# Patient Record
Sex: MALE | Race: WHITE | NOT HISPANIC OR LATINO | Employment: PART TIME | URBAN - METROPOLITAN AREA
[De-identification: names, ages, dates, MRNs, and addresses within clinical notes are randomized per-mention and may not be internally consistent; named-entity substitution may affect disease eponyms.]

---

## 2017-01-04 ENCOUNTER — OFFICE VISIT (OUTPATIENT)
Dept: FAMILY MEDICINE CLINIC | Facility: CLINIC | Age: 72
End: 2017-01-04

## 2017-01-04 VITALS
HEIGHT: 70 IN | TEMPERATURE: 97.5 F | HEART RATE: 70 BPM | RESPIRATION RATE: 16 BRPM | DIASTOLIC BLOOD PRESSURE: 83 MMHG | BODY MASS INDEX: 26.2 KG/M2 | SYSTOLIC BLOOD PRESSURE: 147 MMHG | WEIGHT: 183 LBS

## 2017-01-04 DIAGNOSIS — E78.2 MIXED HYPERLIPIDEMIA: Primary | ICD-10-CM

## 2017-01-04 PROBLEM — E78.5 HYPERLIPIDEMIA: Status: ACTIVE | Noted: 2017-01-04

## 2017-01-04 PROCEDURE — 99213 OFFICE O/P EST LOW 20 MIN: CPT | Performed by: FAMILY MEDICINE

## 2017-01-04 RX ORDER — NIACIN 500 MG
500 TABLET ORAL NIGHTLY
COMMUNITY
End: 2018-01-11

## 2017-01-04 NOTE — MR AVS SNAPSHOT
Td Melendez Jr.   2017 3:00 PM   Office Visit    Provider:  Beau Lee MD   Department:  [facility]   Dept Phone:  [phone]                Your Full Care Plan              Today's Medication Changes          These changes are accurate as of: 17  3:26 PM.  If you have any questions, ask your nurse or doctor.               Stop taking medication(s)listed here:     cetirizine 10 MG tablet   Commonly known as:  zyrTEC   Stopped by:  Beau Lee MD           vitamin b complex capsule capsule   Stopped by:  Beau Lee MD           Vitamin D (Cholecalciferol) 1000 UNITS capsule   Stopped by:  Beau Lee MD                      Your Updated Medication List          This list is accurate as of: 17  3:26 PM.  Always use your most recent med list.                alosetron 0.5 MG tablet   Commonly known as:  LOTRONEX       ALPRAZolam 0.25 MG tablet   Commonly known as:  XANAX       aspirin 81 MG chewable tablet       ezetimibe 10 MG tablet   Commonly known as:  ZETIA       glucosamine-chondroitin 500-400 MG capsule capsule       LIVALO 4 MG tablet   Generic drug:  Pitavastatin Calcium       niacin 500 MG tablet               You Were Diagnosed With        Codes Comments    Mixed hyperlipidemia    -  Primary ICD-10-CM: E78.2  ICD-9-CM: 272.2 new issue for me to address      Instructions     None    Patient Instructions History      eXelate Signup     [facility] eXelate allows you to send messages to your doctor, view your test results, renew your prescriptions, schedule appointments, and more. To sign up, go to [employer] and click on the Sign Up Now link in the New User? box. Enter your e[employer] Activation Code exactly as it appears below along with the last four digits of your Social Security Number and your Date of Birth () to complete the sign-up process. If you do not sign up before the expiration date, you must  "request a new code.    [employer] Activation Code: [email or web address]  Expires: 1/18/2017  3:23 PM    If you have questions, you can email [email or web address] or call [phone] to talk to our [employer] staff. Remember, [employer] is NOT to be used for urgent needs. For medical emergencies, dial 911.               Other Info from Your Visit           Your Appointments     Jul 12, 2017  3:15 PM EDT   Office Visit with Beau Lee MD   [facility] (--)    [address] [place] [id number] [phone]           Arrive 15 minutes prior to appointment.              Allergies     Augmentin [Amoxicillin-pot Clavulanate] Allergy Hives, Itching      Reason for Visit     Hyperlipidemia           Vital Signs     Blood Pressure Pulse Temperature Respirations Height Weight    147/83 70 97.5 °F (36.4 °C) (Oral) 16 70\" (177.8 cm) 183 lb (83 kg)    Body Mass Index Smoking Status                26.26 kg/m2 Former Smoker          Problems and Diagnoses Noted     High cholesterol or triglycerides      "

## 2017-01-04 NOTE — PROGRESS NOTES
"Subjective   Td Melendez Jr. is a 71 y.o. male.     History of Present Illness     Chief Complaint:   Chief Complaint   Patient presents with   • Hyperlipidemia       Td Melendez Jr. 71 y.o. male who presents today to establish care for Medical Management of the below listed issues and medication refills.  he has a history of   Patient Active Problem List   Diagnosis   • CAD (coronary artery disease)   • Hyperlipidemia   .  Since the last visit, he has overall felt well.  he has been compliant with   Current Outpatient Prescriptions:   •  niacin 500 MG tablet, Take 500 mg by mouth Every Night., Disp: , Rfl:   •  alosetron (LOTRONEX) 0.5 MG tablet, Take 0.5 mg by mouth. q other day, Disp: , Rfl:   •  ALPRAZolam (XANAX) 0.25 MG tablet, Take 0.25 mg by mouth as needed for anxiety., Disp: , Rfl:   •  aspirin 81 MG chewable tablet, Chew 81 mg daily., Disp: , Rfl:   •  ezetimibe (ZETIA) 10 MG tablet, Take 10 mg by mouth daily., Disp: , Rfl:   •  glucosamine-chondroitin 500-400 MG capsule capsule, Take  by mouth daily., Disp: , Rfl:   •  Pitavastatin Calcium (LIVALO) 4 MG tablet, Take  by mouth every night., Disp: , Rfl: .  he denies medication side effects.    He is UTD with his cardiologist. Most recent LDL was 63 (November 2016) with a slightly low HDL.    All of the chronic condition(s) listed above are stable w/o issues.    Sees GI in [place] that gives him the Lotronex.    BPs at home average 120's systolics.    Had myalgia issues with prior statins, too.     Visit Vitals   • /83   • Pulse 70   • Temp 97.5 °F (36.4 °C) (Oral)   • Resp 16   • Ht 70\" (177.8 cm)   • Wt 183 lb (83 kg)   • BMI 26.26 kg/m2       Results for orders placed or performed during the hospital encounter of 04/28/16   POCT rapid strep A   Result Value Ref Range    Rapid Strep A Screen Negative Negative, VALID, INVALID         The following portions of the patient's history were reviewed and updated as appropriate: allergies, " current medications, past family history, past medical history, past social history, past surgical history and problem list.    Review of Systems   Constitutional: Negative for activity change, chills, fatigue and fever.   Respiratory: Negative for cough and chest tightness.    Cardiovascular: Negative for chest pain and palpitations.   Gastrointestinal: Negative for abdominal pain and nausea.   Endocrine: Negative for cold intolerance and polydipsia.   Psychiatric/Behavioral: Negative for behavioral problems and dysphoric mood.   All other systems reviewed and are negative.      Objective   Physical Exam   Constitutional: He appears well-developed and well-nourished.   Neck: Neck supple. No thyromegaly present.   Cardiovascular: Normal rate and regular rhythm.    No murmur heard.  Pulmonary/Chest: Effort normal and breath sounds normal.   Abdominal: Bowel sounds are normal.   Psychiatric: He has a normal mood and affect. His behavior is normal.   Nursing note and vitals reviewed.      Assessment/Plan   Td was seen today for hyperlipidemia.    Diagnoses and all orders for this visit:    Mixed hyperlipidemia  Comments:  new issue for me to address      Diet/exercise discussed.  Ancillary, pt given samples of Viagra 50mg to try due to some chronic ED issues.

## 2017-07-12 ENCOUNTER — OFFICE VISIT (OUTPATIENT)
Dept: FAMILY MEDICINE CLINIC | Facility: CLINIC | Age: 72
End: 2017-07-12

## 2017-07-12 VITALS
SYSTOLIC BLOOD PRESSURE: 141 MMHG | OXYGEN SATURATION: 98 % | TEMPERATURE: 99.1 F | WEIGHT: 191 LBS | HEART RATE: 66 BPM | HEIGHT: 70 IN | DIASTOLIC BLOOD PRESSURE: 67 MMHG | BODY MASS INDEX: 27.35 KG/M2 | RESPIRATION RATE: 16 BRPM

## 2017-07-12 DIAGNOSIS — E78.2 MIXED HYPERLIPIDEMIA: Primary | ICD-10-CM

## 2017-07-12 DIAGNOSIS — I25.10 CORONARY ARTERY DISEASE INVOLVING NATIVE CORONARY ARTERY OF NATIVE HEART WITHOUT ANGINA PECTORIS: ICD-10-CM

## 2017-07-12 PROCEDURE — 99213 OFFICE O/P EST LOW 20 MIN: CPT | Performed by: FAMILY MEDICINE

## 2017-07-12 NOTE — PROGRESS NOTES
"Subjective   Td Melendez Jr. is a 71 y.o. male.     History of Present Illness     Chief Complaint:   Chief Complaint   Patient presents with   • Hyperlipidemia   • Coronary Artery Disease       Td Melendez Jr. 71 y.o. male who presents today for Medical Management of the below listed issues and medication refills.  he has a history of   Patient Active Problem List   Diagnosis   • CAD (coronary artery disease)   • Hyperlipidemia   .  Since the last visit, he has overall felt well.  he has been compliant with   Current Outpatient Prescriptions:   •  alosetron (LOTRONEX) 0.5 MG tablet, Take 0.5 mg by mouth. q other day, Disp: , Rfl:   •  ALPRAZolam (XANAX) 0.25 MG tablet, Take 0.25 mg by mouth as needed for anxiety., Disp: , Rfl:   •  aspirin 81 MG chewable tablet, Chew 81 mg daily., Disp: , Rfl:   •  ezetimibe (ZETIA) 10 MG tablet, Take 10 mg by mouth daily., Disp: , Rfl:   •  glucosamine-chondroitin 500-400 MG capsule capsule, Take  by mouth daily., Disp: , Rfl:   •  niacin 500 MG tablet, Take 500 mg by mouth Every Night., Disp: , Rfl:   •  Pitavastatin Calcium (LIVALO) 4 MG tablet, Take  by mouth every night., Disp: , Rfl: .  he denies medication side effects.    Recent labs from June show TC= 127, LDL=65, HDL=40, TEl=665, glucose=86, PSA=4.2.  All of the chronic condition(s) listed above are stable w/o issues.    /67  Pulse 66  Temp 99.1 °F (37.3 °C) (Oral)   Resp 16  Ht 70\" (177.8 cm)  Wt 191 lb (86.6 kg)  SpO2 98%  BMI 27.41 kg/m2    Results for orders placed or performed during the hospital encounter of 04/28/16   POCT rapid strep A   Result Value Ref Range    Rapid Strep A Screen Negative Negative, VALID, INVALID         The following portions of the patient's history were reviewed and updated as appropriate: allergies, current medications, past family history, past medical history, past social history, past surgical history and problem list.    Review of Systems   Constitutional: Negative " for activity change, chills, fatigue and fever.   Respiratory: Negative for cough and shortness of breath.    Cardiovascular: Negative for chest pain and palpitations.   Gastrointestinal: Negative for abdominal pain.   Endocrine: Negative for cold intolerance.   Psychiatric/Behavioral: Negative for behavioral problems and dysphoric mood. The patient is not nervous/anxious.        Objective   Physical Exam   Constitutional: He appears well-developed and well-nourished.   Neck: Neck supple. No thyromegaly present.   Cardiovascular: Normal rate and regular rhythm.    No murmur heard.  Pulmonary/Chest: Effort normal and breath sounds normal.   Abdominal: Bowel sounds are normal.   Psychiatric: He has a normal mood and affect. His behavior is normal.   Nursing note and vitals reviewed.      Assessment/Plan   Td was seen today for hyperlipidemia and coronary artery disease.    Diagnoses and all orders for this visit:    Mixed hyperlipidemia    Coronary artery disease involving native coronary artery of native heart without angina pectoris    Pt has plenty of medications currently and will continue same.    Offered urology consult for the PSA yet pt declines as he reports this has been around that level for some time.

## 2017-07-13 ENCOUNTER — OFFICE VISIT (OUTPATIENT)
Dept: CARDIOLOGY | Facility: CLINIC | Age: 72
End: 2017-07-13

## 2017-07-13 VITALS
HEIGHT: 70 IN | DIASTOLIC BLOOD PRESSURE: 80 MMHG | HEART RATE: 70 BPM | BODY MASS INDEX: 27.06 KG/M2 | WEIGHT: 189 LBS | SYSTOLIC BLOOD PRESSURE: 112 MMHG

## 2017-07-13 DIAGNOSIS — I25.10 CORONARY ARTERY DISEASE INVOLVING NATIVE CORONARY ARTERY OF NATIVE HEART WITHOUT ANGINA PECTORIS: Primary | ICD-10-CM

## 2017-07-13 PROCEDURE — 99213 OFFICE O/P EST LOW 20 MIN: CPT | Performed by: INTERNAL MEDICINE

## 2017-07-14 PROCEDURE — 93000 ELECTROCARDIOGRAM COMPLETE: CPT | Performed by: INTERNAL MEDICINE

## 2017-07-14 NOTE — PROGRESS NOTES
Date of Office Visit: 2017  Encounter Provider: Obi Boucher MD  Place of Service: [facility] CARDIOLOGY  Patient Name: Td Melendez Jr.  :1945    Chief Complaint   Patient presents with   • Coronary Artery Disease     1 year   :     HPI: Td Melendez Jr. is a 71 y.o. male who presents today to followup. He has a history of unstable angina and underwent drug-eluting stent placement to the left anterior descending artery and the right coronary artery in 2015. He has a nonobstructive lesion in the circumflex. In 2015, he presented with chest discomfort and he was quite anxious as it was right after the passing of his mother. He had a Cardiolite stress test, which was unremarkable. He has had no chest pain since then and is doing really done well.     Past Medical History:   Diagnosis Date   • Anxiety    • CAD (coronary artery disease)     unstable angina, 2015: 20% LM, long 70% LAD with abnormal FFR (s/p 2.81m51lg Xience), 50% OM1, 99% prox RCA (s/p 3.5x18m Xience).  Normal Cardiolite 2015.    • Fibromyalgia    • H/O complete eye exam    • Hypercholesterolemia    • Hyperlipidemia    • IBS (irritable bowel syndrome)    • Knee pain        Past Surgical History:   Procedure Laterality Date   • CARDIAC CATHETERIZATION      [facility] 2015: cath with 20% LM long 70% LAD (with positive FFR), 50% OM1, 99% prox RCA, s/p 2.75x33 Xience Alpine to LAD and 3.5x18mm Xience Alpine to RCA. LVEF 64% Normal Cardiolite 2015   • CARDIAC SURGERY      cardiac stents, 2   • COLONOSCOPY  5 years       Social History     Social History   • Marital status:      Spouse name: N/A   • Number of children: N/A   • Years of education: N/A     Occupational History   • Not on file.     Social History Main Topics   • Smoking status: Former Smoker   • Smokeless tobacco: Not on file   • Alcohol use No      Comment: caffeine use   • Drug use: No   • Sexual activity: Not on  "file     Other Topics Concern   • Not on file     Social History Narrative       Family History   Problem Relation Age of Onset   • Coronary artery disease Other    • Cervical cancer Mother    • Cancer Mother    • Depression Mother    • Heart disease Mother    • Liver disease Mother    • Alcohol abuse Mother    • Thyroid cancer Father    • Cancer Father    • Depression Father    • Diabetes Father    • Thyroid disease Father        Review of Systems   All other systems reviewed and are negative.      Allergies   Allergen Reactions   • Augmentin [Amoxicillin-Pot Clavulanate] Hives and Itching         Current Outpatient Prescriptions:   •  alosetron (LOTRONEX) 0.5 MG tablet, Take 0.5 mg by mouth. q other day, Disp: , Rfl:   •  ALPRAZolam (XANAX) 0.25 MG tablet, Take 0.25 mg by mouth as needed for anxiety., Disp: , Rfl:   •  aspirin 81 MG chewable tablet, Chew 81 mg daily., Disp: , Rfl:   •  ezetimibe (ZETIA) 10 MG tablet, Take 10 mg by mouth daily., Disp: , Rfl:   •  glucosamine-chondroitin 500-400 MG capsule capsule, Take  by mouth daily., Disp: , Rfl:   •  niacin 500 MG tablet, Take 500 mg by mouth Every Night., Disp: , Rfl:   •  Pitavastatin Calcium (LIVALO) 4 MG tablet, Take  by mouth every night., Disp: , Rfl:      Objective:     Vitals:    07/13/17 1352   BP: 112/80   BP Location: Left arm   Pulse: 70   Weight: 189 lb (85.7 kg)   Height: 70\" (177.8 cm)     Body mass index is 27.12 kg/(m^2).    Physical Exam   Constitutional: He is oriented to person, place, and time. He appears well-developed and well-nourished.   HENT:   Head: Normocephalic.   Nose: Nose normal.   Mouth/Throat: Oropharynx is clear and moist.   Eyes: Conjunctivae and EOM are normal. Pupils are equal, round, and reactive to light.   Neck: Normal range of motion. No JVD present.   Cardiovascular: Normal rate, regular rhythm, normal heart sounds and intact distal pulses.    No murmur heard.  Pulmonary/Chest: Effort normal and breath sounds normal. "   Abdominal: Soft. He exhibits no mass. There is no tenderness.   Musculoskeletal: Normal range of motion. He exhibits no edema.   Lymphadenopathy:     He has no cervical adenopathy.   Neurological: He is alert and oriented to person, place, and time. No cranial nerve deficit.   Skin: Skin is warm and dry. No rash noted.   Psychiatric: He has a normal mood and affect. His behavior is normal. Judgment and thought content normal.   Vitals reviewed.        ECG 12 Lead  Date/Time: 7/14/2017 4:03 PM  Performed by: OBI BOUCHER  Authorized by: OBI BOUCHER   Comparison: compared with previous ECG   Similar to previous ECG  Rhythm: sinus rhythm  Conduction: conduction normal  ST Segments: ST segments normal  T Waves: T waves normal  QRS axis: normal  Other: no other findings  Clinical impression: normal ECG              Assessment:       Diagnosis Plan   1. Coronary artery disease involving native coronary artery of native heart without angina pectoris            Plan:       1.  Coronary Artery Disease  Assessment  • The patient has no angina  • He's doing well.  He's on ezetimibe and pitavastatin.    Subjective - Objective  • There has been a previous stent procedure using MADELIN 7/2015  • Current antiplatelet therapy includes aspirin 81 mg          Sincerely,       Obi Boucher MD

## 2017-07-19 ENCOUNTER — TELEPHONE (OUTPATIENT)
Dept: CARDIOLOGY | Facility: CLINIC | Age: 72
End: 2017-07-19

## 2017-07-19 NOTE — TELEPHONE ENCOUNTER
----- Message from Lakshmi Padgett MA sent at 7/13/2017  2:48 PM EDT -----  Call Dr. Castillo Mancia's office for lab work .

## 2017-09-05 RX ORDER — LIDOCAINE 50 MG/G
1 PATCH TOPICAL EVERY 24 HOURS
Qty: 30 PATCH | Refills: 5 | Status: SHIPPED | OUTPATIENT
Start: 2017-09-05 | End: 2018-01-11

## 2017-09-05 RX ORDER — METHYLPREDNISOLONE 4 MG/1
TABLET ORAL
Qty: 21 TABLET | Refills: 5 | Status: SHIPPED | OUTPATIENT
Start: 2017-09-05 | End: 2017-10-05

## 2017-09-05 RX ORDER — CYCLOBENZAPRINE HCL 10 MG
10 TABLET ORAL 2 TIMES DAILY PRN
Qty: 60 TABLET | Refills: 1 | Status: SHIPPED | OUTPATIENT
Start: 2017-09-05 | End: 2017-10-05

## 2017-09-28 ENCOUNTER — TELEPHONE (OUTPATIENT)
Dept: FAMILY MEDICINE CLINIC | Facility: CLINIC | Age: 72
End: 2017-09-28

## 2017-09-28 DIAGNOSIS — I25.10 CORONARY ARTERY DISEASE INVOLVING NATIVE CORONARY ARTERY OF NATIVE HEART WITHOUT ANGINA PECTORIS: Primary | ICD-10-CM

## 2017-09-28 DIAGNOSIS — E78.2 MIXED HYPERLIPIDEMIA: ICD-10-CM

## 2017-09-28 RX ORDER — EZETIMIBE 10 MG/1
10 TABLET ORAL DAILY
Qty: 90 TABLET | Refills: 2 | Status: SHIPPED | OUTPATIENT
Start: 2017-09-28 | End: 2018-06-28 | Stop reason: SDUPTHER

## 2017-09-28 NOTE — TELEPHONE ENCOUNTER
Pt need refill. Also asked pt to see Dr. Lee regarding something for anxiety, because we do not do long term scheduled medications

## 2017-10-05 ENCOUNTER — OFFICE VISIT (OUTPATIENT)
Dept: FAMILY MEDICINE CLINIC | Facility: CLINIC | Age: 72
End: 2017-10-05

## 2017-10-05 VITALS
HEIGHT: 70 IN | BODY MASS INDEX: 27.06 KG/M2 | RESPIRATION RATE: 16 BRPM | DIASTOLIC BLOOD PRESSURE: 72 MMHG | HEART RATE: 70 BPM | TEMPERATURE: 98.1 F | WEIGHT: 189 LBS | SYSTOLIC BLOOD PRESSURE: 131 MMHG

## 2017-10-05 DIAGNOSIS — R97.20 ELEVATED PSA: ICD-10-CM

## 2017-10-05 DIAGNOSIS — E78.2 MIXED HYPERLIPIDEMIA: ICD-10-CM

## 2017-10-05 DIAGNOSIS — Z00.00 MEDICARE ANNUAL WELLNESS VISIT, SUBSEQUENT: Primary | ICD-10-CM

## 2017-10-05 DIAGNOSIS — M54.50 CHRONIC RIGHT-SIDED LOW BACK PAIN WITHOUT SCIATICA: ICD-10-CM

## 2017-10-05 DIAGNOSIS — G89.29 CHRONIC RIGHT-SIDED LOW BACK PAIN WITHOUT SCIATICA: ICD-10-CM

## 2017-10-05 PROCEDURE — 99214 OFFICE O/P EST MOD 30 MIN: CPT | Performed by: FAMILY MEDICINE

## 2017-10-05 PROCEDURE — G0439 PPPS, SUBSEQ VISIT: HCPCS | Performed by: FAMILY MEDICINE

## 2017-10-05 NOTE — PROGRESS NOTES
QUICK REFERENCE INFORMATION:  The ABCs of the Annual Wellness Visit    Subsequent Medicare Wellness Visit    HEALTH RISK ASSESSMENT    1945    Recent Hospitalizations:  No hospitalization(s) within the last year..        Current Medical Providers:  Patient Care Team:  Beau Lee MD as PCP - General (Family Medicine)  Obi Boucher MD as Consulting Physician (Cardiology)        Smoking Status:  History   Smoking Status   • Former Smoker   Smokeless Tobacco   • Never Used       Alcohol Consumption:  History   Alcohol Use No     Comment: caffeine use       Depression Screen:   PHQ-2/PHQ-9 Depression Screening 10/5/2017   Little interest or pleasure in doing things 2   Feeling down, depressed, or hopeless 0   Trouble falling or staying asleep, or sleeping too much 0   Feeling tired or having little energy 0   Poor appetite or overeating 1   Feeling bad about yourself - or that you are a failure or have let yourself or your family down 0   Trouble concentrating on things, such as reading the newspaper or watching television 0   Moving or speaking so slowly that other people could have noticed. Or the opposite - being so fidgety or restless that you have been moving around a lot more than usual 0   Thoughts that you would be better off dead, or of hurting yourself in some way 0   Total Score 3   If you checked off any problems, how difficult have these problems made it for you to do your work, take care of things at home, or get along with other people? Somewhat difficult       Health Habits and Functional and Cognitive Screening:  Functional & Cognitive Status 10/5/2017   Do you have difficulty preparing food and eating? No   Do you have difficulty bathing yourself? No   Do you have difficulty getting dressed? No   Do you have difficulty using the toilet? No   Do you have difficulty moving around from place to place? No   In the past year have you fallen or experienced a near fall? No   Do you need help using  the phone?  No   Are you deaf or do you have serious difficulty hearing?  No   Do you need help with transportation? No   Do you need help shopping? No   Do you need help preparing meals?  No   Do you need help with housework?  No   Do you need help with laundry? No   Do you need help taking your medications? No   Do you need help managing money? No   Do you have difficulty concentrating, remembering or making decisions? No       Health Habits  Current Diet: Limited Junk Food  Dental Exam: Up to date  Eye Exam: Not up to date  Exercise (times per week): 7 times per week  Current Exercise Activities Include: Yoga      Does the patient have evidence of cognitive impairment? No    Aspirin use counseling: Taking ASA appropriately as indicated      Recent Lab Results:  CMP:  Lab Results   Component Value Date    BUN 20 11/22/2015    CREATININE 0.89 11/22/2015     11/22/2015    K 3.9 11/22/2015    CO2 26 11/22/2015    CALCIUM 9.2 11/22/2015    ALBUMIN 4.3 11/22/2015    BILITOT 0.8 11/22/2015    ALKPHOS 68 11/22/2015    AST 25 11/22/2015    ALT 30 11/22/2015     Lipid Panel:     HbA1c:       Visual Acuity:  No exam data present    Age-appropriate Screening Schedule:  Refer to the list below for future screening recommendations based on patient's age, sex and/or medical conditions. Orders for these recommended tests are listed in the plan section. The patient has been provided with a written plan.    Health Maintenance   Topic Date Due   • LIPID PANEL  01/04/2017   • INFLUENZA VACCINE  08/01/2017   • PNEUMOCOCCAL VACCINES (65+ LOW/MEDIUM RISK) (2 of 2 - PPSV23) 07/04/2018 (Originally 1/1/2017)   • TDAP/TD VACCINES (2 - Td) 01/01/2018   • COLONOSCOPY  06/01/2021   • ZOSTER VACCINE  Completed        Subjective   History of Present Illness    Td Melendez Jr. is a 71 y.o. male who presents for an Subsequent Wellness Visit.    The following portions of the patient's history were reviewed and updated as appropriate:  "allergies, current medications, past family history, past medical history, past social history, past surgical history and problem list.    Outpatient Medications Prior to Visit   Medication Sig Dispense Refill   • alosetron (LOTRONEX) 0.5 MG tablet Take 0.5 mg by mouth. q other day     • ALPRAZolam (XANAX) 0.25 MG tablet Take 0.25 mg by mouth as needed for anxiety.     • aspirin 81 MG chewable tablet Chew 81 mg daily.     • ezetimibe (ZETIA) 10 MG tablet Take 1 tablet by mouth Daily. 90 tablet 2   • glucosamine-chondroitin 500-400 MG capsule capsule Take  by mouth daily.     • lidocaine (LIDODERM) 5 % Place 1 patch on the skin Daily. Remove & Discard patch within 12 hours or as directed by MD 30 patch 5   • niacin 500 MG tablet Take 500 mg by mouth Every Night.     • Pitavastatin Calcium (LIVALO) 4 MG tablet Take 1 tablet by mouth Every Night. 90 tablet 2   • cyclobenzaprine (FLEXERIL) 10 MG tablet Take 1 tablet by mouth 2 (Two) Times a Day As Needed for Muscle Spasms. 60 tablet 1   • MethylPREDNISolone (MEDROL, BUCKY,) 4 MG tablet Take as directed on package instructions. 21 tablet 5     No facility-administered medications prior to visit.        Patient Active Problem List   Diagnosis   • CAD (coronary artery disease)   • Hyperlipidemia       Advance Care Planning:  has an advance directive - a copy HAS NOT been provided    Identification of Risk Factors:  Risk factors include: cardiovascular risk.    Review of Systems    Compared to one year ago, the patient feels his physical health is the same.  Compared to one year ago, the patient feels his mental health is the same.    Objective     Physical Exam    Vitals:    10/05/17 0921   BP: 131/72   Pulse: 70   Resp: 16   Temp: 98.1 °F (36.7 °C)   TempSrc: Oral   Weight: 189 lb (85.7 kg)   Height: 70\" (177.8 cm)   PainSc: 4  Comment: low back pain       Body mass index is 27.12 kg/(m^2).  Discussed the patient's BMI with him. The BMI is in the acceptable " range.    Assessment/Plan   Patient Self-Management and Personalized Health Advice  The patient has been provided with information about: diet, exercise and weight management and preventive services including:   · Exercise counseling provided, Fall Risk assessment done, Nutrition counseling provided.    Visit Diagnoses:    ICD-10-CM ICD-9-CM   1. Medicare annual wellness visit, subsequent Z00.00 V70.0   2. Chronic right-sided low back pain without sciatica M54.5 724.2    G89.29 338.29   3. Mixed hyperlipidemia E78.2 272.2   4. Elevated PSA R97.20 790.93       Orders Placed This Encounter   Procedures   • Lipid Panel     Standing Status:   Future   • Comprehensive Metabolic Panel     Standing Status:   Future   • PSA     Standing Status:   Future       Outpatient Encounter Prescriptions as of 10/5/2017   Medication Sig Dispense Refill   • alosetron (LOTRONEX) 0.5 MG tablet Take 0.5 mg by mouth. q other day     • ALPRAZolam (XANAX) 0.25 MG tablet Take 0.25 mg by mouth as needed for anxiety.     • aspirin 81 MG chewable tablet Chew 81 mg daily.     • diclofenac (VOLTAREN) 50 MG EC tablet Take 1 tablet by mouth 2 (Two) Times a Day. 60 tablet 5   • ezetimibe (ZETIA) 10 MG tablet Take 1 tablet by mouth Daily. 90 tablet 2   • glucosamine-chondroitin 500-400 MG capsule capsule Take  by mouth daily.     • lidocaine (LIDODERM) 5 % Place 1 patch on the skin Daily. Remove & Discard patch within 12 hours or as directed by MD 30 patch 5   • niacin 500 MG tablet Take 500 mg by mouth Every Night.     • Pitavastatin Calcium (LIVALO) 4 MG tablet Take 1 tablet by mouth Every Night. 90 tablet 2   • [DISCONTINUED] cyclobenzaprine (FLEXERIL) 10 MG tablet Take 1 tablet by mouth 2 (Two) Times a Day As Needed for Muscle Spasms. 60 tablet 1   • [DISCONTINUED] MethylPREDNISolone (MEDROL, BUCKY,) 4 MG tablet Take as directed on package instructions. 21 tablet 5     No facility-administered encounter medications on file as of 10/5/2017.         Reviewed use of high risk medication in the elderly: not applicable  Reviewed for potential of harmful drug interactions in the elderly: not applicable    Follow Up:  No Follow-up on file.     An After Visit Summary and PPPS with all of these plans were given to the patient.

## 2017-10-05 NOTE — PROGRESS NOTES
"Subjective   Td Melendez Jr. is a 71 y.o. male.     CC: Back Pain    History of Present Illness     Pt comes in today c/o LBP since stepping off a curb recently.  This happened Labor Day weekend and, the day after the curb issue, he walked a whole lot the next day, further aggravating it. Has had back trouble going back to the early 2000's. Had MRI then. Hurt more with standing and sitting. Is some better since it happened. Pain is right-sided and does not radiate. Denies spasms currently. \"Achy\". Used Prednisone early in September. Using ASA QD. Doing some home PT he's learned prior.       The following portions of the patient's history were reviewed and updated as appropriate: allergies, current medications, past family history, past medical history, past social history, past surgical history and problem list.    Review of Systems   Constitutional: Negative for activity change, chills, fatigue and fever.   Respiratory: Negative for cough and shortness of breath.    Cardiovascular: Negative for chest pain and palpitations.   Gastrointestinal: Negative for abdominal pain.   Endocrine: Negative for cold intolerance.   Musculoskeletal: Positive for back pain.   Neurological: Negative for weakness and numbness.   Psychiatric/Behavioral: Negative for behavioral problems and dysphoric mood. The patient is not nervous/anxious.      /72  Pulse 70  Temp 98.1 °F (36.7 °C) (Oral)   Resp 16  Ht 70\" (177.8 cm)  Wt 189 lb (85.7 kg)  BMI 27.12 kg/m2    Objective   Physical Exam   Constitutional: He appears well-developed and well-nourished.   Neck: Neck supple. No thyromegaly present.   Cardiovascular: Normal rate and regular rhythm.    No murmur heard.  Pulmonary/Chest: Effort normal and breath sounds normal.   Abdominal: Bowel sounds are normal.   Musculoskeletal: He exhibits tenderness (mildly tender right facet region, lower lumbar).   Negative SLR   Psychiatric: He has a normal mood and affect. His behavior " is normal.   Nursing note and vitals reviewed.  Discussed labs his prior Md completed in June esp.regarding the mildly elevated PSA. He wishes to monitor with labs at this time vs seeing urology.    Assessment/Plan   Td was seen today for medicare wellness exam and low back pain.    Diagnoses and all orders for this visit:    Medicare annual wellness visit, subsequent    Chronic right-sided low back pain without sciatica  -     diclofenac (VOLTAREN) 50 MG EC tablet; Take 1 tablet by mouth 2 (Two) Times a Day.    Mixed hyperlipidemia  -     Lipid Panel; Future  -     Comprehensive Metabolic Panel; Future    Elevated PSA  -     PSA; Future

## 2017-10-05 NOTE — PATIENT INSTRUCTIONS
Medicare Wellness  Personal Prevention Plan of Service     Date of Office Visit:  10/05/2017  Encounter Provider:  Beau Lee MD  Place of Service:  [facility]  Patient Name: Td Melendez Jr.  :  1945    As part of the Medicare Wellness portion of your visit today, we are providing you with this personalized preventive plan of services (PPPS). This plan is based upon recommendations of the [place] Preventive Services Task Force (USPSTF) and the Advisory Committee on Immunization Practices (ACIP).    This lists the preventive care services that should be considered, and provides dates of when you are due. Items listed as completed are up-to-date and do not require any further intervention.    Health Maintenance   Topic Date Due   • LIPID PANEL  2017   • INFLUENZA VACCINE  2017   • PNEUMOCOCCAL VACCINES (65+ LOW/MEDIUM RISK) (2 of 2 - PPSV23) 2018 (Originally 2017)   • TDAP/TD VACCINES (2 - Td) 2018   • MEDICARE ANNUAL WELLNESS  10/05/2018   • COLONOSCOPY  2021   • ZOSTER VACCINE  Completed   • HEPATITIS C SCREENING  Excluded   • AAA SCREEN (ONE-TIME)  Excluded       Orders Placed This Encounter   Procedures   • Lipid Panel     Standing Status:   Future   • Comprehensive Metabolic Panel     Standing Status:   Future   • PSA     Standing Status:   Future       No Follow-up on file.

## 2017-11-09 ENCOUNTER — TELEPHONE (OUTPATIENT)
Dept: FAMILY MEDICINE CLINIC | Facility: CLINIC | Age: 72
End: 2017-11-09

## 2017-11-09 DIAGNOSIS — N52.9 ERECTILE DYSFUNCTION, UNSPECIFIED ERECTILE DYSFUNCTION TYPE: Primary | ICD-10-CM

## 2017-11-09 RX ORDER — SILDENAFIL 50 MG/1
TABLET, FILM COATED ORAL
Qty: 20 TABLET | Refills: 6 | Status: SHIPPED | OUTPATIENT
Start: 2017-11-09 | End: 2017-11-10

## 2017-11-09 NOTE — TELEPHONE ENCOUNTER
----- Message from Td Melendez Jr. sent at 11/8/2017  3:09 PM EST -----  Regarding: RE: Prescription Question  Contact: [phone]  The [facility] on [address] in [place] ([phone]),  Sildenafil 20 mg, 20 tabs    Td Mendoza  ----- Message -----  From: Beau Lee MD  Sent: 11/8/2017 12:28 PM EST  To: Td Melendez Jr.  Subject: RE: Prescription Question    Insurance simply will not cover these except for very rare circumstances. I would be happy to send some in. Where would you like me to send them?          ----- Message -----     From: Td Melendez Jr.     Sent: 11/7/2017  3:04 PM EST       To: Beau Lee MD  Subject: Prescription Question    We had discussed possible prescription for Sildenafil in the past. A generic is available locally at 20mg dose. To get insurance coverage requires a coverage review. Is this an option? Full cost isn't prohibitive either. I was thinking 20 tablets.    Td Melendez

## 2017-11-10 RX ORDER — SILDENAFIL CITRATE 20 MG/1
TABLET ORAL
Qty: 30 TABLET | Refills: 11 | Status: SHIPPED | OUTPATIENT
Start: 2017-11-10 | End: 2019-10-04 | Stop reason: SDUPTHER

## 2017-11-16 ENCOUNTER — OFFICE VISIT (OUTPATIENT)
Dept: FAMILY MEDICINE CLINIC | Facility: CLINIC | Age: 72
End: 2017-11-16

## 2017-11-16 VITALS
BODY MASS INDEX: 27.2 KG/M2 | HEART RATE: 68 BPM | SYSTOLIC BLOOD PRESSURE: 135 MMHG | DIASTOLIC BLOOD PRESSURE: 78 MMHG | WEIGHT: 190 LBS | TEMPERATURE: 98.2 F | RESPIRATION RATE: 16 BRPM | HEIGHT: 70 IN

## 2017-11-16 DIAGNOSIS — M54.2 CERVICALGIA: Primary | ICD-10-CM

## 2017-11-16 PROCEDURE — 99214 OFFICE O/P EST MOD 30 MIN: CPT | Performed by: FAMILY MEDICINE

## 2017-11-16 PROCEDURE — 72050 X-RAY EXAM NECK SPINE 4/5VWS: CPT | Performed by: FAMILY MEDICINE
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
## 2017-12-05 ENCOUNTER — RESULTS ENCOUNTER (OUTPATIENT)
Dept: FAMILY MEDICINE CLINIC | Facility: CLINIC | Age: 72
End: 2017-12-05

## 2017-12-05 DIAGNOSIS — E78.2 MIXED HYPERLIPIDEMIA: ICD-10-CM

## 2017-12-05 DIAGNOSIS — R97.20 ELEVATED PSA: ICD-10-CM

## 2017-12-07 ENCOUNTER — TREATMENT (OUTPATIENT)
Dept: PHYSICAL THERAPY | Facility: CLINIC | Age: 72
End: 2017-12-07

## 2017-12-07 DIAGNOSIS — M54.2 PAIN, NECK: Primary | ICD-10-CM

## 2017-12-07 PROCEDURE — 97161 PT EVAL LOW COMPLEX 20 MIN: CPT | Performed by: PHYSICAL THERAPIST

## 2017-12-07 PROCEDURE — 97112 NEUROMUSCULAR REEDUCATION: CPT | Performed by: PHYSICAL THERAPIST

## 2017-12-07 PROCEDURE — 97110 THERAPEUTIC EXERCISES: CPT | Performed by: PHYSICAL THERAPIST
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
## 2017-12-07 NOTE — PATIENT INSTRUCTIONS
Sleep position and pillow height - Relax the Back recommendation  Pathology and involved anatomy  HEP performance  Please view My [facility] Td Melendez Jr. for a complete list of HEP instructions.  Handout given for ulnar nerve glide

## 2017-12-10 ENCOUNTER — APPOINTMENT (OUTPATIENT)
Dept: GENERAL RADIOLOGY | Facility: HOSPITAL | Age: 72
End: 2017-12-10

## 2017-12-10 ENCOUNTER — HOSPITAL ENCOUNTER (EMERGENCY)
Facility: HOSPITAL | Age: 72
Discharge: HOME | End: 2017-12-10
Attending: EMERGENCY MEDICINE | Admitting: EMERGENCY MEDICINE

## 2017-12-10 VITALS
HEIGHT: 70 IN | WEIGHT: 187 LBS | TEMPERATURE: 97.8 F | BODY MASS INDEX: 26.77 KG/M2 | DIASTOLIC BLOOD PRESSURE: 74 MMHG | RESPIRATION RATE: 16 BRPM | OXYGEN SATURATION: 93 % | SYSTOLIC BLOOD PRESSURE: 138 MMHG | HEART RATE: 63 BPM

## 2017-12-10 DIAGNOSIS — R07.9 CHEST PAIN, UNSPECIFIED TYPE: Primary | ICD-10-CM

## 2017-12-10 LAB
ALBUMIN SERPL-MCNC: 4.1 G/DL (ref 3.5–5.2)
ALBUMIN/GLOB SERPL: 1.5 G/DL
ALP SERPL-CCNC: 82 U/L (ref 39–117)
ALT SERPL W P-5'-P-CCNC: 29 U/L (ref 1–41)
ANION GAP SERPL CALCULATED.3IONS-SCNC: 11.4 MMOL/L
AST SERPL-CCNC: 20 U/L (ref 1–40)
BASOPHILS # BLD AUTO: 0.01 10*3/MM3 (ref 0–0.2)
BASOPHILS NFR BLD AUTO: 0.2 % (ref 0–1.5)
BILIRUB SERPL-MCNC: 0.7 MG/DL (ref 0.1–1.2)
BUN BLD-MCNC: 20 MG/DL (ref 8–23)
BUN/CREAT SERPL: 21.5 (ref 7–25)
CALCIUM SPEC-SCNC: 9.1 MG/DL (ref 8.6–10.5)
CHLORIDE SERPL-SCNC: 105 MMOL/L (ref 98–107)
CO2 SERPL-SCNC: 25.6 MMOL/L (ref 22–29)
CREAT BLD-MCNC: 0.93 MG/DL (ref 0.76–1.27)
D DIMER PPP FEU-MCNC: <0.27 MCGFEU/ML (ref 0–0.49)
DEPRECATED RDW RBC AUTO: 42.5 FL (ref 37–54)
EOSINOPHIL # BLD AUTO: 0.08 10*3/MM3 (ref 0–0.7)
EOSINOPHIL NFR BLD AUTO: 1.3 % (ref 0.3–6.2)
ERYTHROCYTE [DISTWIDTH] IN BLOOD BY AUTOMATED COUNT: 12.8 % (ref 11.5–14.5)
GFR SERPL CREATININE-BSD FRML MDRD: 80 ML/MIN/1.73
GLOBULIN UR ELPH-MCNC: 2.8 GM/DL
GLUCOSE BLD-MCNC: 104 MG/DL (ref 65–99)
HCT VFR BLD AUTO: 47.8 % (ref 40.4–52.2)
HGB BLD-MCNC: 16.6 G/DL (ref 13.7–17.6)
HOLD SPECIMEN: NORMAL
IMM GRANULOCYTES # BLD: 0 10*3/MM3 (ref 0–0.03)
IMM GRANULOCYTES NFR BLD: 0 % (ref 0–0.5)
LYMPHOCYTES # BLD AUTO: 1.65 10*3/MM3 (ref 0.9–4.8)
LYMPHOCYTES NFR BLD AUTO: 27.6 % (ref 19.6–45.3)
MCH RBC QN AUTO: 31.9 PG (ref 27–32.7)
MCHC RBC AUTO-ENTMCNC: 34.7 G/DL (ref 32.6–36.4)
MCV RBC AUTO: 91.7 FL (ref 79.8–96.2)
MONOCYTES # BLD AUTO: 0.47 10*3/MM3 (ref 0.2–1.2)
MONOCYTES NFR BLD AUTO: 7.9 % (ref 5–12)
NEUTROPHILS # BLD AUTO: 3.77 10*3/MM3 (ref 1.9–8.1)
NEUTROPHILS NFR BLD AUTO: 63 % (ref 42.7–76)
PLATELET # BLD AUTO: 166 10*3/MM3 (ref 140–500)
PMV BLD AUTO: 10.1 FL (ref 6–12)
POTASSIUM BLD-SCNC: 4.4 MMOL/L (ref 3.5–5.2)
PROT SERPL-MCNC: 6.9 G/DL (ref 6–8.5)
RBC # BLD AUTO: 5.21 10*6/MM3 (ref 4.6–6)
SODIUM BLD-SCNC: 142 MMOL/L (ref 136–145)
TROPONIN T SERPL-MCNC: <0.01 NG/ML (ref 0–0.03)
TROPONIN T SERPL-MCNC: <0.01 NG/ML (ref 0–0.03)
WBC NRBC COR # BLD: 5.98 10*3/MM3 (ref 4.5–10.7)
WHOLE BLOOD HOLD SPECIMEN: NORMAL

## 2017-12-10 PROCEDURE — 96375 TX/PRO/DX INJ NEW DRUG ADDON: CPT

## 2017-12-10 PROCEDURE — 85025 COMPLETE CBC W/AUTO DIFF WBC: CPT | Performed by: EMERGENCY MEDICINE

## 2017-12-10 PROCEDURE — 96374 THER/PROPH/DIAG INJ IV PUSH: CPT

## 2017-12-10 PROCEDURE — 25010000002 ONDANSETRON PER 1 MG: Performed by: EMERGENCY MEDICINE

## 2017-12-10 PROCEDURE — 85379 FIBRIN DEGRADATION QUANT: CPT | Performed by: EMERGENCY MEDICINE

## 2017-12-10 PROCEDURE — 99285 EMERGENCY DEPT VISIT HI MDM: CPT

## 2017-12-10 PROCEDURE — 36415 COLL VENOUS BLD VENIPUNCTURE: CPT

## 2017-12-10 PROCEDURE — 93010 ELECTROCARDIOGRAM REPORT: CPT | Performed by: INTERNAL MEDICINE

## 2017-12-10 PROCEDURE — 71020 HC CHEST PA AND LATERAL: CPT

## 2017-12-10 PROCEDURE — 80053 COMPREHEN METABOLIC PANEL: CPT | Performed by: EMERGENCY MEDICINE

## 2017-12-10 PROCEDURE — 93005 ELECTROCARDIOGRAM TRACING: CPT

## 2017-12-10 PROCEDURE — 84484 ASSAY OF TROPONIN QUANT: CPT | Performed by: EMERGENCY MEDICINE

## 2017-12-10 RX ORDER — FAMOTIDINE 10 MG/ML
20 INJECTION, SOLUTION INTRAVENOUS ONCE
Status: COMPLETED | OUTPATIENT
Start: 2017-12-10 | End: 2017-12-10

## 2017-12-10 RX ORDER — ONDANSETRON 2 MG/ML
4 INJECTION INTRAMUSCULAR; INTRAVENOUS ONCE
Status: COMPLETED | OUTPATIENT
Start: 2017-12-10 | End: 2017-12-10

## 2017-12-10 RX ORDER — ALUMINA, MAGNESIA, AND SIMETHICONE 2400; 2400; 240 MG/30ML; MG/30ML; MG/30ML
15 SUSPENSION ORAL ONCE
Status: COMPLETED | OUTPATIENT
Start: 2017-12-10 | End: 2017-12-10

## 2017-12-10 RX ORDER — SODIUM CHLORIDE 0.9 % (FLUSH) 0.9 %
10 SYRINGE (ML) INJECTION AS NEEDED
Status: DISCONTINUED | OUTPATIENT
Start: 2017-12-10 | End: 2017-12-10 | Stop reason: HOSPADM

## 2017-12-10 RX ORDER — UBIDECARENONE 100 MG
100 CAPSULE ORAL DAILY
COMMUNITY

## 2017-12-10 RX ORDER — MAGNESIUM HYDROXIDE/ALUMINUM HYDROXICE/SIMETHICONE 120; 1200; 1200 MG/30ML; MG/30ML; MG/30ML
30 SUSPENSION ORAL ONCE
Status: DISCONTINUED | OUTPATIENT
Start: 2017-12-10 | End: 2017-12-10

## 2017-12-10 RX ORDER — ASPIRIN 81 MG/1
324 TABLET, CHEWABLE ORAL ONCE
Status: COMPLETED | OUTPATIENT
Start: 2017-12-10 | End: 2017-12-10

## 2017-12-10 RX ADMIN — FAMOTIDINE 20 MG: 10 INJECTION, SOLUTION INTRAVENOUS at 09:33

## 2017-12-10 RX ADMIN — ASPIRIN 324 MG: 81 TABLET, CHEWABLE ORAL at 09:35

## 2017-12-10 RX ADMIN — ALUMINUM HYDROXIDE, MAGNESIUM HYDROXIDE, AND DIMETHICONE 15 ML: 400; 400; 40 SUSPENSION ORAL at 09:35

## 2017-12-10 RX ADMIN — ONDANSETRON 4 MG: 2 INJECTION INTRAMUSCULAR; INTRAVENOUS at 09:31

## 2017-12-10 NOTE — ED TRIAGE NOTES
"Pt to ER this AM due to \"chest pressure\" since 1030 last night.  Took a sildenafil with no change.  "

## 2017-12-10 NOTE — ED PROVIDER NOTES
" EMERGENCY DEPARTMENT ENCOUNTER    CHIEF COMPLAINT  Chief Complaint: Chest discomfort  History given by: Pt with spouse in the room  History limited by: Nothing  Room Number: 08/08  PMD: Beau Lee MD      HPI:  Pt is a 72 y.o. male who presents complaining of moderate chest discomfort at 2230 last night. Pt describes he discomfort as \"indigestion\" with a \"burning feeling all over.\" He states those sensations have been constant since 2230 last night when he was at rest. Pt also c/o nausea, but denies fever, chills, abdominal pain, vomiting, exertional CP, motor/sensory changes, recent illnesses. Pt reports his sx remind him of niacin, but reports he has not recently taken any niacin, but does report taking sildenafil 20 mg for erectile dysfunction last night 2 hrs PT symptoms. He reports his last cardiac evaluation was in 12/2015 with Dr. Boucher, and has a hx of 2 cardiac stents. He denies a hx of thoracic or abdominal AA.     Duration:  Since 2230 last night  Onset: gradual  Timing: constant  Location: generalized, but focalized in his chest  Radiation: none  Quality: \"burning all over\"  Intensity/Severity: moderate  Progression: unchanged  Associated Symptoms: abd pain  Aggravating Factors: none  Alleviating Factors: none  Previous Episodes: Pt reports a hx of cardiac stent placement in 12/2015  Treatment before arrival: Pt reports taking sildenafil last night    PAST MEDICAL HISTORY  Active Ambulatory Problems     Diagnosis Date Noted   • CAD (coronary artery disease)    • Hyperlipidemia 01/04/2017     Resolved Ambulatory Problems     Diagnosis Date Noted   • No Resolved Ambulatory Problems     Past Medical History:   Diagnosis Date   • Anxiety    • CAD (coronary artery disease)    • Fibromyalgia    • H/O complete eye exam 2014   • Hypercholesterolemia    • Hyperlipidemia    • IBS (irritable bowel syndrome)    • Knee pain        PAST SURGICAL HISTORY  Past Surgical History:   Procedure Laterality Date   • " "CARDIAC CATHETERIZATION      [place] 07/2015: cath with 20% LM long 70% LAD (with positive FFR), 50% OM1, 99% prox RCA, s/p 2.75x33 Xience Alpine to LAD and 3.5x18mm Xience Alpine to RCA. LVEF 64% Normal Cardiolite 11/2015   • CARDIAC SURGERY      cardiac stents, 2   • COLONOSCOPY  5 years       FAMILY HISTORY  Family History   Problem Relation Age of Onset   • Coronary artery disease Other    • Cervical cancer Mother    • Cancer Mother    • Depression Mother    • Heart disease Mother    • Liver disease Mother    • Alcohol abuse Mother    • Thyroid cancer Father    • Cancer Father    • Depression Father    • Diabetes Father    • Thyroid disease Father        SOCIAL HISTORY  Social History     Social History   • Marital status:      Spouse name: N/A   • Number of children: N/A   • Years of education: N/A     Occupational History   • Not on file.     Social History Main Topics   • Smoking status: Former Smoker   • Smokeless tobacco: Never Used   • Alcohol use No      Comment: caffeine use   • Drug use: No   • Sexual activity: Not on file     Other Topics Concern   • Not on file     Social History Narrative       ALLERGIES  Augmentin [amoxicillin-pot clavulanate]    REVIEW OF SYSTEMS  Review of Systems   Constitutional: Negative.  Negative for chills and fever.   HENT: Negative.  Negative for sore throat.    Eyes: Negative.    Respiratory: Negative.  Negative for cough.    Cardiovascular: Positive for chest pain (burning \"discomfort\").   Gastrointestinal: Positive for nausea.   Genitourinary: Negative.  Negative for dysuria.   Musculoskeletal: Negative.  Negative for back pain.   Skin: Negative.  Negative for rash.   Neurological: Negative.  Negative for headaches.       PHYSICAL EXAM  ED Triage Vitals   Temp Heart Rate Resp BP SpO2   12/10/17 0804 12/10/17 0804 12/10/17 0804 12/10/17 0837 12/10/17 0804   97.2 °F (36.2 °C) 64 18 168/84 98 %      Temp src Heart Rate Source Patient Position BP Location FiO2 (%)   -- " 12/10/17 0842 12/10/17 0837 12/10/17 0837 --    Monitor Sitting Right arm        Physical Exam   Constitutional: He is oriented to person, place, and time and well-developed, well-nourished, and in no distress. No distress.   HENT:   Head: Normocephalic and atraumatic.   Eyes: EOM are normal. Pupils are equal, round, and reactive to light.   Neck: Normal range of motion. Neck supple.   Cardiovascular: Normal rate, regular rhythm, normal heart sounds and intact distal pulses.    No murmur heard.  Pulmonary/Chest: Effort normal and breath sounds normal. No respiratory distress.   Abdominal: Soft. There is no tenderness. There is no rebound and no guarding.   Musculoskeletal: Normal range of motion. He exhibits no edema.   Neurological: He is alert and oriented to person, place, and time. He has normal sensation and normal strength.   Skin: Skin is warm and dry.   Psychiatric: Mood and affect normal. His mood appears anxious.   Nursing note and vitals reviewed.      LAB RESULTS  Lab Results (last 24 hours)     Procedure Component Value Units Date/Time    CBC & Differential [[id number]] Collected:  12/10/17 0841    Specimen:  Blood Updated:  12/10/17 0855    Narrative:       The following orders were created for panel order CBC & Differential.  Procedure                               Abnormality         Status                     ---------                               -----------         ------                     CBC Auto Differential[[id number]]        Normal              Final result                 Please view results for these tests on the individual orders.    Comprehensive Metabolic Panel [[id number]]  (Abnormal) Collected:  12/10/17 0841    Specimen:  Blood Updated:  12/10/17 0930     Glucose 104 (H) mg/dL      BUN 20 mg/dL      Creatinine 0.93 mg/dL      Sodium 142 mmol/L      Potassium 4.4 mmol/L      Chloride 105 mmol/L      CO2 25.6 mmol/L      Calcium 9.1 mg/dL      Total Protein 6.9 g/dL      Albumin 4.10  g/dL      ALT (SGPT) 29 U/L      AST (SGOT) 20 U/L      Alkaline Phosphatase 82 U/L      Total Bilirubin 0.7 mg/dL      eGFR Non African Amer 80 mL/min/1.73      Globulin 2.8 gm/dL      A/G Ratio 1.5 g/dL      BUN/Creatinine Ratio 21.5     Anion Gap 11.4 mmol/L     Troponin [[id number]]  (Normal) Collected:  12/10/17 0841    Specimen:  Blood Updated:  12/10/17 0930     Troponin T <0.010 ng/mL     Narrative:       Troponin T Reference Ranges:  Less than 0.03 ng/mL:    Negative for AMI  0.03 to 0.09 ng/mL:      Indeterminant for AMI  Greater than 0.09 ng/mL: Positive for AMI    CBC Auto Differential [[id number]]  (Normal) Collected:  12/10/17 0841    Specimen:  Blood Updated:  12/10/17 0855     WBC 5.98 10*3/mm3      RBC 5.21 10*6/mm3      Hemoglobin 16.6 g/dL      Hematocrit 47.8 %      MCV 91.7 fL      MCH 31.9 pg      MCHC 34.7 g/dL      RDW 12.8 %      RDW-SD 42.5 fl      MPV 10.1 fL      Platelets 166 10*3/mm3      Neutrophil % 63.0 %      Lymphocyte % 27.6 %      Monocyte % 7.9 %      Eosinophil % 1.3 %      Basophil % 0.2 %      Immature Grans % 0.0 %      Neutrophils, Absolute 3.77 10*3/mm3      Lymphocytes, Absolute 1.65 10*3/mm3      Monocytes, Absolute 0.47 10*3/mm3      Eosinophils, Absolute 0.08 10*3/mm3      Basophils, Absolute 0.01 10*3/mm3      Immature Grans, Absolute 0.00 10*3/mm3     D-dimer, Quantitative [[id number]]  (Normal) Collected:  12/10/17 0841    Specimen:  Blood Updated:  12/10/17 0951     D-Dimer, Quantitative <0.27 MCGFEU/mL     Narrative:       The Stago D-Dimer test used in conjunction with a clinical pretest probability (PTP) assessment model, has been approved by the FDA to rule out the presence of venous thromboembolism (VTE) in outpatients suspected of deep venous thrombosis (DVT) or pulmonary embolism (PE).     Troponin [[id number]]  (Normal) Collected:  12/10/17 1121    Specimen:  Blood Updated:  12/10/17 1151     Troponin T <0.010 ng/mL     Narrative:       Troponin T Reference  Ranges:  Less than 0.03 ng/mL:    Negative for AMI  0.03 to 0.09 ng/mL:      Indeterminant for AMI  Greater than 0.09 ng/mL: Positive for AMI          I ordered the above labs and reviewed the results    RADIOLOGY  XR Chest 2 View   Final Result   No focal pulmonary consolidation. COPD change. Follow-up as   clinical indications persist.       This report was finalized on 12/10/2017 10:00 AM by Dr. Matt Pelaez MD.               I ordered the above noted radiological studies. Interpreted by radiologist. Reviewed by me in PACS.       PROCEDURES  Procedures    EKG           EKG time: 0809  Rhythm/Rate: nsr, 84  P waves and WI: normal  QRS, axis: narrow complex, normal axis   ST and T waves: nonspecific ST and T wave changes, normal QT    Interpreted Contemporaneously by me, independently viewed  unchanged compared to prior 11/2015      PROGRESS AND CONSULTS  ED Course   Comment By Time   12:27 PM  I have discussed at length with the patient and spouse the results of the tests.  I have a low index of suspicion that anything is serious occurring.  The symptoms are atypical for coronary artery disease, pulmonary embolism, or thoracic aortic dissection.  The patient understands the limitations of testing and understands that we are unable to rule out a disease process 100%.  The patient feels comfortable and agrees in being discharged home.  The patient will will return if symptoms worsen, develop shortness of breath, weakness in extremities, or any concerns.  They will also follow up with cardiology as provided. I completed 2 sets of troponin and patient refused admission for observation if he felt uncomfortable discharging home. He has remained pain free in ED after Treatment with Gi meds. Currently pain free. Sha Ruiz MD 12/10 [id number] - I offered the pt pain medication, but he declined at this time.     0910 - ASA, zofran and pepcid ordered. D-dimer ordered.    0933 - maalox max ordered.     1031 -  Consult placed with [facility].     1103 - Consulted with Dr. Flores (cardiology) who reports that the pt is suitable for discharge and recommends having the pt call tomorrow for follow up.     1105 - Troponin ordered for further evaluation.     1110 - Rechecked pt. Pt states he feels better after GI-cocktail. Informed pt of his lab work and imaging studies including negative CXR, d-dimer and troponin. Informed him that based on his results, I do not believe his sx are due to a cardiac etiology. D/w pt the plans to perform a second troponin, and if the second troponin is negative then discharging him home with a follow up with cardiology. I offered the pt admission for observation, but he declined and states he does want to go home. Pt understands and agrees with plan. All questions answered.     MEDICAL DECISION MAKING  Results were reviewed/discussed with the patient and they were also made aware of online access. Pt also made aware that some labs, such as cultures, will not be resulted during ER visit and follow up with PMD is necessary.     MDM  Number of Diagnoses or Management Options     Amount and/or Complexity of Data Reviewed  Clinical lab tests: reviewed and ordered (D-dimer - negative  First troponin - negative  Second troponin - negative)  Tests in the radiology section of CPT®: ordered and reviewed (CXR - negative acute)  Tests in the medicine section of CPT®: ordered and reviewed (See note)  Decide to obtain previous medical records or to obtain history from someone other than the patient: yes  Review and summarize past medical records: yes (In 7/2017, the pt saw Dr. Boucher, and has a known hx of coronary artery disease. HPI from 7/13/17:    HPI: Td Melendez  is a 71 y.o. male who presents today to followup. He has a history of unstable angina and underwent drug-eluting stent placement to the left anterior descending artery and the right coronary artery in July 2015. He has a nonobstructive lesion in the  circumflex. In November 2015, he presented with chest discomfort and he was quite anxious as it was right after the passing of his mother. He had a Cardiolite stress test, which was unremarkable. He has had no chest pain since then and is doing really done well. )  Discuss the patient with other providers: yes (Dr. Flores ([facility] [place]))           DIAGNOSIS  Final diagnoses:   Chest pain, unspecified type       DISPOSITION  DISCHARGE    Patient discharged in stable condition.    Reviewed implications of results, diagnosis, meds, responsibility to follow up, warning signs and symptoms of possible worsening, potential complications and reasons to return to ER.    Patient/Family voiced understanding of above instructions.    Discussed plan for discharge, as there is no emergent indication for admission.  Pt/family is agreeable and understands need for follow up and repeat testing.  Pt is aware that discharge does not mean that nothing is wrong but it indicates no emergency is present that requires admission and they must continue care with follow-up as given below or physician of their choice.     FOLLOW-UP  Obi Boucher MD  [address] [address]  [phone]      Call in am for follow up in next couple of days. retyurn if any chest pain, shortness of breath, fever, any concerns         Medication List      Stop          aspirin 81 MG chewable tablet       lidocaine 5 %   Commonly known as:  LIDODERM       niacin 500 MG tablet               Latest Documented Vital Signs:  As of 12:26 PM  BP- 138/76 HR- 61 Temp- 97.2 °F (36.2 °C) O2 sat- 93%    --  Documentation assistance provided by festus De La Vega for Dr. Ruiz.  Information recorded by the scribe was done at my direction and has been verified and validated by me.          Eduardo De La Vega  12/10/17 0117       Sha Ruiz MD  12/10/17 9244

## 2017-12-10 NOTE — ED NOTES
"Pt c/o right sided chest pressure/burning since 2230 last night that radiates to the right side of back. Pt states that it is in a \"C\" shape. Pt states he took sildenafil around 2030 last night but is unsure if it is related to the symptoms he is having.     Shoshana Beck RN  12/10/17 0849    "

## 2017-12-12 ENCOUNTER — TELEPHONE (OUTPATIENT)
Dept: SOCIAL WORK | Facility: HOSPITAL | Age: 72
End: 2017-12-12

## 2017-12-12 ENCOUNTER — TREATMENT (OUTPATIENT)
Dept: PHYSICAL THERAPY | Facility: CLINIC | Age: 72
End: 2017-12-12

## 2017-12-12 PROCEDURE — 97112 NEUROMUSCULAR REEDUCATION: CPT | Performed by: PHYSICAL THERAPIST

## 2017-12-12 PROCEDURE — 97110 THERAPEUTIC EXERCISES: CPT | Performed by: PHYSICAL THERAPIST

## 2017-12-12 NOTE — TELEPHONE ENCOUNTER
ED f/u phone call: states that he feels better and has a call in to cardiologist re f/u care. No questions/concerns

## 2017-12-12 NOTE — PROGRESS NOTES
Physical Therapy Daily Progress Note    Time In 1030  Time Out 1111    Td Melendez reports: neck not feeling too bad today.  Pt did go to [facility] and got some good information.  Pt reports no problem with HEP.  Pt has decreased degree of decline for inversion table and increased time.  Pt reports ache in thoracic spine after carrying casket at  and lying on slant board.  Pt reports leaning forward while carrying casket.  Pt reports improved symptoms after using slant board.    Subjective     Objective       General Comments     Spine Comments  Thoracic  T2-12 hypomobile; painful T6-10     See Exercise, Manual, and Modality Logs for complete treatment.     Assessment & Plan     Assessment  Assessment details: Initiated thoracic mobility and upper quarter strengthening today to improve pain-free mobility and function.  Progress cervical control/stabilization activities with upper extremity resisted strengthening.  Pt required mild verbal and tactile cues to avoid compensation of scalenes and sternocleidomastoid during cervical retraction training.  Pt demonstrated difficulty with cervical retraction today and required moderate verbal, tactile, and visual cues for improved performance to limit cervical extension and compensation of global cervical muscles.      Progress strengthening /stabilization /functional activity           Manual Therapy:    8     mins  94760;  Therapeutic Exercise:    12     mins  07535;     Neuromuscular Dinorah:    10    mins  04790;    Therapeutic Activity:     -     mins  43191;     Gait Training:      -     mins  15927;     Ultrasound:     -     mins  42254;    Electrical Stimulation:    -     mins  13496 ( );  Dry Needling     -     mins self-pay    Timed Treatment:   30   mins - direct  Total Treatment:     41   mins    Mariel Urban, PT, DPT  Physical Therapist

## 2017-12-15 ENCOUNTER — TREATMENT (OUTPATIENT)
Dept: PHYSICAL THERAPY | Facility: CLINIC | Age: 72
End: 2017-12-15

## 2017-12-15 DIAGNOSIS — M54.2 PAIN, NECK: Primary | ICD-10-CM

## 2017-12-15 PROCEDURE — 97110 THERAPEUTIC EXERCISES: CPT | Performed by: PHYSICAL THERAPIST

## 2017-12-15 PROCEDURE — G0283 ELEC STIM OTHER THAN WOUND: HCPCS | Performed by: PHYSICAL THERAPIST

## 2017-12-15 PROCEDURE — 97140 MANUAL THERAPY 1/> REGIONS: CPT | Performed by: PHYSICAL THERAPIST

## 2017-12-15 NOTE — PROGRESS NOTES
Physical Therapy Daily Progress Note    Time In 1031  Time Out 1140    Td Melendez reports: feels about the same and neck is still sore.    Subjective     Objective       Tenderness   Cervical Spine   Tenderness in the spinous process and left transverse process.     Additional Tenderness Details  Spinous processes C6 and C7 TTP     See Exercise, Manual, and Modality Logs for complete treatment.       Assessment & Plan     Assessment  Assessment details: Initiated additional cervical and scapulothoracic strengthening today with mild difficulty and without increased pain.  Pt reported TTP at lower cervical spinous processes and left cervical transversus processes.  Pt education on purpose of treatment and function of upper quarter.  Pt educated on appropriate response to treatment.       Progress per Plan of Care           Manual Therapy:    10     mins  74478;  Therapeutic Exercise:    20     mins  68609;     Neuromuscular Dinorah:    -    mins  56293;    Therapeutic Activity:     -     mins  72085;     Gait Training:      -     mins  38620;     Ultrasound:     -     mins  43822;    Electrical Stimulation:    15     mins  73901 ( );  Dry Needling     -     mins self-pay    Timed Treatment:   30   mins direct  Total Treatment:     69   mins    Mariel Urban, PT, DPT  Physical Therapist

## 2017-12-15 NOTE — PATIENT INSTRUCTIONS
See assessment from today's treatment note  Benefits of dry needling, purpose and plan of care as well as cost  Appropriate response to treatment  HEP performance  Please view My [facility] Td Melendez Jr. for a complete list of HEP instructions.

## 2017-12-19 ENCOUNTER — TREATMENT (OUTPATIENT)
Dept: PHYSICAL THERAPY | Facility: CLINIC | Age: 72
End: 2017-12-19

## 2017-12-19 DIAGNOSIS — M54.2 PAIN, NECK: Primary | ICD-10-CM

## 2017-12-19 PROCEDURE — G0283 ELEC STIM OTHER THAN WOUND: HCPCS | Performed by: PHYSICAL THERAPIST

## 2017-12-19 PROCEDURE — 97140 MANUAL THERAPY 1/> REGIONS: CPT | Performed by: PHYSICAL THERAPIST

## 2017-12-19 PROCEDURE — 97110 THERAPEUTIC EXERCISES: CPT | Performed by: PHYSICAL THERAPIST

## 2017-12-19 NOTE — PROGRESS NOTES
"Physical Therapy Daily Progress Note    Time In 0902  Time Out 0954    Td Melendez reports: doing pretty well pre-treatment and not too much neck pain.    Subjective     Objective   See Exercise, Manual, and Modality Logs for complete treatment.       Assessment & Plan     Assessment  Assessment details: Continued manual therapy today due to favorable response last treatment.  Progressed local cervical therapeutic exercises and scapular strengthening today th mild difficulty and without increased pain.  Pt reported mild left neck \"pull\" during manual therapy.      Progress per Plan of Care           Manual Therapy:    10     mins  49924;  Therapeutic Exercise:    13     mins  73288;     Neuromuscular Dinorah:    -    mins  42294;    Therapeutic Activity:     -     mins  88680;     Gait Training:      -     mins  27460;     Ultrasound:     -     mins  27030;    Electrical Stimulation:    15     mins  64636 ( );  Dry Needling     -     mins self-pay    Timed Treatment:   23   mins direct  Total Treatment:     52   mins    Mariel Urban, PT, DPT  Physical Therapist    "

## 2017-12-29 ENCOUNTER — TREATMENT (OUTPATIENT)
Dept: PHYSICAL THERAPY | Facility: CLINIC | Age: 72
End: 2017-12-29

## 2017-12-29 DIAGNOSIS — M54.2 PAIN, NECK: Primary | ICD-10-CM

## 2017-12-29 PROCEDURE — 97110 THERAPEUTIC EXERCISES: CPT | Performed by: PHYSICAL THERAPIST

## 2017-12-29 PROCEDURE — 97140 MANUAL THERAPY 1/> REGIONS: CPT | Performed by: PHYSICAL THERAPIST

## 2017-12-29 NOTE — PROGRESS NOTES
Physical Therapy Daily Progress Note    Time In 1332  Time Out 1422    Td Melendez reports: improvement in neck symptoms, especially at night.  Pt reports discomfort is still there but noticeably better.    Subjective     Objective       Active Range of Motion   Cervical/Thoracic Spine   Cervical    Flexion: 74 degrees   Extension: 57 degrees   Left lateral flexion: 48 degrees   Right lateral flexion: 43 degrees      See Exercise, Manual, and Modality Logs for complete treatment.       Assessment & Plan     Assessment  Assessment details: Pt required mild verbal cues to avoid cervical extension during supine chin tucks.  Pt demonstrates improved cervical AROM and has met goals related to flexion and right lateral flexion.  Pt demonstrates good progress to remaining goals and will continue to improve with skilled PT and regular HEP performance.     Note: there may be 2 columns in modalities flow sheet, but heat and electrical stimulation not performed on today's date of service 12/29/17        Progress per Plan of Care and Progress strengthening /stabilization /functional activity           Manual Therapy:    10     mins  33693;  Therapeutic Exercise:    20     mins  67606;     Neuromuscular Dinorah:    -    mins  43680;    Therapeutic Activity:     -     mins  69279;     Gait Training:      -     mins  41576;     Ultrasound:     -     mins  93548;    Electrical Stimulation:    -     mins  22672 ( );  Dry Needling     -     mins self-pay    Timed Treatment:   30   mins - direct  Total Treatment:     50   mins    Mariel Urban, PT, DPT  Physical Therapist

## 2018-01-03 ENCOUNTER — OFFICE VISIT (OUTPATIENT)
Dept: FAMILY MEDICINE CLINIC | Facility: CLINIC | Age: 73
End: 2018-01-03

## 2018-01-03 ENCOUNTER — TREATMENT (OUTPATIENT)
Dept: PHYSICAL THERAPY | Facility: CLINIC | Age: 73
End: 2018-01-03

## 2018-01-03 VITALS
WEIGHT: 191 LBS | HEART RATE: 70 BPM | RESPIRATION RATE: 18 BRPM | DIASTOLIC BLOOD PRESSURE: 65 MMHG | TEMPERATURE: 98.5 F | SYSTOLIC BLOOD PRESSURE: 128 MMHG | OXYGEN SATURATION: 97 % | BODY MASS INDEX: 27.35 KG/M2 | HEIGHT: 70 IN

## 2018-01-03 DIAGNOSIS — M54.2 PAIN, NECK: Primary | ICD-10-CM

## 2018-01-03 DIAGNOSIS — F41.9 ANXIETY: Primary | ICD-10-CM

## 2018-01-03 PROCEDURE — 97110 THERAPEUTIC EXERCISES: CPT | Performed by: PHYSICAL THERAPIST

## 2018-01-03 PROCEDURE — 97112 NEUROMUSCULAR REEDUCATION: CPT | Performed by: PHYSICAL THERAPIST

## 2018-01-03 PROCEDURE — 99213 OFFICE O/P EST LOW 20 MIN: CPT | Performed by: NURSE PRACTITIONER

## 2018-01-03 NOTE — PROGRESS NOTES
"Physical Therapy Daily Progress Note    Time In 1030  Time Out 1120    Td Melendez reports: doing \"pretty fair\" pre-treatment.     Subjective     Objective   See Exercise, Manual, and Modality Logs for complete treatment.       Assessment & Plan     Assessment  Assessment details: Progressed scapular stabilization exercises today in prone.  Pt required moderate verbal and tactile cues to avoid upper cervical extension during supine and seated chin tuck.  Pt tolerated treatment well without pain.  Modalities deferred due to no significant reported symptom change after last treatment without modalities and to progress pt to more active strategies.         Progress strengthening /stabilization /functional activity   Re-assessment next visit           Manual Therapy:    10     mins  02298;  Therapeutic Exercise:    10     mins  24377;     Neuromuscular Dinorah:    10   mins  37767;    Therapeutic Activity:     -     mins  86812;     Gait Training:      -     mins  39142;     Ultrasound:     -     mins  23730;    Electrical Stimulation:    -     mins  73647 ( );  Dry Needling     -     mins self-pay    Timed Treatment:   30   mins - direct  Total Treatment:     50   mins    Mariel Urban, PT, DPT  Physical Therapist    "

## 2018-01-03 NOTE — PATIENT INSTRUCTIONS
Purpose of treatment  HEP performance  Please view My [facility] Td Melendez Jr. for a complete list of HEP instructions.

## 2018-01-03 NOTE — PROGRESS NOTES
Subjective   Td Melendez Jr. is a 72 y.o. male.     History of Present Illness   Td Melendez Jr. male 72 y.o. who presents today for new evaluation and treatment of Depression and Anxiety.  He reports depressed mood, anxiety and sleep disturbance. Onset of symptoms was approximately a few weeks ago.  He denies current suicidal and homicidal ideation. Risk factors are prior diagnosis of anxiety and prior diagnosis of depression.  Previous treatment includes zoloft and xanax prn.  He complains of the following medication side effects: none.      The following portions of the patient's history were reviewed and updated as appropriate: allergies, current medications, past family history, past medical history, past social history, past surgical history and problem list.    Review of Systems   Constitutional: Negative for fatigue.   Respiratory: Negative for cough and shortness of breath.    Cardiovascular: Negative for chest pain and palpitations.   Skin: Negative for rash.   Psychiatric/Behavioral: Negative for dysphoric mood, self-injury, sleep disturbance and suicidal ideas. The patient is not nervous/anxious.        Objective   Physical Exam   Constitutional: He is oriented to person, place, and time. He appears well-developed and well-nourished.   Pulmonary/Chest: Effort normal.   Neurological: He is alert and oriented to person, place, and time.   Skin: Skin is warm and dry.   Psychiatric: He has a normal mood and affect. His behavior is normal. Judgment and thought content normal.   Nursing note and vitals reviewed.      Assessment/Plan   Td was seen today for anxiety.    Diagnoses and all orders for this visit:    Anxiety  -     sertraline (ZOLOFT) 50 MG tablet; Take 1 tablet by mouth Daily. Start with 1/2 tab PO daily for 4 days then one po daily for stress

## 2018-01-05 ENCOUNTER — TREATMENT (OUTPATIENT)
Dept: PHYSICAL THERAPY | Facility: CLINIC | Age: 73
End: 2018-01-05

## 2018-01-05 DIAGNOSIS — M54.2 PAIN, NECK: Primary | ICD-10-CM

## 2018-01-05 PROCEDURE — 97110 THERAPEUTIC EXERCISES: CPT | Performed by: PHYSICAL THERAPIST

## 2018-01-05 PROCEDURE — G0283 ELEC STIM OTHER THAN WOUND: HCPCS | Performed by: PHYSICAL THERAPIST

## 2018-01-05 PROCEDURE — 97140 MANUAL THERAPY 1/> REGIONS: CPT | Performed by: PHYSICAL THERAPIST

## 2018-01-05 NOTE — PROGRESS NOTES
Re-Assessment / Re-Certification    Time In 1405     Time Out 1308    Patient: Td Melendez Jr.   : 1945  Diagnosis/ICD-10 Code:  Pain, neck [M54.2]  Referring practitioner: Beau Lee MD  Date of Initial Visit: 2018  Today's Date: 2018  Patient seen for 6 sessions      Subjective:   Td Melendez reports: overall improvement and more stable.  Pt reports still need for improvement and got spasm in left side of neck when getting out of car.  Pt reports plan to discuss neck symptoms with MD next week.  Pt reports improvement in sleep, but continued occasional disruptions in sleep due to neck pain.   Subjective Questionnaire: NDI 12%  Clinical Progress: improved  Home Program Compliance: Yes  Treatment has included: therapeutic exercise, neuromuscular re-education, manual therapy, electrical stimulation and moist heat    Subjective   Objective       Palpation   Left   Hypertonic in the upper trapezius.   Tenderness of the upper trapezius.   Trigger point to upper trapezius.     Right   Hypertonic in the upper trapezius. Tenderness of the upper trapezius.   Trigger point to upper trapezius.     Active Range of Motion   Cervical/Thoracic Spine   Cervical    Left rotation: 60 degrees   Right rotation: 55 degrees with pain    Strength/Myotome Testing   Cervical Spine   Neck extension: 5  Neck flexion: 4+ (increased posterior neck discomfort)    Left   Neck lateral flexion (C3): 4+    Right   Neck lateral flexion (C3): 4+    Additional Strength Details  Right rotation resisted test 4+/5 with some posterior neck discomfort  Left rotation resisted test 4+/5 without pain       Assessment & Plan     Assessment  Impairments: abnormal or restricted ROM, impaired physical strength and pain with function  Assessment details: Pt demonstrates improved cervical ROM and has met part of ROM goals.  Pt continues to demonstrate limitations and discomfort with right rotation and lateral flexion.  Initiated manual  therapy and HEP SNAGs today to improve right cervical rotation.  Pt demonstrates progress towards goals but continued limitations requiring continued skilled physical therapy in order to sit, get into and out of car and sleep without neck pain.  Prognosis: good  Functional Limitations: carrying objects, lifting, sleeping and uncomfortable because of pain  Plan  Therapy options: will be seen for skilled physical therapy services  Planned modality interventions: thermotherapy (hydrocollator packs), electrical stimulation/Russian stimulation and cryotherapy  Planned therapy interventions: abdominal trunk stabilization, body mechanics training, flexibility, functional ROM exercises, home exercise program, joint mobilization, manual therapy, neuromuscular re-education, soft tissue mobilization, strengthening, stretching and therapeutic activities  Frequency: 2x week  Duration in weeks: 2  Treatment plan discussed with: patient      Progress toward previous goals: Partially Met     New Goals  Short-term goals (STG): In 2 weeks:  1. Right cervical rotation AROM improve to 60 degrees without pain.  2. Pt will maintain cervical and scapular retraction independently for 5 minutes during therapeutic exercises for improved posture and upper quarter function with less pain.   3. Pt will report no upper extremity neural symptoms during daily activities.  4. Pt will sleep through the night without disruptions due to neck pain.  5. NDI score improve to 0% to demonstrate functional improvement.         Recommendations: Continue as planned  Timeframe: 2 weeks  Prognosis to achieve goals: good    PT Signature: Mariel Urban, PT, DPT      Based upon review of the patient's progress and continued therapy plan, it is my medical opinion that Td Melendez should [facility]  [address] [address]  [address]  [phone].    Signature: __________________________________  Beau Lee MD    Manual Therapy:    12     mins  [id number];  Therapeutic Exercise:    23     mins  12197;     Neuromuscular Dinorah:    8    mins  78675;    Therapeutic Activity:     -     mins  47509;     Gait Training:      -     mins  03882;     Ultrasound:     -     mins  34824;    Electrical Stimulation:    15     mins  43912 ( );  Dry Needling     -     mins self-pay    Timed Treatment:   43   mins   Total Treatment:     63   mins    Please sign and return via fax to [phone]. Thank you, [facility].

## 2018-01-09 LAB
ALBUMIN SERPL-MCNC: 4.4 G/DL (ref 3.5–4.8)
ALBUMIN/GLOB SERPL: 1.8 {RATIO} (ref 1.2–2.2)
ALP SERPL-CCNC: 81 IU/L (ref 39–117)
ALT SERPL-CCNC: 30 IU/L (ref 0–44)
AST SERPL-CCNC: 26 IU/L (ref 0–40)
BILIRUB SERPL-MCNC: 0.7 MG/DL (ref 0–1.2)
BUN SERPL-MCNC: 20 MG/DL (ref 8–27)
BUN/CREAT SERPL: 19 (ref 10–24)
CALCIUM SERPL-MCNC: 9.5 MG/DL (ref 8.6–10.2)
CHLORIDE SERPL-SCNC: 104 MMOL/L (ref 96–106)
CHOLEST SERPL-MCNC: 149 MG/DL (ref 100–199)
CO2 SERPL-SCNC: 25 MMOL/L (ref 18–29)
CREAT SERPL-MCNC: 1.06 MG/DL (ref 0.76–1.27)
GLOBULIN SER CALC-MCNC: 2.4 G/DL (ref 1.5–4.5)
GLUCOSE SERPL-MCNC: 97 MG/DL (ref 65–99)
HDLC SERPL-MCNC: 34 MG/DL
LDLC SERPL CALC-MCNC: 81 MG/DL (ref 0–99)
POTASSIUM SERPL-SCNC: 4.8 MMOL/L (ref 3.5–5.2)
PROT SERPL-MCNC: 6.8 G/DL (ref 6–8.5)
PSA SERPL-MCNC: 2.8 NG/ML (ref 0–4)
SODIUM SERPL-SCNC: 145 MMOL/L (ref 134–144)
TRIGL SERPL-MCNC: 169 MG/DL (ref 0–149)
VLDLC SERPL CALC-MCNC: 34 MG/DL (ref 5–40)

## 2018-01-11 ENCOUNTER — OFFICE VISIT (OUTPATIENT)
Dept: FAMILY MEDICINE CLINIC | Facility: CLINIC | Age: 73
End: 2018-01-11

## 2018-01-11 VITALS
HEIGHT: 70 IN | SYSTOLIC BLOOD PRESSURE: 122 MMHG | WEIGHT: 189 LBS | HEART RATE: 77 BPM | OXYGEN SATURATION: 98 % | DIASTOLIC BLOOD PRESSURE: 68 MMHG | TEMPERATURE: 98.8 F | BODY MASS INDEX: 27.06 KG/M2 | RESPIRATION RATE: 16 BRPM

## 2018-01-11 DIAGNOSIS — F41.9 ANXIETY: ICD-10-CM

## 2018-01-11 DIAGNOSIS — I25.10 CORONARY ARTERY DISEASE INVOLVING NATIVE CORONARY ARTERY OF NATIVE HEART WITHOUT ANGINA PECTORIS: Primary | ICD-10-CM

## 2018-01-11 DIAGNOSIS — M54.2 CERVICALGIA: ICD-10-CM

## 2018-01-11 DIAGNOSIS — E78.2 MIXED HYPERLIPIDEMIA: ICD-10-CM

## 2018-01-11 PROCEDURE — 99214 OFFICE O/P EST MOD 30 MIN: CPT | Performed by: FAMILY MEDICINE

## 2018-01-11 RX ORDER — ALPRAZOLAM 0.25 MG/1
0.25 TABLET ORAL DAILY PRN
Qty: 30 TABLET | Refills: 0 | Status: SHIPPED | OUTPATIENT
Start: 2018-01-11 | End: 2019-01-09 | Stop reason: SDUPTHER

## 2018-01-11 NOTE — PROGRESS NOTES
"Subjective   dT Melendez Jr. is a 72 y.o. male.     History of Present Illness     Chief Complaint:   Chief Complaint   Patient presents with   • Hyperlipidemia     Lab results   • Coronary Artery Disease   • Chronic Neck Pain   • Anxiety       Td Melendez Jr. 72 y.o. male who presents today for Medical Management of the below listed issues and medication refills.  he has a problem list of   Patient Active Problem List   Diagnosis   • CAD (coronary artery disease)   • Hyperlipidemia   • Anxiety   .  Since the last visit, he has been building up on his Zoloft and slowly better. Gets one Xanax script/year and needs a refill today.  he has been compliant with   Current Outpatient Prescriptions:   •  diclofenac (VOLTAREN) 50 MG EC tablet, Take 1 tablet by mouth 2 (Two) Times a Day., Disp: 60 tablet, Rfl: 5  •  ezetimibe (ZETIA) 10 MG tablet, Take 1 tablet by mouth Daily., Disp: 90 tablet, Rfl: 2  •  Pitavastatin Calcium (LIVALO) 4 MG tablet, Take 1 tablet by mouth Every Night., Disp: 90 tablet, Rfl: 2  •  sertraline (ZOLOFT) 50 MG tablet, Take 1 tablet by mouth Daily. Start with 1/2 tab PO daily for 4 days then one po daily for stress, Disp: 30 tablet, Rfl: 2  •  sildenafil (REVATIO) 20 MG tablet, Take 2-3 tabs QD prn, Disp: 30 tablet, Rfl: 11  •  alosetron (LOTRONEX) 0.5 MG tablet, Take 0.5 mg by mouth. q other day, Disp: , Rfl:   •  ALPRAZolam (XANAX) 0.25 MG tablet, Take 1 tablet by mouth Daily As Needed for Anxiety., Disp: 30 tablet, Rfl: 0  •  coenzyme Q10 100 MG capsule, Take 200 mg by mouth 2 (Two) Times a Day., Disp: , Rfl:   •  glucosamine-chondroitin 500-400 MG capsule capsule, Take 2 capsules by mouth Daily., Disp: , Rfl: .  he denies medication side effects.    All of the chronic condition(s) listed above are stable w/o issues.    /68  Pulse 77  Temp 98.8 °F (37.1 °C) (Oral)   Resp 16  Ht 177.8 cm (70\")  Wt 85.7 kg (189 lb)  SpO2 98%  BMI 27.12 kg/m2    Results for orders placed or " performed during the hospital encounter of 12/10/17   Comprehensive Metabolic Panel   Result Value Ref Range    Glucose 104 (H) 65 - 99 mg/dL    BUN 20 8 - 23 mg/dL    Creatinine 0.93 0.76 - 1.27 mg/dL    Sodium 142 136 - 145 mmol/L    Potassium 4.4 3.5 - 5.2 mmol/L    Chloride 105 98 - 107 mmol/L    CO2 25.6 22.0 - 29.0 mmol/L    Calcium 9.1 8.6 - 10.5 mg/dL    Total Protein 6.9 6.0 - 8.5 g/dL    Albumin 4.10 3.50 - 5.20 g/dL    ALT (SGPT) 29 1 - 41 U/L    AST (SGOT) 20 1 - 40 U/L    Alkaline Phosphatase 82 39 - 117 U/L    Total Bilirubin 0.7 0.1 - 1.2 mg/dL    eGFR Non African Amer 80 >60 mL/min/1.73    Globulin 2.8 gm/dL    A/G Ratio 1.5 g/dL    BUN/Creatinine Ratio 21.5 7.0 - 25.0    Anion Gap 11.4 mmol/L   Troponin   Result Value Ref Range    Troponin T <0.010 0.000 - 0.030 ng/mL   CBC Auto Differential   Result Value Ref Range    WBC 5.98 4.50 - 10.70 10*3/mm3    RBC 5.21 4.60 - 6.00 10*6/mm3    Hemoglobin 16.6 13.7 - 17.6 g/dL    Hematocrit 47.8 40.4 - 52.2 %    MCV 91.7 79.8 - 96.2 fL    MCH 31.9 27.0 - 32.7 pg    MCHC 34.7 32.6 - 36.4 g/dL    RDW 12.8 11.5 - 14.5 %    RDW-SD 42.5 37.0 - 54.0 fl    MPV 10.1 6.0 - 12.0 fL    Platelets 166 140 - 500 10*3/mm3    Neutrophil % 63.0 42.7 - 76.0 %    Lymphocyte % 27.6 19.6 - 45.3 %    Monocyte % 7.9 5.0 - 12.0 %    Eosinophil % 1.3 0.3 - 6.2 %    Basophil % 0.2 0.0 - 1.5 %    Immature Grans % 0.0 0.0 - 0.5 %    Neutrophils, Absolute 3.77 1.90 - 8.10 10*3/mm3    Lymphocytes, Absolute 1.65 0.90 - 4.80 10*3/mm3    Monocytes, Absolute 0.47 0.20 - 1.20 10*3/mm3    Eosinophils, Absolute 0.08 0.00 - 0.70 10*3/mm3    Basophils, Absolute 0.01 0.00 - 0.20 10*3/mm3    Immature Grans, Absolute 0.00 0.00 - 0.03 10*3/mm3   D-dimer, Quantitative   Result Value Ref Range    D-Dimer, Quantitative <0.27 0.00 - 0.49 MCGFEU/mL   Troponin   Result Value Ref Range    Troponin T <0.010 0.000 - 0.030 ng/mL   Light Blue Top   Result Value Ref Range    Extra Tube hold for add-on    Gold Top  - SST   Result Value Ref Range    Extra Tube Hold for add-ons.            The following portions of the patient's history were reviewed and updated as appropriate: allergies, current medications, past family history, past medical history, past social history, past surgical history and problem list.    Review of Systems   Constitutional: Negative for activity change, chills, fatigue and fever.   Respiratory: Negative for cough and shortness of breath.    Cardiovascular: Negative for chest pain and palpitations.   Gastrointestinal: Negative for abdominal pain.   Endocrine: Negative for cold intolerance.   Musculoskeletal: Positive for neck pain (chronic).   Psychiatric/Behavioral: Negative for behavioral problems and dysphoric mood. The patient is nervous/anxious.        Objective   Physical Exam   Constitutional: He appears well-developed and well-nourished.   Neck: Neck supple. No thyromegaly present.   Cardiovascular: Normal rate and regular rhythm.    No murmur heard.  Pulmonary/Chest: Effort normal and breath sounds normal.   Abdominal: Bowel sounds are normal.   Psychiatric: He has a normal mood and affect. His behavior is normal.   Nursing note and vitals reviewed.  Labs reviewed with pt today during visit. All questions answered.      Assessment/Plan   Td was seen today for hyperlipidemia, coronary artery disease, chronic neck pain and anxiety.    Diagnoses and all orders for this visit:    Coronary artery disease involving native coronary artery of native heart without angina pectoris    Mixed hyperlipidemia    Cervicalgia  Comments:  unresponsive to Rx  Orders:  -     MRI cervical spine wo contrast; Future    Anxiety  -     ALPRAZolam (XANAX) 0.25 MG tablet; Take 1 tablet by mouth Daily As Needed for Anxiety.

## 2018-01-24 ENCOUNTER — HOSPITAL ENCOUNTER (OUTPATIENT)
Dept: MRI IMAGING | Facility: HOSPITAL | Age: 73
Discharge: HOME | End: 2018-01-24
Admitting: FAMILY MEDICINE

## 2018-01-24 DIAGNOSIS — M54.2 CERVICALGIA: ICD-10-CM

## 2018-01-24 PROCEDURE — 72141 MRI NECK SPINE W/O DYE: CPT

## 2018-01-30 ENCOUNTER — OFFICE VISIT (OUTPATIENT)
Dept: FAMILY MEDICINE CLINIC | Facility: CLINIC | Age: 73
End: 2018-01-30

## 2018-01-30 VITALS
HEART RATE: 64 BPM | DIASTOLIC BLOOD PRESSURE: 65 MMHG | TEMPERATURE: 97.3 F | HEIGHT: 70 IN | BODY MASS INDEX: 27.35 KG/M2 | RESPIRATION RATE: 16 BRPM | SYSTOLIC BLOOD PRESSURE: 139 MMHG | WEIGHT: 191 LBS

## 2018-01-30 DIAGNOSIS — M50.30 DEGENERATIVE CERVICAL DISC: Primary | ICD-10-CM

## 2018-01-30 PROCEDURE — 99213 OFFICE O/P EST LOW 20 MIN: CPT | Performed by: FAMILY MEDICINE

## 2018-01-30 NOTE — PROGRESS NOTES
"Subjective   Td Melendez Jr. is a 72 y.o. male.     CC: Management of Cervical Pain    History of Present Illness     Pt comes I today to discuss recent MRI due to increasing cervical pain.      The following portions of the patient's history were reviewed and updated as appropriate: allergies, current medications, past family history, past medical history, past social history, past surgical history and problem list.    Review of Systems   Constitutional: Negative for activity change, chills, fatigue and fever.   Respiratory: Negative for cough and shortness of breath.    Cardiovascular: Negative for chest pain and palpitations.   Gastrointestinal: Negative for abdominal pain.   Endocrine: Negative for cold intolerance.   Musculoskeletal: Positive for neck pain.   Psychiatric/Behavioral: Negative for behavioral problems and dysphoric mood. The patient is not nervous/anxious.      /65  Pulse 64  Temp 97.3 °F (36.3 °C) (Oral)   Resp 16  Ht 177.8 cm (70\")  Wt 86.6 kg (191 lb)  BMI 27.41 kg/m2    Objective   Physical Exam   Constitutional: He appears well-developed and well-nourished.   Neck: Neck supple. No thyromegaly present.   Cardiovascular: Normal rate and regular rhythm.    No murmur heard.  Pulmonary/Chest: Effort normal and breath sounds normal.   Abdominal: Bowel sounds are normal.   Psychiatric: He has a normal mood and affect. His behavior is normal.   Nursing note and vitals reviewed.  MRI results reviewed with pt.    Assessment/Plan   Td was seen today for results.    Diagnoses and all orders for this visit:    Degenerative cervical disc  -     Ambulatory Referral to Pain Management               "

## 2018-04-18 ENCOUNTER — OFFICE VISIT (OUTPATIENT)
Dept: FAMILY MEDICINE CLINIC | Facility: CLINIC | Age: 73
End: 2018-04-18

## 2018-04-18 VITALS
DIASTOLIC BLOOD PRESSURE: 88 MMHG | RESPIRATION RATE: 16 BRPM | WEIGHT: 188 LBS | HEIGHT: 70 IN | SYSTOLIC BLOOD PRESSURE: 140 MMHG | TEMPERATURE: 97.8 F | HEART RATE: 88 BPM | BODY MASS INDEX: 26.92 KG/M2

## 2018-04-18 DIAGNOSIS — E78.2 MIXED HYPERLIPIDEMIA: ICD-10-CM

## 2018-04-18 DIAGNOSIS — F41.9 ANXIETY: Primary | ICD-10-CM

## 2018-04-18 PROCEDURE — 99213 OFFICE O/P EST LOW 20 MIN: CPT | Performed by: FAMILY MEDICINE

## 2018-04-18 RX ORDER — GABAPENTIN 300 MG/1
CAPSULE ORAL
Status: ON HOLD | COMMUNITY
Start: 2018-02-10 | End: 2018-06-24

## 2018-04-18 NOTE — PROGRESS NOTES
"Subjective   Td Melendez Jr. is a 72 y.o. male.     History of Present Illness     Chief Complaint:   Chief Complaint   Patient presents with   • Anxiety     FOLLOW UP MEDICATION ZOLOFT 50mgs QDAY        Td Melendez Jr. 72 y.o. male who presents today for Medical Management of the below listed issues and medication refills.  he has a problem list of   Patient Active Problem List   Diagnosis   • CAD (coronary artery disease)   • Hyperlipidemia   • Anxiety   .  Since the last visit, he has overall felt well. He stopped the Zoloft due to some continued GI upset. Instead, he ordered some Lithium, 5mg BID (from mail pharmacy) and reports is actually doing quite well.  he has been compliant with   Current Outpatient Prescriptions:   •  alosetron (LOTRONEX) 0.5 MG tablet, Take 0.5 mg by mouth. q other day, Disp: , Rfl:   •  ALPRAZolam (XANAX) 0.25 MG tablet, Take 1 tablet by mouth Daily As Needed for Anxiety., Disp: 30 tablet, Rfl: 0  •  coenzyme Q10 100 MG capsule, Take 200 mg by mouth 2 (Two) Times a Day., Disp: , Rfl:   •  diclofenac (VOLTAREN) 50 MG EC tablet, Take 1 tablet by mouth 2 (Two) Times a Day., Disp: 60 tablet, Rfl: 5  •  ezetimibe (ZETIA) 10 MG tablet, Take 1 tablet by mouth Daily., Disp: 90 tablet, Rfl: 2  •  gabapentin (NEURONTIN) 300 MG capsule, , Disp: , Rfl:   •  glucosamine-chondroitin 500-400 MG capsule capsule, Take 2 capsules by mouth Daily., Disp: , Rfl:   •  Pitavastatin Calcium (LIVALO) 4 MG tablet, Take 1 tablet by mouth Every Night., Disp: 90 tablet, Rfl: 2  •  sildenafil (REVATIO) 20 MG tablet, Take 2-3 tabs QD prn, Disp: 30 tablet, Rfl: 11.  he denies current medication side effects.    All of the chronic condition(s) listed above are stable w/o issues.    /88   Pulse 88   Temp 97.8 °F (36.6 °C) (Oral)   Resp 16   Ht 177.8 cm (70\")   Wt 85.3 kg (188 lb)   BMI 26.98 kg/m²     Results for orders placed or performed during the hospital encounter of 12/10/17   Comprehensive " Metabolic Panel   Result Value Ref Range    Glucose 104 (H) 65 - 99 mg/dL    BUN 20 8 - 23 mg/dL    Creatinine 0.93 0.76 - 1.27 mg/dL    Sodium 142 136 - 145 mmol/L    Potassium 4.4 3.5 - 5.2 mmol/L    Chloride 105 98 - 107 mmol/L    CO2 25.6 22.0 - 29.0 mmol/L    Calcium 9.1 8.6 - 10.5 mg/dL    Total Protein 6.9 6.0 - 8.5 g/dL    Albumin 4.10 3.50 - 5.20 g/dL    ALT (SGPT) 29 1 - 41 U/L    AST (SGOT) 20 1 - 40 U/L    Alkaline Phosphatase 82 39 - 117 U/L    Total Bilirubin 0.7 0.1 - 1.2 mg/dL    eGFR Non African Amer 80 >60 mL/min/1.73    Globulin 2.8 gm/dL    A/G Ratio 1.5 g/dL    BUN/Creatinine Ratio 21.5 7.0 - 25.0    Anion Gap 11.4 mmol/L   Troponin   Result Value Ref Range    Troponin T <0.010 0.000 - 0.030 ng/mL   CBC Auto Differential   Result Value Ref Range    WBC 5.98 4.50 - 10.70 10*3/mm3    RBC 5.21 4.60 - 6.00 10*6/mm3    Hemoglobin 16.6 13.7 - 17.6 g/dL    Hematocrit 47.8 40.4 - 52.2 %    MCV 91.7 79.8 - 96.2 fL    MCH 31.9 27.0 - 32.7 pg    MCHC 34.7 32.6 - 36.4 g/dL    RDW 12.8 11.5 - 14.5 %    RDW-SD 42.5 37.0 - 54.0 fl    MPV 10.1 6.0 - 12.0 fL    Platelets 166 140 - 500 10*3/mm3    Neutrophil % 63.0 42.7 - 76.0 %    Lymphocyte % 27.6 19.6 - 45.3 %    Monocyte % 7.9 5.0 - 12.0 %    Eosinophil % 1.3 0.3 - 6.2 %    Basophil % 0.2 0.0 - 1.5 %    Immature Grans % 0.0 0.0 - 0.5 %    Neutrophils, Absolute 3.77 1.90 - 8.10 10*3/mm3    Lymphocytes, Absolute 1.65 0.90 - 4.80 10*3/mm3    Monocytes, Absolute 0.47 0.20 - 1.20 10*3/mm3    Eosinophils, Absolute 0.08 0.00 - 0.70 10*3/mm3    Basophils, Absolute 0.01 0.00 - 0.20 10*3/mm3    Immature Grans, Absolute 0.00 0.00 - 0.03 10*3/mm3   D-dimer, Quantitative   Result Value Ref Range    D-Dimer, Quantitative <0.27 0.00 - 0.49 MCGFEU/mL   Troponin   Result Value Ref Range    Troponin T <0.010 0.000 - 0.030 ng/mL   Light Blue Top   Result Value Ref Range    Extra Tube hold for add-on    Gold Top - SST   Result Value Ref Range    Extra Tube Hold for add-ons.             The following portions of the patient's history were reviewed and updated as appropriate: allergies, current medications, past family history, past medical history, past social history, past surgical history and problem list.    Review of Systems   Constitutional: Negative for activity change, chills, fatigue and fever.   Respiratory: Negative for cough and shortness of breath.    Cardiovascular: Negative for chest pain and palpitations.   Gastrointestinal: Negative for abdominal pain.   Endocrine: Negative for cold intolerance.   Psychiatric/Behavioral: Negative for behavioral problems and dysphoric mood. The patient is not nervous/anxious.        Objective   Physical Exam   Constitutional: He appears well-developed and well-nourished.   Neck: Neck supple. No thyromegaly present.   Cardiovascular: Normal rate and regular rhythm.    No murmur heard.  Pulmonary/Chest: Effort normal and breath sounds normal.   Abdominal: Bowel sounds are normal.   Psychiatric: He has a normal mood and affect. His behavior is normal.   Nursing note and vitals reviewed.      Assessment/Plan   Td was seen today for anxiety.    Diagnoses and all orders for this visit:    Anxiety    Mixed hyperlipidemia  -     Basic Metabolic Panel; Future  -     Lipid Panel; Future    PT seems to be doing well with his current medications and will continue same.

## 2018-05-18 ENCOUNTER — RESULTS ENCOUNTER (OUTPATIENT)
Dept: FAMILY MEDICINE CLINIC | Facility: CLINIC | Age: 73
End: 2018-05-18

## 2018-05-18 DIAGNOSIS — E78.2 MIXED HYPERLIPIDEMIA: ICD-10-CM

## 2018-06-06 ENCOUNTER — TELEPHONE (OUTPATIENT)
Dept: FAMILY MEDICINE CLINIC | Facility: CLINIC | Age: 73
End: 2018-06-06

## 2018-06-06 DIAGNOSIS — G89.29 CHRONIC RIGHT-SIDED LOW BACK PAIN WITHOUT SCIATICA: ICD-10-CM

## 2018-06-06 DIAGNOSIS — M54.50 CHRONIC RIGHT-SIDED LOW BACK PAIN WITHOUT SCIATICA: ICD-10-CM

## 2018-06-06 NOTE — TELEPHONE ENCOUNTER
----- Message from Td Melendez Jr. sent at 6/6/2018 11:45 AM EDT -----  Regarding: Prescription Question  Contact: [phone]  Can I get a refill on Diclofenac?

## 2018-06-24 ENCOUNTER — HOSPITAL ENCOUNTER (OUTPATIENT)
Facility: HOSPITAL | Age: 73
Setting detail: OBSERVATION
Discharge: HOME | End: 2018-06-25
Attending: EMERGENCY MEDICINE | Admitting: INTERNAL MEDICINE

## 2018-06-24 ENCOUNTER — APPOINTMENT (OUTPATIENT)
Dept: GENERAL RADIOLOGY | Facility: HOSPITAL | Age: 73
End: 2018-06-24

## 2018-06-24 DIAGNOSIS — R07.9 CHEST PAIN, UNSPECIFIED TYPE: Primary | ICD-10-CM

## 2018-06-24 LAB
ALBUMIN SERPL-MCNC: 4.1 G/DL (ref 3.5–5.2)
ALBUMIN/GLOB SERPL: 1.4 G/DL
ALP SERPL-CCNC: 74 U/L (ref 39–117)
ALT SERPL W P-5'-P-CCNC: 28 U/L (ref 1–41)
ANION GAP SERPL CALCULATED.3IONS-SCNC: 15.1 MMOL/L
AST SERPL-CCNC: 20 U/L (ref 1–40)
BASOPHILS # BLD AUTO: 0.02 10*3/MM3 (ref 0–0.2)
BASOPHILS NFR BLD AUTO: 0.2 % (ref 0–1.5)
BILIRUB SERPL-MCNC: 0.8 MG/DL (ref 0.1–1.2)
BUN BLD-MCNC: 21 MG/DL (ref 8–23)
BUN/CREAT SERPL: 18.1 (ref 7–25)
CALCIUM SPEC-SCNC: 9.9 MG/DL (ref 8.6–10.5)
CHLORIDE SERPL-SCNC: 102 MMOL/L (ref 98–107)
CO2 SERPL-SCNC: 25.9 MMOL/L (ref 22–29)
CREAT BLD-MCNC: 1.16 MG/DL (ref 0.76–1.27)
DEPRECATED RDW RBC AUTO: 43.5 FL (ref 37–54)
EOSINOPHIL # BLD AUTO: 0.06 10*3/MM3 (ref 0–0.7)
EOSINOPHIL NFR BLD AUTO: 0.7 % (ref 0.3–6.2)
ERYTHROCYTE [DISTWIDTH] IN BLOOD BY AUTOMATED COUNT: 12.8 % (ref 11.5–14.5)
GFR SERPL CREATININE-BSD FRML MDRD: 62 ML/MIN/1.73
GLOBULIN UR ELPH-MCNC: 2.9 GM/DL
GLUCOSE BLD-MCNC: 98 MG/DL (ref 65–99)
HCT VFR BLD AUTO: 48.7 % (ref 40.4–52.2)
HGB BLD-MCNC: 17.5 G/DL (ref 13.7–17.6)
IMM GRANULOCYTES # BLD: 0.02 10*3/MM3 (ref 0–0.03)
IMM GRANULOCYTES NFR BLD: 0.2 % (ref 0–0.5)
INR PPP: 1.08 (ref 0.9–1.1)
LIPASE SERPL-CCNC: 29 U/L (ref 13–60)
LYMPHOCYTES # BLD AUTO: 1.34 10*3/MM3 (ref 0.9–4.8)
LYMPHOCYTES NFR BLD AUTO: 15.8 % (ref 19.6–45.3)
MCH RBC QN AUTO: 33.4 PG (ref 27–32.7)
MCHC RBC AUTO-ENTMCNC: 35.9 G/DL (ref 32.6–36.4)
MCV RBC AUTO: 92.9 FL (ref 79.8–96.2)
MONOCYTES # BLD AUTO: 0.68 10*3/MM3 (ref 0.2–1.2)
MONOCYTES NFR BLD AUTO: 8 % (ref 5–12)
NEUTROPHILS # BLD AUTO: 6.39 10*3/MM3 (ref 1.9–8.1)
NEUTROPHILS NFR BLD AUTO: 75.3 % (ref 42.7–76)
PLATELET # BLD AUTO: 175 10*3/MM3 (ref 140–500)
PMV BLD AUTO: 9.9 FL (ref 6–12)
POTASSIUM BLD-SCNC: 4.2 MMOL/L (ref 3.5–5.2)
PROT SERPL-MCNC: 7 G/DL (ref 6–8.5)
PROTHROMBIN TIME: 13.8 SECONDS (ref 11.7–14.2)
RBC # BLD AUTO: 5.24 10*6/MM3 (ref 4.6–6)
SODIUM BLD-SCNC: 143 MMOL/L (ref 136–145)
TROPONIN T SERPL-MCNC: <0.01 NG/ML (ref 0–0.03)
TROPONIN T SERPL-MCNC: <0.01 NG/ML (ref 0–0.03)
WBC NRBC COR # BLD: 8.49 10*3/MM3 (ref 4.5–10.7)

## 2018-06-24 PROCEDURE — 85610 PROTHROMBIN TIME: CPT | Performed by: EMERGENCY MEDICINE

## 2018-06-24 PROCEDURE — 93010 ELECTROCARDIOGRAM REPORT: CPT | Performed by: INTERNAL MEDICINE

## 2018-06-24 PROCEDURE — 84484 ASSAY OF TROPONIN QUANT: CPT | Performed by: EMERGENCY MEDICINE

## 2018-06-24 PROCEDURE — 71046 X-RAY EXAM CHEST 2 VIEWS: CPT

## 2018-06-24 PROCEDURE — G0378 HOSPITAL OBSERVATION PER HR: HCPCS

## 2018-06-24 PROCEDURE — 99220 PR INITIAL OBSERVATION CARE/DAY 70 MINUTES: CPT | Performed by: INTERNAL MEDICINE

## 2018-06-24 PROCEDURE — 84484 ASSAY OF TROPONIN QUANT: CPT | Performed by: INTERNAL MEDICINE

## 2018-06-24 PROCEDURE — 99284 EMERGENCY DEPT VISIT MOD MDM: CPT

## 2018-06-24 PROCEDURE — 85025 COMPLETE CBC W/AUTO DIFF WBC: CPT | Performed by: EMERGENCY MEDICINE

## 2018-06-24 PROCEDURE — 80053 COMPREHEN METABOLIC PANEL: CPT | Performed by: EMERGENCY MEDICINE

## 2018-06-24 PROCEDURE — 83690 ASSAY OF LIPASE: CPT | Performed by: EMERGENCY MEDICINE

## 2018-06-24 PROCEDURE — 94799 UNLISTED PULMONARY SVC/PX: CPT

## 2018-06-24 PROCEDURE — 93005 ELECTROCARDIOGRAM TRACING: CPT | Performed by: EMERGENCY MEDICINE

## 2018-06-24 RX ORDER — NITROGLYCERIN 0.4 MG/1
0.4 TABLET SUBLINGUAL
Status: DISCONTINUED | OUTPATIENT
Start: 2018-06-24 | End: 2018-06-25 | Stop reason: HOSPADM

## 2018-06-24 RX ORDER — ALPRAZOLAM 0.25 MG/1
0.25 TABLET ORAL DAILY PRN
Status: DISCONTINUED | OUTPATIENT
Start: 2018-06-24 | End: 2018-06-25 | Stop reason: HOSPADM

## 2018-06-24 RX ORDER — SODIUM CHLORIDE 0.9 % (FLUSH) 0.9 %
1-10 SYRINGE (ML) INJECTION AS NEEDED
Status: DISCONTINUED | OUTPATIENT
Start: 2018-06-24 | End: 2018-06-25 | Stop reason: HOSPADM

## 2018-06-24 RX ORDER — ASPIRIN 325 MG
325 TABLET ORAL ONCE
Status: COMPLETED | OUTPATIENT
Start: 2018-06-24 | End: 2018-06-24

## 2018-06-24 RX ORDER — ACETAMINOPHEN 325 MG/1
650 TABLET ORAL EVERY 4 HOURS PRN
Status: DISCONTINUED | OUTPATIENT
Start: 2018-06-24 | End: 2018-06-25 | Stop reason: HOSPADM

## 2018-06-24 RX ORDER — CALCIUM CARBONATE 200(500)MG
2 TABLET,CHEWABLE ORAL 2 TIMES DAILY PRN
Status: DISCONTINUED | OUTPATIENT
Start: 2018-06-24 | End: 2018-06-25 | Stop reason: HOSPADM

## 2018-06-24 RX ORDER — NITROGLYCERIN 0.4 MG/1
0.4 TABLET SUBLINGUAL
Status: DISCONTINUED | OUTPATIENT
Start: 2018-06-24 | End: 2018-06-24

## 2018-06-24 RX ORDER — ASPIRIN 325 MG
325 TABLET ORAL DAILY
Status: DISCONTINUED | OUTPATIENT
Start: 2018-06-24 | End: 2018-06-25 | Stop reason: HOSPADM

## 2018-06-24 RX ADMIN — ASPIRIN 325 MG: 325 TABLET ORAL at 17:34

## 2018-06-24 RX ADMIN — NITROGLYCERIN 1 INCH: 20 OINTMENT TOPICAL at 23:18

## 2018-06-24 RX ADMIN — NITROGLYCERIN 1 INCH: 20 OINTMENT TOPICAL at 17:35

## 2018-06-24 RX ADMIN — NITROGLYCERIN 1 INCH: 20 OINTMENT TOPICAL at 11:52

## 2018-06-24 RX ADMIN — NITROGLYCERIN 0.4 MG: 0.4 TABLET SUBLINGUAL at 11:42

## 2018-06-24 RX ADMIN — ASPIRIN 325 MG: 325 TABLET ORAL at 11:41

## 2018-06-25 ENCOUNTER — APPOINTMENT (OUTPATIENT)
Dept: NUCLEAR MEDICINE | Facility: HOSPITAL | Age: 73
End: 2018-06-25

## 2018-06-25 VITALS
HEART RATE: 67 BPM | BODY MASS INDEX: 26.2 KG/M2 | DIASTOLIC BLOOD PRESSURE: 77 MMHG | HEIGHT: 70 IN | OXYGEN SATURATION: 94 % | TEMPERATURE: 97.7 F | RESPIRATION RATE: 18 BRPM | WEIGHT: 183 LBS | SYSTOLIC BLOOD PRESSURE: 114 MMHG

## 2018-06-25 LAB
ANION GAP SERPL CALCULATED.3IONS-SCNC: 12.3 MMOL/L
BH CV STRESS BP STAGE 1: NORMAL
BH CV STRESS BP STAGE 2: NORMAL
BH CV STRESS DURATION MIN STAGE 1: 3
BH CV STRESS DURATION MIN STAGE 2: 3
BH CV STRESS DURATION SEC STAGE 1: 0
BH CV STRESS DURATION SEC STAGE 2: 0
BH CV STRESS GRADE STAGE 1: 10
BH CV STRESS GRADE STAGE 2: 12
BH CV STRESS HR STAGE 1: 113
BH CV STRESS HR STAGE 2: 145
BH CV STRESS METS STAGE 1: 5
BH CV STRESS METS STAGE 2: 7.5
BH CV STRESS PROTOCOL 1: NORMAL
BH CV STRESS RECOVERY BP: NORMAL MMHG
BH CV STRESS RECOVERY HR: 97 BPM
BH CV STRESS SPEED STAGE 1: 1.7
BH CV STRESS SPEED STAGE 2: 2.5
BH CV STRESS STAGE 1: 1
BH CV STRESS STAGE 2: 2
BUN BLD-MCNC: 23 MG/DL (ref 8–23)
BUN/CREAT SERPL: 21.5 (ref 7–25)
CALCIUM SPEC-SCNC: 8.8 MG/DL (ref 8.6–10.5)
CHLORIDE SERPL-SCNC: 101 MMOL/L (ref 98–107)
CO2 SERPL-SCNC: 26.7 MMOL/L (ref 22–29)
CREAT BLD-MCNC: 1.07 MG/DL (ref 0.76–1.27)
DEPRECATED RDW RBC AUTO: 44.6 FL (ref 37–54)
ERYTHROCYTE [DISTWIDTH] IN BLOOD BY AUTOMATED COUNT: 13 % (ref 11.5–14.5)
GFR SERPL CREATININE-BSD FRML MDRD: 68 ML/MIN/1.73
GLUCOSE BLD-MCNC: 97 MG/DL (ref 65–99)
HCT VFR BLD AUTO: 44.9 % (ref 40.4–52.2)
HGB BLD-MCNC: 15.2 G/DL (ref 13.7–17.6)
LV EF NUC BP: 65 %
LYMPHOCYTES # BLD MANUAL: 1.49 10*3/MM3 (ref 0.9–4.8)
LYMPHOCYTES NFR BLD MANUAL: 10 % (ref 5–12)
LYMPHOCYTES NFR BLD MANUAL: 15 % (ref 19.6–45.3)
MAXIMAL PREDICTED HEART RATE: 150 BPM
MCH RBC QN AUTO: 32 PG (ref 27–32.7)
MCHC RBC AUTO-ENTMCNC: 33.9 G/DL (ref 32.6–36.4)
MCV RBC AUTO: 94.5 FL (ref 79.8–96.2)
MONOCYTES # BLD AUTO: 1 10*3/MM3 (ref 0.2–1.2)
NEUTROPHILS # BLD AUTO: 7.46 10*3/MM3 (ref 1.9–8.1)
NEUTROPHILS NFR BLD MANUAL: 75 % (ref 42.7–76)
PERCENT MAX PREDICTED HR: 96 %
PLAT MORPH BLD: NORMAL
PLATELET # BLD AUTO: 162 10*3/MM3 (ref 140–500)
PMV BLD AUTO: 10.6 FL (ref 6–12)
POTASSIUM BLD-SCNC: 4.1 MMOL/L (ref 3.5–5.2)
RBC # BLD AUTO: 4.75 10*6/MM3 (ref 4.6–6)
RBC MORPH BLD: NORMAL
SODIUM BLD-SCNC: 140 MMOL/L (ref 136–145)
STRESS BASELINE BP: NORMAL MMHG
STRESS BASELINE HR: 72 BPM
STRESS PERCENT HR: 113 %
STRESS POST ESTIMATED WORKLOAD: 7.2 METS
STRESS POST EXERCISE DUR MIN: 6 MIN
STRESS POST EXERCISE DUR SEC: 0 SEC
STRESS POST PEAK BP: NORMAL MMHG
STRESS POST PEAK HR: 144 BPM
STRESS TARGET HR: 128 BPM
TROPONIN T SERPL-MCNC: <0.01 NG/ML (ref 0–0.03)
WBC MORPH BLD: NORMAL
WBC NRBC COR # BLD: 9.95 10*3/MM3 (ref 4.5–10.7)

## 2018-06-25 PROCEDURE — 80048 BASIC METABOLIC PNL TOTAL CA: CPT | Performed by: INTERNAL MEDICINE

## 2018-06-25 PROCEDURE — 85027 COMPLETE CBC AUTOMATED: CPT | Performed by: INTERNAL MEDICINE

## 2018-06-25 PROCEDURE — 99217 PR OBSERVATION CARE DISCHARGE MANAGEMENT: CPT | Performed by: NURSE PRACTITIONER

## 2018-06-25 PROCEDURE — 93005 ELECTROCARDIOGRAM TRACING: CPT | Performed by: INTERNAL MEDICINE

## 2018-06-25 PROCEDURE — G0378 HOSPITAL OBSERVATION PER HR: HCPCS

## 2018-06-25 PROCEDURE — 93017 CV STRESS TEST TRACING ONLY: CPT

## 2018-06-25 PROCEDURE — 84484 ASSAY OF TROPONIN QUANT: CPT | Performed by: INTERNAL MEDICINE

## 2018-06-25 PROCEDURE — 85007 BL SMEAR W/DIFF WBC COUNT: CPT | Performed by: INTERNAL MEDICINE

## 2018-06-25 PROCEDURE — 0 TECHNETIUM SESTAMIBI: Performed by: INTERNAL MEDICINE

## 2018-06-25 PROCEDURE — 93018 CV STRESS TEST I&R ONLY: CPT | Performed by: INTERNAL MEDICINE

## 2018-06-25 PROCEDURE — 93010 ELECTROCARDIOGRAM REPORT: CPT | Performed by: INTERNAL MEDICINE

## 2018-06-25 PROCEDURE — A9500 TC99M SESTAMIBI: HCPCS | Performed by: INTERNAL MEDICINE

## 2018-06-25 PROCEDURE — 93016 CV STRESS TEST SUPVJ ONLY: CPT | Performed by: INTERNAL MEDICINE

## 2018-06-25 PROCEDURE — 78452 HT MUSCLE IMAGE SPECT MULT: CPT

## 2018-06-25 PROCEDURE — 78452 HT MUSCLE IMAGE SPECT MULT: CPT | Performed by: INTERNAL MEDICINE

## 2018-06-25 RX ORDER — OMEPRAZOLE 20 MG/1
20 TABLET, DELAYED RELEASE ORAL DAILY
Qty: 30 TABLET | Refills: 3 | Status: SHIPPED | OUTPATIENT
Start: 2018-06-25 | End: 2018-12-17 | Stop reason: SDUPTHER

## 2018-06-25 RX ADMIN — NITROGLYCERIN 1 INCH: 20 OINTMENT TOPICAL at 05:00

## 2018-06-25 RX ADMIN — ASPIRIN 325 MG: 325 TABLET ORAL at 14:58

## 2018-06-25 RX ADMIN — TECHNETIUM TC 99M SESTAMIBI 1 DOSE: 1 INJECTION INTRAVENOUS at 13:15

## 2018-06-25 RX ADMIN — TECHNETIUM TC 99M SESTAMIBI 1 DOSE: 1 INJECTION INTRAVENOUS at 11:30

## 2018-06-25 RX ADMIN — ALPRAZOLAM 0.25 MG: 0.25 TABLET ORAL at 05:04

## 2018-06-25 RX ADMIN — ACETAMINOPHEN 650 MG: 325 TABLET, FILM COATED ORAL at 05:34

## 2018-06-25 RX ADMIN — NITROGLYCERIN 0.4 MG: 0.4 TABLET SUBLINGUAL at 04:58

## 2018-06-26 LAB
BUN SERPL-MCNC: 18 MG/DL (ref 8–23)
BUN/CREAT SERPL: 17.1 (ref 7–25)
CALCIUM SERPL-MCNC: 9.4 MG/DL (ref 8.6–10.5)
CHLORIDE SERPL-SCNC: 102 MMOL/L (ref 98–107)
CHOLEST SERPL-MCNC: 161 MG/DL (ref 0–200)
CO2 SERPL-SCNC: 25.7 MMOL/L (ref 22–29)
CREAT SERPL-MCNC: 1.05 MG/DL (ref 0.76–1.27)
GLUCOSE SERPL-MCNC: 96 MG/DL (ref 65–99)
HDLC SERPL-MCNC: 38 MG/DL (ref 40–60)
LDLC SERPL CALC-MCNC: 92 MG/DL (ref 0–100)
POTASSIUM SERPL-SCNC: 4.2 MMOL/L (ref 3.5–5.2)
SODIUM SERPL-SCNC: 141 MMOL/L (ref 136–145)
TRIGL SERPL-MCNC: 157 MG/DL (ref 0–150)
VLDLC SERPL CALC-MCNC: 31.4 MG/DL (ref 5–40)

## 2018-06-28 ENCOUNTER — OFFICE VISIT (OUTPATIENT)
Dept: FAMILY MEDICINE CLINIC | Facility: CLINIC | Age: 73
End: 2018-06-28

## 2018-06-28 VITALS
DIASTOLIC BLOOD PRESSURE: 72 MMHG | TEMPERATURE: 98 F | RESPIRATION RATE: 16 BRPM | WEIGHT: 187 LBS | HEIGHT: 70 IN | SYSTOLIC BLOOD PRESSURE: 128 MMHG | BODY MASS INDEX: 26.77 KG/M2 | HEART RATE: 72 BPM

## 2018-06-28 DIAGNOSIS — E78.2 MIXED HYPERLIPIDEMIA: ICD-10-CM

## 2018-06-28 DIAGNOSIS — I25.10 CORONARY ARTERY DISEASE INVOLVING NATIVE CORONARY ARTERY OF NATIVE HEART WITHOUT ANGINA PECTORIS: Primary | ICD-10-CM

## 2018-06-28 DIAGNOSIS — K21.9 GASTROESOPHAGEAL REFLUX DISEASE WITHOUT ESOPHAGITIS: ICD-10-CM

## 2018-06-28 PROCEDURE — 99214 OFFICE O/P EST MOD 30 MIN: CPT | Performed by: FAMILY MEDICINE

## 2018-06-28 RX ORDER — EZETIMIBE 10 MG/1
10 TABLET ORAL DAILY
Qty: 90 TABLET | Refills: 2 | Status: SHIPPED | OUTPATIENT
Start: 2018-06-28 | End: 2019-01-09 | Stop reason: SDUPTHER

## 2018-06-28 NOTE — PROGRESS NOTES
Subjective   Td Melendez Jr. is a 72 y.o. male.     History of Present Illness     Chief Complaint:   Chief Complaint   Patient presents with   • Hyperlipidemia     med refill  - lab results   • chest pain     hospital follow up - dr obi calzada       Td Melendez Jr. 72 y.o. male who presents today for Medical Management of the below listed issues and medication refills.  he has a problem list of   Patient Active Problem List   Diagnosis   • CAD (coronary artery disease)   • Hyperlipidemia   • Anxiety   • Chest pain   • Gastroesophageal reflux disease without esophagitis   .  Since the last visit, he has overall felt well until a hospitalization last week. That visit was as follows:  Hospital Course:   Td Melendez Jr. is a 72 y.o. male who normally follows with Dr. Obi Calzada for a history of coronary artery disease s/p stenting to LAD and RCA in 2015. He had a normal stress test that year and has done well since then. He presented to [facility] yesterday with complaints of epigastric chest discomfort with shortness of breath and diaphoresis that started as he was doing some stretching exercises. He was admitted for observation and ruled out for myocardial infarction. Overnight, he had another episode of chest burning that woke him from sleep. It was relieved with nitro. This morning, he underwent exercise nuclear perfusion testing which showed  GI artifact but no evidence of ischemia. He is stable for discharge. I have recommended over the counter prilosec and to follow up with PCP. He will keep his appointment with Dr. Calzada in July and call our office with any additional problems.     Current outpatient and discharge medications have been reconciled for the patient.  Reviewed by: Beau Lee MD          he has been compliant with   Current Outpatient Prescriptions:   •  alosetron (LOTRONEX) 0.5 MG tablet, Take 0.5 mg by mouth Daily. q other day , Disp: , Rfl:   •  ALPRAZolam (XANAX)  "0.25 MG tablet, Take 1 tablet by mouth Daily As Needed for Anxiety., Disp: 30 tablet, Rfl: 0  •  aspirin 81 MG tablet, Take 1 tablet by mouth Daily., Disp: 30 tablet, Rfl: 11  •  coenzyme Q10 100 MG capsule, Take 200 mg by mouth 2 (Two) Times a Day., Disp: , Rfl:   •  diclofenac (VOLTAREN) 50 MG EC tablet, Take 1 tablet by mouth 2 (Two) Times a Day., Disp: 60 tablet, Rfl: 5  •  ezetimibe (ZETIA) 10 MG tablet, Take 1 tablet by mouth Daily., Disp: 90 tablet, Rfl: 2  •  glucosamine-chondroitin 500-400 MG capsule capsule, Take 2 capsules by mouth Daily., Disp: , Rfl:   •  omeprazole OTC (PRILOSEC OTC) 20 MG EC tablet, Take 1 tablet by mouth Daily., Disp: 30 tablet, Rfl: 3  •  Pitavastatin Calcium (LIVALO) 4 MG tablet, Take 1 tablet by mouth Every Night., Disp: 90 tablet, Rfl: 2  •  sildenafil (REVATIO) 20 MG tablet, Take 2-3 tabs QD prn, Disp: 30 tablet, Rfl: 11.  he denies medication side effects.    All of the chronic condition(s) listed above are stable w/o issues.    /72   Pulse 72   Temp 98 °F (36.7 °C) (Oral)   Resp 16   Ht 177.8 cm (70\")   Wt 84.8 kg (187 lb)   BMI 26.83 kg/m²     Results for orders placed or performed during the hospital encounter of 06/24/18   Comprehensive Metabolic Panel   Result Value Ref Range    Glucose 98 65 - 99 mg/dL    BUN 21 8 - 23 mg/dL    Creatinine 1.16 0.76 - 1.27 mg/dL    Sodium 143 136 - 145 mmol/L    Potassium 4.2 3.5 - 5.2 mmol/L    Chloride 102 98 - 107 mmol/L    CO2 25.9 22.0 - 29.0 mmol/L    Calcium 9.9 8.6 - 10.5 mg/dL    Total Protein 7.0 6.0 - 8.5 g/dL    Albumin 4.10 3.50 - 5.20 g/dL    ALT (SGPT) 28 1 - 41 U/L    AST (SGOT) 20 1 - 40 U/L    Alkaline Phosphatase 74 39 - 117 U/L    Total Bilirubin 0.8 0.1 - 1.2 mg/dL    eGFR Non African Amer 62 >60 mL/min/1.73    Globulin 2.9 gm/dL    A/G Ratio 1.4 g/dL    BUN/Creatinine Ratio 18.1 7.0 - 25.0    Anion Gap 15.1 mmol/L   Protime-INR   Result Value Ref Range    Protime 13.8 11.7 - 14.2 Seconds    INR 1.08 0.90 - " 1.10   Troponin   Result Value Ref Range    Troponin T <0.010 0.000 - 0.030 ng/mL   Lipase   Result Value Ref Range    Lipase 29 13 - 60 U/L   CBC Auto Differential   Result Value Ref Range    WBC 8.49 4.50 - 10.70 10*3/mm3    RBC 5.24 4.60 - 6.00 10*6/mm3    Hemoglobin 17.5 13.7 - 17.6 g/dL    Hematocrit 48.7 40.4 - 52.2 %    MCV 92.9 79.8 - 96.2 fL    MCH 33.4 (H) 27.0 - 32.7 pg    MCHC 35.9 32.6 - 36.4 g/dL    RDW 12.8 11.5 - 14.5 %    RDW-SD 43.5 37.0 - 54.0 fl    MPV 9.9 6.0 - 12.0 fL    Platelets 175 140 - 500 10*3/mm3    Neutrophil % 75.3 42.7 - 76.0 %    Lymphocyte % 15.8 (L) 19.6 - 45.3 %    Monocyte % 8.0 5.0 - 12.0 %    Eosinophil % 0.7 0.3 - 6.2 %    Basophil % 0.2 0.0 - 1.5 %    Immature Grans % 0.2 0.0 - 0.5 %    Neutrophils, Absolute 6.39 1.90 - 8.10 10*3/mm3    Lymphocytes, Absolute 1.34 0.90 - 4.80 10*3/mm3    Monocytes, Absolute 0.68 0.20 - 1.20 10*3/mm3    Eosinophils, Absolute 0.06 0.00 - 0.70 10*3/mm3    Basophils, Absolute 0.02 0.00 - 0.20 10*3/mm3    Immature Grans, Absolute 0.02 0.00 - 0.03 10*3/mm3   Troponin   Result Value Ref Range    Troponin T <0.010 0.000 - 0.030 ng/mL   Basic Metabolic Panel   Result Value Ref Range    Glucose 97 65 - 99 mg/dL    BUN 23 8 - 23 mg/dL    Creatinine 1.07 0.76 - 1.27 mg/dL    Sodium 140 136 - 145 mmol/L    Potassium 4.1 3.5 - 5.2 mmol/L    Chloride 101 98 - 107 mmol/L    CO2 26.7 22.0 - 29.0 mmol/L    Calcium 8.8 8.6 - 10.5 mg/dL    eGFR Non African Amer 68 >60 mL/min/1.73    BUN/Creatinine Ratio 21.5 7.0 - 25.0    Anion Gap 12.3 mmol/L   Troponin   Result Value Ref Range    Troponin T <0.010 0.000 - 0.030 ng/mL   CBC Auto Differential   Result Value Ref Range    WBC 9.95 4.50 - 10.70 10*3/mm3    RBC 4.75 4.60 - 6.00 10*6/mm3    Hemoglobin 15.2 13.7 - 17.6 g/dL    Hematocrit 44.9 40.4 - 52.2 %    MCV 94.5 79.8 - 96.2 fL    MCH 32.0 27.0 - 32.7 pg    MCHC 33.9 32.6 - 36.4 g/dL    RDW 13.0 11.5 - 14.5 %    RDW-SD 44.6 37.0 - 54.0 fl    MPV 10.6 6.0 - 12.0 fL     Platelets 162 140 - 500 10*3/mm3   Stress Test With Myocardial Perfusion One Day   Result Value Ref Range    BH CV STRESS PROTOCOL 1 Johny     Stage 1 1     HR Stage 1 113     BP Stage 1 134/54     Duration Min Stage 1 3     Duration Sec Stage 1 0     Grade Stage 1 10     Speed Stage 1 1.7     BH CV STRESS METS STAGE 1 5     Stage 2 2     HR Stage 2 145     BP Stage 2 178/59     Duration Min Stage 2 3     Duration Sec Stage 2 0     Grade Stage 2 12     Speed Stage 2 2.5     BH CV STRESS METS STAGE 2 7.5     Baseline HR 72 bpm    Baseline /67 mmHg    Peak  bpm    Percent Max Pred HR 96.00 %    Percent Target  %    Peak /67 mmHg    Recovery HR 97 bpm    Recovery /66 mmHg    Target HR (85%) 128 bpm    Max. Pred. HR (100%) 150 bpm    Exercise duration (min) 6 min    Exercise duration (sec) 0 sec    Estimated workload 7.2 METS    Nuc Stress EF 65 %   Manual Differential   Result Value Ref Range    Neutrophil % 75.0 42.7 - 76.0 %    Lymphocyte % 15.0 (L) 19.6 - 45.3 %    Monocyte % 10.0 5.0 - 12.0 %    Neutrophils Absolute 7.46 1.90 - 8.10 10*3/mm3    Lymphocytes Absolute 1.49 0.90 - 4.80 10*3/mm3    Monocytes Absolute 1.00 0.20 - 1.20 10*3/mm3    RBC Morphology Normal Normal    WBC Morphology Normal Normal    Platelet Morphology Normal Normal           The following portions of the patient's history were reviewed and updated as appropriate: allergies, current medications, past family history, past medical history, past social history, past surgical history and problem list.    Review of Systems   Constitutional: Negative for activity change, chills, fatigue and fever.   Respiratory: Negative for cough and shortness of breath.    Cardiovascular: Negative for chest pain and palpitations.   Gastrointestinal: Negative for abdominal pain.   Endocrine: Negative for cold intolerance.   Psychiatric/Behavioral: Negative for behavioral problems and dysphoric mood. The patient is not  nervous/anxious.        Objective   Physical Exam   Constitutional: He appears well-developed and well-nourished.   Neck: Neck supple. No thyromegaly present.   Cardiovascular: Normal rate and regular rhythm.    No murmur heard.  Pulmonary/Chest: Effort normal and breath sounds normal.   Abdominal: Bowel sounds are normal. There is no tenderness.   Psychiatric: He has a normal mood and affect. His behavior is normal.   Nursing note and vitals reviewed.  Labs reviewed with pt today during visit. All questions answered.  Hospital records reviewed with pt confirming HPI.      Assessment/Plan   Td was seen today for hyperlipidemia and chest pain.    Diagnoses and all orders for this visit:    Coronary artery disease involving native coronary artery of native heart without angina pectoris  -     Pitavastatin Calcium (LIVALO) 4 MG tablet; Take 1 tablet by mouth Every Night.  -     ezetimibe (ZETIA) 10 MG tablet; Take 1 tablet by mouth Daily.    Mixed hyperlipidemia  -     Pitavastatin Calcium (LIVALO) 4 MG tablet; Take 1 tablet by mouth Every Night.  -     ezetimibe (ZETIA) 10 MG tablet; Take 1 tablet by mouth Daily.    Gastroesophageal reflux disease without esophagitis  -     Ambulatory Referral to Gastroenterology

## 2018-07-13 ENCOUNTER — OFFICE VISIT (OUTPATIENT)
Dept: GASTROENTEROLOGY | Facility: CLINIC | Age: 73
End: 2018-07-13

## 2018-07-13 VITALS
BODY MASS INDEX: 27.14 KG/M2 | TEMPERATURE: 98.2 F | SYSTOLIC BLOOD PRESSURE: 130 MMHG | WEIGHT: 189.6 LBS | HEIGHT: 70 IN | DIASTOLIC BLOOD PRESSURE: 74 MMHG

## 2018-07-13 DIAGNOSIS — K21.9 GASTROESOPHAGEAL REFLUX DISEASE, ESOPHAGITIS PRESENCE NOT SPECIFIED: Primary | ICD-10-CM

## 2018-07-13 DIAGNOSIS — K63.5 POLYP OF COLON, UNSPECIFIED PART OF COLON, UNSPECIFIED TYPE: ICD-10-CM

## 2018-07-13 PROCEDURE — 99204 OFFICE O/P NEW MOD 45 MIN: CPT | Performed by: INTERNAL MEDICINE

## 2018-07-13 RX ORDER — SODIUM CHLORIDE, SODIUM LACTATE, POTASSIUM CHLORIDE, CALCIUM CHLORIDE 600; 310; 30; 20 MG/100ML; MG/100ML; MG/100ML; MG/100ML
30 INJECTION, SOLUTION INTRAVENOUS CONTINUOUS
Status: CANCELLED | OUTPATIENT
Start: 2018-07-25

## 2018-07-13 RX ORDER — LITHIUM 8 MEQ/5ML
5 SOLUTION ORAL 2 TIMES DAILY
COMMUNITY
End: 2021-01-07 | Stop reason: HOSPADM

## 2018-07-13 NOTE — PROGRESS NOTES
Chief Complaint   Patient presents with   • Heartburn   • Diarrhea     Subjective   HPI  Td Melendez Jr. is a 72 y.o. male who presents for evaluation of heartburn and chest discomfort.      Long standing hx of IBS-D, on lotrenex prescribed by Gi in [place].  Has been on this for 12 years.  Takes 0.5mg every other day.    Recently started having heartburn.  Had recent hospitalization for chest pain found to be non-cardiac.  Some mild intermittent dysphagia to breads.  Improvement with omeprazole.  Notes he has been taking diclofenac for last few months for OA.   No prior EGD.  Last colonoscopy in 2011 normal.  Had polyp in 2006.      Past Medical History:   Diagnosis Date   • Anxiety    • CAD (coronary artery disease)     unstable angina, 7/2015: 20% LM, long 70% LAD with abnormal FFR (s/p 2.54y72cp Xience), 50% OM1, 99% prox RCA (s/p 3.5x18m Xience).  Normal Cardiolite 11/2015.    • Fibromyalgia    • H/O complete eye exam 04/2018   • Hypercholesterolemia    • Hyperlipidemia    • IBS (irritable bowel syndrome)    • Knee pain        Current Outpatient Prescriptions:   •  alosetron (LOTRONEX) 0.5 MG tablet, Take 0.5 mg by mouth Daily. q other day , Disp: , Rfl:   •  ALPRAZolam (XANAX) 0.25 MG tablet, Take 1 tablet by mouth Daily As Needed for Anxiety., Disp: 30 tablet, Rfl: 0  •  aspirin 81 MG tablet, Take 1 tablet by mouth Daily., Disp: 30 tablet, Rfl: 11  •  coenzyme Q10 100 MG capsule, Take 200 mg by mouth 2 (Two) Times a Day., Disp: , Rfl:   •  ezetimibe (ZETIA) 10 MG tablet, Take 1 tablet by mouth Daily., Disp: 90 tablet, Rfl: 2  •  glucosamine-chondroitin 500-400 MG capsule capsule, Take 2 capsules by mouth Daily., Disp: , Rfl:   •  omeprazole OTC (PRILOSEC OTC) 20 MG EC tablet, Take 1 tablet by mouth Daily., Disp: 30 tablet, Rfl: 3  •  Pitavastatin Calcium (LIVALO) 4 MG tablet, Take 1 tablet by mouth Every Night., Disp: 90 tablet, Rfl: 2  •  sildenafil (REVATIO) 20 MG tablet, Take 2-3 tabs QD prn, Disp:  30 tablet, Rfl: 11  •  Unable to find, 1 each 1 (One) Time. Lithium orotate-OTC, Disp: , Rfl:   •  diclofenac (VOLTAREN) 50 MG EC tablet, Take 1 tablet by mouth 2 (Two) Times a Day., Disp: 60 tablet, Rfl: 5  •  lithium 8 MEQ/5ML solution oral solution, Take  by mouth 3 (Three) Times a Day., Disp: , Rfl:   Allergies   Allergen Reactions   • Augmentin [Amoxicillin-Pot Clavulanate] Hives and Itching     Social History     Social History   • Marital status:      Spouse name: N/A   • Number of children: N/A   • Years of education: N/A     Occupational History   • Not on file.     Social History Main Topics   • Smoking status: Former Smoker     Packs/day: 0.50     Types: Cigarettes     Quit date: 1970   • Smokeless tobacco: Never Used   • Alcohol use No      Comment: caffeine use   • Drug use: No   • Sexual activity: Not on file     Other Topics Concern   • Not on file     Social History Narrative   • No narrative on file     Family History   Problem Relation Age of Onset   • Coronary artery disease Other    • Cervical cancer Mother    • Cancer Mother    • Depression Mother    • Heart disease Mother    • Liver disease Mother    • Alcohol abuse Mother    • Thyroid cancer Father    • Cancer Father    • Depression Father    • Diabetes Father    • Thyroid disease Father      Review of Systems   HENT: Positive for trouble swallowing.    Cardiovascular: Positive for chest pain.   Gastrointestinal: Positive for diarrhea.        GERD     All other systems reviewed and are negative.       Objective   Vitals:    07/13/18 1043   BP: 130/74   Temp: 98.2 °F (36.8 °C)     Physical Exam   Constitutional: He is oriented to person, place, and time. He appears well-developed and well-nourished.   HENT:   Head: Normocephalic and atraumatic.   Mouth/Throat: Oropharynx is clear and moist.   Eyes: Conjunctivae are normal. No scleral icterus.   Neck: Normal range of motion. Neck supple. No thyromegaly present.   Cardiovascular: Normal  rate and regular rhythm.  Exam reveals no friction rub.    No murmur heard.  Pulmonary/Chest: Effort normal and breath sounds normal. No respiratory distress. He has no wheezes. He has no rales. He exhibits no tenderness.   Abdominal: Soft. Bowel sounds are normal. He exhibits no distension and no mass. There is no tenderness. There is no rebound and no guarding. No hernia.   Musculoskeletal: He exhibits no edema.   Lymphadenopathy:     He has no cervical adenopathy.     He has no axillary adenopathy.   Neurological: He is alert and oriented to person, place, and time.   Skin: Skin is warm and dry. No rash noted.   Psychiatric: He has a normal mood and affect. His behavior is normal. Judgment and thought content normal.   Vitals reviewed.       Assessment/Plan   Assessment:     1. Gastroesophageal reflux disease, esophagitis presence not specified    2. Polyp of colon, unspecified part of colon, unspecified type    3.      IBS-D    Plan:   EGD will be scheduled for evaluation of pts new onset GERD  Continue omeprazole for now  Colonoscopy will be scheduled for surveillance due to hx of colon polyps  IBS-D well controlled on Lotrenex.  Pt will obtain refill from his GI in [place].          Catarino Luna M.D.  [facility]  [address] [place] [address]  Office: [phone]

## 2018-07-13 NOTE — PROGRESS NOTES
Date of Office Visit: 2018  Encounter Provider: Obi Boucher MD  Place of Service: [facility]  Patient Name: Td Melendez Jr.  :1945    Chief Complaint   Patient presents with   • Coronary artery disease involving native coronary artery of      Yearly   :     HPI: Td Melendez Jr. is a 72 y.o. male who presents today to followup. He has a history of unstable angina and underwent drug-eluting stent placement to the left anterior descending artery and the right coronary artery in 2015. He was noted to have a nonobstructive lesion in the circumflex. In 2015, he presented with chest discomfort and he was quite anxious as it was right after the passing of his mother. He had a Cardiolite stress test, which was unremarkable. In 2018, he was admitted with chest pain that sounded GI in etiology; a Cardiolite stress test was normal. He has an upcoming EGD/colonoscopy scheduled.    Past Medical History:   Diagnosis Date   • Anxiety    • CAD (coronary artery disease)     unstable angina, 2015: 20% LM, long 70% LAD with abnormal FFR (s/p 2.11a69xa Xience), 50% OM1, 99% prox RCA (s/p 3.5x18m Xience).  Normal Cardiolite 2015 and 2018   • Fibromyalgia    • H/O complete eye exam 2018   • Hypercholesterolemia    • Hyperlipidemia    • IBS (irritable bowel syndrome)    • Knee pain        Past Surgical History:   Procedure Laterality Date   • CARDIAC CATHETERIZATION      [facility] 2015: cath with 20% LM long 70% LAD (with positive FFR), 50% OM1, 99% prox RCA, s/p 2.75x33 Xience Alpine to LAD and 3.5x18mm Xience Alpine to RCA. LVEF 64% Normal Cardiolite 2015   • CARDIAC SURGERY      cardiac stents, 2   • COLONOSCOPY  approx 2011    normal per patient  [facility]   • WISDOM TOOTH EXTRACTION         Social History     Social History   • Marital status:      Spouse name: N/A   • Number of children: N/A   • Years of education: N/A      Occupational History   • Not on file.     Social History Main Topics   • Smoking status: Former Smoker     Packs/day: 0.50     Types: Cigarettes     Quit date: 1970   • Smokeless tobacco: Never Used   • Alcohol use No      Comment: caffeine use   • Drug use: No   • Sexual activity: Not on file     Other Topics Concern   • Not on file     Social History Narrative   • No narrative on file       Family History   Problem Relation Age of Onset   • Coronary artery disease Other    • Cervical cancer Mother    • Cancer Mother    • Depression Mother    • Heart disease Mother    • Liver disease Mother    • Alcohol abuse Mother    • Thyroid cancer Father    • Cancer Father    • Depression Father    • Diabetes Father    • Thyroid disease Father        Review of Systems   All other systems reviewed and are negative.      Allergies   Allergen Reactions   • Augmentin [Amoxicillin-Pot Clavulanate] Hives and Itching         Current Outpatient Prescriptions:   •  alosetron (LOTRONEX) 0.5 MG tablet, Take 0.5 mg by mouth Daily. q other day , Disp: , Rfl:   •  ALPRAZolam (XANAX) 0.25 MG tablet, Take 1 tablet by mouth Daily As Needed for Anxiety., Disp: 30 tablet, Rfl: 0  •  aspirin 81 MG tablet, Take 1 tablet by mouth Daily., Disp: 30 tablet, Rfl: 11  •  coenzyme Q10 100 MG capsule, Take 200 mg by mouth 2 (Two) Times a Day., Disp: , Rfl:   •  ezetimibe (ZETIA) 10 MG tablet, Take 1 tablet by mouth Daily., Disp: 90 tablet, Rfl: 2  •  glucosamine-chondroitin 500-400 MG capsule capsule, Take 2 capsules by mouth Daily., Disp: , Rfl:   •  lithium 8 MEQ/5ML solution oral solution, Take  by mouth 3 (Three) Times a Day., Disp: , Rfl:   •  omeprazole OTC (PRILOSEC OTC) 20 MG EC tablet, Take 1 tablet by mouth Daily., Disp: 30 tablet, Rfl: 3  •  Pitavastatin Calcium (LIVALO) 4 MG tablet, Take 1 tablet by mouth Every Night., Disp: 90 tablet, Rfl: 2  •  sildenafil (REVATIO) 20 MG tablet, Take 2-3 tabs QD prn, Disp: 30 tablet, Rfl: 11  •  Unable  "to find, 1 each 1 (One) Time. Lithium orotate-OTC, Disp: , Rfl:   •  diclofenac (VOLTAREN) 50 MG EC tablet, Take 1 tablet by mouth 2 (Two) Times a Day., Disp: 60 tablet, Rfl: 5     Objective:     Vitals:    07/16/18 1336   BP: 144/60   BP Location: Right arm   Pulse: 72   Weight: 86.8 kg (191 lb 6.4 oz)   Height: 177.8 cm (70\")     Body mass index is 27.46 kg/m².    Physical Exam   Constitutional: He is oriented to person, place, and time. He appears well-developed and well-nourished.   HENT:   Head: Normocephalic.   Nose: Nose normal.   Mouth/Throat: Oropharynx is clear and moist.   Eyes: Conjunctivae and EOM are normal. Pupils are equal, round, and reactive to light.   Neck: Normal range of motion. No JVD present.   Cardiovascular: Normal rate, regular rhythm, normal heart sounds and intact distal pulses.    No murmur heard.  Pulmonary/Chest: Effort normal and breath sounds normal.   Abdominal: Soft. He exhibits no mass. There is no tenderness.   Musculoskeletal: Normal range of motion. He exhibits no edema.   Lymphadenopathy:     He has no cervical adenopathy.   Neurological: He is alert and oriented to person, place, and time. No cranial nerve deficit.   Skin: Skin is warm and dry. No rash noted.   Psychiatric: He has a normal mood and affect. His behavior is normal. Judgment and thought content normal.   Vitals reviewed.        ECG 12 Lead  Date/Time: 7/16/2018 1:44 PM  Performed by: SUDHAKAR MCKEON  Authorized by: SUDHAKAR MCKEON   Comparison: compared with previous ECG   Similar to previous ECG  Rhythm: sinus rhythm  Conduction: conduction normal  ST Segments: ST segments normal  T Waves: T waves normal  QRS axis: normal  Other: no other findings  Clinical impression: normal ECG              Assessment:       Diagnosis Plan   1. Coronary artery disease involving native coronary artery of native heart without angina pectoris            Plan:       1.  Coronary Artery Disease  Assessment  • The patient has no " angina  • He's doing well.  He's on ezetimibe and pitavastatin.    Subjective - Objective  • There has been a previous stent procedure using MADELIN 7/2015  • Current antiplatelet therapy includes aspirin 81 mg      2.  I rechecked it and got 140/65.  He checks at home and it's always 120s/70.    Sincerely,       Obi Boucher MD

## 2018-07-16 ENCOUNTER — OFFICE VISIT (OUTPATIENT)
Dept: CARDIOLOGY | Facility: CLINIC | Age: 73
End: 2018-07-16

## 2018-07-16 VITALS
BODY MASS INDEX: 27.4 KG/M2 | DIASTOLIC BLOOD PRESSURE: 60 MMHG | SYSTOLIC BLOOD PRESSURE: 144 MMHG | WEIGHT: 191.4 LBS | HEART RATE: 72 BPM | HEIGHT: 70 IN

## 2018-07-16 DIAGNOSIS — I25.10 CORONARY ARTERY DISEASE INVOLVING NATIVE CORONARY ARTERY OF NATIVE HEART WITHOUT ANGINA PECTORIS: Primary | ICD-10-CM

## 2018-07-16 DIAGNOSIS — R03.0 ELEVATED BLOOD PRESSURE READING IN OFFICE WITHOUT DIAGNOSIS OF HYPERTENSION: ICD-10-CM

## 2018-07-16 PROBLEM — R07.9 CHEST PAIN: Status: RESOLVED | Noted: 2018-06-24 | Resolved: 2018-07-16

## 2018-07-16 PROCEDURE — 99213 OFFICE O/P EST LOW 20 MIN: CPT | Performed by: INTERNAL MEDICINE

## 2018-07-16 PROCEDURE — 93000 ELECTROCARDIOGRAM COMPLETE: CPT | Performed by: INTERNAL MEDICINE

## 2018-07-25 ENCOUNTER — ANESTHESIA EVENT (OUTPATIENT)
Dept: GASTROENTEROLOGY | Facility: HOSPITAL | Age: 73
End: 2018-07-25

## 2018-07-25 ENCOUNTER — HOSPITAL ENCOUNTER (OUTPATIENT)
Facility: HOSPITAL | Age: 73
Setting detail: HOSPITAL OUTPATIENT SURGERY
Discharge: HOME | End: 2018-07-25
Attending: INTERNAL MEDICINE | Admitting: INTERNAL MEDICINE

## 2018-07-25 ENCOUNTER — ANESTHESIA (OUTPATIENT)
Dept: GASTROENTEROLOGY | Facility: HOSPITAL | Age: 73
End: 2018-07-25

## 2018-07-25 VITALS
SYSTOLIC BLOOD PRESSURE: 112 MMHG | TEMPERATURE: 97.8 F | RESPIRATION RATE: 16 BRPM | WEIGHT: 184 LBS | HEART RATE: 69 BPM | DIASTOLIC BLOOD PRESSURE: 70 MMHG | OXYGEN SATURATION: 96 % | BODY MASS INDEX: 26.4 KG/M2

## 2018-07-25 DIAGNOSIS — K63.5 POLYP OF COLON, UNSPECIFIED PART OF COLON, UNSPECIFIED TYPE: ICD-10-CM

## 2018-07-25 DIAGNOSIS — K21.9 GASTROESOPHAGEAL REFLUX DISEASE, ESOPHAGITIS PRESENCE NOT SPECIFIED: ICD-10-CM

## 2018-07-25 PROCEDURE — 43239 EGD BIOPSY SINGLE/MULTIPLE: CPT | Performed by: INTERNAL MEDICINE

## 2018-07-25 PROCEDURE — 45380 COLONOSCOPY AND BIOPSY: CPT | Performed by: INTERNAL MEDICINE

## 2018-07-25 PROCEDURE — 88312 SPECIAL STAINS GROUP 1: CPT | Performed by: INTERNAL MEDICINE

## 2018-07-25 PROCEDURE — 45385 COLONOSCOPY W/LESION REMOVAL: CPT | Performed by: INTERNAL MEDICINE

## 2018-07-25 PROCEDURE — 25010000002 PROPOFOL 10 MG/ML EMULSION: Performed by: ANESTHESIOLOGY

## 2018-07-25 PROCEDURE — 88305 TISSUE EXAM BY PATHOLOGIST: CPT | Performed by: INTERNAL MEDICINE

## 2018-07-25 RX ORDER — LIDOCAINE HYDROCHLORIDE 20 MG/ML
INJECTION, SOLUTION INFILTRATION; PERINEURAL AS NEEDED
Status: DISCONTINUED | OUTPATIENT
Start: 2018-07-25 | End: 2018-07-25 | Stop reason: SURG

## 2018-07-25 RX ORDER — SODIUM CHLORIDE, SODIUM LACTATE, POTASSIUM CHLORIDE, CALCIUM CHLORIDE 600; 310; 30; 20 MG/100ML; MG/100ML; MG/100ML; MG/100ML
1000 INJECTION, SOLUTION INTRAVENOUS CONTINUOUS
Status: DISCONTINUED | OUTPATIENT
Start: 2018-07-25 | End: 2018-07-25 | Stop reason: HOSPADM

## 2018-07-25 RX ORDER — PROPOFOL 10 MG/ML
VIAL (ML) INTRAVENOUS AS NEEDED
Status: DISCONTINUED | OUTPATIENT
Start: 2018-07-25 | End: 2018-07-25 | Stop reason: SURG

## 2018-07-25 RX ORDER — PROPOFOL 10 MG/ML
VIAL (ML) INTRAVENOUS CONTINUOUS PRN
Status: DISCONTINUED | OUTPATIENT
Start: 2018-07-25 | End: 2018-07-25 | Stop reason: SURG

## 2018-07-25 RX ADMIN — PROPOFOL 140 MCG/KG/MIN: 10 INJECTION, EMULSION INTRAVENOUS at 14:44

## 2018-07-25 RX ADMIN — LIDOCAINE HYDROCHLORIDE 50 MG: 20 INJECTION, SOLUTION INFILTRATION; PERINEURAL at 14:44

## 2018-07-25 RX ADMIN — PROPOFOL 150 MG: 10 INJECTION, EMULSION INTRAVENOUS at 14:44

## 2018-07-25 RX ADMIN — SODIUM CHLORIDE, POTASSIUM CHLORIDE, SODIUM LACTATE AND CALCIUM CHLORIDE 1000 ML: 600; 310; 30; 20 INJECTION, SOLUTION INTRAVENOUS at 13:56

## 2018-07-25 NOTE — ANESTHESIA POSTPROCEDURE EVALUATION
Patient: Td Melendez Jr.    Procedure Summary     Date:  07/25/18 Room / Location:  [facility] ENDOSCOPY 9 / [facility] ENDOSCOPY    Anesthesia Start:  1439 Anesthesia Stop:  1519    Procedures:       ESOPHAGOGASTRODUODENOSCOPY WITH COLD BIOPSIES (N/A Esophagus)      COLONOSCOPY TO CECUM WITH COLD SNAREPOLYPECTOMIES, HOT SNARE POLYPECTOMIES (N/A ) Diagnosis:       Gastroesophageal reflux disease, esophagitis presence not specified      Polyp of colon, unspecified part of colon, unspecified type      (Gastroesophageal reflux disease, esophagitis presence not specified [K21.9])      (Polyp of colon, unspecified part of colon, unspecified type [K63.5])    Surgeon:  Catarino Luna MD Provider:  Td Fernandes MD    Anesthesia Type:  MAC ASA Status:  2          Anesthesia Type: MAC  Last vitals  BP   99/63 (07/25/18 1526)   Temp   36.6 °C (97.8 °F) (07/25/18 1345)   Pulse   78 (07/25/18 1526)   Resp   16 (07/25/18 1526)     SpO2   96 % (07/25/18 1526)     Post Anesthesia Care and Evaluation    Patient location during evaluation: bedside  Patient participation: complete - patient participated  Level of consciousness: awake and alert  Pain score: 0  Pain management: adequate  Airway patency: patent  Anesthetic complications: No anesthetic complications  PONV Status: none  Cardiovascular status: acceptable  Respiratory status: acceptable  Hydration status: acceptable  Post Neuraxial Block status: Motor and sensory function returned to baseline

## 2018-07-25 NOTE — ANESTHESIA PREPROCEDURE EVALUATION
Anesthesia Evaluation     Patient summary reviewed                Airway   Mallampati: II  TM distance: >3 FB  Neck ROM: full  No difficulty expected  Dental - normal exam     Pulmonary     breath sounds clear to auscultation  Cardiovascular   Exercise tolerance: good (4-7 METS)    Rhythm: regular  Rate: normal        Neuro/Psych  GI/Hepatic/Renal/Endo      Musculoskeletal     Abdominal    Substance History      OB/GYN          Other                        Anesthesia Plan    ASA 2     MAC     intravenous induction

## 2018-07-27 LAB
CYTO UR: NORMAL
LAB AP CASE REPORT: NORMAL
PATH REPORT.FINAL DX SPEC: NORMAL
PATH REPORT.GROSS SPEC: NORMAL

## 2018-08-01 ENCOUNTER — TELEPHONE (OUTPATIENT)
Dept: GASTROENTEROLOGY | Facility: CLINIC | Age: 73
End: 2018-08-01

## 2018-08-01 NOTE — TELEPHONE ENCOUNTER
Results sent to pt via a message on [facility].      Health Maintenance updated to reflect C/S due 7/2021.

## 2018-08-01 NOTE — TELEPHONE ENCOUNTER
----- Message from Catarino Luna MD sent at 8/1/2018 12:10 PM EDT -----  EGD bx benign  Colon polyp mix of benign and pre-cancerous polyps.  No cancer  Recall 3 years

## 2018-12-10 ENCOUNTER — TELEPHONE (OUTPATIENT)
Dept: FAMILY MEDICINE CLINIC | Facility: CLINIC | Age: 73
End: 2018-12-10

## 2018-12-10 DIAGNOSIS — E78.2 MIXED HYPERLIPIDEMIA: Primary | ICD-10-CM

## 2018-12-10 NOTE — TELEPHONE ENCOUNTER
Regarding: Non-Urgent Medical Question  Contact: [phone]  ----- Message from [employer], [employer] sent at 12/8/2018  7:01 PM EST -----    Coming for in for blood samples on 12/17, for my visit to see you on 12/19. Just confirming request put in for lab work.  Td

## 2018-12-11 RX ORDER — OMEPRAZOLE 20 MG/1
CAPSULE, DELAYED RELEASE ORAL
Qty: 30 CAPSULE | Refills: 3 | Status: CANCELLED | OUTPATIENT
Start: 2018-12-11

## 2018-12-16 ENCOUNTER — PATIENT MESSAGE (OUTPATIENT)
Dept: FAMILY MEDICINE CLINIC | Facility: CLINIC | Age: 73
End: 2018-12-16

## 2018-12-17 RX ORDER — OMEPRAZOLE 20 MG/1
20 TABLET, DELAYED RELEASE ORAL DAILY
Qty: 90 TABLET | Refills: 0 | Status: SHIPPED | OUTPATIENT
Start: 2018-12-17 | End: 2019-01-09

## 2018-12-17 NOTE — TELEPHONE ENCOUNTER
From: Td Melendez Jr.  To: Beau Lee MD  Sent: 12/16/2018 8:43 PM EST  Subject: Prescription Question    Can a prescription for Omeprazole 20mg be refilled? If so, can I do it?  Td Melendez

## 2019-01-07 LAB
ALBUMIN SERPL-MCNC: 4 G/DL (ref 3.5–5.2)
ALBUMIN/GLOB SERPL: 1.7 G/DL
ALP SERPL-CCNC: 70 U/L (ref 39–117)
ALT SERPL-CCNC: 25 U/L (ref 1–41)
AST SERPL-CCNC: 22 U/L (ref 1–40)
BASOPHILS # BLD AUTO: 0.03 10*3/MM3 (ref 0–0.2)
BASOPHILS NFR BLD AUTO: 0.5 % (ref 0–1.5)
BILIRUB SERPL-MCNC: 0.8 MG/DL (ref 0.1–1.2)
BUN SERPL-MCNC: 21 MG/DL (ref 8–23)
BUN/CREAT SERPL: 18.8 (ref 7–25)
CALCIUM SERPL-MCNC: 9.3 MG/DL (ref 8.6–10.5)
CHLORIDE SERPL-SCNC: 107 MMOL/L (ref 98–107)
CHOLEST SERPL-MCNC: 140 MG/DL (ref 0–200)
CO2 SERPL-SCNC: 27.4 MMOL/L (ref 22–29)
CREAT SERPL-MCNC: 1.12 MG/DL (ref 0.76–1.27)
EOSINOPHIL # BLD AUTO: 0.31 10*3/MM3 (ref 0–0.7)
EOSINOPHIL NFR BLD AUTO: 5 % (ref 0.3–6.2)
ERYTHROCYTE [DISTWIDTH] IN BLOOD BY AUTOMATED COUNT: 13.5 % (ref 11.5–14.5)
GLOBULIN SER CALC-MCNC: 2.4 GM/DL
GLUCOSE SERPL-MCNC: 97 MG/DL (ref 65–99)
HCT VFR BLD AUTO: 46.8 % (ref 40.4–52.2)
HDLC SERPL-MCNC: 31 MG/DL (ref 40–60)
HGB BLD-MCNC: 16.1 G/DL (ref 13.7–17.6)
IMM GRANULOCYTES # BLD AUTO: 0 10*3/MM3 (ref 0–0.03)
IMM GRANULOCYTES NFR BLD AUTO: 0 % (ref 0–0.5)
LDLC SERPL CALC-MCNC: 83 MG/DL (ref 0–100)
LDLC/HDLC SERPL: 2.69 {RATIO}
LYMPHOCYTES # BLD AUTO: 1.87 10*3/MM3 (ref 0.9–4.8)
LYMPHOCYTES NFR BLD AUTO: 30.5 % (ref 19.6–45.3)
MCH RBC QN AUTO: 31.5 PG (ref 27–32.7)
MCHC RBC AUTO-ENTMCNC: 34.4 G/DL (ref 32.6–36.4)
MCV RBC AUTO: 91.6 FL (ref 79.8–96.2)
MONOCYTES # BLD AUTO: 0.43 10*3/MM3 (ref 0.2–1.2)
MONOCYTES NFR BLD AUTO: 7 % (ref 5–12)
NEUTROPHILS # BLD AUTO: 3.5 10*3/MM3 (ref 1.9–8.1)
NEUTROPHILS NFR BLD AUTO: 57 % (ref 42.7–76)
PLATELET # BLD AUTO: 182 10*3/MM3 (ref 140–500)
POTASSIUM SERPL-SCNC: 4.8 MMOL/L (ref 3.5–5.2)
PROT SERPL-MCNC: 6.4 G/DL (ref 6–8.5)
RBC # BLD AUTO: 5.11 10*6/MM3 (ref 4.6–6)
SODIUM SERPL-SCNC: 145 MMOL/L (ref 136–145)
TRIGL SERPL-MCNC: 128 MG/DL (ref 0–150)
TSH SERPL DL<=0.005 MIU/L-ACNC: 2.19 MIU/ML (ref 0.27–4.2)
VLDLC SERPL CALC-MCNC: 25.6 MG/DL (ref 5–40)
WBC # BLD AUTO: 6.14 10*3/MM3 (ref 4.5–10.7)

## 2019-01-09 ENCOUNTER — OFFICE VISIT (OUTPATIENT)
Dept: FAMILY MEDICINE CLINIC | Facility: CLINIC | Age: 74
End: 2019-01-09

## 2019-01-09 VITALS
SYSTOLIC BLOOD PRESSURE: 134 MMHG | BODY MASS INDEX: 27.2 KG/M2 | TEMPERATURE: 98 F | HEART RATE: 61 BPM | RESPIRATION RATE: 14 BRPM | WEIGHT: 190 LBS | DIASTOLIC BLOOD PRESSURE: 69 MMHG | HEIGHT: 70 IN

## 2019-01-09 DIAGNOSIS — M54.50 CHRONIC RIGHT-SIDED LOW BACK PAIN WITHOUT SCIATICA: ICD-10-CM

## 2019-01-09 DIAGNOSIS — Z23 IMMUNIZATION DUE: ICD-10-CM

## 2019-01-09 DIAGNOSIS — F41.9 ANXIETY: ICD-10-CM

## 2019-01-09 DIAGNOSIS — I25.10 CORONARY ARTERY DISEASE INVOLVING NATIVE CORONARY ARTERY OF NATIVE HEART WITHOUT ANGINA PECTORIS: ICD-10-CM

## 2019-01-09 DIAGNOSIS — K21.9 GASTROESOPHAGEAL REFLUX DISEASE WITHOUT ESOPHAGITIS: ICD-10-CM

## 2019-01-09 DIAGNOSIS — G89.29 CHRONIC RIGHT-SIDED LOW BACK PAIN WITHOUT SCIATICA: ICD-10-CM

## 2019-01-09 DIAGNOSIS — Z00.00 MEDICARE ANNUAL WELLNESS VISIT, SUBSEQUENT: Primary | ICD-10-CM

## 2019-01-09 DIAGNOSIS — E78.2 MIXED HYPERLIPIDEMIA: ICD-10-CM

## 2019-01-09 PROCEDURE — 99214 OFFICE O/P EST MOD 30 MIN: CPT | Performed by: FAMILY MEDICINE

## 2019-01-09 PROCEDURE — 90732 PPSV23 VACC 2 YRS+ SUBQ/IM: CPT | Performed by: FAMILY MEDICINE

## 2019-01-09 PROCEDURE — G0439 PPPS, SUBSEQ VISIT: HCPCS | Performed by: FAMILY MEDICINE

## 2019-01-09 PROCEDURE — 96160 PT-FOCUSED HLTH RISK ASSMT: CPT | Performed by: FAMILY MEDICINE

## 2019-01-09 PROCEDURE — G0009 ADMIN PNEUMOCOCCAL VACCINE: HCPCS | Performed by: FAMILY MEDICINE

## 2019-01-09 RX ORDER — OMEPRAZOLE 20 MG/1
20 CAPSULE, DELAYED RELEASE ORAL DAILY
Qty: 90 CAPSULE | Refills: 3 | Status: SHIPPED | OUTPATIENT
Start: 2019-01-09 | End: 2019-07-25

## 2019-01-09 RX ORDER — ALPRAZOLAM 0.25 MG/1
0.25 TABLET ORAL DAILY PRN
Qty: 30 TABLET | Refills: 2 | Status: SHIPPED | OUTPATIENT
Start: 2019-01-09 | End: 2020-01-30 | Stop reason: SDUPTHER

## 2019-01-09 RX ORDER — EZETIMIBE 10 MG/1
10 TABLET ORAL DAILY
Qty: 90 TABLET | Refills: 3 | Status: SHIPPED | OUTPATIENT
Start: 2019-01-09 | End: 2019-01-14 | Stop reason: SDUPTHER

## 2019-01-09 NOTE — PATIENT INSTRUCTIONS
Medicare Wellness  Personal Prevention Plan of Service     Date of Office Visit:  2019  Encounter Provider:  Beau Lee MD  Place of Service:  [facility]  Patient Name: Td Melendez Jr.  :  1945    As part of the Medicare Wellness portion of your visit today, we are providing you with this personalized preventive plan of services (PPPS). This plan is based upon recommendations of the [place] Preventive Services Task Force (USPSTF) and the Advisory Committee on Immunization Practices (ACIP).    This lists the preventive care services that should be considered, and provides dates of when you are due. Items listed as completed are up-to-date and do not require any further intervention.    Health Maintenance   Topic Date Due   • PNEUMOCOCCAL VACCINES (65+ LOW/MEDIUM RISK) (2 of 2 - PPSV23) 2017   • ZOSTER VACCINE (2 of 2) 2019 (Originally 2012)   • TDAP/TD VACCINES (2 - Td) 2019 (Originally 2018)   • HEPATITIS A VACCINE ADULT (2 of 2) 2019 (Originally 10/24/2018)   • LIPID PANEL  2020   • MEDICARE ANNUAL WELLNESS  2020   • COLONOSCOPY  2021   • INFLUENZA VACCINE  Completed   • HEPATITIS C SCREENING  Discontinued   • AAA SCREEN (ONE-TIME)  Discontinued       No orders of the defined types were placed in this encounter.      Return in about 1 year (around 2020) for Recheck.

## 2019-01-09 NOTE — PROGRESS NOTES
QUICK REFERENCE INFORMATION:  The ABCs of the Annual Wellness Visit    Subsequent Medicare Wellness Visit    HEALTH RISK ASSESSMENT    1945    Recent Hospitalizations:  No hospitalization(s) within the last year..        Current Medical Providers:  Patient Care Team:  Beau Lee MD as PCP - General (Family Medicine)  Obi Boucher MD as Consulting Physician (Cardiology)  Venkat Salas MD (Pain Medicine)  Jonatan Loomis MD as Consulting Physician (Endocrinology)        Smoking Status:  Social History     Tobacco Use   Smoking Status Former Smoker   • Packs/day: 0.50   • Types: Cigarettes   • Last attempt to quit: 1970   • Years since quittin.0   Smokeless Tobacco Never Used       Alcohol Consumption:  Social History     Substance and Sexual Activity   Alcohol Use No    Comment: caffeine use       Depression Screen:   PHQ-2/PHQ-9 Depression Screening 2019   Little interest or pleasure in doing things 0   Feeling down, depressed, or hopeless 0   Trouble falling or staying asleep, or sleeping too much -   Feeling tired or having little energy -   Poor appetite or overeating -   Feeling bad about yourself - or that you are a failure or have let yourself or your family down -   Trouble concentrating on things, such as reading the newspaper or watching television -   Moving or speaking so slowly that other people could have noticed. Or the opposite - being so fidgety or restless that you have been moving around a lot more than usual -   Thoughts that you would be better off dead, or of hurting yourself in some way -   Total Score 0   If you checked off any problems, how difficult have these problems made it for you to do your work, take care of things at home, or get along with other people? -       Health Habits and Functional and Cognitive Screening:  Functional & Cognitive Status 2019   Do you have difficulty preparing food and eating? No   Do you have difficulty bathing yourself, getting  dressed or grooming yourself? No   Do you have difficulty using the toilet? No   Do you have difficulty moving around from place to place? No   Do you have trouble with steps or getting out of a bed or a chair? No   In the past year have you fallen or experienced a near fall? Yes   Current Diet Well Balanced Diet   Dental Exam Up to date   Eye Exam Up to date   Exercise (times per week) 2 times per week   Current Exercise Activities Include Walking   Do you need help using the phone?  No   Are you deaf or do you have serious difficulty hearing?  No   Do you need help with transportation? No   Do you need help shopping? No   Do you need help preparing meals?  No   Do you need help with housework?  No   Do you need help with laundry? No   Do you need help taking your medications? No   Do you need help managing money? No   Do you ever drive or ride in a car without wearing a seat belt? No   Have you felt unusual stress, anger or loneliness in the last month? No   Who do you live with? Spouse   If you need help, do you have trouble finding someone available to you? Yes   Have you been bothered in the last four weeks by sexual problems? No   Do you have difficulty concentrating, remembering or making decisions? No           Does the patient have evidence of cognitive impairment? No    Aspirin use counseling: Does not need ASA (and currently is not on it)      Recent Lab Results:  CMP:  Lab Results   Component Value Date    GLU 97 01/07/2019    BUN 21 01/07/2019    CREATININE 1.12 01/07/2019    EGFRIFNONA 65 01/07/2019    EGFRIFAFRI 78 01/07/2019    BCR 18.8 01/07/2019     01/07/2019    K 4.8 01/07/2019    CO2 27.4 01/07/2019    CALCIUM 9.3 01/07/2019    PROTENTOTREF 6.4 01/07/2019    ALBUMIN 4.00 01/07/2019    LABGLOBREF 2.4 01/07/2019    LABIL2 1.7 01/07/2019    BILITOT 0.8 01/07/2019    ALKPHOS 70 01/07/2019    AST 22 01/07/2019    ALT 25 01/07/2019     Lipid Panel:  Lab Results   Component Value Date    TRIG 128  01/07/2019    HDL 31 (L) 01/07/2019    VLDL 25.6 01/07/2019    LDLHDL 2.69 01/07/2019     HbA1c:       Visual Acuity:  No exam data present    Age-appropriate Screening Schedule:  Refer to the list below for future screening recommendations based on patient's age, sex and/or medical conditions. Orders for these recommended tests are listed in the plan section. The patient has been provided with a written plan.    Health Maintenance   Topic Date Due   • PNEUMOCOCCAL VACCINES (65+ LOW/MEDIUM RISK) (2 of 2 - PPSV23) 01/01/2017   • ZOSTER VACCINE (2 of 2) 01/24/2019 (Originally 2/26/2012)   • TDAP/TD VACCINES (2 - Td) 01/30/2019 (Originally 1/1/2018)   • LIPID PANEL  01/07/2020   • COLONOSCOPY  07/25/2021   • INFLUENZA VACCINE  Completed        Subjective   History of Present Illness    Td Melendez Jr. is a 73 y.o. male who presents for an Subsequent Wellness Visit.    The following portions of the patient's history were reviewed and updated as appropriate: allergies, current medications, past family history, past medical history, past social history, past surgical history and problem list.    Outpatient Medications Prior to Visit   Medication Sig Dispense Refill   • ALPRAZolam (XANAX) 0.25 MG tablet Take 1 tablet by mouth Daily As Needed for Anxiety. 30 tablet 0   • ezetimibe (ZETIA) 10 MG tablet Take 1 tablet by mouth Daily. 90 tablet 2   • Pitavastatin Calcium (LIVALO) 4 MG tablet Take 1 tablet by mouth Every Night. 90 tablet 2   • alosetron (LOTRONEX) 0.5 MG tablet Take 0.5 mg by mouth Daily. q other day      • aspirin 81 MG tablet Take 1 tablet by mouth Daily. 30 tablet 11   • coenzyme Q10 100 MG capsule Take 200 mg by mouth 2 (Two) Times a Day.     • glucosamine-chondroitin 500-400 MG capsule capsule Take 2 capsules by mouth Daily.     • lithium 8 MEQ/5ML solution oral solution Take  by mouth 3 (Three) Times a Day.     • sildenafil (REVATIO) 20 MG tablet Take 2-3 tabs QD prn 30 tablet 11   • Unable to find 1  "each 1 (One) Time. Lithium orotate-OTC     • diclofenac (VOLTAREN) 50 MG EC tablet Take 1 tablet by mouth 2 (Two) Times a Day. 60 tablet 5   • omeprazole OTC (PRILOSEC OTC) 20 MG EC tablet Take 1 tablet by mouth Daily. 90 tablet 0     No facility-administered medications prior to visit.        Patient Active Problem List   Diagnosis   • CAD (coronary artery disease)   • Hyperlipidemia   • Anxiety   • Gastroesophageal reflux disease without esophagitis   • Gastroesophageal reflux disease   • Polyp of colon       Advance Care Planning:  has NO advance directive - information provided to the patient today    Identification of Risk Factors:  Risk factors include: cardiovascular risk.    Review of Systems    Compared to one year ago, the patient feels his physical health is the same.  Compared to one year ago, the patient feels his mental health is the same.    Objective     Physical Exam    Vitals:    01/09/19 1503   BP: 134/69   Pulse: 61   Resp: 14   Temp: 98 °F (36.7 °C)   TempSrc: Oral   Weight: 86.2 kg (190 lb)   Height: 177.8 cm (70\")       Patient's Body mass index is 27.26 kg/m². BMI is within normal parameters. No follow-up required.      Assessment/Plan   Patient Self-Management and Personalized Health Advice  The patient has been provided with information about: diet, exercise and weight management and preventive services including:   · Exercise counseling provided, Fall Risk assessment done, Nutrition counseling provided.    Visit Diagnoses:    ICD-10-CM ICD-9-CM   1. Medicare annual wellness visit, subsequent Z00.00 V70.0   2. Gastroesophageal reflux disease without esophagitis K21.9 530.81   3. Mixed hyperlipidemia E78.2 272.2   4. Coronary artery disease involving native coronary artery of native heart without angina pectoris I25.10 414.01   5. Anxiety F41.9 300.00   6. Chronic right-sided low back pain without sciatica M54.5 724.2    G89.29 338.29   7. Immunization due Z23 V05.9       Orders Placed This " Encounter   Procedures   • Pneumococcal Polysaccharide Vaccine 23-Valent Greater Than or Equal To 3yo Subcutaneous / IM       Outpatient Encounter Medications as of 1/9/2019   Medication Sig Dispense Refill   • ALPRAZolam (XANAX) 0.25 MG tablet Take 1 tablet by mouth Daily As Needed for Anxiety. 30 tablet 2   • [DISCONTINUED] ALPRAZolam (XANAX) 0.25 MG tablet Take 1 tablet by mouth Daily As Needed for Anxiety. 30 tablet 0   • [DISCONTINUED] ezetimibe (ZETIA) 10 MG tablet Take 1 tablet by mouth Daily. 90 tablet 2   • [DISCONTINUED] Pitavastatin Calcium (LIVALO) 4 MG tablet Take 1 tablet by mouth Every Night. 90 tablet 2   • alosetron (LOTRONEX) 0.5 MG tablet Take 0.5 mg by mouth Daily. q other day      • aspirin 81 MG tablet Take 1 tablet by mouth Daily. 30 tablet 11   • coenzyme Q10 100 MG capsule Take 200 mg by mouth 2 (Two) Times a Day.     • diclofenac (VOLTAREN) 50 MG EC tablet Take 1 tablet by mouth 2 (Two) Times a Day. 180 tablet 3   • ezetimibe (ZETIA) 10 MG tablet Take 1 tablet by mouth Daily. 90 tablet 3   • glucosamine-chondroitin 500-400 MG capsule capsule Take 2 capsules by mouth Daily.     • lithium 8 MEQ/5ML solution oral solution Take  by mouth 3 (Three) Times a Day.     • omeprazole (priLOSEC) 20 MG capsule Take 1 capsule by mouth Daily. 90 capsule 3   • Pitavastatin Calcium (LIVALO) 4 MG tablet Take 1 tablet by mouth Every Night. 90 tablet 3   • sildenafil (REVATIO) 20 MG tablet Take 2-3 tabs QD prn 30 tablet 11   • Unable to find 1 each 1 (One) Time. Lithium orotate-OTC     • [DISCONTINUED] diclofenac (VOLTAREN) 50 MG EC tablet Take 1 tablet by mouth 2 (Two) Times a Day. 60 tablet 5   • [DISCONTINUED] omeprazole OTC (PRILOSEC OTC) 20 MG EC tablet Take 1 tablet by mouth Daily. 90 tablet 0     No facility-administered encounter medications on file as of 1/9/2019.        Reviewed use of high risk medication in the elderly: not applicable  Reviewed for potential of harmful drug interactions in the  elderly: not applicable    Follow Up:  Return in about 1 year (around 1/9/2020) for Recheck.     An After Visit Summary and PPPS with all of these plans were given to the patient.

## 2019-01-09 NOTE — PROGRESS NOTES
Subjective   Td Melendez Jr. is a 73 y.o. male.     History of Present Illness     Chief Complaint:   Chief Complaint   Patient presents with   • Coronary Artery Disease     med refill - konstantin - lab results. NEW PHARM    • Anxiety   • Hyperlipidemia   • MEDICARE WELLNESS EXAM       Td Melendez Jr. 73 y.o. male who presents today for Medical Management of the below listed issues and medication refills.  he has a problem list of   Patient Active Problem List   Diagnosis   • CAD (coronary artery disease)   • Hyperlipidemia   • Anxiety   • Gastroesophageal reflux disease without esophagitis   • Gastroesophageal reflux disease   • Polyp of colon   .  Since the last visit, he has overall felt well.  he has been compliant with   Current Outpatient Medications:   •  ALPRAZolam (XANAX) 0.25 MG tablet, Take 1 tablet by mouth Daily As Needed for Anxiety., Disp: 30 tablet, Rfl: 2  •  alosetron (LOTRONEX) 0.5 MG tablet, Take 0.5 mg by mouth Daily. q other day , Disp: , Rfl:   •  aspirin 81 MG tablet, Take 1 tablet by mouth Daily., Disp: 30 tablet, Rfl: 11  •  coenzyme Q10 100 MG capsule, Take 200 mg by mouth 2 (Two) Times a Day., Disp: , Rfl:   •  diclofenac (VOLTAREN) 50 MG EC tablet, Take 1 tablet by mouth 2 (Two) Times a Day., Disp: 180 tablet, Rfl: 3  •  ezetimibe (ZETIA) 10 MG tablet, Take 1 tablet by mouth Daily., Disp: 90 tablet, Rfl: 3  •  glucosamine-chondroitin 500-400 MG capsule capsule, Take 2 capsules by mouth Daily., Disp: , Rfl:   •  lithium 8 MEQ/5ML solution oral solution, Take  by mouth 3 (Three) Times a Day., Disp: , Rfl:   •  omeprazole (priLOSEC) 20 MG capsule, Take 1 capsule by mouth Daily., Disp: 90 capsule, Rfl: 3  •  Pitavastatin Calcium (LIVALO) 4 MG tablet, Take 1 tablet by mouth Every Night., Disp: 90 tablet, Rfl: 3  •  sildenafil (REVATIO) 20 MG tablet, Take 2-3 tabs QD prn, Disp: 30 tablet, Rfl: 11  •  Unable to find, 1 each 1 (One) Time. Lithium orotate-OTC, Disp: , Rfl: .  he denies  "medication side effects.    All of the chronic condition(s) listed above are stable w/o issues.    /69   Pulse 61   Temp 98 °F (36.7 °C) (Oral)   Resp 14   Ht 177.8 cm (70\")   Wt 86.2 kg (190 lb)   BMI 27.26 kg/m²     Results for orders placed or performed in visit on 12/10/18   Comprehensive metabolic panel   Result Value Ref Range    Glucose 97 65 - 99 mg/dL    BUN 21 8 - 23 mg/dL    Creatinine 1.12 0.76 - 1.27 mg/dL    eGFR Non African Am 65 >60 mL/min/1.73    eGFR African Am 78 >60 mL/min/1.73    BUN/Creatinine Ratio 18.8 7.0 - 25.0    Sodium 145 136 - 145 mmol/L    Potassium 4.8 3.5 - 5.2 mmol/L    Chloride 107 98 - 107 mmol/L    Total CO2 27.4 22.0 - 29.0 mmol/L    Calcium 9.3 8.6 - 10.5 mg/dL    Total Protein 6.4 6.0 - 8.5 g/dL    Albumin 4.00 3.50 - 5.20 g/dL    Globulin 2.4 gm/dL    A/G Ratio 1.7 g/dL    Total Bilirubin 0.8 0.1 - 1.2 mg/dL    Alkaline Phosphatase 70 39 - 117 U/L    AST (SGOT) 22 1 - 40 U/L    ALT (SGPT) 25 1 - 41 U/L   Lipid Panel With LDL/HDL Ratio   Result Value Ref Range    Total Cholesterol 140 0 - 200 mg/dL    Triglycerides 128 0 - 150 mg/dL    HDL Cholesterol 31 (L) 40 - 60 mg/dL    VLDL Cholesterol 25.6 5 - 40 mg/dL    LDL Cholesterol  83 0 - 100 mg/dL    LDL/HDL Ratio 2.69    TSH   Result Value Ref Range    TSH 2.190 0.270 - 4.200 mIU/mL   CBC and Differential   Result Value Ref Range    WBC 6.14 4.50 - 10.70 10*3/mm3    RBC 5.11 4.60 - 6.00 10*6/mm3    Hemoglobin 16.1 13.7 - 17.6 g/dL    Hematocrit 46.8 40.4 - 52.2 %    MCV 91.6 79.8 - 96.2 fL    MCH 31.5 27.0 - 32.7 pg    MCHC 34.4 32.6 - 36.4 g/dL    RDW 13.5 11.5 - 14.5 %    Platelets 182 140 - 500 10*3/mm3    Neutrophil Rel % 57.0 42.7 - 76.0 %    Lymphocyte Rel % 30.5 19.6 - 45.3 %    Monocyte Rel % 7.0 5.0 - 12.0 %    Eosinophil Rel % 5.0 0.3 - 6.2 %    Basophil Rel % 0.5 0.0 - 1.5 %    Neutrophils Absolute 3.50 1.90 - 8.10 10*3/mm3    Lymphocytes Absolute 1.87 0.90 - 4.80 10*3/mm3    Monocytes Absolute 0.43 0.20 " - 1.20 10*3/mm3    Eosinophils Absolute 0.31 0.00 - 0.70 10*3/mm3    Basophils Absolute 0.03 0.00 - 0.20 10*3/mm3    Immature Granulocyte Rel % 0.0 0.0 - 0.5 %    Immature Grans Absolute 0.00 0.00 - 0.03 10*3/mm3           The following portions of the patient's history were reviewed and updated as appropriate: allergies, current medications, past family history, past medical history, past social history, past surgical history and problem list.    Review of Systems   Constitutional: Negative for activity change, chills, fatigue and fever.   Respiratory: Negative for cough and shortness of breath.    Cardiovascular: Negative for chest pain and palpitations.   Gastrointestinal: Negative for abdominal pain.   Endocrine: Negative for cold intolerance.   Psychiatric/Behavioral: Negative for behavioral problems and dysphoric mood. The patient is not nervous/anxious.        Objective   Physical Exam   Constitutional: He appears well-developed and well-nourished.   Neck: Neck supple. No thyromegaly present.   Cardiovascular: Normal rate and regular rhythm.   No murmur heard.  Pulmonary/Chest: Effort normal and breath sounds normal.   Abdominal: Bowel sounds are normal. There is no tenderness.   Psychiatric: He has a normal mood and affect. His behavior is normal.   Nursing note and vitals reviewed.  Labs reviewed with pt today during visit. All questions answered.      Assessment/Plan   Td was seen today for coronary artery disease, anxiety, hyperlipidemia and medicare wellness exam.    Diagnoses and all orders for this visit:    Medicare annual wellness visit, subsequent    Gastroesophageal reflux disease without esophagitis  -     omeprazole (priLOSEC) 20 MG capsule; Take 1 capsule by mouth Daily.    Mixed hyperlipidemia  -     ezetimibe (ZETIA) 10 MG tablet; Take 1 tablet by mouth Daily.  -     Pitavastatin Calcium (LIVALO) 4 MG tablet; Take 1 tablet by mouth Every Night.    Coronary artery disease involving native  coronary artery of native heart without angina pectoris  -     ezetimibe (ZETIA) 10 MG tablet; Take 1 tablet by mouth Daily.  -     Pitavastatin Calcium (LIVALO) 4 MG tablet; Take 1 tablet by mouth Every Night.    Anxiety  -     ALPRAZolam (XANAX) 0.25 MG tablet; Take 1 tablet by mouth Daily As Needed for Anxiety.    Chronic right-sided low back pain without sciatica  -     diclofenac (VOLTAREN) 50 MG EC tablet; Take 1 tablet by mouth 2 (Two) Times a Day.    Immunization due  -     Pneumococcal Polysaccharide Vaccine 23-Valent Greater Than or Equal To 1yo Subcutaneous / IM

## 2019-01-13 ENCOUNTER — PATIENT MESSAGE (OUTPATIENT)
Dept: FAMILY MEDICINE CLINIC | Facility: CLINIC | Age: 74
End: 2019-01-13

## 2019-01-13 DIAGNOSIS — E78.2 MIXED HYPERLIPIDEMIA: ICD-10-CM

## 2019-01-13 DIAGNOSIS — G89.29 CHRONIC RIGHT-SIDED LOW BACK PAIN WITHOUT SCIATICA: ICD-10-CM

## 2019-01-13 DIAGNOSIS — I25.10 CORONARY ARTERY DISEASE INVOLVING NATIVE CORONARY ARTERY OF NATIVE HEART WITHOUT ANGINA PECTORIS: ICD-10-CM

## 2019-01-13 DIAGNOSIS — M54.50 CHRONIC RIGHT-SIDED LOW BACK PAIN WITHOUT SCIATICA: ICD-10-CM

## 2019-01-14 RX ORDER — EZETIMIBE 10 MG/1
10 TABLET ORAL DAILY
Qty: 90 TABLET | Refills: 3 | Status: SHIPPED | OUTPATIENT
Start: 2019-01-14 | End: 2020-01-30 | Stop reason: SDUPTHER

## 2019-01-14 NOTE — TELEPHONE ENCOUNTER
From: Td Melendez Jr.  To: Beau Lee MD  Sent: 1/13/2019 7:42 PM EST  Subject: Prescription Question    Can we send prescriptions for diclofenac, livalo and zetia to [facility] instead of [facility]?    Td Melendez

## 2019-02-21 RX ORDER — LEVOFLOXACIN 750 MG/1
750 TABLET ORAL DAILY
Qty: 10 TABLET | Refills: 0 | Status: SHIPPED | OUTPATIENT
Start: 2019-02-21 | End: 2019-03-03

## 2019-03-25 ENCOUNTER — OFFICE VISIT (OUTPATIENT)
Dept: FAMILY MEDICINE CLINIC | Facility: CLINIC | Age: 74
End: 2019-03-25

## 2019-03-25 VITALS
DIASTOLIC BLOOD PRESSURE: 69 MMHG | TEMPERATURE: 98.2 F | RESPIRATION RATE: 16 BRPM | HEART RATE: 88 BPM | BODY MASS INDEX: 26.63 KG/M2 | HEIGHT: 70 IN | OXYGEN SATURATION: 97 % | SYSTOLIC BLOOD PRESSURE: 128 MMHG | WEIGHT: 186 LBS

## 2019-03-25 DIAGNOSIS — R42 VERTIGO: Primary | ICD-10-CM

## 2019-03-25 PROCEDURE — 99213 OFFICE O/P EST LOW 20 MIN: CPT | Performed by: FAMILY MEDICINE

## 2019-03-25 RX ORDER — MECLIZINE HYDROCHLORIDE 25 MG/1
25 TABLET ORAL 2 TIMES DAILY PRN
Qty: 30 TABLET | Refills: 2 | Status: SHIPPED | OUTPATIENT
Start: 2019-03-25 | End: 2019-07-25

## 2019-03-25 NOTE — PROGRESS NOTES
"Subjective   Td Melendez Jr. is a 73 y.o. male.     CC: Dizziness    History of Present Illness     Pt comes in today with recurring dizziness with movement. Was seen twice in the [facility] this month. The second visit was as follows:    73-year-old male presents with a 2-week history of dizziness.  He was here several weeks ago and was diagnosed with otitis media with dizziness and treated with antibiotic.  He said he felt better until 2 days ago when the symptoms came back.   Had EKG (normal) and they told him they would recommend an MRI to look into this further. Pt reports his sx resolve when supine and come back with movement, along with some nausea with movement. Denies hearing changes or new tinnitus.  Took Augmentin for the initial Otitis Media and pt reports the vertigo sx started with the ear infection.    The following portions of the patient's history were reviewed and updated as appropriate: allergies, current medications, past family history, past medical history, past social history, past surgical history and problem list.    Review of Systems   Constitutional: Negative for activity change, chills, fatigue and fever.   Eyes: Negative for visual disturbance.   Respiratory: Negative for cough and shortness of breath.    Cardiovascular: Negative for chest pain and palpitations.   Gastrointestinal: Negative for abdominal pain.   Endocrine: Negative for cold intolerance.   Neurological: Positive for dizziness. Negative for headaches.   Psychiatric/Behavioral: Negative for behavioral problems and dysphoric mood. The patient is not nervous/anxious.        /69   Pulse 88   Temp 98.2 °F (36.8 °C) (Oral)   Resp 16   Ht 177.8 cm (70\")   Wt 84.4 kg (186 lb)   SpO2 97%   BMI 26.69 kg/m²     Objective   Physical Exam   Constitutional: He appears well-developed and well-nourished.   Neck: Neck supple. No thyromegaly present.   Cardiovascular: Normal rate and regular rhythm.   No murmur " heard.  Pulmonary/Chest: Effort normal and breath sounds normal.   Abdominal: Bowel sounds are normal. There is no tenderness.   Neurological:   Mildly positive Nu-Hallpike noted   Psychiatric: He has a normal mood and affect. His behavior is normal.   Nursing note and vitals reviewed.  [facility] note reviewed with pt confirming HPI.    Assessment/Plan   Td was seen today for dizziness.    Diagnoses and all orders for this visit:    Vertigo  -     meclizine (ANTIVERT) 25 MG tablet; Take 1 tablet by mouth 2 (Two) Times a Day As Needed for dizziness.    Other orders  -     Cancel: Ambulatory Referral to Physical Therapy Evaluate and treat    Offered PT but pt elects to look up Epley's on YouTube and do.

## 2019-07-25 ENCOUNTER — OFFICE VISIT (OUTPATIENT)
Dept: CARDIOLOGY | Facility: CLINIC | Age: 74
End: 2019-07-25

## 2019-07-25 VITALS
SYSTOLIC BLOOD PRESSURE: 132 MMHG | WEIGHT: 187 LBS | HEART RATE: 68 BPM | DIASTOLIC BLOOD PRESSURE: 62 MMHG | OXYGEN SATURATION: 98 % | BODY MASS INDEX: 26.77 KG/M2 | HEIGHT: 70 IN

## 2019-07-25 DIAGNOSIS — I25.10 CORONARY ARTERY DISEASE INVOLVING NATIVE CORONARY ARTERY OF NATIVE HEART WITHOUT ANGINA PECTORIS: Primary | ICD-10-CM

## 2019-07-25 DIAGNOSIS — E78.2 MIXED HYPERLIPIDEMIA: ICD-10-CM

## 2019-07-25 PROBLEM — K21.9 GASTROESOPHAGEAL REFLUX DISEASE: Status: RESOLVED | Noted: 2018-07-13 | Resolved: 2019-07-25

## 2019-07-25 PROCEDURE — 99214 OFFICE O/P EST MOD 30 MIN: CPT | Performed by: NURSE PRACTITIONER

## 2019-07-25 PROCEDURE — 93000 ELECTROCARDIOGRAM COMPLETE: CPT | Performed by: NURSE PRACTITIONER

## 2019-07-25 NOTE — PROGRESS NOTES
"  Date of Office Visit: 2019  Encounter Provider: RUSS Crook  Place of Service: [facility] CARDIOLOGY  Patient Name: Td Melendez Jr.  :1945      Chief Complaint   Patient presents with   • Coronary Artery Disease     Follow Up    :     Dear      HPI: Td Melendez Jr. is a pleasant 73 y.o. male who presents today for cardiac follow up.  He is a new patient to me and his previous records have been reviewed.  He has a history of coronary artery disease and hyperlipidemia.    In 2015, he presented with unstable angina and underwent cardiac catheterization which revealed coronary artery disease.  He had a drug-eluting stent placement to the LAD and the RCA.  In 2015 in 2018 he developed chest pain and had a stress test completed on both occasions which were normal.  He is established patient of Obi Boucher and was last seen in our office in 2018.    He presents today for follow-up and states \"I have had a good year\".  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  He had a bout of dizziness in which he thought he had vertigo and never ended up taking the meclizine as prescribed.  His symptoms eventually resolved.  He denies chest pain, shortness of air, PND, orthopnea, cough, edema, palpitations, or syncope.  His blood pressure today is 130/62 which he states is high for him as he typically runs less than 120/60 in the morning.  He is exercising and working part-time.  Upon review of his medications today, he is no longer taking aspirin 81 mg daily.  He says he was told to stop it because he is taking diclofenac.    I reviewed his blood work from 2019 which included a CBC which was normal, CMP which was normal, TSH normal, and total cholesterol 140, triglycerides 128, HDL 31, and LDL 83.    Past Medical History:   Diagnosis Date   • Anxiety    • CAD " (coronary artery disease)     unstable angina, 2015: 20% LM, long 70% LAD with abnormal FFR (s/p 2.13m22eg Xience), 50% OM1, 99% prox RCA (s/p 3.5x18m Xience).  Normal Cardiolite 2015 and 2018   • Fibromyalgia    • H/O complete eye exam 2018   • Hypercholesterolemia    • Hyperlipidemia    • IBS (irritable bowel syndrome)    • Knee pain        Past Surgical History:   Procedure Laterality Date   • CARDIAC CATHETERIZATION      [facility] 2015: cath with 20% LM long 70% LAD (with positive FFR), 50% OM1, 99% prox RCA, s/p 2.75x33 Xience [place] to LAD and 3.5x18mm Xience Alpine to RCA. LVEF 64% Normal Cardiolite 2015   • CARDIAC SURGERY      cardiac stents, 2   • COLONOSCOPY  approx 2011    normal per patient  [facility]   • COLONOSCOPY W/ POLYPECTOMY N/A 2018    Procedure: COLONOSCOPY TO CECUM WITH COLD SNAREPOLYPECTOMIES, HOT SNARE POLYPECTOMIES;  Surgeon: Catarino Luna MD;  Location: [facility] ENDOSCOPY;  Service: Gastroenterology   • CORONARY ANGIOPLASTY WITH STENT PLACEMENT     • ENDOSCOPY N/A 2018    Procedure: ESOPHAGOGASTRODUODENOSCOPY WITH COLD BIOPSIES;  Surgeon: Catarino Luna MD;  Location: [facility] ENDOSCOPY;  Service: Gastroenterology   • WISDOM TOOTH EXTRACTION         Social History     Socioeconomic History   • Marital status:      Spouse name: Not on file   • Number of children: Not on file   • Years of education: Not on file   • Highest education level: Not on file   Tobacco Use   • Smoking status: Former Smoker     Packs/day: 0.50     Types: Cigarettes     Last attempt to quit: 1970     Years since quittin.5   • Smokeless tobacco: Never Used   Substance and Sexual Activity   • Alcohol use: No     Comment: caffeine use   • Drug use: No   • Sexual activity: Defer       Family History   Problem Relation Age of Onset   • Coronary artery disease Other    • Cervical cancer Mother    • Cancer Mother    • Depression Mother    • Heart disease Mother    • Liver  disease Mother    • Alcohol abuse Mother    • Thyroid cancer Father    • Cancer Father    • Depression Father    • Diabetes Father    • Thyroid disease Father        The following portion of the patient's history were reviewed and updated as appropriate: past medical history, past surgical history, past social history, past family history, allergies, current medications, and problem list.    Review of Systems   Constitution: Negative for chills, diaphoresis, fever, malaise/fatigue, night sweats, weight gain and weight loss.   HENT: Negative for hearing loss, nosebleeds, sore throat and tinnitus.    Eyes: Positive for pain. Negative for blurred vision, double vision and visual disturbance.   Cardiovascular: Negative for chest pain, claudication, cyanosis, dyspnea on exertion, irregular heartbeat, leg swelling, near-syncope, orthopnea, palpitations, paroxysmal nocturnal dyspnea and syncope.   Respiratory: Negative for cough, hemoptysis, shortness of breath, snoring and wheezing.    Endocrine: Negative for cold intolerance, heat intolerance and polyuria.   Hematologic/Lymphatic: Negative for bleeding problem. Does not bruise/bleed easily.   Skin: Negative for color change, dry skin, flushing and itching.   Musculoskeletal: Negative for falls, joint pain, joint swelling, muscle cramps, muscle weakness and myalgias.   Gastrointestinal: Negative for abdominal pain, constipation, heartburn, melena, nausea and vomiting.   Genitourinary: Positive for frequency. Negative for dysuria and hematuria.        Erectile dysfunction   Neurological: Positive for dizziness. Negative for excessive daytime sleepiness, light-headedness, loss of balance, numbness, paresthesias, seizures and vertigo.   Psychiatric/Behavioral: Negative for altered mental status, depression, memory loss and substance abuse. The patient is nervous/anxious. The patient does not have insomnia.    Allergic/Immunologic: Negative for environmental allergies.  "      Allergies   Allergen Reactions   • Augmentin [Amoxicillin-Pot Clavulanate] Hives and Itching         Current Outpatient Medications:   •  alosetron (LOTRONEX) 0.5 MG tablet, Take 0.5 mg by mouth Daily. q other day , Disp: , Rfl:   •  ALPRAZolam (XANAX) 0.25 MG tablet, Take 1 tablet by mouth Daily As Needed for Anxiety., Disp: 30 tablet, Rfl: 2  •  CBD oil (cannabidiol) capsule, Take  by mouth 2 (Two) Times a Day., Disp: , Rfl:   •  coenzyme Q10 100 MG capsule, Take 200 mg by mouth 2 (Two) Times a Day., Disp: , Rfl:   •  diclofenac (VOLTAREN) 50 MG EC tablet, Take 1 tablet by mouth 2 (Two) Times a Day., Disp: 180 tablet, Rfl: 3  •  ezetimibe (ZETIA) 10 MG tablet, Take 1 tablet by mouth Daily., Disp: 90 tablet, Rfl: 3  •  glucosamine-chondroitin 500-400 MG capsule capsule, Take 2 capsules by mouth Daily., Disp: , Rfl:   •  lithium 8 MEQ/5ML solution oral solution, Take 5 mg by mouth 3 (Three) Times a Day., Disp: , Rfl:   •  Pitavastatin Calcium (LIVALO) 4 MG tablet, Take 1 tablet by mouth Every Night., Disp: 90 tablet, Rfl: 3  •  sildenafil (REVATIO) 20 MG tablet, Take 2-3 tabs QD prn, Disp: 30 tablet, Rfl: 11  •  Unable to find, 1 each 1 (One) Time. Lithium orotate-OTC, Disp: , Rfl:   •  aspirin 81 MG tablet, Take 1 tablet by mouth Daily., Disp: 30 tablet, Rfl: 11        Objective:     Vitals:    07/25/19 1341   BP: 132/62   Pulse: 68   SpO2: 98%   Weight: 84.8 kg (187 lb)   Height: 177.8 cm (70\")     Body mass index is 26.83 kg/m².    PHYSICAL EXAM:    Vitals Reviewed.   General Appearance: No acute distress, well developed and well nourished.   Eyes: Conjunctiva and lids: No erythema, swelling, or discharge. Sclera non-icteric.   HENT: Atraumatic, normocephalic. External eyes, ears, and nose normal. No hearing loss noted. Mucous membranes normal. Lips not cyanotic. Neck supple with no tenderness.  Respiratory: No signs of respiratory distress. Respiration rhythm and depth normal.   Clear to auscultation. No " rales, crackles, rhonchi, or wheezing auscultated.   Cardiovascular:  Jugular Venous Pressure: Normal  Heart Rate and Rhythm: Normal, Heart Sounds: Normal S1 and S2. No S3 or S4 noted.  Murmurs: No murmurs noted. No rubs, thrills, or gallops.   Arterial Pulses: Carotid pulses normal. No carotid bruit noted. Lower Extremities: No edema noted.  Gastrointestinal:  Abdomen soft, non-distended, non-tender. Normal bowel sounds. No hepatomegaly.   Musculoskeletal: Normal movement of extremities  Skin and Nails: General appearance normal. No pallor, cyanosis, diaphoresis. Skin temperature normal. No clubbing of fingernails.   Psychiatric: Patient alert and oriented to person, place, and time. Speech and behavior appropriate. Normal mood and affect.       ECG 12 Lead  Date/Time: 7/25/2019 1:51 PM  Performed by: Padmini Ballard APRN  Authorized by: Padmini Ballard APRN   Comparison: compared with previous ECG from 7/16/2018  Rhythm: sinus rhythm  Rate: normal  BPM: 67  Conduction: conduction normal  ST Segments: ST segments normal  T Waves: T waves normal  QRS axis: normal  Other: no other findings    Clinical impression: normal ECG              Assessment:       Diagnosis Plan   1. Coronary artery disease involving native coronary artery of native heart without angina pectoris     2. Mixed hyperlipidemia            Plan:       1.  Coronary Artery Disease: He denies anginal symptoms.  Upon review of his meds he stopped taking his aspirin because he is taking diclofenac.  I recommended that he restart his aspirin 81 mg daily as he has a history of drug-eluting stent placement.  If there is a concern of bleeding with him being on the diclofenac, I have asked him to follow-up with his PCP.    Coronary Artery Disease  Assessment  • The patient has no angina  • Restart aspirin 81 mg daily    Plan  • Lifestyle modifications discussed include adhering to a heart healthy diet, maintenance of a healthy weight, medication  compliance, regular exercise and regular monitoring of cholesterol and blood pressure    Subjective - Objective  • There has been a previous stent procedure using MADELIN        2.  Hyperlipidemia: Remains on pitavastatin and goal LDL is less than 70.    3.  I recommended follow-up with Obi Boucher in 1 year, unless otherwise needed sooner.      As always, it has been a pleasure to participate in your patient's care. Thank you.       Sincerely,         RUSS Greco        **Dragon Disclaimer:**  Much of this encounter note is an electronic transcription/translation of spoken language to printed text. The electronic translation of spoken language may permit erroneous, or at times, nonsensical words or phrases to be inadvertently transcribed. Although I have reviewed the note for such errors, some may still exist.

## 2019-09-30 RX ORDER — SILDENAFIL CITRATE 20 MG/1
TABLET ORAL
Qty: 30 TABLET | Refills: 11 | OUTPATIENT
Start: 2019-09-30

## 2019-10-07 ENCOUNTER — PATIENT MESSAGE (OUTPATIENT)
Dept: FAMILY MEDICINE CLINIC | Facility: CLINIC | Age: 74
End: 2019-10-07

## 2019-10-07 DIAGNOSIS — E78.2 MIXED HYPERLIPIDEMIA: Primary | ICD-10-CM

## 2019-10-09 LAB
ALBUMIN SERPL-MCNC: 4.4 G/DL (ref 3.5–5.2)
ALBUMIN/GLOB SERPL: 1.8 G/DL
ALP SERPL-CCNC: 90 U/L (ref 39–117)
ALT SERPL-CCNC: 17 U/L (ref 1–41)
AST SERPL-CCNC: 17 U/L (ref 1–40)
BILIRUB SERPL-MCNC: 0.8 MG/DL (ref 0.2–1.2)
BUN SERPL-MCNC: 17 MG/DL (ref 8–23)
BUN/CREAT SERPL: 16.2 (ref 7–25)
CALCIUM SERPL-MCNC: 9.4 MG/DL (ref 8.6–10.5)
CHLORIDE SERPL-SCNC: 102 MMOL/L (ref 98–107)
CHOLEST SERPL-MCNC: 176 MG/DL (ref 0–200)
CO2 SERPL-SCNC: 27.2 MMOL/L (ref 22–29)
CREAT SERPL-MCNC: 1.05 MG/DL (ref 0.76–1.27)
GLOBULIN SER CALC-MCNC: 2.5 GM/DL
GLUCOSE SERPL-MCNC: 103 MG/DL (ref 65–99)
HDLC SERPL-MCNC: 42 MG/DL (ref 40–60)
LDLC SERPL CALC-MCNC: 108 MG/DL (ref 0–100)
POTASSIUM SERPL-SCNC: 4.6 MMOL/L (ref 3.5–5.2)
PROT SERPL-MCNC: 6.9 G/DL (ref 6–8.5)
SODIUM SERPL-SCNC: 142 MMOL/L (ref 136–145)
TRIGL SERPL-MCNC: 129 MG/DL (ref 0–150)
VLDLC SERPL CALC-MCNC: 25.8 MG/DL

## 2019-10-14 ENCOUNTER — OFFICE VISIT (OUTPATIENT)
Dept: FAMILY MEDICINE CLINIC | Facility: CLINIC | Age: 74
End: 2019-10-14

## 2019-10-14 VITALS
TEMPERATURE: 98.6 F | DIASTOLIC BLOOD PRESSURE: 64 MMHG | BODY MASS INDEX: 26.77 KG/M2 | SYSTOLIC BLOOD PRESSURE: 108 MMHG | HEART RATE: 64 BPM | WEIGHT: 187 LBS | HEIGHT: 70 IN | RESPIRATION RATE: 16 BRPM

## 2019-10-14 DIAGNOSIS — J31.2 CHRONIC SORE THROAT: Primary | ICD-10-CM

## 2019-10-14 PROCEDURE — 99213 OFFICE O/P EST LOW 20 MIN: CPT | Performed by: FAMILY MEDICINE

## 2019-10-14 RX ORDER — DOXYCYCLINE HYCLATE 100 MG/1
CAPSULE ORAL
Refills: 0 | COMMUNITY
Start: 2019-10-10 | End: 2019-11-07

## 2019-10-14 RX ORDER — IBUPROFEN 400 MG/1
400 TABLET ORAL AS NEEDED
COMMUNITY
End: 2020-12-11

## 2019-10-14 RX ORDER — OMEPRAZOLE 20 MG/1
TABLET, DELAYED RELEASE ORAL
COMMUNITY
Start: 2018-06-25 | End: 2020-12-11

## 2019-10-14 RX ORDER — ACETAMINOPHEN AND CODEINE PHOSPHATE 300; 30 MG/1; MG/1
TABLET ORAL SEE ADMIN INSTRUCTIONS
Refills: 0 | COMMUNITY
Start: 2019-10-11 | End: 2020-12-11

## 2019-10-14 NOTE — PROGRESS NOTES
"Subjective   Td Melendez Jr. is a 73 y.o. male.     CC: Sore Throat    History of Present Illness     Pt comes in today c/o a 6-7 week h/o ST. Reports this can awaken him in the night. Described as a \"tickle\". Recently his dentist gave him some abx and the throat is better in the past several days. Using some cough drops. [place] [facility] [place] and w/u reported as normal. Does have some AR issues, too. FH: brother being treated for throat cancer. Uses an occasional Prilosec yet reports not much of an issue with GERD currently. The ST is mainly on the right side.      The following portions of the patient's history were reviewed and updated as appropriate: allergies, current medications, past family history, past medical history, past social history, past surgical history and problem list.    Review of Systems   Constitutional: Negative for activity change, chills, fatigue and fever.   HENT: Positive for sore throat.    Respiratory: Negative for cough and shortness of breath.    Cardiovascular: Negative for chest pain and palpitations.   Gastrointestinal: Negative for abdominal pain.   Endocrine: Negative for cold intolerance.   Psychiatric/Behavioral: Negative for behavioral problems and dysphoric mood. The patient is not nervous/anxious.        /64   Pulse 64   Temp 98.6 °F (37 °C) (Oral)   Resp 16   Ht 177.8 cm (70\")   Wt 84.8 kg (187 lb)   BMI 26.83 kg/m²     Objective   Physical Exam   Constitutional: He appears well-developed and well-nourished.   HENT:   Mouth/Throat: Uvula is midline, oropharynx is clear and moist and mucous membranes are normal. No tonsillar exudate.   Neck: Neck supple. No thyromegaly present.   Cardiovascular: Normal rate and regular rhythm.   No murmur heard.  Pulmonary/Chest: Effort normal and breath sounds normal.   Abdominal: Bowel sounds are normal. There is no tenderness.   Psychiatric: He has a normal mood and affect. His behavior is normal. "   Nursing note and vitals reviewed.  Labs reviewed with pt today during visit. All questions answered.      Assessment/Plan   Td was seen today for sore throat and hyperlipidemia.    Diagnoses and all orders for this visit:    Chronic sore throat  -     Ambulatory Referral to ENT (Otolaryngology)

## 2020-01-30 ENCOUNTER — OFFICE VISIT (OUTPATIENT)
Dept: FAMILY MEDICINE CLINIC | Facility: CLINIC | Age: 75
End: 2020-01-30

## 2020-01-30 VITALS
SYSTOLIC BLOOD PRESSURE: 135 MMHG | OXYGEN SATURATION: 98 % | TEMPERATURE: 97.9 F | HEIGHT: 70 IN | WEIGHT: 186 LBS | RESPIRATION RATE: 16 BRPM | DIASTOLIC BLOOD PRESSURE: 76 MMHG | BODY MASS INDEX: 26.63 KG/M2 | HEART RATE: 68 BPM

## 2020-01-30 DIAGNOSIS — M54.50 CHRONIC RIGHT-SIDED LOW BACK PAIN WITHOUT SCIATICA: ICD-10-CM

## 2020-01-30 DIAGNOSIS — I25.10 CORONARY ARTERY DISEASE INVOLVING NATIVE CORONARY ARTERY OF NATIVE HEART WITHOUT ANGINA PECTORIS: ICD-10-CM

## 2020-01-30 DIAGNOSIS — Z12.5 SCREENING FOR PROSTATE CANCER: ICD-10-CM

## 2020-01-30 DIAGNOSIS — E78.2 MIXED HYPERLIPIDEMIA: ICD-10-CM

## 2020-01-30 DIAGNOSIS — G89.29 CHRONIC RIGHT-SIDED LOW BACK PAIN WITHOUT SCIATICA: ICD-10-CM

## 2020-01-30 DIAGNOSIS — Z00.00 MEDICARE ANNUAL WELLNESS VISIT, SUBSEQUENT: Primary | ICD-10-CM

## 2020-01-30 DIAGNOSIS — F41.9 ANXIETY: ICD-10-CM

## 2020-01-30 PROCEDURE — G0439 PPPS, SUBSEQ VISIT: HCPCS | Performed by: FAMILY MEDICINE

## 2020-01-30 PROCEDURE — 99214 OFFICE O/P EST MOD 30 MIN: CPT | Performed by: FAMILY MEDICINE

## 2020-01-30 RX ORDER — EZETIMIBE 10 MG/1
10 TABLET ORAL DAILY
Qty: 90 TABLET | Refills: 3 | Status: SHIPPED | OUTPATIENT
Start: 2020-01-30 | End: 2021-02-11 | Stop reason: SDUPTHER

## 2020-01-30 RX ORDER — ALPRAZOLAM 0.25 MG/1
0.25 TABLET ORAL DAILY PRN
Qty: 30 TABLET | Refills: 2 | Status: SHIPPED | OUTPATIENT
Start: 2020-01-30 | End: 2021-02-11 | Stop reason: SDUPTHER

## 2020-01-30 NOTE — PROGRESS NOTES
The ABCs of the Annual Wellness Visit  Subsequent Medicare Wellness Visit    Chief Complaint   Patient presents with   • medcare wellness     last AMW done  2019 per Kendra ARIZMENDI   • Coronary Artery Disease   • Hypertension   • Anxiety       Subjective   History of Present Illness:  Td Melendez Jr. is a 74 y.o. male who presents for a Subsequent Medicare Wellness Visit.    HEALTH RISK ASSESSMENT    Recent Hospitalizations:  No hospitalization(s) within the last year.    Current Medical Providers:  Patient Care Team:  Beau Lee MD as PCP - General (Family Medicine)  Obi Boucher MD as Consulting Physician (Cardiology)  Venkat Salas MD (Pain Medicine)  Jonatan Loomis MD as Consulting Physician (Endocrinology)    Smoking Status:  Social History     Tobacco Use   Smoking Status Former Smoker   • Packs/day: 0.50   • Types: Cigarettes   • Last attempt to quit: 1970   • Years since quittin.1   Smokeless Tobacco Never Used       Alcohol Consumption:  Social History     Substance and Sexual Activity   Alcohol Use No    Comment: caffeine use       Depression Screen:   PHQ-2/PHQ-9 Depression Screening 2020   Little interest or pleasure in doing things 0   Feeling down, depressed, or hopeless 0   Trouble falling or staying asleep, or sleeping too much -   Feeling tired or having little energy -   Poor appetite or overeating -   Feeling bad about yourself - or that you are a failure or have let yourself or your family down -   Trouble concentrating on things, such as reading the newspaper or watching television -   Moving or speaking so slowly that other people could have noticed. Or the opposite - being so fidgety or restless that you have been moving around a lot more than usual -   Thoughts that you would be better off dead, or of hurting yourself in some way -   Total Score 0   If you checked off any problems, how difficult have these problems made it for you to do your work, take care of things at  home, or get along with other people? -       Fall Risk Screen:  MARILU Fall Risk Assessment was completed, and patient is at LOW risk for falls.Assessment completed on:1/30/2020    Health Habits and Functional and Cognitive Screening:  Functional & Cognitive Status 1/30/2020   Do you have difficulty preparing food and eating? No   Do you have difficulty bathing yourself, getting dressed or grooming yourself? No   Do you have difficulty using the toilet? No   Do you have difficulty moving around from place to place? No   Do you have trouble with steps or getting out of a bed or a chair? No   Current Diet Well Balanced Diet   Dental Exam Up to date   Eye Exam Up to date   Exercise (times per week) 0 times per week   Current Exercise Activities Include None   Do you need help using the phone?  No   Are you deaf or do you have serious difficulty hearing?  No   Do you need help with transportation? No   Do you need help shopping? No   Do you need help preparing meals?  No   Do you need help with housework?  No   Do you need help with laundry? No   Do you need help taking your medications? No   Do you need help managing money? No   Do you ever drive or ride in a car without wearing a seat belt? No   Have you felt unusual stress, anger or loneliness in the last month? No   Who do you live with? Spouse   If you need help, do you have trouble finding someone available to you? Yes   Have you been bothered in the last four weeks by sexual problems? No   Do you have difficulty concentrating, remembering or making decisions? No         Does the patient have evidence of cognitive impairment? No    Asprin use counseling:Taking ASA appropriately as indicated    Age-appropriate Screening Schedule:  Refer to the list below for future screening recommendations based on patient's age, sex and/or medical conditions. Orders for these recommended tests are listed in the plan section. The patient has been provided with a written  plan.    Health Maintenance   Topic Date Due   • TDAP/TD VACCINES (2 - Td) 01/01/2018   • INFLUENZA VACCINE  08/01/2019   • ZOSTER VACCINE (2 of 2) 04/02/2020 (Originally 2/26/2012)   • LIPID PANEL  10/08/2020   • COLONOSCOPY  07/25/2021          The following portions of the patient's history were reviewed and updated as appropriate: allergies, current medications, past family history, past medical history, past social history, past surgical history and problem list.    Outpatient Medications Prior to Visit   Medication Sig Dispense Refill   • ALPRAZolam (XANAX) 0.25 MG tablet Take 1 tablet by mouth Daily As Needed for Anxiety. 30 tablet 2   • diclofenac (VOLTAREN) 50 MG EC tablet Take 1 tablet by mouth 2 (Two) Times a Day. 180 tablet 3   • ezetimibe (ZETIA) 10 MG tablet Take 1 tablet by mouth Daily. 90 tablet 3   • Pitavastatin Calcium (LIVALO) 4 MG tablet Take 1 tablet by mouth Every Night. (Patient taking differently: Take 1 mg by mouth Every Night.) 90 tablet 3   • acetaminophen-codeine (TYLENOL #3) 300-30 MG per tablet Take  by mouth See Admin Instructions. Take 1 tablet by mouth every 4-6 hours as needed for pain  0   • alosetron (LOTRONEX) 0.5 MG tablet Take 0.5 mg by mouth Daily. q other day      • aspirin 81 MG tablet Take 1 tablet by mouth Daily. 30 tablet 11   • CBD oil (cannabidiol) capsule Take  by mouth 2 (Two) Times a Day.     • coenzyme Q10 100 MG capsule Take 200 mg by mouth 2 (Two) Times a Day.     • glucosamine-chondroitin 500-400 MG capsule capsule Take 2 capsules by mouth Daily.     • ibuprofen (ADVIL,MOTRIN) 400 MG tablet Take 400 mg by mouth.     • lithium 8 MEQ/5ML solution oral solution Take 5 mg by mouth 3 (Three) Times a Day.     • omeprazole OTC (PriLOSEC OTC) 20 MG EC tablet Take  by mouth.     • sildenafil (REVATIO) 20 MG tablet Take 2-3 tabs QD prn 30 tablet 11   • methylPREDNISolone (MEDROL, BUCKY,) 4 MG tablet Take as directed on package instructions. 21 each 0     No  "facility-administered medications prior to visit.        Patient Active Problem List   Diagnosis   • CAD (coronary artery disease)   • Hyperlipidemia   • Anxiety   • Gastroesophageal reflux disease without esophagitis   • Polyp of colon       Advanced Care Planning:  Patient does not have an advance directive - information provided to the patient today    Review of Systems    Compared to one year ago, the patient feels his physical health is the same.  Compared to one year ago, the patient feels his mental health is the same.    Reviewed chart for potential of high risk medication in the elderly: yes  Reviewed chart for potential of harmful drug interactions in the elderly:yes    Objective         Vitals:    01/30/20 1517   BP: 135/76   Pulse: 68   Resp: 16   Temp: 97.9 °F (36.6 °C)   TempSrc: Oral   SpO2: 98%   Weight: 84.4 kg (186 lb)   Height: 177.8 cm (70\")   PainSc: 0-No pain       Body mass index is 26.69 kg/m².  Discussed the patient's BMI with him. The BMI is in the acceptable range.    Physical Exam          Assessment/Plan   Medicare Risks and Personalized Health Plan  CMS Preventative Services Quick Reference  Cardiovascular risk    The above risks/problems have been discussed with the patient.  Pertinent information has been shared with the patient in the After Visit Summary.  Follow up plans and orders are seen below in the Assessment/Plan Section.    Diagnoses and all orders for this visit:    1. Medicare annual wellness visit, subsequent (Primary)    2. Coronary artery disease involving native coronary artery of native heart without angina pectoris  -     ezetimibe (ZETIA) 10 MG tablet; Take 1 tablet by mouth Daily.  Dispense: 90 tablet; Refill: 3  -     Comprehensive metabolic panel  -     Lipid panel  -     CBC and Differential  -     TSH  -     pitavastatin calcium (LIVALO) 1 MG tablet tablet; Take 1 tablet by mouth Every Night.  Dispense: 90 tablet; Refill: 3    3. Mixed hyperlipidemia  -     " ezetimibe (ZETIA) 10 MG tablet; Take 1 tablet by mouth Daily.  Dispense: 90 tablet; Refill: 3  -     Lipid panel  -     pitavastatin calcium (LIVALO) 1 MG tablet tablet; Take 1 tablet by mouth Every Night.  Dispense: 90 tablet; Refill: 3    4. Chronic right-sided low back pain without sciatica  -     diclofenac (VOLTAREN) 50 MG EC tablet; Take 1 tablet by mouth 2 (Two) Times a Day.  Dispense: 180 tablet; Refill: 3    5. Anxiety  -     ALPRAZolam (XANAX) 0.25 MG tablet; Take 1 tablet by mouth Daily As Needed for Anxiety.  Dispense: 30 tablet; Refill: 2    6. Screening for prostate cancer  -     PSA    Other orders  -     Cancel: Pitavastatin Calcium (LIVALO) 4 MG tablet; Take 1 tablet by mouth Every Night.  Dispense: 90 tablet; Refill: 3      Follow Up:  No follow-ups on file.     An After Visit Summary and PPPS were given to the patient.

## 2020-01-30 NOTE — PROGRESS NOTES
Subjective   Td Melendez Jr. is a 74 y.o. male.     History of Present Illness     Chief Complaint:   Chief Complaint   Patient presents with   • medcare wellness     last AMW done  1/2019 per Kendra ARIZMENDI   • Coronary Artery Disease   • Hypertension   • Anxiety       Td Melendez Jr. 74 y.o. male who presents today for Medical Management of the below listed issues and medication refills.  he has a problem list of   Patient Active Problem List   Diagnosis   • CAD (coronary artery disease)   • Hyperlipidemia   • Anxiety   • Gastroesophageal reflux disease without esophagitis   • Polyp of colon   .  Since the last visit, he has overall felt well.  he has been compliant with   Current Outpatient Medications:   •  acetaminophen-codeine (TYLENOL #3) 300-30 MG per tablet, Take  by mouth See Admin Instructions. Take 1 tablet by mouth every 4-6 hours as needed for pain, Disp: , Rfl: 0  •  alosetron (LOTRONEX) 0.5 MG tablet, Take 0.5 mg by mouth Daily. q other day , Disp: , Rfl:   •  ALPRAZolam (XANAX) 0.25 MG tablet, Take 1 tablet by mouth Daily As Needed for Anxiety., Disp: 30 tablet, Rfl: 2  •  aspirin 81 MG tablet, Take 1 tablet by mouth Daily., Disp: 30 tablet, Rfl: 11  •  CBD oil (cannabidiol) capsule, Take  by mouth 2 (Two) Times a Day., Disp: , Rfl:   •  coenzyme Q10 100 MG capsule, Take 200 mg by mouth 2 (Two) Times a Day., Disp: , Rfl:   •  diclofenac (VOLTAREN) 50 MG EC tablet, Take 1 tablet by mouth 2 (Two) Times a Day., Disp: 180 tablet, Rfl: 3  •  ezetimibe (ZETIA) 10 MG tablet, Take 1 tablet by mouth Daily., Disp: 90 tablet, Rfl: 3  •  glucosamine-chondroitin 500-400 MG capsule capsule, Take 2 capsules by mouth Daily., Disp: , Rfl:   •  ibuprofen (ADVIL,MOTRIN) 400 MG tablet, Take 400 mg by mouth., Disp: , Rfl:   •  lithium 8 MEQ/5ML solution oral solution, Take 5 mg by mouth 3 (Three) Times a Day., Disp: , Rfl:   •  omeprazole OTC (PriLOSEC OTC) 20 MG EC tablet, Take  by mouth., Disp: , Rfl:   •   "pitavastatin calcium (LIVALO) 1 MG tablet tablet, Take 1 tablet by mouth Every Night., Disp: 90 tablet, Rfl: 3  •  sildenafil (REVATIO) 20 MG tablet, Take 2-3 tabs QD prn, Disp: 30 tablet, Rfl: 11.  he denies medication side effects.    All of the other chronic condition(s) listed above are stable w/o issues.    /76   Pulse 68   Temp 97.9 °F (36.6 °C) (Oral)   Resp 16   Ht 177.8 cm (70\")   Wt 84.4 kg (186 lb)   SpO2 98%   BMI 26.69 kg/m²     Results for orders placed or performed in visit on 10/07/19   Comprehensive metabolic panel   Result Value Ref Range    Glucose 103 (H) 65 - 99 mg/dL    BUN 17 8 - 23 mg/dL    Creatinine 1.05 0.76 - 1.27 mg/dL    eGFR Non African Am 70 >60 mL/min/1.73    eGFR African Am 84 >60 mL/min/1.73    BUN/Creatinine Ratio 16.2 7.0 - 25.0    Sodium 142 136 - 145 mmol/L    Potassium 4.6 3.5 - 5.2 mmol/L    Chloride 102 98 - 107 mmol/L    Total CO2 27.2 22.0 - 29.0 mmol/L    Calcium 9.4 8.6 - 10.5 mg/dL    Total Protein 6.9 6.0 - 8.5 g/dL    Albumin 4.40 3.50 - 5.20 g/dL    Globulin 2.5 gm/dL    A/G Ratio 1.8 g/dL    Total Bilirubin 0.8 0.2 - 1.2 mg/dL    Alkaline Phosphatase 90 39 - 117 U/L    AST (SGOT) 17 1 - 40 U/L    ALT (SGPT) 17 1 - 41 U/L   Lipid panel   Result Value Ref Range    Total Cholesterol 176 0 - 200 mg/dL    Triglycerides 129 0 - 150 mg/dL    HDL Cholesterol 42 40 - 60 mg/dL    VLDL Cholesterol 25.8 mg/dL    LDL Cholesterol  108 (H) 0 - 100 mg/dL           The following portions of the patient's history were reviewed and updated as appropriate: allergies, current medications, past family history, past medical history, past social history, past surgical history and problem list.    Review of Systems   Constitutional: Negative for activity change, chills, fatigue and fever.   Respiratory: Negative for cough and shortness of breath.    Cardiovascular: Negative for chest pain and palpitations.   Gastrointestinal: Negative for abdominal pain.   Endocrine: Negative " for cold intolerance.   Psychiatric/Behavioral: Negative for behavioral problems and dysphoric mood. The patient is not nervous/anxious.        Objective   Physical Exam   Constitutional: He appears well-developed and well-nourished.   Neck: Neck supple. No thyromegaly present.   Cardiovascular: Normal rate and regular rhythm.   No murmur heard.  Pulmonary/Chest: Effort normal and breath sounds normal.   Abdominal: Bowel sounds are normal. There is no tenderness.   Psychiatric: He has a normal mood and affect. His behavior is normal.   Nursing note and vitals reviewed.      Assessment/Plan   Td was seen today for [facility] wellness, coronary artery disease, hypertension and anxiety.    Diagnoses and all orders for this visit:    Medicare annual wellness visit, subsequent    Coronary artery disease involving native coronary artery of native heart without angina pectoris  -     ezetimibe (ZETIA) 10 MG tablet; Take 1 tablet by mouth Daily.  -     Comprehensive metabolic panel  -     Lipid panel  -     CBC and Differential  -     TSH  -     pitavastatin calcium (LIVALO) 1 MG tablet tablet; Take 1 tablet by mouth Every Night.    Mixed hyperlipidemia  -     ezetimibe (ZETIA) 10 MG tablet; Take 1 tablet by mouth Daily.  -     Lipid panel  -     pitavastatin calcium (LIVALO) 1 MG tablet tablet; Take 1 tablet by mouth Every Night.    Chronic right-sided low back pain without sciatica  -     diclofenac (VOLTAREN) 50 MG EC tablet; Take 1 tablet by mouth 2 (Two) Times a Day.    Anxiety  -     ALPRAZolam (XANAX) 0.25 MG tablet; Take 1 tablet by mouth Daily As Needed for Anxiety.    Screening for prostate cancer  -     PSA

## 2020-01-30 NOTE — PATIENT INSTRUCTIONS
Medicare Wellness  Personal Prevention Plan of Service     Date of Office Visit:  2020  Encounter Provider:  Beau Lee MD  Place of Service:  [facility]  Patient Name: Td Melendez Jr.  :  1945    As part of the Medicare Wellness portion of your visit today, we are providing you with this personalized preventive plan of services (PPPS). This plan is based upon recommendations of the [place] Preventive Services Task Force (USPSTF) and the Advisory Committee on Immunization Practices (ACIP).    This lists the preventive care services that should be considered, and provides dates of when you are due. Items listed as completed are up-to-date and do not require any further intervention.    Health Maintenance   Topic Date Due   • TDAP/TD VACCINES (2 - Td) 2018   • INFLUENZA VACCINE  2019   • ZOSTER VACCINE (2 of 2) 2020 (Originally 2012)   • LIPID PANEL  10/08/2020   • MEDICARE ANNUAL WELLNESS  2021   • COLONOSCOPY  2021   • Pneumococcal Vaccine Once at 65 Years Old  Completed   • HEPATITIS C SCREENING  Discontinued   • AAA SCREEN (ONE-TIME)  Discontinued       Orders Placed This Encounter   Procedures   • Comprehensive metabolic panel   • Lipid panel   • TSH   • PSA   • CBC and Differential     Order Specific Question:   Manual Differential     Answer:   Yes       Return in about 1 year (around 2021) for Recheck.

## 2020-05-27 ENCOUNTER — HOSPITAL ENCOUNTER (EMERGENCY)
Facility: HOSPITAL | Age: 75
Discharge: HOME | End: 2020-05-27
Attending: EMERGENCY MEDICINE | Admitting: EMERGENCY MEDICINE

## 2020-05-27 ENCOUNTER — APPOINTMENT (OUTPATIENT)
Dept: GENERAL RADIOLOGY | Facility: HOSPITAL | Age: 75
End: 2020-05-27

## 2020-05-27 VITALS
RESPIRATION RATE: 16 BRPM | DIASTOLIC BLOOD PRESSURE: 77 MMHG | HEART RATE: 69 BPM | HEIGHT: 70 IN | BODY MASS INDEX: 27.06 KG/M2 | OXYGEN SATURATION: 99 % | SYSTOLIC BLOOD PRESSURE: 132 MMHG | TEMPERATURE: 96.9 F | WEIGHT: 189 LBS

## 2020-05-27 DIAGNOSIS — R10.13 EPIGASTRIC PAIN: Primary | ICD-10-CM

## 2020-05-27 LAB
ALBUMIN SERPL-MCNC: 4.5 G/DL (ref 3.5–5.2)
ALBUMIN/GLOB SERPL: 1.7 G/DL
ALP SERPL-CCNC: 71 U/L (ref 39–117)
ALT SERPL W P-5'-P-CCNC: 18 U/L (ref 1–41)
ANION GAP SERPL CALCULATED.3IONS-SCNC: 9.4 MMOL/L (ref 5–15)
AST SERPL-CCNC: 20 U/L (ref 1–40)
BASOPHILS # BLD AUTO: 0.01 10*3/MM3 (ref 0–0.2)
BASOPHILS NFR BLD AUTO: 0.1 % (ref 0–1.5)
BILIRUB SERPL-MCNC: 0.9 MG/DL (ref 0.2–1.2)
BUN BLD-MCNC: 15 MG/DL (ref 8–23)
BUN/CREAT SERPL: 16.7 (ref 7–25)
CALCIUM SPEC-SCNC: 9.8 MG/DL (ref 8.6–10.5)
CHLORIDE SERPL-SCNC: 102 MMOL/L (ref 98–107)
CO2 SERPL-SCNC: 26.6 MMOL/L (ref 22–29)
CREAT BLD-MCNC: 0.9 MG/DL (ref 0.76–1.27)
D DIMER PPP FEU-MCNC: <0.27 MCGFEU/ML (ref 0–0.49)
DEPRECATED RDW RBC AUTO: 40.3 FL (ref 37–54)
EOSINOPHIL # BLD AUTO: 0.02 10*3/MM3 (ref 0–0.4)
EOSINOPHIL NFR BLD AUTO: 0.3 % (ref 0.3–6.2)
ERYTHROCYTE [DISTWIDTH] IN BLOOD BY AUTOMATED COUNT: 12.3 % (ref 12.3–15.4)
GFR SERPL CREATININE-BSD FRML MDRD: 83 ML/MIN/1.73
GLOBULIN UR ELPH-MCNC: 2.7 GM/DL
GLUCOSE BLD-MCNC: 109 MG/DL (ref 65–99)
HCT VFR BLD AUTO: 50.5 % (ref 37.5–51)
HGB BLD-MCNC: 17.9 G/DL (ref 13–17.7)
HOLD SPECIMEN: NORMAL
HOLD SPECIMEN: NORMAL
IMM GRANULOCYTES # BLD AUTO: 0.02 10*3/MM3 (ref 0–0.05)
IMM GRANULOCYTES NFR BLD AUTO: 0.3 % (ref 0–0.5)
LYMPHOCYTES # BLD AUTO: 1.07 10*3/MM3 (ref 0.7–3.1)
LYMPHOCYTES NFR BLD AUTO: 14.9 % (ref 19.6–45.3)
MCH RBC QN AUTO: 31.9 PG (ref 26.6–33)
MCHC RBC AUTO-ENTMCNC: 35.4 G/DL (ref 31.5–35.7)
MCV RBC AUTO: 89.9 FL (ref 79–97)
MONOCYTES # BLD AUTO: 0.46 10*3/MM3 (ref 0.1–0.9)
MONOCYTES NFR BLD AUTO: 6.4 % (ref 5–12)
NEUTROPHILS # BLD AUTO: 5.58 10*3/MM3 (ref 1.7–7)
NEUTROPHILS NFR BLD AUTO: 78 % (ref 42.7–76)
NRBC BLD AUTO-RTO: 0 /100 WBC (ref 0–0.2)
PLATELET # BLD AUTO: 192 10*3/MM3 (ref 140–450)
PMV BLD AUTO: 9.5 FL (ref 6–12)
POTASSIUM BLD-SCNC: 4.3 MMOL/L (ref 3.5–5.2)
PROT SERPL-MCNC: 7.2 G/DL (ref 6–8.5)
RBC # BLD AUTO: 5.62 10*6/MM3 (ref 4.14–5.8)
SODIUM BLD-SCNC: 138 MMOL/L (ref 136–145)
TROPONIN T SERPL-MCNC: <0.01 NG/ML (ref 0–0.03)
WBC NRBC COR # BLD: 7.16 10*3/MM3 (ref 3.4–10.8)
WHOLE BLOOD HOLD SPECIMEN: NORMAL
WHOLE BLOOD HOLD SPECIMEN: NORMAL

## 2020-05-27 PROCEDURE — 93010 ELECTROCARDIOGRAM REPORT: CPT | Performed by: INTERNAL MEDICINE

## 2020-05-27 PROCEDURE — 93005 ELECTROCARDIOGRAM TRACING: CPT | Performed by: EMERGENCY MEDICINE

## 2020-05-27 PROCEDURE — 85379 FIBRIN DEGRADATION QUANT: CPT | Performed by: EMERGENCY MEDICINE

## 2020-05-27 PROCEDURE — 85025 COMPLETE CBC W/AUTO DIFF WBC: CPT | Performed by: EMERGENCY MEDICINE

## 2020-05-27 PROCEDURE — 99284 EMERGENCY DEPT VISIT MOD MDM: CPT

## 2020-05-27 PROCEDURE — 80053 COMPREHEN METABOLIC PANEL: CPT | Performed by: EMERGENCY MEDICINE

## 2020-05-27 PROCEDURE — 93005 ELECTROCARDIOGRAM TRACING: CPT

## 2020-05-27 PROCEDURE — 84484 ASSAY OF TROPONIN QUANT: CPT | Performed by: EMERGENCY MEDICINE

## 2020-05-27 PROCEDURE — 71045 X-RAY EXAM CHEST 1 VIEW: CPT

## 2020-05-27 RX ORDER — ASPIRIN 81 MG/1
324 TABLET, CHEWABLE ORAL ONCE
Status: DISCONTINUED | OUTPATIENT
Start: 2020-05-27 | End: 2020-05-27

## 2020-05-27 RX ORDER — SODIUM CHLORIDE 0.9 % (FLUSH) 0.9 %
10 SYRINGE (ML) INJECTION AS NEEDED
Status: DISCONTINUED | OUTPATIENT
Start: 2020-05-27 | End: 2020-05-27 | Stop reason: HOSPADM

## 2020-05-30 LAB
ALBUMIN SERPL-MCNC: 4.5 G/DL (ref 3.5–5.2)
ALBUMIN/GLOB SERPL: 2 G/DL
ALP SERPL-CCNC: 76 U/L (ref 39–117)
ALT SERPL-CCNC: 14 U/L (ref 1–41)
AST SERPL-CCNC: 18 U/L (ref 1–40)
BASOPHILS # BLD AUTO: 0.03 10*3/MM3 (ref 0–0.2)
BASOPHILS NFR BLD AUTO: 0.4 % (ref 0–1.5)
BILIRUB SERPL-MCNC: 1.1 MG/DL (ref 0.2–1.2)
BUN SERPL-MCNC: 22 MG/DL (ref 8–23)
BUN/CREAT SERPL: 20 (ref 7–25)
CALCIUM SERPL-MCNC: 9.9 MG/DL (ref 8.6–10.5)
CHLORIDE SERPL-SCNC: 103 MMOL/L (ref 98–107)
CHOLEST SERPL-MCNC: 162 MG/DL (ref 0–200)
CO2 SERPL-SCNC: 25.7 MMOL/L (ref 22–29)
CREAT SERPL-MCNC: 1.1 MG/DL (ref 0.76–1.27)
EOSINOPHIL # BLD AUTO: 0.13 10*3/MM3 (ref 0–0.4)
EOSINOPHIL NFR BLD AUTO: 1.9 % (ref 0.3–6.2)
ERYTHROCYTE [DISTWIDTH] IN BLOOD BY AUTOMATED COUNT: 12.6 % (ref 12.3–15.4)
GLOBULIN SER CALC-MCNC: 2.2 GM/DL
GLUCOSE SERPL-MCNC: 99 MG/DL (ref 65–99)
HCT VFR BLD AUTO: 50.2 % (ref 37.5–51)
HDLC SERPL-MCNC: 40 MG/DL (ref 40–60)
HGB BLD-MCNC: 17.7 G/DL (ref 13–17.7)
IMM GRANULOCYTES # BLD AUTO: 0.01 10*3/MM3 (ref 0–0.05)
IMM GRANULOCYTES NFR BLD AUTO: 0.1 % (ref 0–0.5)
LDLC SERPL CALC-MCNC: 100 MG/DL (ref 0–100)
LYMPHOCYTES # BLD AUTO: 1.92 10*3/MM3 (ref 0.7–3.1)
LYMPHOCYTES NFR BLD AUTO: 28.2 % (ref 19.6–45.3)
MCH RBC QN AUTO: 32 PG (ref 26.6–33)
MCHC RBC AUTO-ENTMCNC: 35.3 G/DL (ref 31.5–35.7)
MCV RBC AUTO: 90.8 FL (ref 79–97)
MONOCYTES # BLD AUTO: 0.64 10*3/MM3 (ref 0.1–0.9)
MONOCYTES NFR BLD AUTO: 9.4 % (ref 5–12)
NEUTROPHILS # BLD AUTO: 4.07 10*3/MM3 (ref 1.7–7)
NEUTROPHILS NFR BLD AUTO: 60 % (ref 42.7–76)
NRBC BLD AUTO-RTO: 0 /100 WBC (ref 0–0.2)
PLATELET # BLD AUTO: 205 10*3/MM3 (ref 140–450)
POTASSIUM SERPL-SCNC: 4.3 MMOL/L (ref 3.5–5.2)
PROT SERPL-MCNC: 6.7 G/DL (ref 6–8.5)
PSA SERPL-MCNC: 3.92 NG/ML (ref 0–4)
RBC # BLD AUTO: 5.53 10*6/MM3 (ref 4.14–5.8)
SODIUM SERPL-SCNC: 141 MMOL/L (ref 136–145)
TRIGL SERPL-MCNC: 110 MG/DL (ref 0–150)
TSH SERPL DL<=0.005 MIU/L-ACNC: 1.92 UIU/ML (ref 0.27–4.2)
VLDLC SERPL CALC-MCNC: 22 MG/DL
WBC # BLD AUTO: 6.8 10*3/MM3 (ref 3.4–10.8)

## 2020-06-03 ENCOUNTER — OFFICE VISIT (OUTPATIENT)
Dept: FAMILY MEDICINE CLINIC | Facility: CLINIC | Age: 75
End: 2020-06-03

## 2020-06-03 VITALS
DIASTOLIC BLOOD PRESSURE: 68 MMHG | HEIGHT: 70 IN | BODY MASS INDEX: 26.77 KG/M2 | TEMPERATURE: 97.7 F | SYSTOLIC BLOOD PRESSURE: 133 MMHG | HEART RATE: 70 BPM | RESPIRATION RATE: 16 BRPM | WEIGHT: 187 LBS

## 2020-06-03 DIAGNOSIS — Z09 HOSPITAL DISCHARGE FOLLOW-UP: ICD-10-CM

## 2020-06-03 DIAGNOSIS — R10.13 EPIGASTRIC PAIN: Primary | ICD-10-CM

## 2020-06-03 PROCEDURE — 99214 OFFICE O/P EST MOD 30 MIN: CPT | Performed by: FAMILY MEDICINE

## 2020-06-03 NOTE — PROGRESS NOTES
Subjective   Td Melendez Jr. is a 74 y.o. male.     CC: ED F/U for Abdominal Pain    History of Present Illness     Pt seen in f/u today after a recent ED visit that was as follows:    Date of encounter:  5/27/2020  PCP: Beau Lee MD  Historian: Patient      I used full protective equipment while examining this patient.  This includes face mask, gloves and protective eyewear.  I washed my hands before entering the room and immediately upon leaving the room        HPI:  Chief Complaint: Chest pain  A complete HPI/ROS/PMH/PSH/SH/FH are unobtainable due to: None     Context: Td Melendez Jr. is a 74 y.o. male who presents to the ED c/o chest pain.  Pain began yesterday afternoon about 20 hours ago.  Pain waxes and wanes.  It is worsened by standing and generally improved somewhat by laying down.  Pain is in the epigastric area and is described as aching.  Mild to moderate intensity currently occasionally moderate to severe.  When asked if this is similar to his prior coronary problems patient states he cannot tell.  He does have a history of acid reflux and takes Prilosec.  He did try some Tums yesterday which did not really help this discomfort.  He reports mild shortness of air but no substantial cough.  Patient use occasional anti-inflammatories for neck pain but has not taken any recently and when he does take ibuprofen he takes 400 mg as a maximum and usually just several times a day.  Patient did have coronary stents about 5 years ago and has done well since that time.  He sees Dr. Obi Boucher in follow-up for his heart.  Primary care provider is Dr. Lee.     [DB]    4402 I discussed results of testing with patient at the bedside.  We participated in shared decision making.  I believe most of his pain is epigastric in nature although labs are benign.  We discussed doing either a CT scan or ultrasound but felt that given that he is feeling so much better that neither were needed at this point.   "Patient did walk about the ED and made a large loop having no increased discomfort.  We discussed possible further cardiac testing including possible stress test but both of us feel that cardiac etiology is very low seen normal EKG and troponin despite 20 hours of discomfort.  At this point we decided the best course of action would be to let this patient go home.  Will double up the dose of Prilosec and see how he does over the next several days.  If he has increasing pain shortness of breath or as needed he is welcome to come back to the ED for further evaluation.  If symptoms persist he should follow-up with either his primary care provider or Dr. Escalante who he sees for cardiology.      Pt reports no help with the increase Prilosec. Pt has had some increased belching and gas/bloating. Denies CP and heart sx and walks well w/o sx. Is seeing cardiology next month. FH: no GB disease known. C-Scope is UTD.    Current outpatient and discharge medications have been reconciled for the patient.  Reviewed by: Beau Lee MD    The following portions of the patient's history were reviewed and updated as appropriate: allergies, current medications, past family history, past medical history, past social history, past surgical history and problem list.    Review of Systems   Constitutional: Negative for activity change, chills, fatigue and fever.   Respiratory: Negative for cough and shortness of breath.    Cardiovascular: Negative for chest pain and palpitations.   Gastrointestinal: Negative for abdominal pain.   Endocrine: Negative for cold intolerance.   Psychiatric/Behavioral: Negative for behavioral problems and dysphoric mood. The patient is not nervous/anxious.        /68   Pulse 70   Temp 97.7 °F (36.5 °C) (Oral)   Resp 16   Ht 177.8 cm (70\")   Wt 84.8 kg (187 lb)   BMI 26.83 kg/m²     Objective   Physical Exam   Constitutional: He appears well-developed and well-nourished.   Neck: Neck supple. No " thyromegaly present.   Cardiovascular: Normal rate and regular rhythm.   No murmur heard.  Pulmonary/Chest: Effort normal and breath sounds normal.   Abdominal: Bowel sounds are normal. He exhibits no mass. There is no tenderness. There is no rebound. No hernia.   Psychiatric: He has a normal mood and affect. His behavior is normal.   Nursing note and vitals reviewed.  Hospital records reviewed with pt confirming HPI.      Assessment/Plan   Td was seen today for epigastric pain.    Diagnoses and all orders for this visit:    Epigastric pain  -     US Gallbladder; Future    Hospital discharge follow-up    If the GB is negative, will get back over to GI for probable EGD.

## 2020-06-10 ENCOUNTER — APPOINTMENT (OUTPATIENT)
Dept: ULTRASOUND IMAGING | Facility: HOSPITAL | Age: 75
End: 2020-06-10

## 2020-06-10 ENCOUNTER — HOSPITAL ENCOUNTER (OUTPATIENT)
Dept: ULTRASOUND IMAGING | Facility: HOSPITAL | Age: 75
Discharge: HOME | End: 2020-06-10
Admitting: FAMILY MEDICINE

## 2020-06-10 DIAGNOSIS — R10.13 EPIGASTRIC PAIN: ICD-10-CM

## 2020-06-10 PROCEDURE — 76705 ECHO EXAM OF ABDOMEN: CPT

## 2020-06-15 DIAGNOSIS — R10.13 EPIGASTRIC PAIN: Primary | ICD-10-CM

## 2020-06-18 RX ORDER — METHYLPREDNISOLONE 4 MG/1
TABLET ORAL
Qty: 21 TABLET | Refills: 3 | Status: SHIPPED | OUTPATIENT
Start: 2020-06-18 | End: 2020-08-06

## 2020-08-03 NOTE — PROGRESS NOTES
RM:________     PCP: Beau Lee MD    : 1945  AGE: 74 y.o.  EST PATIENT   REASON FOR VISIT/  CC:  CAD       WT: ____________ BP: __________L __________R HR______    CHEST PAIN: _____________    SOA: _____________PALPS: _______________     LIGHTHEADED: ___________FATIGUE: ________________ EDEMA __________    ALLERGIES:Augmentin [amoxicillin-pot clavulanate] SMOKING HISTORY:  Social History     Tobacco Use   • Smoking status: Former Smoker     Packs/day: 0.50     Types: Cigarettes     Last attempt to quit: 1970     Years since quittin.6   • Smokeless tobacco: Never Used   Substance Use Topics   • Alcohol use: No     Comment: caffeine use   • Drug use: No     CAFFEINE USE_________________  ALCOHOL ______________________    Below is the patient's most recent value for Albumin, ALT, AST, BUN, Calcium, Chloride, Cholesterol, CO2, Creatinine, GFR, Glucose, HDL, Hematocrit, Hemoglobin, Hemoglobin A1C, LDL, Magnesium, Phosphorus, Platelets, Potassium, PSA, Sodium, Triglycerides, TSH and WBC.   Lab Results   Component Value Date    ALBUMIN 4.50 2020    ALT 14 2020    AST 18 2020    BUN 22 2020    CALCIUM 9.9 2020     2020    CO2 25.7 2020    CREATININE 1.10 2020    GLU 99 2020    HDL 40 2020    HCT 50.2 2020    HGB 17.7 2020     2020    MG 2.0 2015     2020    K 4.3 2020    PSA 3.920 2020     2020    TRIG 110 2020    TSH 1.920 2020    WBC 6.80 2020

## 2020-08-06 ENCOUNTER — TELEMEDICINE (OUTPATIENT)
Dept: CARDIOLOGY | Facility: CLINIC | Age: 75
End: 2020-08-06

## 2020-08-06 VITALS
DIASTOLIC BLOOD PRESSURE: 65 MMHG | SYSTOLIC BLOOD PRESSURE: 125 MMHG | WEIGHT: 178.5 LBS | BODY MASS INDEX: 25.56 KG/M2 | HEART RATE: 62 BPM | HEIGHT: 70 IN

## 2020-08-06 DIAGNOSIS — I25.10 CORONARY ARTERY DISEASE INVOLVING NATIVE CORONARY ARTERY OF NATIVE HEART WITHOUT ANGINA PECTORIS: Primary | ICD-10-CM

## 2020-08-06 PROCEDURE — 99213 OFFICE O/P EST LOW 20 MIN: CPT | Performed by: INTERNAL MEDICINE

## 2020-08-06 NOTE — PROGRESS NOTES
Date of Office Visit: 2020  Encounter Provider: Obi Boucher MD  Place of Service: [facility] CARDIOLOGY  Patient Name: Td Melendez Jr.  :1945    Chief Complaint   Patient presents with   • Coronary Artery Disease   :     HPI: Td Melendez Jr. is a 74 y.o. male who presents today to follow-up via video visit.      He has a history of unstable angina and underwent drug-eluting stent placement to the left anterior descending artery and the right coronary artery in 2015. He was noted to have a nonobstructive lesion in the circumflex. In 2015, he presented with chest discomfort and he was quite anxious as it was right after the passing of his mother. He had a Cardiolite stress test, which was unremarkable. In 2018, he was admitted with chest pain that sounded GI in etiology; a Cardiolite stress test was normal.     Past Medical History:   Diagnosis Date   • Anxiety    • CAD (coronary artery disease)     unstable angina, 2015: 20% LM, long 70% LAD with abnormal FFR (s/p 2.09j86ci Xience), 50% OM1, 99% prox RCA (s/p 3.5x18m Xience).  Normal Cardiolite 2015 and 2018   • Fibromyalgia    • GERD (gastroesophageal reflux disease)    • H/O complete eye exam 2018   • Hyperlipidemia    • IBS (irritable bowel syndrome)    • Knee pain        Past Surgical History:   Procedure Laterality Date   • CARDIAC CATHETERIZATION      [facility] 2015: cath with 20% LM long 70% LAD (with positive FFR), 50% OM1, 99% prox RCA, s/p 2.75x33 Xience Alpine to LAD and 3.5x18mm Xience Alpine to RCA. LVEF 64% Normal Cardiolite 2015   • CARDIAC SURGERY      cardiac stents, 2   • COLONOSCOPY  approx 2011    normal per patient  [facility]   • COLONOSCOPY W/ POLYPECTOMY N/A 2018    Procedure: COLONOSCOPY TO CECUM WITH COLD SNAREPOLYPECTOMIES, HOT SNARE POLYPECTOMIES;  Surgeon: Catarino Luna MD;  Location: [facility] ENDOSCOPY;  Service: Gastroenterology   •  CORONARY ANGIOPLASTY WITH STENT PLACEMENT     • ENDOSCOPY N/A 2018    Procedure: ESOPHAGOGASTRODUODENOSCOPY WITH COLD BIOPSIES;  Surgeon: Catarino Luna MD;  Location: [facility] ENDOSCOPY;  Service: Gastroenterology   • WISDOM TOOTH EXTRACTION         Social History     Socioeconomic History   • Marital status:      Spouse name: Not on file   • Number of children: Not on file   • Years of education: Not on file   • Highest education level: Not on file   Tobacco Use   • Smoking status: Former Smoker     Packs/day: 0.50     Types: Cigarettes     Last attempt to quit: 1970     Years since quittin.6   • Smokeless tobacco: Never Used   Substance and Sexual Activity   • Alcohol use: No     Comment: caffeine use   • Drug use: No   • Sexual activity: Defer       Family History   Problem Relation Age of Onset   • Coronary artery disease Other    • Cervical cancer Mother    • Cancer Mother    • Depression Mother    • Heart disease Mother    • Liver disease Mother    • Alcohol abuse Mother    • Thyroid cancer Father    • Cancer Father    • Depression Father    • Diabetes Father    • Thyroid disease Father        Review of Systems   Musculoskeletal: Positive for neck pain.   Gastrointestinal: Positive for abdominal pain and heartburn.   All other systems reviewed and are negative.      Allergies   Allergen Reactions   • Augmentin [Amoxicillin-Pot Clavulanate] Hives and Itching         Current Outpatient Medications:   •  acetaminophen-codeine (TYLENOL #3) 300-30 MG per tablet, Take  by mouth See Admin Instructions. Take 1 tablet by mouth every 4-6 hours as needed for pain, Disp: , Rfl: 0  •  alosetron (LOTRONEX) 0.5 MG tablet, Take 0.5 mg by mouth As Needed. q other day , Disp: , Rfl:   •  ALPRAZolam (XANAX) 0.25 MG tablet, Take 1 tablet by mouth Daily As Needed for Anxiety., Disp: 30 tablet, Rfl: 2  •  aspirin 81 MG tablet, Take 1 tablet by mouth Daily., Disp: 30 tablet, Rfl: 11  •  coenzyme Q10 100 MG  "capsule, Take 200 mg by mouth Daily., Disp: , Rfl:   •  diclofenac (VOLTAREN) 50 MG EC tablet, Take 1 tablet by mouth 2 (Two) Times a Day. (Patient taking differently: Take 50 mg by mouth Daily.), Disp: 180 tablet, Rfl: 3  •  ezetimibe (ZETIA) 10 MG tablet, Take 1 tablet by mouth Daily., Disp: 90 tablet, Rfl: 3  •  glucosamine-chondroitin 500-400 MG capsule capsule, Take 2 capsules by mouth Daily., Disp: , Rfl:   •  ibuprofen (ADVIL,MOTRIN) 400 MG tablet, Take 400 mg by mouth As Needed., Disp: , Rfl:   •  lithium 8 MEQ/5ML solution oral solution, Take 5 mg by mouth 3 (Three) Times a Day., Disp: , Rfl:   •  omeprazole OTC (PriLOSEC OTC) 20 MG EC tablet, Take  by mouth., Disp: , Rfl:   •  pitavastatin calcium (LIVALO) 1 MG tablet tablet, Take 1 tablet by mouth Every Night. (Patient taking differently: Take 2 mg by mouth Every Night.), Disp: 90 tablet, Rfl: 3  •  sildenafil (REVATIO) 20 MG tablet, Take 2-3 tabs QD prn, Disp: 30 tablet, Rfl: 11  •  Vitamin D-Vitamin K (VITAMIN K2-VITAMIN D3 PO), Take  by mouth Daily., Disp: , Rfl:      Objective:     Vitals:    08/06/20 0856   BP: 125/65   Pulse: 62   Weight: 81 kg (178 lb 8 oz)   Height: 177.8 cm (70\")     Body mass index is 25.61 kg/m².    Physical Exam   Constitutional: He is oriented to person, place, and time. He appears well-developed and well-nourished.   HENT:   Head: Normocephalic.   Nose: Nose normal.   Eyes: EOM are normal.   Neck: Normal range of motion.   Pulmonary/Chest: Effort normal.   Musculoskeletal: Normal range of motion.   Neurological: He is alert and oriented to person, place, and time.   Psychiatric: He has a normal mood and affect. His behavior is normal. Judgment and thought content normal.     Procedures     EKG --   I have personally reviewed EKG on 5/27/2020 and my interpretation of the tracing is as follows: NSR, normal EKG, no change        Assessment:       Diagnosis Plan   1. Coronary artery disease involving native coronary artery of " native heart without angina pectoris          Plan:       1.  Coronary Artery Disease  Assessment  • The patient has no angina  • He's doing well.  He's on ezetimibe and pitavastatin.    Subjective - Objective  • There has been a previous stent procedure using MADELIN 7/2015  • Current antiplatelet therapy includes aspirin 81 mg      This patient has consented to a telehealth visit via [employer]. The visit was scheduled as a video visit to comply with patient safety concerns in accordance with CDC recommendations.  All vitals recorded within this visit are reported by the patient.  I spent  13 minutes in total including but not limited to the 7 minutes spent in direct conversation with this patient.       Sincerely,       Obi Boucher MD

## 2020-09-23 ENCOUNTER — HOSPITAL ENCOUNTER (EMERGENCY)
Facility: HOSPITAL | Age: 75
Discharge: HOME | End: 2020-09-23
Attending: EMERGENCY MEDICINE | Admitting: EMERGENCY MEDICINE

## 2020-09-23 VITALS
RESPIRATION RATE: 16 BRPM | DIASTOLIC BLOOD PRESSURE: 81 MMHG | SYSTOLIC BLOOD PRESSURE: 157 MMHG | TEMPERATURE: 96.9 F | OXYGEN SATURATION: 97 % | HEIGHT: 70 IN | HEART RATE: 95 BPM | BODY MASS INDEX: 25.48 KG/M2 | WEIGHT: 178 LBS

## 2020-09-23 DIAGNOSIS — R33.8 ACUTE URINARY RETENTION: Primary | ICD-10-CM

## 2020-09-23 LAB
BACTERIA UR QL AUTO: NORMAL /HPF
BILIRUB UR QL STRIP: NEGATIVE
CLARITY UR: CLEAR
COLOR UR: YELLOW
GLUCOSE UR STRIP-MCNC: NEGATIVE MG/DL
HGB UR QL STRIP.AUTO: ABNORMAL
HYALINE CASTS UR QL AUTO: NORMAL /LPF
KETONES UR QL STRIP: NEGATIVE
LEUKOCYTE ESTERASE UR QL STRIP.AUTO: NEGATIVE
NITRITE UR QL STRIP: NEGATIVE
PH UR STRIP.AUTO: 7 [PH] (ref 5–8)
PROT UR QL STRIP: NEGATIVE
RBC # UR: NORMAL /HPF
REF LAB TEST METHOD: NORMAL
SP GR UR STRIP: <1.005 (ref 1–1.03)
SQUAMOUS #/AREA URNS HPF: NORMAL /HPF
UROBILINOGEN UR QL STRIP: ABNORMAL
WBC UR QL AUTO: NORMAL /HPF

## 2020-09-23 PROCEDURE — 81001 URINALYSIS AUTO W/SCOPE: CPT | Performed by: EMERGENCY MEDICINE

## 2020-09-23 PROCEDURE — 99283 EMERGENCY DEPT VISIT LOW MDM: CPT

## 2020-09-23 PROCEDURE — 51702 INSERT TEMP BLADDER CATH: CPT

## 2020-09-23 RX ORDER — SULFAMETHOXAZOLE AND TRIMETHOPRIM 800; 160 MG/1; MG/1
1 TABLET ORAL 2 TIMES DAILY
Qty: 8 TABLET | Refills: 0 | OUTPATIENT
Start: 2020-09-23 | End: 2020-11-13

## 2020-09-23 NOTE — ED NOTES
Pt ambulatory to triage from home with c/o inability to urinate. States last urination @ 11pm last night.  Pt states had a few dribbles throughout the night, but didn't get much out.  Pt is very anxious at triage.  Pt provided with mask in triage.  Triage personnel wore appropriate PPE.       Polly Trevino, RN  09/23/20 0677

## 2020-09-23 NOTE — DISCHARGE INSTRUCTIONS
Call [facility] today to schedule follow-up appointment.  Leave the catheter in until you follow-up with the urologist.  Take medication as prescribed.  Return to the emergency department for abdominal pain, flank pain, fever, vomiting, or other concern.

## 2020-09-23 NOTE — ED TRIAGE NOTES
"Pt states took gabapentin last night, has had some difficulties before, \"but I've never had it just STOP like this before.\"  "

## 2020-09-23 NOTE — ED PROVIDER NOTES
" EMERGENCY DEPARTMENT ENCOUNTER    Room Number:  14/14  Date of encounter:  9/23/2020  PCP: Beau Lee MD  Historian: Patient     I used full protective equipment while examining this patient.  This includes face mask, gloves and protective eyewear.  I washed my hands before entering the room and immediately upon leaving the room.  Patient was wearing a surgical mask.      HPI:  Chief Complaint: Urinary retention  A complete HPI/ROS/PMH/PSH/SH/FH are unobtainable due to: None    Context: Td Melendez Jr. is a 74 y.o. male who presents to the ED c/o urinary retention.  Patient states he last urinated normally at around 11 PM last night.  Since then, he has only been able to \"dribble\" a few drops of urine.  Patient complains of lower abdominal discomfort.  He denies nausea, vomiting, flank pain, fever, chills, dysuria, or hematuria.  There are no aggravating or alleviating factors.  Symptoms have been constant.  Patient states he took a dose of gabapentin last night and thinks that this may have caused his urinary retention.      PAST MEDICAL HISTORY  Active Ambulatory Problems     Diagnosis Date Noted   • CAD (coronary artery disease)    • Hyperlipidemia 01/04/2017   • Anxiety 01/11/2018   • Gastroesophageal reflux disease without esophagitis 06/28/2018   • Polyp of colon 07/13/2018     Resolved Ambulatory Problems     Diagnosis Date Noted   • Chest pain 06/24/2018   • Gastroesophageal reflux disease 07/13/2018     Past Medical History:   Diagnosis Date   • Fibromyalgia    • GERD (gastroesophageal reflux disease)    • H/O complete eye exam 04/2018   • IBS (irritable bowel syndrome)    • Knee pain          PAST SURGICAL HISTORY  Past Surgical History:   Procedure Laterality Date   • CARDIAC CATHETERIZATION      [facility] 07/2015: cath with 20% LM long 70% LAD (with positive FFR), 50% OM1, 99% prox RCA, s/p 2.75x33 Xience Alpine to LAD and 3.5x18mm Xience Alpine to RCA. LVEF 64% Normal Cardiolite 11/2015   • CARDIAC " SURGERY      cardiac stents, 2   • COLONOSCOPY  approx 2011    normal per patient  [facility]   • COLONOSCOPY W/ POLYPECTOMY N/A 2018    Procedure: COLONOSCOPY TO CECUM WITH COLD SNAREPOLYPECTOMIES, HOT SNARE POLYPECTOMIES;  Surgeon: Catarino Luna MD;  Location: [facility] ENDOSCOPY;  Service: Gastroenterology   • CORONARY ANGIOPLASTY WITH STENT PLACEMENT     • ENDOSCOPY N/A 2018    Procedure: ESOPHAGOGASTRODUODENOSCOPY WITH COLD BIOPSIES;  Surgeon: Catarino Luna MD;  Location: [facility] ENDOSCOPY;  Service: Gastroenterology   • WISDOM TOOTH EXTRACTION           FAMILY HISTORY  Family History   Problem Relation Age of Onset   • Coronary artery disease Other    • Cervical cancer Mother    • Cancer Mother    • Depression Mother    • Heart disease Mother    • Liver disease Mother    • Alcohol abuse Mother    • Thyroid cancer Father    • Cancer Father    • Depression Father    • Diabetes Father    • Thyroid disease Father          SOCIAL HISTORY  Social History     Socioeconomic History   • Marital status:      Spouse name: Not on file   • Number of children: Not on file   • Years of education: Not on file   • Highest education level: Not on file   Tobacco Use   • Smoking status: Former Smoker     Packs/day: 0.50     Types: Cigarettes     Quit date: 1970     Years since quittin.7   • Smokeless tobacco: Never Used   Substance and Sexual Activity   • Alcohol use: No     Comment: caffeine use   • Drug use: No   • Sexual activity: Defer         ALLERGIES  Augmentin [amoxicillin-pot clavulanate]       REVIEW OF SYSTEMS  Review of Systems      All systems have been reviewed and are negative except as as discussed in the HPI    PHYSICAL EXAM    I have reviewed the triage vital signs and nursing notes.    ED Triage Vitals [20 0700]   Temp Heart Rate Resp BP SpO2   96.9 °F (36.1 °C) 95 16 -- 97 %      Temp src Heart Rate Source Patient Position BP Location FiO2 (%)   Temporal Monitor --  -- --       Physical Exam  GENERAL: Awake, alert, appears uncomfortable  HENT: NCAT, nares patent, moist mucous membranes  NECK: supple, no lymphadenopathy  EYES: no scleral icterus  CV: regular rhythm, regular rate, no murmur  RESPIRATORY: normal effort, clear to auscultation bilaterally  ABDOMEN: soft, bladder is distended, there is suprapubic tenderness without rebound or guarding, no CVA tenderness  MUSCULOSKELETAL: Extremities are nontender and without obvious deformity.  There is normal range of motion in all extremities.  There is no calf tenderness or pedal edema  NEURO: Strength, sensation, and coordination are grossly intact.  Speech and mentation are unremarkable.  No facial droop.  SKIN: warm, dry, no rash  PSYCH: Normal mood and affect      LAB RESULTS  Recent Results (from the past 24 hour(s))   Urinalysis With Microscopic If Indicated (No Culture) - Urine, Catheter    Collection Time: 09/23/20  7:11 AM    Specimen: Urine, Catheter   Result Value Ref Range    Color, UA Yellow Yellow, Straw    Appearance, UA Clear Clear    pH, UA 7.0 5.0 - 8.0    Specific Gravity, UA <1.005 (L) 1.005 - 1.030    Glucose, UA Negative Negative    Ketones, UA Negative Negative    Bilirubin, UA Negative Negative    Blood, UA Small (1+) (A) Negative    Protein, UA Negative Negative    Leuk Esterase, UA Negative Negative    Nitrite, UA Negative Negative    Urobilinogen, UA 0.2 E.U./dL 0.2 - 1.0 E.U./dL   Urinalysis, Microscopic Only - Urine, Catheter    Collection Time: 09/23/20  7:11 AM    Specimen: Urine, Catheter   Result Value Ref Range    RBC, UA 0-2 None Seen, 0-2 /HPF    WBC, UA 0-2 None Seen, 0-2 /HPF    Bacteria, UA None Seen None Seen /HPF    Squamous Epithelial Cells, UA 0-2 None Seen, 0-2 /HPF    Hyaline Casts, UA None Seen None Seen /LPF    Methodology Manual Light Microscopy        Ordered the above labs and independently reviewed the results.      RADIOLOGY  No Radiology Exams Resulted Within Past 24 Hours    I  ordered the above noted radiological studies. Reviewed by me and discussed with radiologist.  See dictation for official radiology interpretation.      PROCEDURES  Procedures      MEDICATIONS GIVEN IN ER    Medications - No data to display      PROGRESS, DATA ANALYSIS, CONSULTS, AND MEDICAL DECISION MAKING    All labs have been independently reviewed by me.  All radiology studies have been reviewed by me and discussed with radiologist dictating the report.   EKG's independently viewed and interpreted by me.  I have reviewed the nurse's notes, vital signs, past medical history, and medication list.  Discussion below represents my analysis of pertinent findings related to patient's condition, differential diagnosis, treatment plan and final disposition.      ED Course as of Sep 23 0819   Wed Sep 23, 2020   0709 Patient has acute urinary retention.  Cervantes catheter will be placed.    [WH]   0710 Old records reviewed.  Patient was last seen here in the ER in May 2020 for epigastric pain.    [WH]   0737 Patient is resting comfortably after having a Cervantes catheter placed.  There was initial return of approximately 400 mL of urine    [WH]   0815 Test results discussed with the patient.  Shared decision-making was discussed and the patient agrees with being discharged with catheter left in place.  [facility].  Patient will be put on Bactrim for a few days until his catheter is removed.  I advised the patient to talk to his primary care doctor about whether or not to continue taking gabapentin.    []      ED Course User Index  [WH] Felton Gan MD       AS OF 08:19 EDT VITALS:    BP -    HR - 95  TEMP - 96.9 °F (36.1 °C) (Temporal)  O2 SATS - 97%      DIAGNOSIS  Final diagnoses:   Acute urinary retention         DISPOSITION  Discharge    DISCHARGE    Patient discharged in stable condition.    Reviewed implications of results, diagnosis, meds, responsibility to follow up,  warning signs and symptoms of possible worsening, potential complications and reasons to return to ER, including flank pain, abdominal pain, fever, nausea, vomiting, or problems with the catheter.    Patient/Family voiced understanding of above instructions.    Discussed plan for discharge, as there is no emergent indication for admission. Patient referred to primary care provider for BP management due to today's BP. Pt/family is agreeable and understands need for follow up and repeat testing.  Pt is aware that discharge does not mean that nothing is wrong but it indicates no emergency is present that requires admission and they must continue care with follow-up as given below or physician of their choice.     FOLLOW-UP  [facility]  [address] [facility] [address]  [phone]  Call today  Acute urinary retention         Medication List      New Prescriptions    sulfamethoxazole-trimethoprim 800-160 MG per tablet  Commonly known as: BACTRIM DS,SEPTRA DS  Take 1 tablet by mouth 2 (Two) Times a Day.        Changed    diclofenac 50 MG EC tablet  Commonly known as: VOLTAREN  Take 1 tablet by mouth 2 (Two) Times a Day.  What changed: when to take this     pitavastatin calcium 1 MG tablet tablet  Commonly known as: Livalo  Take 1 tablet by mouth Every Night.  What changed: how much to take           Where to Get Your Medications      You can get these medications from any pharmacy    Bring a paper prescription for each of these medications  · sulfamethoxazole-trimethoprim 800-160 MG per tablet           Dictated utilizing Dragon dictation:  Much of this encounter note is an electronic transcription/translation of spoken language to printed text. The electronic translation of spoken language may permit erroneous, or at times, nonsensical words or phrases to be inadvertently transcribed; Although I have reviewed the note for such errors, some may still exist.     Felton Gan MD  09/23/20  0848

## 2020-10-14 DIAGNOSIS — I25.10 CORONARY ARTERY DISEASE INVOLVING NATIVE CORONARY ARTERY OF NATIVE HEART WITHOUT ANGINA PECTORIS: ICD-10-CM

## 2020-10-14 DIAGNOSIS — E78.2 MIXED HYPERLIPIDEMIA: ICD-10-CM

## 2020-11-13 ENCOUNTER — HOSPITAL ENCOUNTER (EMERGENCY)
Facility: HOSPITAL | Age: 75
Discharge: HOME | End: 2020-11-13
Attending: EMERGENCY MEDICINE | Admitting: EMERGENCY MEDICINE

## 2020-11-13 ENCOUNTER — APPOINTMENT (OUTPATIENT)
Dept: GENERAL RADIOLOGY | Facility: HOSPITAL | Age: 75
End: 2020-11-13

## 2020-11-13 VITALS
WEIGHT: 175 LBS | BODY MASS INDEX: 25.05 KG/M2 | OXYGEN SATURATION: 95 % | RESPIRATION RATE: 16 BRPM | HEIGHT: 70 IN | HEART RATE: 60 BPM | SYSTOLIC BLOOD PRESSURE: 149 MMHG | TEMPERATURE: 98.2 F | DIASTOLIC BLOOD PRESSURE: 85 MMHG

## 2020-11-13 DIAGNOSIS — R10.13 EPIGASTRIC PAIN: Primary | ICD-10-CM

## 2020-11-13 LAB
ALBUMIN SERPL-MCNC: 4.4 G/DL (ref 3.5–5.2)
ALBUMIN/GLOB SERPL: 1.9 G/DL
ALP SERPL-CCNC: 71 U/L (ref 39–117)
ALT SERPL W P-5'-P-CCNC: 17 U/L (ref 1–41)
ANION GAP SERPL CALCULATED.3IONS-SCNC: 12.1 MMOL/L (ref 5–15)
AST SERPL-CCNC: 18 U/L (ref 1–40)
BASOPHILS # BLD AUTO: 0.03 10*3/MM3 (ref 0–0.2)
BASOPHILS NFR BLD AUTO: 0.4 % (ref 0–1.5)
BILIRUB SERPL-MCNC: 0.6 MG/DL (ref 0–1.2)
BUN SERPL-MCNC: 14 MG/DL (ref 8–23)
BUN/CREAT SERPL: 13.5 (ref 7–25)
CALCIUM SPEC-SCNC: 9.5 MG/DL (ref 8.6–10.5)
CHLORIDE SERPL-SCNC: 101 MMOL/L (ref 98–107)
CO2 SERPL-SCNC: 23.9 MMOL/L (ref 22–29)
CREAT SERPL-MCNC: 1.04 MG/DL (ref 0.76–1.27)
DEPRECATED RDW RBC AUTO: 39.4 FL (ref 37–54)
EOSINOPHIL # BLD AUTO: 0.04 10*3/MM3 (ref 0–0.4)
EOSINOPHIL NFR BLD AUTO: 0.6 % (ref 0.3–6.2)
ERYTHROCYTE [DISTWIDTH] IN BLOOD BY AUTOMATED COUNT: 12.2 % (ref 12.3–15.4)
GFR SERPL CREATININE-BSD FRML MDRD: 70 ML/MIN/1.73
GLOBULIN UR ELPH-MCNC: 2.3 GM/DL
GLUCOSE SERPL-MCNC: 148 MG/DL (ref 65–99)
HCT VFR BLD AUTO: 47.7 % (ref 37.5–51)
HGB BLD-MCNC: 16.5 G/DL (ref 13–17.7)
HOLD SPECIMEN: NORMAL
HOLD SPECIMEN: NORMAL
IMM GRANULOCYTES # BLD AUTO: 0.02 10*3/MM3 (ref 0–0.05)
IMM GRANULOCYTES NFR BLD AUTO: 0.3 % (ref 0–0.5)
LYMPHOCYTES # BLD AUTO: 1.3 10*3/MM3 (ref 0.7–3.1)
LYMPHOCYTES NFR BLD AUTO: 18.4 % (ref 19.6–45.3)
MCH RBC QN AUTO: 31 PG (ref 26.6–33)
MCHC RBC AUTO-ENTMCNC: 34.6 G/DL (ref 31.5–35.7)
MCV RBC AUTO: 89.7 FL (ref 79–97)
MONOCYTES # BLD AUTO: 0.44 10*3/MM3 (ref 0.1–0.9)
MONOCYTES NFR BLD AUTO: 6.2 % (ref 5–12)
NEUTROPHILS NFR BLD AUTO: 5.25 10*3/MM3 (ref 1.7–7)
NEUTROPHILS NFR BLD AUTO: 74.1 % (ref 42.7–76)
NRBC BLD AUTO-RTO: 0 /100 WBC (ref 0–0.2)
PLATELET # BLD AUTO: 196 10*3/MM3 (ref 140–450)
PMV BLD AUTO: 9.6 FL (ref 6–12)
POTASSIUM SERPL-SCNC: 4 MMOL/L (ref 3.5–5.2)
PROT SERPL-MCNC: 6.7 G/DL (ref 6–8.5)
QT INTERVAL: 365 MS
RBC # BLD AUTO: 5.32 10*6/MM3 (ref 4.14–5.8)
SODIUM SERPL-SCNC: 137 MMOL/L (ref 136–145)
TROPONIN T SERPL-MCNC: <0.01 NG/ML (ref 0–0.03)
WBC # BLD AUTO: 7.08 10*3/MM3 (ref 3.4–10.8)
WHOLE BLOOD HOLD SPECIMEN: NORMAL
WHOLE BLOOD HOLD SPECIMEN: NORMAL

## 2020-11-13 PROCEDURE — 93005 ELECTROCARDIOGRAM TRACING: CPT | Performed by: EMERGENCY MEDICINE

## 2020-11-13 PROCEDURE — 85025 COMPLETE CBC W/AUTO DIFF WBC: CPT

## 2020-11-13 PROCEDURE — 99282 EMERGENCY DEPT VISIT SF MDM: CPT

## 2020-11-13 PROCEDURE — 84484 ASSAY OF TROPONIN QUANT: CPT | Performed by: EMERGENCY MEDICINE

## 2020-11-13 PROCEDURE — 80053 COMPREHEN METABOLIC PANEL: CPT | Performed by: EMERGENCY MEDICINE

## 2020-11-13 PROCEDURE — 93005 ELECTROCARDIOGRAM TRACING: CPT

## 2020-11-13 PROCEDURE — 36415 COLL VENOUS BLD VENIPUNCTURE: CPT

## 2020-11-13 PROCEDURE — 93010 ELECTROCARDIOGRAM REPORT: CPT | Performed by: INTERNAL MEDICINE

## 2020-11-13 PROCEDURE — 71046 X-RAY EXAM CHEST 2 VIEWS: CPT

## 2020-11-13 RX ORDER — SUCRALFATE 1 G/1
1 TABLET ORAL 4 TIMES DAILY
Qty: 120 TABLET | Refills: 0 | Status: SHIPPED | OUTPATIENT
Start: 2020-11-13 | End: 2021-01-06

## 2020-11-13 RX ORDER — SODIUM CHLORIDE 0.9 % (FLUSH) 0.9 %
10 SYRINGE (ML) INJECTION AS NEEDED
Status: DISCONTINUED | OUTPATIENT
Start: 2020-11-13 | End: 2020-11-13 | Stop reason: HOSPADM

## 2020-11-13 NOTE — ED PROVIDER NOTES
" EMERGENCY DEPARTMENT ENCOUNTER    Room Number:  30/30  Date of encounter:  11/13/2020  PCP: Beau Lee MD  Historian: Patient     I used full protective equipment while examining this patient.  This includes face mask, gloves and protective eyewear.  I washed my hands before entering the room and immediately upon leaving the room      HPI:  Chief Complaint: Chest pain  A complete HPI/ROS/PMH/PSH/SH/FH are unobtainable due to: None    Context: Td Melendez Jr. is a 75 y.o. male who presents to the ED c/o chest pain.  Patient states he has had chest pain off and on for about 1 week.  Initially symptoms would come and go but of been more constant here recently.  Pain is described as an aching in the epigastric area that feels like a \"sore throat\" pain is currently moderate.  Nothing tends to really worsen the pain or improve the pain.  He states is not worsened with exertion.  Patient thought initially that it was related to acid reflux and doubled up on his Prilosec but that has not really helped.  Patient does have a history of remote coronary artery disease and is also had several work-ups for chest pain which was felt to be noncardiac in nature.  His cardiologist is Dr. Boucher and primary care provider is Dr. Beau Lee.      PAST MEDICAL HISTORY  Active Ambulatory Problems     Diagnosis Date Noted   • CAD (coronary artery disease)    • Hyperlipidemia 01/04/2017   • Anxiety 01/11/2018   • Gastroesophageal reflux disease without esophagitis 06/28/2018   • Polyp of colon 07/13/2018     Resolved Ambulatory Problems     Diagnosis Date Noted   • Chest pain 06/24/2018   • Gastroesophageal reflux disease 07/13/2018     Past Medical History:   Diagnosis Date   • Fibromyalgia    • GERD (gastroesophageal reflux disease)    • H/O complete eye exam 04/2018   • IBS (irritable bowel syndrome)    • Knee pain          PAST SURGICAL HISTORY  Past Surgical History:   Procedure Laterality Date   • CARDIAC CATHETERIZATION   "    [place] 2015: cath with 20% LM long 70% LAD (with positive FFR), 50% OM1, 99% prox RCA, s/p 2.75x33 Xience Alpine to LAD and 3.5x18mm Xience Alpine to RCA. LVEF 64% Normal Cardiolite 2015   • CARDIAC SURGERY      cardiac stents, 2   • COLONOSCOPY  approx 2011    normal per patient  [facility]   • COLONOSCOPY W/ POLYPECTOMY N/A 2018    Procedure: COLONOSCOPY TO CECUM WITH COLD SNAREPOLYPECTOMIES, HOT SNARE POLYPECTOMIES;  Surgeon: Catarino Luna MD;  Location: [facility] ENDOSCOPY;  Service: Gastroenterology   • CORONARY ANGIOPLASTY WITH STENT PLACEMENT     • ENDOSCOPY N/A 2018    Procedure: ESOPHAGOGASTRODUODENOSCOPY WITH COLD BIOPSIES;  Surgeon: Catarino Luna MD;  Location: [facility] ENDOSCOPY;  Service: Gastroenterology   • WISDOM TOOTH EXTRACTION           FAMILY HISTORY  Family History   Problem Relation Age of Onset   • Coronary artery disease Other    • Cervical cancer Mother    • Cancer Mother    • Depression Mother    • Heart disease Mother    • Liver disease Mother    • Alcohol abuse Mother    • Thyroid cancer Father    • Cancer Father    • Depression Father    • Diabetes Father    • Thyroid disease Father          SOCIAL HISTORY  Social History     Socioeconomic History   • Marital status:      Spouse name: Not on file   • Number of children: Not on file   • Years of education: Not on file   • Highest education level: Not on file   Tobacco Use   • Smoking status: Former Smoker     Packs/day: 0.50     Types: Cigarettes     Quit date:      Years since quittin.9   • Smokeless tobacco: Never Used   Substance and Sexual Activity   • Alcohol use: No     Comment: caffeine use   • Drug use: No   • Sexual activity: Defer         ALLERGIES  Augmentin [amoxicillin-pot clavulanate]       REVIEW OF SYSTEMS  Review of Systems   Constitutional: Negative.  Negative for fever.   HENT: Negative.  Negative for sore throat.    Eyes: Negative.    Respiratory: Negative.  Negative for  cough.    Cardiovascular: Positive for chest pain (As per HPI).   Gastrointestinal: Positive for diarrhea (Semiformed stool).   Genitourinary: Negative.  Negative for dysuria.   Musculoskeletal: Negative.  Negative for back pain.   Skin: Negative.  Negative for rash.   Neurological: Negative.  Negative for headaches.   All other systems reviewed and are negative.          PHYSICAL EXAM    I have reviewed the triage vital signs and nursing notes.    ED Triage Vitals   Temp Heart Rate Resp BP SpO2   11/13/20 1510 11/13/20 1510 11/13/20 1510 11/13/20 1512 11/13/20 1510   98.2 °F (36.8 °C) 91 18 147/80 97 %      Temp src Heart Rate Source Patient Position BP Location FiO2 (%)   11/13/20 1510 11/13/20 1510 -- -- --   Tympanic Monitor          Physical Exam  GENERAL: Alert male in no apparent distress, seems somewhat anxious  HENT: nares patent  EYES: no scleral icterus  CV: regular rhythm, regular rate-no murmur  RESPIRATORY: normal effort, clear to auscultation bilaterally  ABDOMEN: soft, nontender to palpation  MUSCULOSKELETAL: no deformity  NEURO: Strength, sensation, and coordination are grossly intact.  Speech and mentation are unremarkable  SKIN: warm, dry      LAB RESULTS  Recent Results (from the past 24 hour(s))   ECG 12 Lead    Collection Time: 11/13/20  3:15 PM   Result Value Ref Range    QT Interval 365 ms   Comprehensive Metabolic Panel    Collection Time: 11/13/20  3:20 PM    Specimen: Blood   Result Value Ref Range    Glucose 148 (H) 65 - 99 mg/dL    BUN 14 8 - 23 mg/dL    Creatinine 1.04 0.76 - 1.27 mg/dL    Sodium 137 136 - 145 mmol/L    Potassium 4.0 3.5 - 5.2 mmol/L    Chloride 101 98 - 107 mmol/L    CO2 23.9 22.0 - 29.0 mmol/L    Calcium 9.5 8.6 - 10.5 mg/dL    Total Protein 6.7 6.0 - 8.5 g/dL    Albumin 4.40 3.50 - 5.20 g/dL    ALT (SGPT) 17 1 - 41 U/L    AST (SGOT) 18 1 - 40 U/L    Alkaline Phosphatase 71 39 - 117 U/L    Total Bilirubin 0.6 0.0 - 1.2 mg/dL    eGFR Non African Amer 70 >60 mL/min/1.73     Globulin 2.3 gm/dL    A/G Ratio 1.9 g/dL    BUN/Creatinine Ratio 13.5 7.0 - 25.0    Anion Gap 12.1 5.0 - 15.0 mmol/L   Troponin    Collection Time: 11/13/20  3:20 PM    Specimen: Blood   Result Value Ref Range    Troponin T <0.010 0.000 - 0.030 ng/mL   Light Blue Top    Collection Time: 11/13/20  3:20 PM   Result Value Ref Range    Extra Tube hold for add-on    Green Top (Gel)    Collection Time: 11/13/20  3:20 PM   Result Value Ref Range    Extra Tube Hold for add-ons.    Lavender Top    Collection Time: 11/13/20  3:20 PM   Result Value Ref Range    Extra Tube hold for add-on    Gold Top - SST    Collection Time: 11/13/20  3:20 PM   Result Value Ref Range    Extra Tube Hold for add-ons.    CBC Auto Differential    Collection Time: 11/13/20  3:20 PM    Specimen: Blood   Result Value Ref Range    WBC 7.08 3.40 - 10.80 10*3/mm3    RBC 5.32 4.14 - 5.80 10*6/mm3    Hemoglobin 16.5 13.0 - 17.7 g/dL    Hematocrit 47.7 37.5 - 51.0 %    MCV 89.7 79.0 - 97.0 fL    MCH 31.0 26.6 - 33.0 pg    MCHC 34.6 31.5 - 35.7 g/dL    RDW 12.2 (L) 12.3 - 15.4 %    RDW-SD 39.4 37.0 - 54.0 fl    MPV 9.6 6.0 - 12.0 fL    Platelets 196 140 - 450 10*3/mm3    Neutrophil % 74.1 42.7 - 76.0 %    Lymphocyte % 18.4 (L) 19.6 - 45.3 %    Monocyte % 6.2 5.0 - 12.0 %    Eosinophil % 0.6 0.3 - 6.2 %    Basophil % 0.4 0.0 - 1.5 %    Immature Grans % 0.3 0.0 - 0.5 %    Neutrophils, Absolute 5.25 1.70 - 7.00 10*3/mm3    Lymphocytes, Absolute 1.30 0.70 - 3.10 10*3/mm3    Monocytes, Absolute 0.44 0.10 - 0.90 10*3/mm3    Eosinophils, Absolute 0.04 0.00 - 0.40 10*3/mm3    Basophils, Absolute 0.03 0.00 - 0.20 10*3/mm3    Immature Grans, Absolute 0.02 0.00 - 0.05 10*3/mm3    nRBC 0.0 0.0 - 0.2 /100 WBC       Ordered the above labs and independently reviewed the results.      RADIOLOGY  Xr Chest 2 View    Result Date: 11/13/2020  TWO VIEW CHEST  HISTORY: Chest pain.  FINDINGS: The lungs are well-expanded and clear and the heart and hilar structures are normal.  There is no acute disease or change from 05/27/2020.  This report was finalized on 11/13/2020 4:17 PM by Dr. Kofi Monroe M.D.        I ordered the above noted radiological studies. Reviewed by me and discussed with radiologist.  See dictation for official radiology interpretation.      PROCEDURES  Procedures      MEDICATIONS GIVEN IN ER    Medications   sodium chloride 0.9 % flush 10 mL (has no administration in time range)         PROGRESS, DATA ANALYSIS, CONSULTS, AND MEDICAL DECISION MAKING    All labs have been independently reviewed by me.  All radiology studies have been reviewed by me and discussed with radiologist dictating the report.   EKG's independently viewed and interpreted by me.  Discussion below represents my analysis of pertinent findings related to patient's condition, differential diagnosis, treatment plan and final disposition.      ED Course as of Nov 13 1720   Fri Nov 13, 2020   1654 I reviewed prior medical records and note the patient has had remote coronary artery disease but has done well over the last 5 years or so.  He did have a admission in 2018 for chest pain which is found to be noncardiac, likely GI.  He was seen in the ED several months ago by myself with chest pain and felt to be GI in nature rather than cardiac.    [DB]   1701 EKG          EKG time: 15 15  Rhythm/Rate: Sinus 76  P waves and MS: Normal P waves and MS intervals  QRS, axis: Normal axis, normal QRS  ST and T waves: Unremarkable ST and T wave    Interpreted Contemporaneously by me, independently viewed  Not significantly changed compared to prior 5/2020      [DB]   1705 Labs are reviewed and are fairly unremarkable.  There is a normal CMP with exception of glucose of 148.  CBC is reassuring.  Troponin is normal.  Patient has had symptoms ongoing for about a week and I do not feel that there is indication for a repeat troponin given the time course of his chest pain.    [DB]   1705 At this point there are multiple  possible etiologies for chest pain.  GI related pain seems most likely.  Musculoskeletal pain certainly is a possibility I do not think that this is acute coronary syndrome with normal EKG and normal troponin.  Also symptoms are not very classic for acute coronary syndrome as symptoms are not exertional whatsoever.    [DB]   1707 ED heart score is 4 with negative troponin.    [DB]   1708 Review of medical record shows a negative stress test in 2018 after admission for chest pain with negative troponins.    [DB]   1709 I discussed results of testing with patient and wife at the bedside.  Patient with negative troponin despite symptoms ongoing for almost 1 week.  Symptoms are much more suggestive of GI related problems and will add Carafate to the Prilosec he is currently taking.  Patient is seeing Dr. James Luna in the past and we will have him follow-up with him when possible.    [DB]      ED Course User Index  [DB] Josep Romeo MD       AS OF 17:20 EST VITALS:    BP - 147/80  HR - 91  TEMP - 98.2 °F (36.8 °C) (Tympanic)  O2 SATS - 97%      DIAGNOSIS  Final diagnoses:   Epigastric pain         DISPOSITION  DISCHARGE    Patient discharged in stable condition.    Reviewed implications of results, diagnosis, meds, responsibility to follow up, warning signs and symptoms of possible worsening, potential complications and reasons to return to ER, including increased pain, fever or as needed.    Patient/Family voiced understanding of above instructions.    Discussed plan for discharge, as there is no emergent indication for admission. Patient referred to primary care provider for BP management due to today's BP. Pt/family is agreeable and understands need for follow up and repeat testing.  Pt is aware that discharge does not mean that nothing is wrong but it indicates no emergency is present that requires admission and they must continue care with follow-up as given below or physician of their choice.      FOLLOW-UP  Catarino Luna MD  [address] [facility]  [address]D [address]LO[address]  [place] [address]  [phone]      Call for Appointment    Obi Boucher MD  [address] [facility]  [address]  [place] [address]  [phone]      Call for Appointment         Medication List      New Prescriptions    sucralfate 1 g tablet  Commonly known as: CARAFATE  Take 1 tablet by mouth 4 (Four) Times a Day.        Changed    diclofenac 50 MG EC tablet  Commonly known as: VOLTAREN  Take 1 tablet by mouth 2 (Two) Times a Day.  What changed: when to take this        Stop    sulfamethoxazole-trimethoprim 800-160 MG per tablet  Commonly known as: BACTRIM DS,SEPTRA DS           Where to Get Your Medications      You can get these medications from any pharmacy    Bring a paper prescription for each of these medications  · sucralfate 1 g tablet                Josep Romeo MD  11/13/20 4047

## 2020-11-13 NOTE — ED TRIAGE NOTES
midsternal chest pain started few days ago states thought was indigestion, pain more severe now and not subsiding. Pt took 81 mg ASA this morning. Pt has hx of cardiac stent. Mask placed on patient in first look triage. Triage staff wearing masks.

## 2020-11-13 NOTE — ED NOTES
Pt states cp that has been intermittent x1 week but has became constant the past few days. Pt states it's midsternal and a burning pain. Pt states hx of acid reflux and cardiac stents. Pt states nothing makes it better or worse. Pt denies soa, n/v, or any other symptom.    Patient in mask. This RN in appropriate PPE - including mask, goggles, and gloves during all of patient care.        Traci Rhodes, RN  11/13/20 [id number]

## 2020-11-13 NOTE — DISCHARGE INSTRUCTIONS
I recommend continue to take Prilosec twice daily.  Would add Carafate to help for additional protection.  Please contact your GI specialist, Dr. Catarino Calles for further evaluation.  Return to the ED for increasing pain or as needed.

## 2020-11-18 ENCOUNTER — TELEPHONE (OUTPATIENT)
Dept: GASTROENTEROLOGY | Facility: CLINIC | Age: 75
End: 2020-11-18

## 2020-11-18 NOTE — TELEPHONE ENCOUNTER
----- Message from Catarino Luna MD sent at 11/17/2020 10:45 AM EST -----  Regarding: FW: Test Results Question  Contact: [phone]  Can we see if Cinthya can get him in any sooner.  Thanks    ----- Message -----  From: Kelly Melendez RN  Sent: 11/16/2020  10:13 AM EST  To: Catarino Luna MD  Subject: FW: Test Results Question                          ----- Message -----  From: Td Melendez Jr.  Sent: 11/15/2020   9:49 AM EST  To: [facility]  Subject: Test Results Question                            I went to the emergency room at [facility] this past Friday for chest pain. Dr. Romeo was sure the issue was in the digestive system. I had been taking two Prilosec per day as well as antacid tablets with little relief. Dr. Romeo prescribed Sucralfate as an extra measure to mitigate symptom. It helps a bit, but my discomfort is still quite noticeable. I have a standing appointment right now with your nurse practitioner on December 5th. Is it possible to receive assistance sooner?    Td fairchild  1945

## 2020-12-11 ENCOUNTER — OFFICE VISIT (OUTPATIENT)
Dept: GASTROENTEROLOGY | Facility: CLINIC | Age: 75
End: 2020-12-11

## 2020-12-11 ENCOUNTER — TELEPHONE (OUTPATIENT)
Dept: GASTROENTEROLOGY | Facility: CLINIC | Age: 75
End: 2020-12-11

## 2020-12-11 VITALS — BODY MASS INDEX: 26.05 KG/M2 | WEIGHT: 182 LBS | HEIGHT: 70 IN

## 2020-12-11 DIAGNOSIS — R63.0 POOR APPETITE: ICD-10-CM

## 2020-12-11 DIAGNOSIS — R10.13 EPIGASTRIC PAIN: ICD-10-CM

## 2020-12-11 DIAGNOSIS — Z86.010 PERSONAL HISTORY OF COLONIC POLYPS: ICD-10-CM

## 2020-12-11 DIAGNOSIS — R10.13 DYSPEPSIA: Primary | ICD-10-CM

## 2020-12-11 PROCEDURE — 99214 OFFICE O/P EST MOD 30 MIN: CPT | Performed by: NURSE PRACTITIONER

## 2020-12-11 RX ORDER — TAMSULOSIN HYDROCHLORIDE 0.4 MG/1
1 CAPSULE ORAL DAILY
COMMUNITY
End: 2021-04-23

## 2020-12-11 RX ORDER — CHOLECALCIFEROL (VITAMIN D3) 125 MCG
5 CAPSULE ORAL
COMMUNITY
End: 2021-04-23

## 2020-12-11 RX ORDER — PANTOPRAZOLE SODIUM 40 MG/1
40 TABLET, DELAYED RELEASE ORAL 2 TIMES DAILY
Qty: 60 TABLET | Refills: 5 | Status: SHIPPED | OUTPATIENT
Start: 2020-12-11 | End: 2021-07-06

## 2020-12-11 NOTE — PROGRESS NOTES
Chief Complaint   Patient presents with   • Abdominal Pain     HPI    Td Melendez Jr. is a  75 y.o. male here for a follow up visit for abdominal pain.  This patient follows with Dr. Cabrera, new to me.  Patient has not been seen since 2018.  He was in the emergency room recently for complaints of epigastric pain and sore throat.  Labs at the time revealed normal CBC, normal LFTs, negative troponin.  Chest x-ray was unremarkable.  He was started on Carafate in combination PPI therapy and told to follow-up with our service.    Currently he feels like Carafate has helped somewhat but he still having significant dyspeptic symptoms daily and intermittent epigastric pain described as a burning aching sensation.  There is poor appetite but fortunately no nausea or vomiting.  No dysphagia.  He cut out all NSAIDs which seems to help.  Was previously taking diclofenac as well as Advil.    No diarrhea, constipation, or rectal bleeding.    His cardiologist is Dr. Boucher and primary care provider is Dr. Beau Lee    Data reviewed:    EGD 2018 regular Z-line, gastritis, duodenitis.  Colonoscopy 2018 polyps, recall 3 years.  Pathology was benign.  Colon polyps mix of benign and precancerous polyps.    Past Medical History:   Diagnosis Date   • Anxiety    • CAD (coronary artery disease)     unstable angina, 7/2015: 20% LM, long 70% LAD with abnormal FFR (s/p 2.49y54cb Xience), 50% OM1, 99% prox RCA (s/p 3.5x18m Xience).  Normal Cardiolite 11/2015 and 6/2018   • Fibromyalgia    • GERD (gastroesophageal reflux disease)    • H/O complete eye exam 04/2018   • Hyperlipidemia    • IBS (irritable bowel syndrome)    • Knee pain        Past Surgical History:   Procedure Laterality Date   • CARDIAC CATHETERIZATION      RUST 07/2015: cath with 20% LM long 70% LAD (with positive FFR), 50% OM1, 99% prox RCA, s/p 2.75x33 Xience Alpine to LAD and 3.5x18mm Xience Alpine to RCA. LVEF 64% Normal Cardiolite 11/2015   • CARDIAC SURGERY       cardiac stents, 2   • COLONOSCOPY  approx 6/2011    normal per patient  [place]   • COLONOSCOPY W/ POLYPECTOMY N/A 7/25/2018    Procedure: COLONOSCOPY TO CECUM WITH COLD SNAREPOLYPECTOMIES, HOT SNARE POLYPECTOMIES;  Surgeon: Catarino Luna MD;  Location: [facility] ENDOSCOPY;  Service: Gastroenterology   • CORONARY ANGIOPLASTY WITH STENT PLACEMENT     • ENDOSCOPY N/A 7/25/2018    Procedure: ESOPHAGOGASTRODUODENOSCOPY WITH COLD BIOPSIES;  Surgeon: Catarino Luna MD;  Location: [facility] ENDOSCOPY;  Service: Gastroenterology   • WISDOM TOOTH EXTRACTION         Scheduled Meds:  Outpatient Encounter Medications as of 12/11/2020   Medication Sig Dispense Refill   • alosetron (LOTRONEX) 0.5 MG tablet Take 0.5 mg by mouth As Needed. q other day      • ALPRAZolam (XANAX) 0.25 MG tablet Take 1 tablet by mouth Daily As Needed for Anxiety. 30 tablet 2   • aspirin 81 MG tablet Take 1 tablet by mouth Daily. 30 tablet 11   • coenzyme Q10 100 MG capsule Take 200 mg by mouth Daily.     • ezetimibe (ZETIA) 10 MG tablet Take 1 tablet by mouth Daily. 90 tablet 3   • lithium 8 MEQ/5ML solution oral solution Take 5 mg by mouth 3 (Three) Times a Day.     • melatonin 5 MG tablet tablet Take 5 mg by mouth. 1/2 tablet a needed     • omeprazole OTC (PriLOSEC OTC) 20 MG EC tablet Take  by mouth.     • sucralfate (CARAFATE) 1 g tablet Take 1 tablet by mouth 4 (Four) Times a Day. 120 tablet 0   • tamsulosin (FLOMAX) 0.4 MG capsule 24 hr capsule Take 1 capsule by mouth Daily.     • Vitamin D-Vitamin K (VITAMIN K2-VITAMIN D3 PO) Take  by mouth Daily.     • vitamin E 100 UNIT capsule Take 100 Units by mouth Daily.     • diclofenac (VOLTAREN) 50 MG EC tablet Take 1 tablet by mouth 2 (Two) Times a Day. (Patient taking differently: Take 50 mg by mouth Daily.) 180 tablet 3   • pitavastatin calcium (LIVALO) 2 MG tablet tablet Take 1 tablet by mouth Every Night. 90 tablet 1   • sildenafil (REVATIO) 20 MG tablet Take 2-3 tabs QD prn 30  tablet 11   • [DISCONTINUED] acetaminophen-codeine (TYLENOL #3) 300-30 MG per tablet Take  by mouth See Admin Instructions. Take 1 tablet by mouth every 4-6 hours as needed for pain  0   • [DISCONTINUED] glucosamine-chondroitin 500-400 MG capsule capsule Take 2 capsules by mouth Daily.     • [DISCONTINUED] ibuprofen (ADVIL,MOTRIN) 400 MG tablet Take 400 mg by mouth As Needed.       No facility-administered encounter medications on file as of 2020.        Continuous Infusions:No current facility-administered medications for this visit.       PRN Meds:.    Allergies   Allergen Reactions   • Augmentin [Amoxicillin-Pot Clavulanate] Hives and Itching       Social History     Socioeconomic History   • Marital status:      Spouse name: Not on file   • Number of children: Not on file   • Years of education: Not on file   • Highest education level: Not on file   Tobacco Use   • Smoking status: Former Smoker     Packs/day: 0.50     Types: Cigarettes     Quit date: 1970     Years since quittin.9   • Smokeless tobacco: Never Used   Substance and Sexual Activity   • Alcohol use: No     Comment: caffeine use   • Drug use: No   • Sexual activity: Defer       Family History   Problem Relation Age of Onset   • Coronary artery disease Other    • Cervical cancer Mother    • Cancer Mother    • Depression Mother    • Heart disease Mother    • Liver disease Mother    • Alcohol abuse Mother    • Thyroid cancer Father    • Cancer Father    • Depression Father    • Diabetes Father    • Thyroid disease Father        Review of Systems   Constitutional: Positive for appetite change. Negative for activity change, fatigue, fever and unexpected weight change.   HENT: Negative for trouble swallowing.    Respiratory: Negative for apnea, cough, choking, chest tightness, shortness of breath and wheezing.    Cardiovascular: Negative for chest pain, palpitations and leg swelling.   Gastrointestinal: Positive for abdominal pain.  Negative for abdominal distention, anal bleeding, blood in stool, constipation, diarrhea, nausea, rectal pain and vomiting.       There were no vitals filed for this visit.    Physical Exam  Constitutional:       Appearance: He is well-developed.   Cardiovascular:      Rate and Rhythm: Normal rate and regular rhythm.      Heart sounds: Normal heart sounds.   Pulmonary:      Effort: Pulmonary effort is normal. No respiratory distress.      Breath sounds: Normal breath sounds. No wheezing.   Abdominal:      General: Bowel sounds are normal. There is no distension.      Palpations: Abdomen is soft. There is no mass.      Tenderness: There is no abdominal tenderness. There is no guarding.      Hernia: No hernia is present.   Skin:     General: Skin is warm and dry.   Neurological:      Mental Status: He is alert and oriented to person, place, and time.   Psychiatric:         Behavior: Behavior normal.     Problem list    Epigastric pain  Dyspepsia  Poor appetite  History of gastritis and duodenitis  Personal history of colon polyps    Assessment/plan    Pleasant 75-year-old male seen today in follow-up after recent ER evaluation for epigastric pain somewhat improved on Carafate but still having significant dyspeptic symptoms, epigastric pain, and poor appetite.  Fortunately no weight loss.      Moving forward recommend EGD with Dr. Luna for further evaluation of symptoms.  Start Protonix 40 mg twice daily.  Continue Carafate at twice daily dosing.  I instructed the patient to dissolve Carafate and liquid and drink for maximized benefit.  If EGD found to be unremarkable consider gallbladder evaluation.    I also want him to avoid NSAIDs and adhere to an antireflux diet.    Colonoscopy for surveillance purposes next year.    Follow-up and further recommendations pending the aforementioned work-up.

## 2020-12-30 ENCOUNTER — TRANSCRIBE ORDERS (OUTPATIENT)
Dept: SLEEP MEDICINE | Facility: HOSPITAL | Age: 75
End: 2020-12-30

## 2020-12-30 DIAGNOSIS — Z01.818 OTHER SPECIFIED PRE-OPERATIVE EXAMINATION: Primary | ICD-10-CM

## 2021-01-05 ENCOUNTER — LAB (OUTPATIENT)
Dept: LAB | Facility: HOSPITAL | Age: 76
End: 2021-01-05

## 2021-01-05 DIAGNOSIS — Z01.818 OTHER SPECIFIED PRE-OPERATIVE EXAMINATION: ICD-10-CM

## 2021-01-05 PROCEDURE — C9803 HOPD COVID-19 SPEC COLLECT: HCPCS

## 2021-01-05 PROCEDURE — U0004 COV-19 TEST NON-CDC HGH THRU: HCPCS

## 2021-01-06 LAB — SARS-COV-2 RNA RESP QL NAA+PROBE: NOT DETECTED

## 2021-01-07 ENCOUNTER — ANESTHESIA (OUTPATIENT)
Dept: GASTROENTEROLOGY | Facility: HOSPITAL | Age: 76
End: 2021-01-07

## 2021-01-07 ENCOUNTER — ANESTHESIA EVENT (OUTPATIENT)
Dept: GASTROENTEROLOGY | Facility: HOSPITAL | Age: 76
End: 2021-01-07

## 2021-01-07 ENCOUNTER — HOSPITAL ENCOUNTER (OUTPATIENT)
Facility: HOSPITAL | Age: 76
Setting detail: HOSPITAL OUTPATIENT SURGERY
Discharge: HOME | End: 2021-01-07
Attending: INTERNAL MEDICINE | Admitting: INTERNAL MEDICINE

## 2021-01-07 VITALS
SYSTOLIC BLOOD PRESSURE: 122 MMHG | WEIGHT: 180 LBS | OXYGEN SATURATION: 97 % | DIASTOLIC BLOOD PRESSURE: 71 MMHG | HEIGHT: 70 IN | TEMPERATURE: 98.6 F | BODY MASS INDEX: 25.77 KG/M2 | HEART RATE: 59 BPM | RESPIRATION RATE: 16 BRPM

## 2021-01-07 DIAGNOSIS — R10.13 EPIGASTRIC PAIN: ICD-10-CM

## 2021-01-07 DIAGNOSIS — R63.0 POOR APPETITE: ICD-10-CM

## 2021-01-07 DIAGNOSIS — R10.13 DYSPEPSIA: ICD-10-CM

## 2021-01-07 PROCEDURE — 25010000002 PROPOFOL 10 MG/ML EMULSION: Performed by: NURSE ANESTHETIST, CERTIFIED REGISTERED

## 2021-01-07 PROCEDURE — 43239 EGD BIOPSY SINGLE/MULTIPLE: CPT | Performed by: INTERNAL MEDICINE

## 2021-01-07 PROCEDURE — 88305 TISSUE EXAM BY PATHOLOGIST: CPT | Performed by: INTERNAL MEDICINE

## 2021-01-07 RX ORDER — SODIUM CHLORIDE, SODIUM LACTATE, POTASSIUM CHLORIDE, AND CALCIUM CHLORIDE .6; .31; .03; .02 G/100ML; G/100ML; G/100ML; G/100ML
20 INJECTION, SOLUTION INTRAVENOUS CONTINUOUS
Status: DISCONTINUED | OUTPATIENT
Start: 2021-01-07 | End: 2021-01-07 | Stop reason: HOSPADM

## 2021-01-07 RX ORDER — SODIUM CHLORIDE 0.9 % (FLUSH) 0.9 %
10 SYRINGE (ML) INJECTION EVERY 12 HOURS SCHEDULED
Status: DISCONTINUED | OUTPATIENT
Start: 2021-01-07 | End: 2021-01-07 | Stop reason: HOSPADM

## 2021-01-07 RX ORDER — PROPOFOL 10 MG/ML
VIAL (ML) INTRAVENOUS CONTINUOUS PRN
Status: DISCONTINUED | OUTPATIENT
Start: 2021-01-07 | End: 2021-01-07 | Stop reason: SURG

## 2021-01-07 RX ORDER — SODIUM CHLORIDE 0.9 % (FLUSH) 0.9 %
10 SYRINGE (ML) INJECTION AS NEEDED
Status: CANCELLED | OUTPATIENT
Start: 2021-01-07

## 2021-01-07 RX ORDER — LIDOCAINE HYDROCHLORIDE 20 MG/ML
INJECTION, SOLUTION INFILTRATION; PERINEURAL AS NEEDED
Status: DISCONTINUED | OUTPATIENT
Start: 2021-01-07 | End: 2021-01-07 | Stop reason: SURG

## 2021-01-07 RX ORDER — PROPOFOL 10 MG/ML
VIAL (ML) INTRAVENOUS AS NEEDED
Status: DISCONTINUED | OUTPATIENT
Start: 2021-01-07 | End: 2021-01-07 | Stop reason: SURG

## 2021-01-07 RX ADMIN — SODIUM CHLORIDE, POTASSIUM CHLORIDE, SODIUM LACTATE AND CALCIUM CHLORIDE 200 ML: 600; 310; 30; 20 INJECTION, SOLUTION INTRAVENOUS at 10:08

## 2021-01-07 RX ADMIN — PROPOFOL 150 MCG/KG/MIN: 10 INJECTION, EMULSION INTRAVENOUS at 10:00

## 2021-01-07 RX ADMIN — SODIUM CHLORIDE, POTASSIUM CHLORIDE, SODIUM LACTATE AND CALCIUM CHLORIDE 20 ML/HR: 600; 310; 30; 20 INJECTION, SOLUTION INTRAVENOUS at 09:26

## 2021-01-07 RX ADMIN — PROPOFOL 50 MG: 10 INJECTION, EMULSION INTRAVENOUS at 10:00

## 2021-01-07 RX ADMIN — LIDOCAINE HYDROCHLORIDE 50 MG: 20 INJECTION, SOLUTION INFILTRATION; PERINEURAL at 10:00

## 2021-01-07 NOTE — ANESTHESIA PREPROCEDURE EVALUATION
Anesthesia Evaluation     Patient summary reviewed and Nursing notes reviewed   NPO Solid Status: > 8 hours  NPO Liquid Status: > 2 hours           Airway   Mallampati: I  TM distance: >3 FB  Neck ROM: full  No difficulty expected  Dental - normal exam     Pulmonary - normal exam   (+) a smoker Former,   Cardiovascular - normal exam    (+) CAD, hyperlipidemia,       Neuro/Psych  (+) psychiatric history Anxiety,     GI/Hepatic/Renal/Endo    (+)  GERD,      Musculoskeletal (-) negative ROS    Abdominal  - normal exam    Bowel sounds: normal.   Substance History - negative use     OB/GYN negative ob/gyn ROS         Other                      Anesthesia Plan    ASA 3     MAC       Anesthetic plan, all risks, benefits, and alternatives have been provided, discussed and informed consent has been obtained with: patient.

## 2021-01-07 NOTE — ANESTHESIA POSTPROCEDURE EVALUATION
"Patient: Td Melendez Jr.    Procedure Summary     Date: 01/07/21 Room / Location: [facility] ENDOSCOPY 10 / [facility] ENDOSCOPY    Anesthesia Start: 0955 Anesthesia Stop: 1013    Procedure: ESOPHAGOGASTRODUODENOSCOPY with cold biopsies (N/A Esophagus) Diagnosis:       Dyspepsia      Epigastric pain      Poor appetite      (Dyspepsia [R10.13])      (Epigastric pain [R10.13])      (Poor appetite [R63.0])    Surgeon: Catarino Luna MD Provider: Josep Srinivasan MD    Anesthesia Type: MAC ASA Status: 3          Anesthesia Type: MAC    Vitals  Vitals Value Taken Time   /71 01/07/21 1032   Temp     Pulse 59 01/07/21 1032   Resp 16 01/07/21 1032   SpO2 97 % 01/07/21 1032           Post Anesthesia Care and Evaluation    Patient location during evaluation: PACU  Patient participation: complete - patient participated  Level of consciousness: awake  Pain score: 0  Pain management: adequate  Airway patency: patent  Anesthetic complications: No anesthetic complications  PONV Status: none  Cardiovascular status: acceptable  Respiratory status: acceptable  Hydration status: acceptable    Comments: /71 (BP Location: Left arm, Patient Position: Lying)   Pulse 59   Temp 37 °C (98.6 °F) (Oral)   Resp 16   Ht 177.8 cm (70\")   Wt 81.6 kg (180 lb)   SpO2 97%   BMI 25.83 kg/m²       "

## 2021-01-17 NOTE — PROGRESS NOTES
EGD Pathology Results:  Mild gastritis, no H pylori    Recommendations:  Continue current medication    Office f/u: 2-4 weeks with BG

## 2021-01-18 ENCOUNTER — TELEPHONE (OUTPATIENT)
Dept: GASTROENTEROLOGY | Facility: CLINIC | Age: 76
End: 2021-01-18

## 2021-01-18 ENCOUNTER — HOSPITAL ENCOUNTER (EMERGENCY)
Facility: HOSPITAL | Age: 76
Discharge: LEFT BEFORE DISCHARGE | End: 2021-01-18

## 2021-01-18 VITALS
DIASTOLIC BLOOD PRESSURE: 59 MMHG | HEART RATE: 65 BPM | TEMPERATURE: 96.5 F | SYSTOLIC BLOOD PRESSURE: 126 MMHG | RESPIRATION RATE: 16 BRPM | OXYGEN SATURATION: 98 %

## 2021-01-18 LAB
ALBUMIN SERPL-MCNC: 4 G/DL (ref 3.5–5.2)
ALBUMIN/GLOB SERPL: 1.7 G/DL
ALP SERPL-CCNC: 65 U/L (ref 39–117)
ALT SERPL W P-5'-P-CCNC: 13 U/L (ref 1–41)
ANION GAP SERPL CALCULATED.3IONS-SCNC: 12.8 MMOL/L (ref 5–15)
AST SERPL-CCNC: 14 U/L (ref 1–40)
BASOPHILS # BLD AUTO: 0.02 10*3/MM3 (ref 0–0.2)
BASOPHILS NFR BLD AUTO: 0.3 % (ref 0–1.5)
BILIRUB SERPL-MCNC: 0.8 MG/DL (ref 0–1.2)
BUN SERPL-MCNC: 20 MG/DL (ref 8–23)
BUN/CREAT SERPL: 21.5 (ref 7–25)
CALCIUM SPEC-SCNC: 9 MG/DL (ref 8.6–10.5)
CHLORIDE SERPL-SCNC: 106 MMOL/L (ref 98–107)
CO2 SERPL-SCNC: 19.2 MMOL/L (ref 22–29)
CREAT SERPL-MCNC: 0.93 MG/DL (ref 0.76–1.27)
DEPRECATED RDW RBC AUTO: 41.1 FL (ref 37–54)
EOSINOPHIL # BLD AUTO: 0.02 10*3/MM3 (ref 0–0.4)
EOSINOPHIL NFR BLD AUTO: 0.3 % (ref 0.3–6.2)
ERYTHROCYTE [DISTWIDTH] IN BLOOD BY AUTOMATED COUNT: 12.4 % (ref 12.3–15.4)
GFR SERPL CREATININE-BSD FRML MDRD: 80 ML/MIN/1.73
GLOBULIN UR ELPH-MCNC: 2.3 GM/DL
GLUCOSE SERPL-MCNC: 125 MG/DL (ref 65–99)
HCT VFR BLD AUTO: 45.9 % (ref 37.5–51)
HGB BLD-MCNC: 16.1 G/DL (ref 13–17.7)
HOLD SPECIMEN: NORMAL
HOLD SPECIMEN: NORMAL
IMM GRANULOCYTES # BLD AUTO: 0.02 10*3/MM3 (ref 0–0.05)
IMM GRANULOCYTES NFR BLD AUTO: 0.3 % (ref 0–0.5)
LYMPHOCYTES # BLD AUTO: 0.71 10*3/MM3 (ref 0.7–3.1)
LYMPHOCYTES NFR BLD AUTO: 11.4 % (ref 19.6–45.3)
MAGNESIUM SERPL-MCNC: 2 MG/DL (ref 1.6–2.4)
MCH RBC QN AUTO: 32.1 PG (ref 26.6–33)
MCHC RBC AUTO-ENTMCNC: 35.1 G/DL (ref 31.5–35.7)
MCV RBC AUTO: 91.4 FL (ref 79–97)
MONOCYTES # BLD AUTO: 0.33 10*3/MM3 (ref 0.1–0.9)
MONOCYTES NFR BLD AUTO: 5.3 % (ref 5–12)
NEUTROPHILS NFR BLD AUTO: 5.14 10*3/MM3 (ref 1.7–7)
NEUTROPHILS NFR BLD AUTO: 82.4 % (ref 42.7–76)
NRBC BLD AUTO-RTO: 0 /100 WBC (ref 0–0.2)
PLATELET # BLD AUTO: 167 10*3/MM3 (ref 140–450)
PMV BLD AUTO: 10 FL (ref 6–12)
POTASSIUM SERPL-SCNC: 4.4 MMOL/L (ref 3.5–5.2)
PROT SERPL-MCNC: 6.3 G/DL (ref 6–8.5)
RBC # BLD AUTO: 5.02 10*6/MM3 (ref 4.14–5.8)
SODIUM SERPL-SCNC: 138 MMOL/L (ref 136–145)
TROPONIN T SERPL-MCNC: <0.01 NG/ML (ref 0–0.03)
WBC # BLD AUTO: 6.24 10*3/MM3 (ref 3.4–10.8)
WHOLE BLOOD HOLD SPECIMEN: NORMAL
WHOLE BLOOD HOLD SPECIMEN: NORMAL

## 2021-01-18 PROCEDURE — 84484 ASSAY OF TROPONIN QUANT: CPT

## 2021-01-18 PROCEDURE — 83735 ASSAY OF MAGNESIUM: CPT

## 2021-01-18 PROCEDURE — 36415 COLL VENOUS BLD VENIPUNCTURE: CPT

## 2021-01-18 PROCEDURE — 99211 OFF/OP EST MAY X REQ PHY/QHP: CPT

## 2021-01-18 PROCEDURE — 80053 COMPREHEN METABOLIC PANEL: CPT

## 2021-01-18 PROCEDURE — 85025 COMPLETE CBC W/AUTO DIFF WBC: CPT

## 2021-01-18 RX ORDER — SODIUM CHLORIDE 0.9 % (FLUSH) 0.9 %
10 SYRINGE (ML) INJECTION AS NEEDED
Status: DISCONTINUED | OUTPATIENT
Start: 2021-01-18 | End: 2021-01-18 | Stop reason: HOSPADM

## 2021-01-18 NOTE — ED TRIAGE NOTES
Pt arrives via EMS. Pt has hx of urinary retention. [facility] Rezum [facility]. Pt had a syncopal episode on the way home from hospital. Pt doesn't remember syncopal episode but reported he did not hit his head on anything        Pt masked on arrival, staff masked    Run# [id number]

## 2021-01-18 NOTE — TELEPHONE ENCOUNTER
----- Message from Catarino Luna MD sent at 1/17/2021  2:06 PM EST -----  EGD Pathology Results:  Mild gastritis, no H pylori    Recommendations:  Continue current medication    Office f/u: 2-4 weeks with BG

## 2021-01-18 NOTE — ED NOTES
Patient states that he is feeling much better and that he wants to go home. Patient informed that he should stay, but he states that he feels comfortable going home. Patient will sign paper work stating he is leaving.      Claudia Morris RN  01/18/21 4130

## 2021-01-18 NOTE — TELEPHONE ENCOUNTER
Called pt and advised of Dr Luna's note.  Pt verbalized understanding.     F/u appt made with Cinthya PUGA 2/16/21 @2pm.

## 2021-01-19 ENCOUNTER — EPISODE CHANGES (OUTPATIENT)
Dept: CASE MANAGEMENT | Facility: OTHER | Age: 76
End: 2021-01-19

## 2021-01-28 RX ORDER — DIAZEPAM 10 MG/1
TABLET ORAL
COMMUNITY
Start: 2021-01-15 | End: 2021-08-27

## 2021-02-01 RX ORDER — HYDROCODONE BITARTRATE AND ACETAMINOPHEN 5; 325 MG/1; MG/1
TABLET ORAL SEE ADMIN INSTRUCTIONS
COMMUNITY
Start: 2021-01-29

## 2021-02-11 ENCOUNTER — TELEMEDICINE (OUTPATIENT)
Dept: FAMILY MEDICINE CLINIC | Facility: CLINIC | Age: 76
End: 2021-02-11

## 2021-02-11 DIAGNOSIS — N52.9 ERECTILE DYSFUNCTION, UNSPECIFIED ERECTILE DYSFUNCTION TYPE: Chronic | ICD-10-CM

## 2021-02-11 DIAGNOSIS — F41.9 ANXIETY: Chronic | ICD-10-CM

## 2021-02-11 DIAGNOSIS — Z00.00 ANNUAL PHYSICAL EXAM: ICD-10-CM

## 2021-02-11 DIAGNOSIS — E78.2 MIXED HYPERLIPIDEMIA: Chronic | ICD-10-CM

## 2021-02-11 DIAGNOSIS — I25.10 CORONARY ARTERY DISEASE INVOLVING NATIVE CORONARY ARTERY OF NATIVE HEART WITHOUT ANGINA PECTORIS: Primary | Chronic | ICD-10-CM

## 2021-02-11 PROCEDURE — 99214 OFFICE O/P EST MOD 30 MIN: CPT | Performed by: FAMILY MEDICINE

## 2021-02-11 RX ORDER — EZETIMIBE 10 MG/1
10 TABLET ORAL DAILY
Qty: 90 TABLET | Refills: 3 | Status: SHIPPED | OUTPATIENT
Start: 2021-02-11 | End: 2021-06-30 | Stop reason: SDUPTHER

## 2021-02-11 RX ORDER — SILDENAFIL CITRATE 20 MG/1
TABLET ORAL
Qty: 30 TABLET | Refills: 11 | Status: SHIPPED | OUTPATIENT
Start: 2021-02-11 | End: 2022-03-01 | Stop reason: SDUPTHER

## 2021-02-11 RX ORDER — ALPRAZOLAM 0.25 MG/1
0.25 TABLET ORAL DAILY PRN
Qty: 30 TABLET | Refills: 2 | Status: SHIPPED | OUTPATIENT
Start: 2021-02-11 | End: 2021-06-30 | Stop reason: SDUPTHER

## 2021-02-11 NOTE — PROGRESS NOTES
Subjective   Td Melendez Jr. is a 75 y.o. male.     CC: Video Visit for Medical Management    History of Present Illness     Chief Complaint:   Chief Complaint   Patient presents with   • Hypertension     med refill - video call    • Anxiety   • Erectile Dysfunction       Td Melendez Jr. 75 y.o. male who presents today for Medical Management of the below listed issues and medication refills.  he has a problem list of   Patient Active Problem List   Diagnosis   • CAD (coronary artery disease)   • Hyperlipidemia   • Anxiety   • Gastroesophageal reflux disease without esophagitis   • Polyp of colon   • Dyspepsia   • Epigastric pain   • Poor appetite   .  Since the last visit, he has overall felt well.  he has been compliant with   Current Outpatient Medications:   •  ALPRAZolam (XANAX) 0.25 MG tablet, Take 1 tablet by mouth Daily As Needed for Anxiety., Disp: 30 tablet, Rfl: 2  •  aspirin 81 MG tablet, Take 1 tablet by mouth Daily., Disp: 30 tablet, Rfl: 11  •  coenzyme Q10 100 MG capsule, Take 200 mg by mouth Daily., Disp: , Rfl:   •  diazePAM (VALIUM) 10 MG tablet, TAKE 1 TABLET BY MOUTH 1 HOUR PRIOR TO PROCEDURE, Disp: , Rfl:   •  Emollient (COLLAGEN EX), Take 1 tablet by mouth Daily., Disp: , Rfl:   •  ezetimibe (ZETIA) 10 MG tablet, Take 1 tablet by mouth Daily., Disp: 90 tablet, Rfl: 3  •  HYDROcodone-acetaminophen (NORCO) 5-325 MG per tablet, Take  by mouth See Admin Instructions. Take 1 tablet by mouth every 4-6 hours as needed for pain, Disp: , Rfl:   •  LITHIUM PO, Take 5 mg by mouth 2 (two) times a day., Disp: , Rfl:   •  melatonin 5 MG tablet tablet, Take 5 mg by mouth. 1/2 tablet a needed, Disp: , Rfl:   •  pantoprazole (PROTONIX) 40 MG EC tablet, Take 1 tablet by mouth 2 (Two) Times a Day., Disp: 60 tablet, Rfl: 5  •  pitavastatin calcium (LIVALO) 2 MG tablet tablet, Take 1 tablet by mouth Every Night., Disp: 90 tablet, Rfl: 1  •  sildenafil (REVATIO) 20 MG tablet, Take 2-3 tabs QD prn, Disp: 30  tablet, Rfl: 11  •  tamsulosin (FLOMAX) 0.4 MG capsule 24 hr capsule, Take 1 capsule by mouth Daily., Disp: , Rfl:   •  Vitamin D-Vitamin K (VITAMIN K2-VITAMIN D3 PO), Take  by mouth Daily., Disp: , Rfl:   •  vitamin E 100 UNIT capsule, Take 100 Units by mouth Daily., Disp: , Rfl: .  he denies medication side effects.    All of the other chronic condition(s) listed above are stable w/o issues.    There were no vitals taken for this visit.    Results for orders placed or performed during the hospital encounter of 01/07/21   Tissue Pathology Exam    Specimen: A: Stomach; Tissue    B: Stomach; Tissue   Result Value Ref Range    Case Report       Surgical Pathology Report                         Case: [id number]                                  Authorizing Provider:  Catarino Luna MD  Collected:           01/07/2021 10:06 AM          Ordering Location:     [facility]  Received:            01/07/2021 11:41 AM                                 ENDO SUITES                                                                  Pathologist:           Chucky Yu MD                                                         Specimens:   1) - Stomach, gastric antrum biopsies                                                               2) - Stomach, gastric body biopsies                                                        Final Diagnosis       1. Stomach, Antrum, Biopsy: Benign gastric mucosa with    A. Mild chronic inflammation (mild non-specific chronic gastritis).   B. No intestinal metaplasia.   C. No Helicobacter pylori.     2. Stomach, Body, Biopsy: Benign gastric mucosa with    A. Mild chronic inflammation (mild non-specific chronic gastritis).   B. No intestinal metaplasia.   C. No Helicobacter pylori.     alena/pkm          Gross Description       1.  The specimen is received in formalin labeled with the patient's name and further designated 'gastric antrum biopsy' are multiple small fragments of  gray-tan tissue. The specimen is submitted for embedding as received.    2.  The specimen is received in formalin labeled with the patient's name and further designated 'gastric body biopsy' is a small fragment of gray-tan tissue. The specimen is submitted for embedding as received.        Microscopic Description       Performed, incorporated in diagnosis.             The following portions of the patient's history were reviewed and updated as appropriate: allergies, current medications, past family history, past medical history, past social history, past surgical history and problem list.    Review of Systems   Constitutional: Negative for activity change, chills and fever.   Respiratory: Negative for cough.    Cardiovascular: Negative for chest pain.   Psychiatric/Behavioral: Negative for dysphoric mood.       Objective   Physical Exam  Constitutional:       General: He is not in acute distress.     Appearance: He is well-developed.   Pulmonary:      Effort: Pulmonary effort is normal.   Neurological:      Mental Status: He is alert and oriented to person, place, and time.   Psychiatric:         Behavior: Behavior normal.         Thought Content: Thought content normal.         Assessment/Plan   Diagnoses and all orders for this visit:    1. Coronary artery disease involving native coronary artery of native heart without angina pectoris (Primary)  -     ezetimibe (ZETIA) 10 MG tablet; Take 1 tablet by mouth Daily.  Dispense: 90 tablet; Refill: 3  -     pitavastatin calcium (LIVALO) 2 MG tablet tablet; Take 1 tablet by mouth Every Night.  Dispense: 90 tablet; Refill: 1  -     Comprehensive metabolic panel; Future  -     Lipid panel; Future  -     CBC and Differential; Future  -     TSH; Future    2. Anxiety  -     ALPRAZolam (XANAX) 0.25 MG tablet; Take 1 tablet by mouth Daily As Needed for Anxiety.  Dispense: 30 tablet; Refill: 2    3. Mixed hyperlipidemia  -     ezetimibe (ZETIA) 10 MG tablet; Take 1 tablet by  mouth Daily.  Dispense: 90 tablet; Refill: 3  -     pitavastatin calcium (LIVALO) 2 MG tablet tablet; Take 1 tablet by mouth Every Night.  Dispense: 90 tablet; Refill: 1  -     Lipid panel; Future    4. Erectile dysfunction, unspecified erectile dysfunction type  -     sildenafil (REVATIO) 20 MG tablet; Take 2-3 tabs QD prn  Dispense: 30 tablet; Refill: 11    5. Annual physical exam  Comments:  for labs only, don't bill  Orders:  -     Comprehensive metabolic panel; Future  -     CBC and Differential; Future  -     TSH; Future  -     PSA; Future    Spent  15   minutes with chart and interview and consent for this encounter given by the patient.  You have chosen to receive care through a telehealth visit.  Do you consent to use a video/audio connection for your medical care today? Yes

## 2021-02-16 ENCOUNTER — OFFICE VISIT (OUTPATIENT)
Dept: GASTROENTEROLOGY | Facility: CLINIC | Age: 76
End: 2021-02-16

## 2021-02-16 VITALS — WEIGHT: 170 LBS | HEIGHT: 70 IN | BODY MASS INDEX: 24.34 KG/M2

## 2021-02-16 DIAGNOSIS — Z86.010 PERSONAL HISTORY OF COLONIC POLYPS: ICD-10-CM

## 2021-02-16 DIAGNOSIS — Z12.11 SCREENING FOR COLON CANCER: ICD-10-CM

## 2021-02-16 DIAGNOSIS — K29.00 ACUTE SUPERFICIAL GASTRITIS WITHOUT HEMORRHAGE: Primary | ICD-10-CM

## 2021-02-16 PROCEDURE — 99443 PR PHYS/QHP TELEPHONE EVALUATION 21-30 MIN: CPT | Performed by: NURSE PRACTITIONER

## 2021-02-16 NOTE — PROGRESS NOTES
Chief Complaint   Patient presents with   • Gastritis     HPI    You have chosen to receive care through a telephone visit. Do you consent to use a telephone visit for your medical care today? yes    Td Melendez Jr. is a  75 y.o. male here for a follow up visit for abdominal pain.  This patient follows with Dr. Luna, known to me.  He has a past GI medical history of gastritis, duodenitis, and colon polyps.    He status post endoscopic evaluation due to complaints of dyspepsia and abdominal pain which I reviewed dated 1/7/2021:    EGD with gastritis.  PPI decrease to daily dosing, consider Dexilant.  Pathology with mild gastritis.    Patient then presented back to the emergency room on 18 January but left AMA as he started to feel better.  Hemogram and LFTs drawn at that time was normal.    On visit today he does report some indigestion as of late but does admit to dietary indiscretions has been eating spicy food.  Outside of these episodes he is doing quite well.  He is tolerating once daily dosing Protonix.  He currently denies nausea, vomiting, dysphagia, or odynophagia.    No diarrhea, constipation, rectal bleeding.  He is due for screening colonoscopy this summer.    Past Medical History:   Diagnosis Date   • Anxiety    • CAD (coronary artery disease)     unstable angina, 7/2015: 20% LM, long 70% LAD with abnormal FFR (s/p 2.23t19yf Xience), 50% OM1, 99% prox RCA (s/p 3.5x18m Xience).  Normal Cardiolite 11/2015 and 6/2018   • Fibromyalgia    • GERD (gastroesophageal reflux disease)    • H/O complete eye exam 04/2018   • Hyperlipidemia    • IBS (irritable bowel syndrome)    • Knee pain        Past Surgical History:   Procedure Laterality Date   • CARDIAC CATHETERIZATION      [facility] 07/2015: cath with 20% LM long 70% LAD (with positive FFR), 50% OM1, 99% prox RCA, s/p 2.75x33 Xience [place] to LAD and 3.5x18mm Xience Alpine to RCA. LVEF 64% Normal Cardiolite 11/2015   • CARDIAC SURGERY      cardiac stents, 2    • COLONOSCOPY  approx 6/2011    normal per patient  [facility]   • COLONOSCOPY W/ POLYPECTOMY N/A 7/25/2018    Procedure: COLONOSCOPY TO CECUM WITH COLD SNAREPOLYPECTOMIES, HOT SNARE POLYPECTOMIES;  Surgeon: Catarino Luna MD;  Location: [facility] ENDOSCOPY;  Service: Gastroenterology   • CORONARY ANGIOPLASTY WITH STENT PLACEMENT     • ENDOSCOPY N/A 7/25/2018    Procedure: ESOPHAGOGASTRODUODENOSCOPY WITH COLD BIOPSIES;  Surgeon: Catarino Luna MD;  Location[facility] ENDOSCOPY;  Service: Gastroenterology   • ENDOSCOPY N/A 1/7/2021    Procedure: ESOPHAGOGASTRODUODENOSCOPY with cold biopsies;  Surgeon: Catarino Luna MD;  Location: [facility] ENDOSCOPY;  Service: Gastroenterology;  Laterality: N/A;  PRE: EPIGASTRIC PAIN, DYSPEPSIA  POST: GASTRITIS   • WISDOM TOOTH EXTRACTION         Scheduled Meds:  Outpatient Encounter Medications as of 2/16/2021   Medication Sig Dispense Refill   • ALPRAZolam (XANAX) 0.25 MG tablet Take 1 tablet by mouth Daily As Needed for Anxiety. 30 tablet 2   • aspirin 81 MG tablet Take 1 tablet by mouth Daily. 30 tablet 11   • coenzyme Q10 100 MG capsule Take 200 mg by mouth Daily.     • Emollient (COLLAGEN EX) Take 1 tablet by mouth Daily.     • ezetimibe (ZETIA) 10 MG tablet Take 1 tablet by mouth Daily. 90 tablet 3   • HYDROcodone-acetaminophen (NORCO) 5-325 MG per tablet Take  by mouth See Admin Instructions. Take 1 tablet by mouth every 4-6 hours as needed for pain     • LITHIUM PO Take 5 mg by mouth 2 (two) times a day.     • melatonin 5 MG tablet tablet Take 5 mg by mouth. 1/2 tablet a needed     • pantoprazole (PROTONIX) 40 MG EC tablet Take 1 tablet by mouth 2 (Two) Times a Day. (Patient taking differently: Take 40 mg by mouth Daily.) 60 tablet 5   • pitavastatin calcium (LIVALO) 2 MG tablet tablet Take 1 tablet by mouth Every Night. 90 tablet 1   • sildenafil (REVATIO) 20 MG tablet Take 2-3 tabs QD prn 30 tablet 11   • tamsulosin (FLOMAX) 0.4 MG capsule 24 hr capsule  Take 1 capsule by mouth Daily.     • Vitamin D-Vitamin K (VITAMIN K2-VITAMIN D3 PO) Take  by mouth Daily.     • vitamin E 100 UNIT capsule Take 100 Units by mouth Daily.     • diazePAM (VALIUM) 10 MG tablet TAKE 1 TABLET BY MOUTH 1 HOUR PRIOR TO PROCEDURE       No facility-administered encounter medications on file as of 2021.        Continuous Infusions:No current facility-administered medications for this visit.       PRN Meds:.    Allergies   Allergen Reactions   • Augmentin [Amoxicillin-Pot Clavulanate] Hives and Itching       Social History     Socioeconomic History   • Marital status:      Spouse name: Not on file   • Number of children: Not on file   • Years of education: Not on file   • Highest education level: Not on file   Tobacco Use   • Smoking status: Former Smoker     Packs/day: 0.50     Types: Cigarettes     Quit date: 1970     Years since quittin.1   • Smokeless tobacco: Never Used   Substance and Sexual Activity   • Alcohol use: No     Comment: caffeine use   • Drug use: No   • Sexual activity: Defer       Family History   Problem Relation Age of Onset   • Coronary artery disease Other    • Cervical cancer Mother    • Cancer Mother    • Depression Mother    • Heart disease Mother    • Liver disease Mother    • Alcohol abuse Mother    • Thyroid cancer Father    • Cancer Father    • Depression Father    • Diabetes Father    • Thyroid disease Father    • Malig Hyperthermia Neg Hx        Review of Systems   Constitutional: Negative for activity change, appetite change, fatigue, fever and unexpected weight change.   HENT: Negative for trouble swallowing.    Respiratory: Negative for apnea, cough, choking, chest tightness, shortness of breath and wheezing.    Cardiovascular: Negative for chest pain, palpitations and leg swelling.   Gastrointestinal: Negative for abdominal distention, abdominal pain, anal bleeding, blood in stool, constipation, diarrhea, nausea, rectal pain and vomiting.        There were no vitals filed for this visit.    Physical Exam  Neurological:      Mental Status: He is oriented to person, place, and time.   Psychiatric:         Mood and Affect: Mood normal.         Behavior: Behavior normal.     Assessment    Gastritis  Recent history colon polyps    Plan    Pleasant 75-year-old male seen today in follow-up for gastritis.  I spent a great deal of counseling him on visit today in regards to GERD diet and lifestyle modifications.  The patient's had a couple of setbacks with dietary indiscretions.  I want him to continue once daily Protonix.  Start FD guard which can be taken as needed, dosing instructions reviewed with the patient.  Strict antireflux dietary precautions.  I reviewed his EGD and pathology and all questions were answered.    Due to his personal history of colon polyps to arrange a colonoscopy this summer, case request completed.    If he does not improve with the addition of FD nkechi and dietary modifications consider transitioning to Dexilant.    Follow-up 3 months.    (Telehealth visit/phone call 30 minutes duration.)

## 2021-03-03 DIAGNOSIS — Z23 IMMUNIZATION DUE: ICD-10-CM

## 2021-03-31 ENCOUNTER — TELEPHONE (OUTPATIENT)
Dept: GASTROENTEROLOGY | Facility: CLINIC | Age: 76
End: 2021-03-31

## 2021-03-31 PROBLEM — Z86.010 PERSONAL HISTORY OF COLONIC POLYPS: Status: ACTIVE | Noted: 2021-03-31

## 2021-03-31 PROBLEM — Z12.11 SCREENING FOR COLON CANCER: Status: ACTIVE | Noted: 2021-03-31

## 2021-03-31 PROBLEM — Z86.0100 PERSONAL HISTORY OF COLONIC POLYPS: Status: ACTIVE | Noted: 2021-03-31

## 2021-04-08 ENCOUNTER — TRANSCRIBE ORDERS (OUTPATIENT)
Dept: SLEEP MEDICINE | Facility: HOSPITAL | Age: 76
End: 2021-04-08

## 2021-04-08 DIAGNOSIS — Z01.818 OTHER SPECIFIED PRE-OPERATIVE EXAMINATION: Primary | ICD-10-CM

## 2021-04-23 ENCOUNTER — LAB (OUTPATIENT)
Dept: LAB | Facility: HOSPITAL | Age: 76
End: 2021-04-23

## 2021-04-23 DIAGNOSIS — Z01.818 OTHER SPECIFIED PRE-OPERATIVE EXAMINATION: ICD-10-CM

## 2021-04-23 PROCEDURE — U0004 COV-19 TEST NON-CDC HGH THRU: HCPCS

## 2021-04-23 PROCEDURE — C9803 HOPD COVID-19 SPEC COLLECT: HCPCS

## 2021-04-23 RX ORDER — ACETAMINOPHEN,DIPHENHYDRAMINE HCL 500; 25 MG/1; MG/1
1 TABLET, FILM COATED ORAL NIGHTLY PRN
COMMUNITY

## 2021-04-24 LAB — SARS-COV-2 ORF1AB RESP QL NAA+PROBE: NOT DETECTED

## 2021-04-26 ENCOUNTER — ANESTHESIA (OUTPATIENT)
Dept: GASTROENTEROLOGY | Facility: HOSPITAL | Age: 76
End: 2021-04-26

## 2021-04-26 ENCOUNTER — HOSPITAL ENCOUNTER (OUTPATIENT)
Facility: HOSPITAL | Age: 76
Setting detail: HOSPITAL OUTPATIENT SURGERY
Discharge: HOME | End: 2021-04-26
Attending: INTERNAL MEDICINE | Admitting: INTERNAL MEDICINE

## 2021-04-26 ENCOUNTER — ANESTHESIA EVENT (OUTPATIENT)
Dept: GASTROENTEROLOGY | Facility: HOSPITAL | Age: 76
End: 2021-04-26

## 2021-04-26 VITALS
RESPIRATION RATE: 16 BRPM | DIASTOLIC BLOOD PRESSURE: 76 MMHG | HEART RATE: 62 BPM | HEIGHT: 70 IN | SYSTOLIC BLOOD PRESSURE: 133 MMHG | TEMPERATURE: 97.8 F | OXYGEN SATURATION: 98 % | WEIGHT: 171.4 LBS | BODY MASS INDEX: 24.54 KG/M2

## 2021-04-26 DIAGNOSIS — Z86.010 PERSONAL HISTORY OF COLONIC POLYPS: ICD-10-CM

## 2021-04-26 DIAGNOSIS — Z12.11 SCREENING FOR COLON CANCER: ICD-10-CM

## 2021-04-26 PROCEDURE — 45380 COLONOSCOPY AND BIOPSY: CPT | Performed by: INTERNAL MEDICINE

## 2021-04-26 PROCEDURE — 88305 TISSUE EXAM BY PATHOLOGIST: CPT | Performed by: INTERNAL MEDICINE

## 2021-04-26 PROCEDURE — 25010000002 PROPOFOL 10 MG/ML EMULSION: Performed by: NURSE ANESTHETIST, CERTIFIED REGISTERED

## 2021-04-26 PROCEDURE — 45385 COLONOSCOPY W/LESION REMOVAL: CPT | Performed by: INTERNAL MEDICINE

## 2021-04-26 RX ORDER — PROPOFOL 10 MG/ML
VIAL (ML) INTRAVENOUS CONTINUOUS PRN
Status: DISCONTINUED | OUTPATIENT
Start: 2021-04-26 | End: 2021-04-26 | Stop reason: SURG

## 2021-04-26 RX ORDER — SODIUM CHLORIDE, SODIUM LACTATE, POTASSIUM CHLORIDE, CALCIUM CHLORIDE 600; 310; 30; 20 MG/100ML; MG/100ML; MG/100ML; MG/100ML
30 INJECTION, SOLUTION INTRAVENOUS CONTINUOUS PRN
Status: DISCONTINUED | OUTPATIENT
Start: 2021-04-26 | End: 2021-04-26 | Stop reason: HOSPADM

## 2021-04-26 RX ORDER — LIDOCAINE HYDROCHLORIDE 20 MG/ML
INJECTION, SOLUTION INFILTRATION; PERINEURAL AS NEEDED
Status: DISCONTINUED | OUTPATIENT
Start: 2021-04-26 | End: 2021-04-26 | Stop reason: SURG

## 2021-04-26 RX ORDER — PROPOFOL 10 MG/ML
VIAL (ML) INTRAVENOUS AS NEEDED
Status: DISCONTINUED | OUTPATIENT
Start: 2021-04-26 | End: 2021-04-26 | Stop reason: SURG

## 2021-04-26 RX ORDER — SODIUM CHLORIDE 0.9 % (FLUSH) 0.9 %
3 SYRINGE (ML) INJECTION EVERY 12 HOURS SCHEDULED
Status: DISCONTINUED | OUTPATIENT
Start: 2021-04-26 | End: 2021-04-26 | Stop reason: HOSPADM

## 2021-04-26 RX ORDER — SODIUM CHLORIDE 0.9 % (FLUSH) 0.9 %
10 SYRINGE (ML) INJECTION AS NEEDED
Status: DISCONTINUED | OUTPATIENT
Start: 2021-04-26 | End: 2021-04-26 | Stop reason: HOSPADM

## 2021-04-26 RX ADMIN — SODIUM CHLORIDE, POTASSIUM CHLORIDE, SODIUM LACTATE AND CALCIUM CHLORIDE 30 ML/HR: 600; 310; 30; 20 INJECTION, SOLUTION INTRAVENOUS at 10:30

## 2021-04-26 RX ADMIN — LIDOCAINE HYDROCHLORIDE 60 MG: 20 INJECTION, SOLUTION INFILTRATION; PERINEURAL at 10:39

## 2021-04-26 RX ADMIN — PROPOFOL 100 MG: 10 INJECTION, EMULSION INTRAVENOUS at 10:39

## 2021-04-26 RX ADMIN — PROPOFOL 140 MCG/KG/MIN: 10 INJECTION, EMULSION INTRAVENOUS at 10:39

## 2021-04-26 NOTE — ADDENDUM NOTE
Addendum  created 04/26/21 1114 by Vincent Zapien MD    Attestation recorded in Intraprocedure, Intraprocedure Attestations filed

## 2021-04-26 NOTE — ANESTHESIA POSTPROCEDURE EVALUATION
"Patient: Td Melendez Jr.    Procedure Summary     Date: 04/26/21 Room / Location: [facility] ENDOSCOPY 10 / [facility] ENDOSCOPY    Anesthesia Start: 1035 Anesthesia Stop: 1104    Procedure: COLONOSCOPY TO CECUM WITH COLD SNARE POLYPECTOMIES AND BIOPSIES  (N/A ) Diagnosis:       Personal history of colonic polyps      Screening for colon cancer      (Personal history of colonic polyps [Z86.010])      (Screening for colon cancer [Z12.11])    Surgeons: Catarino Luna MD Provider: Vincent Zapien MD    Anesthesia Type: MAC ASA Status: 3          Anesthesia Type: MAC    Vitals  Vitals Value Taken Time   /62 04/26/21 1103   Temp 36.6 °C (97.8 °F) 04/26/21 1103   Pulse 61 04/26/21 1103   Resp 16 04/26/21 1103   SpO2 99 % 04/26/21 1103           Post Anesthesia Care and Evaluation    Patient location during evaluation: bedside  Patient participation: complete - patient participated  Level of consciousness: awake  Pain score: 2  Pain management: adequate  Airway patency: patent  Anesthetic complications: No anesthetic complications  PONV Status: none  Cardiovascular status: acceptable  Respiratory status: acceptable  Hydration status: acceptable    Comments: /62   Pulse 61   Temp 36.6 °C (97.8 °F)   Resp 16   Ht 177.8 cm (70\")   Wt 77.7 kg (171 lb 6.4 oz)   SpO2 99%   BMI 24.59 kg/m²         "

## 2021-04-26 NOTE — ANESTHESIA PREPROCEDURE EVALUATION
Anesthesia Evaluation     Patient summary reviewed and Nursing notes reviewed   NPO Solid Status: > 8 hours  NPO Liquid Status: > 2 hours           Airway   Mallampati: II  TM distance: >3 FB  Neck ROM: full  Dental - normal exam     Pulmonary - normal exam   (+) a smoker Former,   Cardiovascular - normal exam    (+) CAD, hyperlipidemia,       Neuro/Psych  (+) psychiatric history Anxiety,     GI/Hepatic/Renal/Endo    (+)  GERD,      Musculoskeletal (-) negative ROS    Abdominal    Substance History - negative use     OB/GYN negative ob/gyn ROS         Other                        Anesthesia Plan    ASA 3     MAC       Anesthetic plan, all risks, benefits, and alternatives have been provided, discussed and informed consent has been obtained with: patient.

## 2021-04-27 LAB
LAB AP CASE REPORT: NORMAL
LAB AP DIAGNOSIS COMMENT: NORMAL
PATH REPORT.FINAL DX SPEC: NORMAL
PATH REPORT.GROSS SPEC: NORMAL

## 2021-05-03 ENCOUNTER — TELEPHONE (OUTPATIENT)
Dept: GASTROENTEROLOGY | Facility: CLINIC | Age: 76
End: 2021-05-03

## 2021-05-03 NOTE — TELEPHONE ENCOUNTER
----- Message from Wendy Cannon sent at 5/3/2021  4:07 PM EDT -----  Regarding: Results  Contact: [phone]  Pt is calling for results

## 2021-05-05 ENCOUNTER — TELEPHONE (OUTPATIENT)
Dept: GASTROENTEROLOGY | Facility: CLINIC | Age: 76
End: 2021-05-05

## 2021-05-05 NOTE — TELEPHONE ENCOUNTER
----- Message from Catarino Luna MD sent at 5/5/2021 11:21 AM EDT -----  Colonoscopy Pathology Results:    Tubular adenomas: 0  Villous adenomas: 3  Sessile serrated polyps: 0  Traditional serrated adenomas: 0  Hyperplastic polyps: 0    Biopsies from areas of inflammation were non-specific.  Please have patient follow up in office with me to discuss possible additional workup.    Recommended surveillance interval: 3 years

## 2021-05-05 NOTE — TELEPHONE ENCOUNTER
Patient called. Advised as per Dr. Luna's note. He verb understanding.   F/u office visit with Dr. Luna is scheduled for 7/6@1500.   Repeat c/s in 3 yrs. 4/26/24 added to recall and HM list.

## 2021-05-05 NOTE — PROGRESS NOTES
Colonoscopy Pathology Results:    Tubular adenomas: 0  Villous adenomas: 3  Sessile serrated polyps: 0  Traditional serrated adenomas: 0  Hyperplastic polyps: 0    Biopsies from areas of inflammation were non-specific.  Please have patient follow up in office with me to discuss possible additional workup.    Recommended surveillance interval: 3 years

## 2021-05-28 ENCOUNTER — TELEPHONE (OUTPATIENT)
Dept: CARDIOLOGY | Facility: CLINIC | Age: 76
End: 2021-05-28

## 2021-05-28 RX ORDER — CLOPIDOGREL BISULFATE 75 MG/1
75 TABLET ORAL DAILY
Qty: 90 TABLET | Refills: 3 | Status: SHIPPED | OUTPATIENT
Start: 2021-05-28 | End: 2022-08-03 | Stop reason: SDUPTHER

## 2021-05-28 NOTE — TELEPHONE ENCOUNTER
----- Message from Lakshmi Padgett MA sent at 5/28/2021 11:25 AM EDT -----  Regarding: FW: Prescription Question  Contact: [phone]    ----- Message -----  From: Td Melendez Jr.  Sent: 5/28/2021  10:42 AM EDT  To: [facility]  Subject: RE: Prescription Question                        Yes! let's try it for a while and see how it works. [facility]    Thank you  Td

## 2021-06-01 ENCOUNTER — TELEPHONE (OUTPATIENT)
Dept: GASTROENTEROLOGY | Facility: CLINIC | Age: 76
End: 2021-06-01

## 2021-06-01 DIAGNOSIS — D12.4 BENIGN NEOPLASM OF DESCENDING COLON: ICD-10-CM

## 2021-06-01 DIAGNOSIS — K21.9 GASTROESOPHAGEAL REFLUX DISEASE WITHOUT ESOPHAGITIS: Primary | ICD-10-CM

## 2021-06-01 DIAGNOSIS — R63.0 POOR APPETITE: ICD-10-CM

## 2021-06-01 DIAGNOSIS — R10.13 EPIGASTRIC PAIN: ICD-10-CM

## 2021-06-01 NOTE — TELEPHONE ENCOUNTER
----- Message from Catarino Luna MD sent at 5/11/2021  7:18 AM EDT -----  Colonoscopy Pathology Results:    Tubular adenomas: 3  Villous adenomas: 0  Sessile serrated polyps: 0  Traditional serrated adenomas: 0  Hyperplastic polyps: 0    The biopsies from area of inflammation in the L colon were non-specific.  [facility].    Recommended surveillance interval: 3 years    Office f/u: 4-6 weeks

## 2021-06-01 NOTE — TELEPHONE ENCOUNTER
Called pt, he will go to [facility] lab to have blood work drawn.     [facility] order placed.

## 2021-06-11 ENCOUNTER — LAB (OUTPATIENT)
Dept: LAB | Facility: HOSPITAL | Age: 76
End: 2021-06-11

## 2021-06-11 PROCEDURE — 36415 COLL VENOUS BLD VENIPUNCTURE: CPT

## 2021-06-20 LAB — REF LAB TEST METHOD: NORMAL

## 2021-07-01 ENCOUNTER — OFFICE VISIT (OUTPATIENT)
Dept: FAMILY MEDICINE CLINIC | Facility: CLINIC | Age: 76
End: 2021-07-01

## 2021-07-01 VITALS
TEMPERATURE: 97 F | DIASTOLIC BLOOD PRESSURE: 66 MMHG | HEIGHT: 70 IN | RESPIRATION RATE: 14 BRPM | HEART RATE: 66 BPM | WEIGHT: 173 LBS | SYSTOLIC BLOOD PRESSURE: 137 MMHG | BODY MASS INDEX: 24.77 KG/M2

## 2021-07-01 DIAGNOSIS — E78.2 MIXED HYPERLIPIDEMIA: Chronic | ICD-10-CM

## 2021-07-01 DIAGNOSIS — R79.89 ABNORMAL CBC: ICD-10-CM

## 2021-07-01 DIAGNOSIS — Z00.00 MEDICARE ANNUAL WELLNESS VISIT, SUBSEQUENT: Primary | ICD-10-CM

## 2021-07-01 DIAGNOSIS — I25.10 CORONARY ARTERY DISEASE INVOLVING NATIVE CORONARY ARTERY OF NATIVE HEART WITHOUT ANGINA PECTORIS: Chronic | ICD-10-CM

## 2021-07-01 DIAGNOSIS — D75.1 ERYTHROCYTOSIS: ICD-10-CM

## 2021-07-01 DIAGNOSIS — F41.9 ANXIETY: Chronic | ICD-10-CM

## 2021-07-01 PROCEDURE — 1160F RVW MEDS BY RX/DR IN RCRD: CPT | Performed by: FAMILY MEDICINE

## 2021-07-01 PROCEDURE — 1126F AMNT PAIN NOTED NONE PRSNT: CPT | Performed by: FAMILY MEDICINE

## 2021-07-01 PROCEDURE — 1170F FXNL STATUS ASSESSED: CPT | Performed by: FAMILY MEDICINE

## 2021-07-01 PROCEDURE — 99214 OFFICE O/P EST MOD 30 MIN: CPT | Performed by: FAMILY MEDICINE

## 2021-07-01 PROCEDURE — G0439 PPPS, SUBSEQ VISIT: HCPCS | Performed by: FAMILY MEDICINE

## 2021-07-01 RX ORDER — EZETIMIBE 10 MG/1
10 TABLET ORAL DAILY
Qty: 90 TABLET | Refills: 1 | Status: SHIPPED | OUTPATIENT
Start: 2021-07-01 | End: 2021-11-15 | Stop reason: SDUPTHER

## 2021-07-01 RX ORDER — ALPRAZOLAM 0.25 MG/1
0.25 TABLET ORAL DAILY PRN
Qty: 30 TABLET | Refills: 2 | Status: SHIPPED | OUTPATIENT
Start: 2021-07-01 | End: 2021-12-21 | Stop reason: SDUPTHER

## 2021-07-01 NOTE — PATIENT INSTRUCTIONS
Medicare Wellness  Personal Prevention Plan of Service     Date of Office Visit:  2021  Encounter Provider:  Beau Lee MD  Place of Service:  [facility] PR[facility]ARE  Patient Name: Td Melendez Jr.  :  1945    As part of the Medicare Wellness portion of your visit today, we are providing you with this personalized preventive plan of services (PPPS). This plan is based upon recommendations of the [place] Preventive Services Task Force (USPSTF) and the Advisory Committee on Immunization Practices (ACIP).    This lists the preventive care services that should be considered, and provides dates of when you are due. Items listed as completed are up-to-date and do not require any further intervention.    Health Maintenance   Topic Date Due   • TDAP/TD VACCINES (2 - Td or Tdap) 2018   • ZOSTER VACCINE (2 of 2) 2022 (Originally 2012)   • INFLUENZA VACCINE  2021   • LIPID PANEL  2022   • ANNUAL WELLNESS VISIT  2022   • COLORECTAL CANCER SCREENING  2024   • COVID-19 Vaccine  Completed   • Pneumococcal Vaccine 65+  Completed   • HEPATITIS C SCREENING  Discontinued   • AAA SCREEN (ONE-TIME)  Discontinued       Orders Placed This Encounter   Procedures   • Ferritin   • Iron   • CBC & Differential     Order Specific Question:   Manual Differential     Answer:   No       Return in about 6 months (around 2022) for Recheck.

## 2021-07-02 LAB
BASOPHILS # BLD AUTO: 0.04 10*3/MM3 (ref 0–0.2)
BASOPHILS NFR BLD AUTO: 0.6 % (ref 0–1.5)
EOSINOPHIL # BLD AUTO: 0.07 10*3/MM3 (ref 0–0.4)
EOSINOPHIL NFR BLD AUTO: 1 % (ref 0.3–6.2)
ERYTHROCYTE [DISTWIDTH] IN BLOOD BY AUTOMATED COUNT: 12.2 % (ref 12.3–15.4)
FERRITIN SERPL-MCNC: 72.8 NG/ML (ref 30–400)
HCT VFR BLD AUTO: 48.3 % (ref 37.5–51)
HGB BLD-MCNC: 16.8 G/DL (ref 13–17.7)
IMM GRANULOCYTES # BLD AUTO: 0.02 10*3/MM3 (ref 0–0.05)
IMM GRANULOCYTES NFR BLD AUTO: 0.3 % (ref 0–0.5)
IRON SERPL-MCNC: 83 MCG/DL (ref 59–158)
LYMPHOCYTES # BLD AUTO: 1.48 10*3/MM3 (ref 0.7–3.1)
LYMPHOCYTES NFR BLD AUTO: 21.5 % (ref 19.6–45.3)
MCH RBC QN AUTO: 31.9 PG (ref 26.6–33)
MCHC RBC AUTO-ENTMCNC: 34.8 G/DL (ref 31.5–35.7)
MCV RBC AUTO: 91.8 FL (ref 79–97)
MONOCYTES # BLD AUTO: 0.48 10*3/MM3 (ref 0.1–0.9)
MONOCYTES NFR BLD AUTO: 7 % (ref 5–12)
NEUTROPHILS # BLD AUTO: 4.78 10*3/MM3 (ref 1.7–7)
NEUTROPHILS NFR BLD AUTO: 69.6 % (ref 42.7–76)
NRBC BLD AUTO-RTO: 0 /100 WBC (ref 0–0.2)
PLATELET # BLD AUTO: 187 10*3/MM3 (ref 140–450)
RBC # BLD AUTO: 5.26 10*6/MM3 (ref 4.14–5.8)
WBC # BLD AUTO: 6.87 10*3/MM3 (ref 3.4–10.8)

## 2021-07-06 ENCOUNTER — OFFICE VISIT (OUTPATIENT)
Dept: GASTROENTEROLOGY | Facility: CLINIC | Age: 76
End: 2021-07-06

## 2021-07-06 VITALS — BODY MASS INDEX: 24.5 KG/M2 | HEIGHT: 70 IN | WEIGHT: 171.17 LBS

## 2021-07-06 DIAGNOSIS — K21.9 GASTROESOPHAGEAL REFLUX DISEASE WITHOUT ESOPHAGITIS: Primary | ICD-10-CM

## 2021-07-06 DIAGNOSIS — R10.13 DYSPEPSIA: ICD-10-CM

## 2021-07-06 DIAGNOSIS — Z86.010 PERSONAL HISTORY OF COLONIC POLYPS: ICD-10-CM

## 2021-07-06 DIAGNOSIS — K52.9 COLITIS: ICD-10-CM

## 2021-07-06 PROCEDURE — 99214 OFFICE O/P EST MOD 30 MIN: CPT | Performed by: INTERNAL MEDICINE

## 2021-07-06 RX ORDER — DEXLANSOPRAZOLE 60 MG/1
60 CAPSULE, DELAYED RELEASE ORAL DAILY
Qty: 30 CAPSULE | Refills: 1 | Status: SHIPPED | OUTPATIENT
Start: 2021-07-06 | End: 2021-08-27 | Stop reason: ALTCHOICE

## 2021-07-06 RX ORDER — CLINDAMYCIN HYDROCHLORIDE 150 MG/1
150 CAPSULE ORAL 3 TIMES DAILY
COMMUNITY
Start: 2021-07-01 | End: 2021-08-27

## 2021-07-06 NOTE — PROGRESS NOTES
Chief Complaint   Patient presents with   • Follow-up     from scope    • Heartburn     Subjective     HPI  Td Melendez Jr. is a 75 y.o. male who presents today for office follow up.     S/P recent colonoscopy for surveillance due to a personal history of colon polyps.  He had a few subcentimeter tubular adenomas resected.  There was some mild inflammation noted in the area of the splenic flexure and descending colon with biopsies returning showing nonspecific colitis.  He was referred for [facility] IBD panel which returned not consistent with inflammatory bowel disease.  He has no lower GI symptoms.    Does continue to have some intermittent issues with GERD and dyspepsia.  His current regimen is Protonix 40 mg twice a day as well as as needed FD guard.  Was recently switched from aspirin to Plavix by his cardiologist and feels like this is helped with the symptoms to some degree.  He has no unintentional weight loss.      Objective   There were no vitals filed for this visit.    Physical Exam  Vitals reviewed.   Constitutional:       Appearance: He is well-developed.   HENT:      Head: Normocephalic and atraumatic.   Abdominal:      General: Bowel sounds are normal. There is no distension.      Palpations: Abdomen is soft. There is no mass.      Tenderness: There is no abdominal tenderness.      Hernia: No hernia is present.   Skin:     General: Skin is warm and dry.   Neurological:      Mental Status: He is alert and oriented to person, place, and time.   Psychiatric:         Behavior: Behavior normal.         Thought Content: Thought content normal.         Judgment: Judgment normal.       The following data was reviewed by: Catarino Luna MD on 07/06/2021:    Data reviewed: GI studies EGD 1/2021 and colonoscopy 4/2021 as well as path reports    Assessment/Plan   Assessment:     1. Gastroesophageal reflux disease without esophagitis    2. Dyspepsia    3. Personal history of colonic polyps    4.  Colitis      Plan:   Non specific colitis, asymptomatic.  ? Transient prep induced ischemia.  Not c/w IBD by [facility] IBD panel.  Recommend expectant management at this time.  Will change PPI to Dexilant 60mg/day.  Can supplement with evening dose Pepcid if necessary    Office f/u 6 mos          Catarino Luna M.D.  [facility]  [address] [place] [address]  Office: [phone]

## 2021-08-27 ENCOUNTER — OFFICE VISIT (OUTPATIENT)
Dept: CARDIOLOGY | Facility: CLINIC | Age: 76
End: 2021-08-27

## 2021-08-27 VITALS
DIASTOLIC BLOOD PRESSURE: 66 MMHG | WEIGHT: 174 LBS | BODY MASS INDEX: 24.91 KG/M2 | HEART RATE: 68 BPM | HEIGHT: 70 IN | SYSTOLIC BLOOD PRESSURE: 128 MMHG

## 2021-08-27 DIAGNOSIS — I25.10 CORONARY ARTERY DISEASE INVOLVING NATIVE CORONARY ARTERY OF NATIVE HEART WITHOUT ANGINA PECTORIS: Primary | ICD-10-CM

## 2021-08-27 DIAGNOSIS — E78.2 MIXED HYPERLIPIDEMIA: ICD-10-CM

## 2021-08-27 DIAGNOSIS — E78.1 HYPERTRIGLYCERIDEMIA: ICD-10-CM

## 2021-08-27 DIAGNOSIS — R94.31 ABNORMAL EKG: ICD-10-CM

## 2021-08-27 PROCEDURE — 93000 ELECTROCARDIOGRAM COMPLETE: CPT | Performed by: NURSE PRACTITIONER

## 2021-08-27 PROCEDURE — 99213 OFFICE O/P EST LOW 20 MIN: CPT | Performed by: NURSE PRACTITIONER

## 2021-08-27 RX ORDER — PANTOPRAZOLE SODIUM 40 MG/1
40 TABLET, DELAYED RELEASE ORAL DAILY
COMMUNITY
Start: 2021-08-09 | End: 2021-11-16 | Stop reason: SDUPTHER

## 2021-08-27 NOTE — PROGRESS NOTES
Date of Office Visit: 2019  Encounter Provider: RUSS Crook  Place of Service: [facility]  Patient Name: Td Melendez Jr.  :1945  Primary Cardiologist: Dr. Obi Boucher     Chief Complaint   Patient presents with   • Coronary Artery Disease   • Annual Exam       HPI: Td Melendez Jr. is a pleasant 75 y.o. male who presents today for annual cardiac follow-up on coronary artery disease.  I have reviewed his medical records.  He has been diagnosed with hyperlipidemia, GERD, fibromyalgia, and neck arthritis.  In 2019 he underwent right-sided neck radioablation and in 2019 he underwent the ablation on the left side.    In 2015, he presented with unstable angina and underwent cardiac catheterization which revealed coronary artery disease.  He had a drug-eluting stent placement to the LAD and the RCA.  In 2015, he was having chest pain and Myoview stress test was normal.  In 2018, he developed recurrent chest pain and stress test was normal.     In 2020, he presented to the [facility] ED with epigastric chest pain.  He explains that he underwent EGD and was told that he had gastritis and then had a colonoscopy earlier this year and was told that he had colon polyps and colitis.  He discussed his antiplatelet therapies with Dr. Boucher and said she stopped his aspirin and changed him to clopidogrel 75 mg daily.    He presents today for his annual cardiac follow-up visit.  He is no longer having heartburn or epigastric issues off the aspirin.  He was changed to clopidogrel, but on his own accord he decided just to cut the pill in half because of bruising.  He denies chest pain, shortness of air, palpitations, edema, dizziness, syncope, or bleeding.  His blood pressure and heart rate are both normal today.  He is in a normal sinus rhythm.  I reviewed his blood work from 2021: CMP normal except for glucose of 101  and CO2 31.8.  Total cholesterol 140, triglycerides 170 (high), HDL low 39, and LDL 72.  Hemoglobin 18.4 and hematocrit 53.1.  TSH normal.  In July 2020 repeat CBC showed normal hemoglobin and hematocrit.    Past Medical History:   Diagnosis Date   • Anxiety    • CAD (coronary artery disease)     unstable angina, 7/2015: 20% LM, long 70% LAD with abnormal FFR (s/p 2.86l53ke Xience), 50% OM1, 99% prox RCA (s/p 3.5x18m Xience).  Normal Cardiolite 11/2015 and 6/2018   • Fibromyalgia    • GERD (gastroesophageal reflux disease)    • H/O complete eye exam 04/2018   • Hyperlipidemia    • IBS (irritable bowel syndrome)    • Squamous cell skin cancer, face    • Urinary retention        Past Surgical History:   Procedure Laterality Date   • CARDIAC CATHETERIZATION      [facility] 07/2015: cath with 20% LM long 70% LAD (with positive FFR), 50% OM1, 99% prox RCA, s/p 2.75x33 Xience Alpine to LAD and 3.5x18mm Xience Alpine to RCA. LVEF 64% Normal Cardiolite 11/2015   • CARDIAC SURGERY      cardiac stents, 2   • COLONOSCOPY  approx 6/2011    normal per patient  [facility]   • COLONOSCOPY N/A 4/26/2021    Procedure: COLONOSCOPY TO CECUM WITH COLD SNARE POLYPECTOMIES AND BIOPSIES ;  Surgeon: Catarino Luna MD;  Location: [facility] ENDOSCOPY;  Service: Gastroenterology;  Laterality: N/A;  PRE- HISTORY COLON POLYPS  POST- POLYPS, COLITIS, HEMORRHOIDS   • COLONOSCOPY W/ POLYPECTOMY N/A 7/25/2018    Procedure: COLONOSCOPY TO CECUM WITH COLD SNAREPOLYPECTOMIES, HOT SNARE POLYPECTOMIES;  Surgeon: Catarino Luna MD;  Location: [facility] ENDOSCOPY;  Service: Gastroenterology   • CORONARY ANGIOPLASTY WITH STENT PLACEMENT     • CYSTOSCOPY TRANSURETHRAL RESECTION OF PROSTATE     • ENDOSCOPY N/A 7/25/2018    Procedure: ESOPHAGOGASTRODUODENOSCOPY WITH COLD BIOPSIES;  Surgeon: Catarino Luna MD;  Location: [facility] ENDOSCOPY;  Service: Gastroenterology   • ENDOSCOPY N/A 1/7/2021    Procedure: ESOPHAGOGASTRODUODENOSCOPY with cold  biopsies;  Surgeon: Catarino Luna MD;  Location: [facility] ENDOSCOPY;  Service: Gastroenterology;  Laterality: N/A;  PRE: EPIGASTRIC PAIN, DYSPEPSIA  POST: GASTRITIS   • RADIOFREQUENCY ABLATION      NECK   • SKIN CANCER EXCISION     • WISDOM TOOTH EXTRACTION         Social History     Socioeconomic History   • Marital status:      Spouse name: Not on file   • Number of children: Not on file   • Years of education: Not on file   • Highest education level: Not on file   Tobacco Use   • Smoking status: Former Smoker     Packs/day: 0.50     Types: Cigarettes     Quit date: 1970     Years since quittin.6   • Smokeless tobacco: Never Used   Vaping Use   • Vaping Use: Never used   Substance and Sexual Activity   • Alcohol use: No     Comment: caffeine use   • Drug use: No   • Sexual activity: Defer       Family History   Problem Relation Age of Onset   • Coronary artery disease Other    • Cervical cancer Mother    • Cancer Mother    • Depression Mother    • Heart disease Mother    • Liver disease Mother    • Alcohol abuse Mother    • Thyroid cancer Father    • Cancer Father    • Depression Father    • Diabetes Father    • Thyroid disease Father    • Malig Hyperthermia Neg Hx        The following portion of the patient's history were reviewed and updated as appropriate: past medical history, past surgical history, past social history, past family history, allergies, current medications, and problem list.    Review of Systems   Constitutional: Negative. Negative for chills, diaphoresis, fever, malaise/fatigue, night sweats, weight gain and weight loss.   HENT: Negative for hearing loss, nosebleeds, sore throat and tinnitus.    Eyes: Negative for blurred vision, double vision, pain and visual disturbance.   Cardiovascular: Negative.  Negative for chest pain, claudication, cyanosis, dyspnea on exertion, irregular heartbeat, leg swelling, near-syncope, orthopnea, palpitations, paroxysmal nocturnal dyspnea and  syncope.   Respiratory: Negative.  Negative for cough, hemoptysis, shortness of breath, snoring and wheezing.    Endocrine: Negative for cold intolerance, heat intolerance and polyuria.   Hematologic/Lymphatic: Negative.  Negative for bleeding problem. Does not bruise/bleed easily.   Skin: Negative for color change, dry skin, flushing and itching.   Musculoskeletal: Negative for falls, joint pain, joint swelling, muscle cramps, muscle weakness and myalgias.   Gastrointestinal: Negative for abdominal pain, constipation, heartburn, melena, nausea and vomiting.   Genitourinary: Negative for dysuria, frequency and hematuria.        Erectile dysfunction   Neurological: Negative for excessive daytime sleepiness, dizziness, light-headedness, loss of balance, numbness, paresthesias, seizures and vertigo.   Psychiatric/Behavioral: Negative for altered mental status, depression, memory loss and substance abuse. The patient does not have insomnia and is not nervous/anxious.    Allergic/Immunologic: Negative for environmental allergies.       Allergies   Allergen Reactions   • Augmentin [Amoxicillin-Pot Clavulanate] Hives and Itching         Current Outpatient Medications:   •  ALPRAZolam (XANAX) 0.25 MG tablet, Take 1 tablet by mouth Daily As Needed for Anxiety., Disp: 30 tablet, Rfl: 2  •  clopidogrel (PLAVIX) 75 MG tablet, Take 1 tablet by mouth Daily., Disp: 90 tablet, Rfl: 3  •  coenzyme Q10 100 MG capsule, Take 200 mg by mouth Daily., Disp: , Rfl:   •  diphenhydrAMINE-acetaminophen (TYLENOL PM)  MG tablet per tablet, Take 1 tablet by mouth At Night As Needed for Sleep., Disp: , Rfl:   •  Emollient (COLLAGEN EX), Take 1 tablet by mouth Daily., Disp: , Rfl:   •  ezetimibe (ZETIA) 10 MG tablet, Take 1 tablet by mouth Daily., Disp: 90 tablet, Rfl: 1  •  HYDROcodone-acetaminophen (NORCO) 5-325 MG per tablet, Take  by mouth See Admin Instructions. Take 1 tablet by mouth every 4-6 hours as needed for pain, Disp: , Rfl:   •  " LITHIUM PO, Take 5 mg by mouth 2 (two) times a day., Disp: , Rfl:   •  pitavastatin calcium (LIVALO) 2 MG tablet tablet, Take 1 tablet by mouth Every Night., Disp: 90 tablet, Rfl: 1  •  sildenafil (REVATIO) 20 MG tablet, Take 2-3 tabs QD prn, Disp: 30 tablet, Rfl: 11  •  Unable to find, 1 (One) Time. Med Name: LOTRENEX, Disp: , Rfl:   •  Vitamin D-Vitamin K (VITAMIN K2-VITAMIN D3 PO), Take  by mouth Daily., Disp: , Rfl:   •  vitamin E 100 UNIT capsule, Take 100 Units by mouth Daily., Disp: , Rfl:   •  pantoprazole (PROTONIX) 40 MG EC tablet, Take 40 mg by mouth Daily., Disp: , Rfl:         Objective:     Vitals:    08/27/21 0845 08/27/21 0847   BP: 124/70 128/66   BP Location: Left arm Right arm   Pulse:  68   Weight: 78.9 kg (174 lb)    Height: 177.8 cm (70\")      Body mass index is 24.97 kg/m².    PHYSICAL EXAM:    Vitals Reviewed.   General Appearance: No acute distress, well developed and well nourished.   Eyes: Conjunctiva and lids: No erythema, swelling, or discharge. Sclera non-icteric.  Wearing glasses.  HENT: Atraumatic, normocephalic. External eyes, ears, and nose normal. No hearing loss noted. Mucous membranes normal. Lips not cyanotic. Neck supple with no tenderness.  Wearing mask.  Respiratory: No signs of respiratory distress. Respiration rhythm and depth normal.   Clear to auscultation. No rales, crackles, rhonchi, or wheezing auscultated.   Cardiovascular:  Jugular Venous Pressure: Normal  Heart Rate and Rhythm: Normal, Heart Sounds: Normal S1 and S2. No S3 or S4 noted.  Murmurs: No murmurs noted. No rubs, thrills, or gallops.   Lower Extremities: No edema noted.  Gastrointestinal:  Abdomen soft, non-distended, non-tender. Normal bowel sounds. No hepatomegaly.   Musculoskeletal: Normal movement of extremities  Skin and Nails: General appearance normal. No pallor, cyanosis, diaphoresis. Skin temperature normal. No clubbing of fingernails.   Psychiatric: Patient alert and oriented to person, place, " and time. Speech and behavior appropriate. Normal mood and affect.       ECG 12 Lead    Date/Time: 8/27/2021 8:47 AM  Performed by: Padmini Ballard APRN  Authorized by: Padmini Ballard APRN   Comparison: compared with previous ECG from 11/13/2020  Similar to previous ECG  Rhythm: sinus rhythm  Rate: normal  BPM: 68  Conduction: conduction normal  QRS axis: normal  Other findings: non-specific ST-T wave changes    Clinical impression: non-specific ECG              Assessment:      No diagnosis found.       Plan:       1.  Coronary Artery Disease: Status post drug-eluting stent placement to the LAD and RCA in July 2015.  He has had 2 normal Myoview stress test since then.  He denies anginal symptoms.  Due to epigastric pain, colitis, gastritis he was changed from aspirin to clopidogrel.  He on his own accord, he decided to cut the clopidogrel pill in half.  I recommended that he restart the full tablet of clopidogrel 75 mg daily.  I recommended that he decrease his vitamin E which may help to decrease bruising.  He remains on pitavastatin.    2.  Abnormal EKG: He has some nonspecific ST-T abnormalities/elevation on EKG.  This is stable and he is symptom-free.    3/4.  Hyperlipidemia and hypertriglyceridemia: Remains on pitavastatin and goal LDL is less than 70.  His triglycerides were elevated at 170 and I recommended decreasing sugars and carbohydrates in his diet.    5.  I recommended follow-up with Obi Boucher in 1 year.  He will call with any cardiac concerns.  He did not need a refill on his clopidogrel today.      As always, it has been a pleasure to participate in your patient's care. Thank you.       Sincerely,         RUSS Greco        · Dictated using Dragon  I spent 25 minutes reviewing her medical records/testing/previous office notes/labs, face-to-face interaction with patient, physical examination, formulating the plan of care, and discussion of plan of care with patient.  · COVID-19  Precautions - Patient was compliant in wearing a mask. When I saw the patient, I used appropriate personal protective equipment (PPE) including mask (standard procedure).  Additionally, I used gown, gloves, and eye shield if indicated.  Hand hygiene was completed before and after seeing the patient.

## 2021-11-14 ENCOUNTER — PATIENT MESSAGE (OUTPATIENT)
Dept: FAMILY MEDICINE CLINIC | Facility: CLINIC | Age: 76
End: 2021-11-14

## 2021-11-14 DIAGNOSIS — I25.10 CORONARY ARTERY DISEASE INVOLVING NATIVE CORONARY ARTERY OF NATIVE HEART WITHOUT ANGINA PECTORIS: Chronic | ICD-10-CM

## 2021-11-14 DIAGNOSIS — E78.2 MIXED HYPERLIPIDEMIA: Chronic | ICD-10-CM

## 2021-11-15 ENCOUNTER — OFFICE VISIT (OUTPATIENT)
Dept: GASTROENTEROLOGY | Facility: CLINIC | Age: 76
End: 2021-11-15

## 2021-11-15 VITALS — HEIGHT: 70 IN | WEIGHT: 175.8 LBS | BODY MASS INDEX: 25.17 KG/M2 | TEMPERATURE: 97.3 F

## 2021-11-15 DIAGNOSIS — K21.9 GASTROESOPHAGEAL REFLUX DISEASE WITHOUT ESOPHAGITIS: Primary | ICD-10-CM

## 2021-11-15 DIAGNOSIS — I25.10 CORONARY ARTERY DISEASE INVOLVING NATIVE CORONARY ARTERY OF NATIVE HEART WITHOUT ANGINA PECTORIS: Chronic | ICD-10-CM

## 2021-11-15 DIAGNOSIS — E78.2 MIXED HYPERLIPIDEMIA: Chronic | ICD-10-CM

## 2021-11-15 PROCEDURE — 99214 OFFICE O/P EST MOD 30 MIN: CPT | Performed by: NURSE PRACTITIONER

## 2021-11-15 RX ORDER — EZETIMIBE 10 MG/1
10 TABLET ORAL DAILY
Qty: 30 TABLET | Refills: 2 | Status: SHIPPED | OUTPATIENT
Start: 2021-11-15 | End: 2021-11-15 | Stop reason: SDUPTHER

## 2021-11-15 RX ORDER — EZETIMIBE 10 MG/1
10 TABLET ORAL DAILY
Qty: 90 TABLET | Refills: 0 | Status: SHIPPED | OUTPATIENT
Start: 2021-11-15 | End: 2021-12-21 | Stop reason: SDUPTHER

## 2021-11-15 RX ORDER — CHOLECALCIFEROL (VITAMIN D3) 125 MCG
5 CAPSULE ORAL NIGHTLY PRN
COMMUNITY

## 2021-11-15 NOTE — TELEPHONE ENCOUNTER
From: Td Melendez Jr.  To: Beau Lee MD  Sent: 11/14/2021 9:58 AM EST  Subject: Script refill    Please refill:  2mg Livalo & 10mg Zetia with [facility] for Td Melendez  Thank you

## 2021-11-15 NOTE — PROGRESS NOTES
Chief Complaint   Patient presents with   • Follow-up   • Heartburn       HPI    Td Melendez Jr. is a  76 y.o. male here for a follow up visit for heartburn.    This patient follows with Dr. Luna, new to me.    He has a personal history of colon polyps and GERD.    Colonoscopy in April--He had a few subcentimeter tubular adenomas resected.  There was some mild inflammation noted in the area of the splenic flexure and descending colon with biopsies returning showing nonspecific colitis.  He was referred for [facility] IBD panel which returned not consistent with inflammatory bowel disease.    He has a history of GERD switch from pantoprazole to Dexilant on last office visit.    Today patient reports no change in symptoms with Dexilant as a result he went back to pantoprazole.  Dexilant was also expensive.  He is currently taking pantoprazole 40 mg once in the morning digestive enzymes with meals and 2 Tums at bedtime.  He sleeps on it incline.  Seems to follow an antireflux diet.  Denies nausea, vomiting, dysphagia, odynophagia.  His appetite is good.  His weight is stable.    Denies diarrhea, constipation, rectal bleeding.    Past Medical History:   Diagnosis Date   • Anxiety    • CAD (coronary artery disease)     unstable angina, 7/2015: 20% LM, long 70% LAD with abnormal FFR (s/p 2.82q50lv Xience), 50% OM1, 99% prox RCA (s/p 3.5x18m Xience).  Normal Cardiolite 11/2015 and 6/2018   • Fibromyalgia    • GERD (gastroesophageal reflux disease)    • H/O complete eye exam 04/2018   • Hyperlipidemia    • IBS (irritable bowel syndrome)    • Squamous cell skin cancer, face    • Urinary retention        Past Surgical History:   Procedure Laterality Date   • CARDIAC CATHETERIZATION      [facility] 07/2015: cath with 20% LM long 70% LAD (with positive FFR), 50% OM1, 99% prox RCA, s/p 2.75x33 Xience Alpine to LAD and 3.5x18mm Xience Alpine to RCA. LVEF 64% Normal Cardiolite 11/2015   • CARDIAC SURGERY      cardiac stents, 2    • COLONOSCOPY  approx 2011    normal per patient  [facility]   • COLONOSCOPY N/A 2021    Procedure: COLONOSCOPY TO CECUM WITH COLD SNARE POLYPECTOMIES AND BIOPSIES ;  Surgeon: Catarino Luna MD;  Location: [facility] ENDOSCOPY;  Service: Gastroenterology;  Laterality: N/A;  PRE- HISTORY COLON POLYPS  POST- POLYPS, COLITIS, HEMORRHOIDS   • COLONOSCOPY W/ POLYPECTOMY N/A 2018    Procedure: COLONOSCOPY TO CECUM WITH COLD SNAREPOLYPECTOMIES, HOT SNARE POLYPECTOMIES;  Surgeon: Catarino Luna MD;  Location: [facility] ENDOSCOPY;  Service: Gastroenterology   • CORONARY ANGIOPLASTY WITH STENT PLACEMENT     • CYSTOSCOPY TRANSURETHRAL RESECTION OF PROSTATE     • ENDOSCOPY N/A 2018    Procedure: ESOPHAGOGASTRODUODENOSCOPY WITH COLD BIOPSIES;  Surgeon: Catarino Luna MD;  Location: [facility] ENDOSCOPY;  Service: Gastroenterology   • ENDOSCOPY N/A 2021    Procedure: ESOPHAGOGASTRODUODENOSCOPY with cold biopsies;  Surgeon: Catarino Luna MD;  Location: [facility] ENDOSCOPY;  Service: Gastroenterology;  Laterality: N/A;  PRE: EPIGASTRIC PAIN, DYSPEPSIA  POST: GASTRITIS   • RADIOFREQUENCY ABLATION      NECK   • SKIN CANCER EXCISION     • WISDOM TOOTH EXTRACTION         Scheduled Meds:     Continuous Infusions: No current facility-administered medications for this visit.      PRN Meds:     Allergies   Allergen Reactions   • Augmentin [Amoxicillin-Pot Clavulanate] Hives and Itching       Social History     Socioeconomic History   • Marital status:    Tobacco Use   • Smoking status: Former Smoker     Packs/day: 0.50     Types: Cigarettes     Quit date: 1970     Years since quittin.9   • Smokeless tobacco: Never Used   Vaping Use   • Vaping Use: Never used   Substance and Sexual Activity   • Alcohol use: No     Comment: caffeine use   • Drug use: No   • Sexual activity: Defer       Family History   Problem Relation Age of Onset   • Coronary artery disease Other    • Cervical cancer  Mother    • Cancer Mother    • Depression Mother    • Heart disease Mother    • Liver disease Mother    • Alcohol abuse Mother    • Thyroid cancer Father    • Cancer Father    • Depression Father    • Diabetes Father    • Thyroid disease Father    • Malig Hyperthermia Neg Hx        Review of Systems   Constitutional: Negative for activity change, appetite change, fatigue, fever and unexpected weight change.   HENT: Negative for trouble swallowing.    Respiratory: Negative for apnea, cough, choking, chest tightness, shortness of breath and wheezing.    Cardiovascular: Negative for chest pain, palpitations and leg swelling.   Gastrointestinal: Negative for abdominal distention, abdominal pain, anal bleeding, blood in stool, constipation, diarrhea, nausea, rectal pain and vomiting.        + GERD       Vitals:    11/15/21 0911   Temp: 97.3 °F (36.3 °C)       Physical Exam  Constitutional:       Appearance: He is well-developed.   Abdominal:      General: Bowel sounds are normal. There is no distension.      Palpations: Abdomen is soft. There is no mass.      Tenderness: There is no abdominal tenderness. There is no guarding.      Hernia: No hernia is present.   Skin:     General: Skin is warm and dry.      Capillary Refill: Capillary refill takes less than 2 seconds.   Neurological:      Mental Status: He is alert and oriented to person, place, and time.   Psychiatric:         Behavior: Behavior normal.     Assessment    Diagnoses and all orders for this visit:    1. Gastroesophageal reflux disease without esophagitis (Primary)  -     FL Upper GI With Air Contrast; Future       Plan    Arrange a upper GI series to assess the extent of reflux.   Antireflux measures and dietary modifications reviewed. Low acid diet reviewed. Keep head of bed elevated. Stop eating/drinking at least 3 hours prior to bedtime. Eliminate caffeine and carbonated beverages.  Weight loss encouraged if BMI over 25.  Occasional handout provided to  the patient regarding GERD to buffer conversation.  Continue a.m. pantoprazole.  Further recommendations and follow-up pending aforementioned work-up.            RUSS Nicholas  [facility]  [address] [place] [address]  Office: [phone]

## 2021-11-16 ENCOUNTER — TELEPHONE (OUTPATIENT)
Dept: GASTROENTEROLOGY | Facility: CLINIC | Age: 76
End: 2021-11-16

## 2021-11-16 RX ORDER — PANTOPRAZOLE SODIUM 40 MG/1
40 TABLET, DELAYED RELEASE ORAL DAILY
Qty: 90 TABLET | Refills: 3 | Status: SHIPPED | OUTPATIENT
Start: 2021-11-16 | End: 2021-11-16

## 2021-11-16 RX ORDER — PANTOPRAZOLE SODIUM 40 MG/1
TABLET, DELAYED RELEASE ORAL
Qty: 90 TABLET | Refills: 3 | Status: SHIPPED | OUTPATIENT
Start: 2021-11-16 | End: 2022-03-18 | Stop reason: DRUGHIGH

## 2021-12-15 ENCOUNTER — HOSPITAL ENCOUNTER (OUTPATIENT)
Dept: GENERAL RADIOLOGY | Facility: HOSPITAL | Age: 76
Discharge: HOME | End: 2021-12-15
Admitting: NURSE PRACTITIONER

## 2021-12-15 DIAGNOSIS — K21.9 GASTROESOPHAGEAL REFLUX DISEASE WITHOUT ESOPHAGITIS: ICD-10-CM

## 2021-12-15 PROCEDURE — 74246 X-RAY XM UPR GI TRC 2CNTRST: CPT

## 2021-12-15 RX ADMIN — BARIUM SULFATE 135 ML: 980 POWDER, FOR SUSPENSION ORAL at 10:07

## 2021-12-15 RX ADMIN — BARIUM SULFATE 700 MG: 700 TABLET ORAL at 10:06

## 2021-12-15 RX ADMIN — BARIUM SULFATE 183 ML: 960 POWDER, FOR SUSPENSION ORAL at 10:07

## 2021-12-15 RX ADMIN — ANTACID/ANTIFLATULENT 1 PACKET: 380; 550; 10; 10 GRANULE, EFFERVESCENT ORAL at 10:07

## 2021-12-17 NOTE — PROGRESS NOTES
Please inform Td that his upper GI series shows a moderate amount of GERD.  How is he feeling?  Any change with recommendations on last office visit?

## 2021-12-20 ENCOUNTER — TELEPHONE (OUTPATIENT)
Dept: GASTROENTEROLOGY | Facility: CLINIC | Age: 76
End: 2021-12-20

## 2021-12-20 NOTE — TELEPHONE ENCOUNTER
----- Message from RUSS Nicholas sent at 12/17/2021  1:21 PM EST -----  Please inform Td that his upper GI series shows a moderate amount of GERD.  How is he feeling?  Any change with recommendations on last office visit?

## 2021-12-22 NOTE — TELEPHONE ENCOUNTER
Have him try pantoprazole 40 mg p.o. twice daily and arrange a follow-up with myself or Dr. Sullivan.

## 2021-12-23 NOTE — TELEPHONE ENCOUNTER
Patient called. Advised as per Cinthya's note. He verb understanding.   F/u with Dr. Sullivan: 01/20/22@1100.

## 2022-01-02 NOTE — PROGRESS NOTES
Subjective   Td Melendez Jr. is a 76 y.o. male.     History of Present Illness     Chief Complaint:   Chief Complaint   Patient presents with   • Hyperlipidemia     med refill /  no labs    • Anxiety   • Heartburn       Td Melendez Jr. 76 y.o. male who presents today for Medical Management of the below listed issues and medication refills. He  has a problem list of   Patient Active Problem List   Diagnosis   • CAD (coronary artery disease)   • Hyperlipidemia   • Anxiety   • Gastroesophageal reflux disease without esophagitis   • Polyp of colon   • Dyspepsia   • Epigastric pain   • Poor appetite   • Personal history of colonic polyps   • Screening for colon cancer   • Hypertriglyceridemia   • Abnormal EKG   .  Since the last visit, He has overall felt well.  he has been compliant with   Current Outpatient Medications:   •  ALPRAZolam (XANAX) 0.25 MG tablet, Take 1 tablet by mouth Daily As Needed for Anxiety., Disp: 30 tablet, Rfl: 2  •  ezetimibe (ZETIA) 10 MG tablet, Take 1 tablet by mouth Daily., Disp: 90 tablet, Rfl: 1  •  pitavastatin calcium (LIVALO) 2 MG tablet tablet, Take 1 tablet by mouth Every Night., Disp: 90 tablet, Rfl: 1  •  clopidogrel (PLAVIX) 75 MG tablet, Take 1 tablet by mouth Daily. (Patient taking differently: Take 75 mg by mouth Daily. Taking 75mg tablet), Disp: 90 tablet, Rfl: 3  •  coenzyme Q10 100 MG capsule, Take 200 mg by mouth Daily., Disp: , Rfl:   •  diphenhydrAMINE-acetaminophen (TYLENOL PM)  MG tablet per tablet, Take 1 tablet by mouth At Night As Needed for Sleep., Disp: , Rfl:   •  Emollient (COLLAGEN EX), Take 1 tablet by mouth Daily., Disp: , Rfl:   •  HYDROcodone-acetaminophen (NORCO) 5-325 MG per tablet, Take  by mouth See Admin Instructions. Take 1 tablet by mouth every 4-6 hours as needed for pain, Disp: , Rfl:   •  LITHIUM PO, Take 5 mg by mouth 2 (two) times a day., Disp: , Rfl:   •  melatonin 5 MG tablet tablet, Take 5 mg by mouth At Night As Needed., Disp: ,  "Rfl:   •  pantoprazole (PROTONIX) 40 MG EC tablet, Take 1 tablet by mouth twice daily, Disp: 90 tablet, Rfl: 3  •  sildenafil (REVATIO) 20 MG tablet, Take 2-3 tabs QD prn, Disp: 30 tablet, Rfl: 11  •  Unable to find, 1 (One) Time. Med Name: LOTRENEX, Disp: , Rfl:   •  Vitamin D-Vitamin K (VITAMIN K2-VITAMIN D3 PO), Take  by mouth Daily., Disp: , Rfl: .  He denies medication side effects.    Pt has taken and not tolerated Zocor and Crestor prior and does well with Livalo.   All of the other chronic condition(s) listed above are stable w/o issues.    /68   Pulse 66   Temp 97.4 °F (36.3 °C) (Oral)   Resp 14   Ht 177.8 cm (70\")   Wt 80.7 kg (178 lb)   BMI 25.54 kg/m²     Results for orders placed or performed in visit on 07/01/21   Ferritin    Specimen: Blood   Result Value Ref Range    Ferritin 72.80 30.00 - 400.00 ng/mL   Iron    Specimen: Blood   Result Value Ref Range    Iron 83 59 - 158 mcg/dL   CBC & Differential    Specimen: Blood   Result Value Ref Range    WBC 6.87 3.40 - 10.80 10*3/mm3    RBC 5.26 4.14 - 5.80 10*6/mm3    Hemoglobin 16.8 13.0 - 17.7 g/dL    Hematocrit 48.3 37.5 - 51.0 %    MCV 91.8 79.0 - 97.0 fL    MCH 31.9 26.6 - 33.0 pg    MCHC 34.8 31.5 - 35.7 g/dL    RDW 12.2 (L) 12.3 - 15.4 %    Platelets 187 140 - 450 10*3/mm3    Neutrophil Rel % 69.6 42.7 - 76.0 %    Lymphocyte Rel % 21.5 19.6 - 45.3 %    Monocyte Rel % 7.0 5.0 - 12.0 %    Eosinophil Rel % 1.0 0.3 - 6.2 %    Basophil Rel % 0.6 0.0 - 1.5 %    Neutrophils Absolute 4.78 1.70 - 7.00 10*3/mm3    Lymphocytes Absolute 1.48 0.70 - 3.10 10*3/mm3    Monocytes Absolute 0.48 0.10 - 0.90 10*3/mm3    Eosinophils Absolute 0.07 0.00 - 0.40 10*3/mm3    Basophils Absolute 0.04 0.00 - 0.20 10*3/mm3    Immature Granulocyte Rel % 0.3 0.0 - 0.5 %    Immature Grans Absolute 0.02 0.00 - 0.05 10*3/mm3    nRBC 0.0 0.0 - 0.2 /100 WBC             The following portions of the patient's history were reviewed and updated as appropriate: allergies, " current medications, past family history, past medical history, past social history, past surgical history, and problem list.    Review of Systems   Constitutional: Negative for activity change, chills and fever.   Respiratory: Negative for cough.    Cardiovascular: Negative for chest pain.   Psychiatric/Behavioral: Negative for dysphoric mood.       Objective   Physical Exam  Constitutional:       General: He is not in acute distress.     Appearance: He is well-developed.   Cardiovascular:      Rate and Rhythm: Normal rate and regular rhythm.   Pulmonary:      Effort: Pulmonary effort is normal.      Breath sounds: Normal breath sounds.   Neurological:      Mental Status: He is alert and oriented to person, place, and time.   Psychiatric:         Behavior: Behavior normal.         Thought Content: Thought content normal.           Assessment/Plan   Diagnoses and all orders for this visit:    1. Coronary artery disease involving native coronary artery of native heart without angina pectoris (Primary)  -     ezetimibe (ZETIA) 10 MG tablet; Take 1 tablet by mouth Daily.  Dispense: 90 tablet; Refill: 1  -     pitavastatin calcium (LIVALO) 2 MG tablet tablet; Take 1 tablet by mouth Every Night.  Dispense: 90 tablet; Refill: 1    2. Anxiety  -     ALPRAZolam (XANAX) 0.25 MG tablet; Take 1 tablet by mouth Daily As Needed for Anxiety.  Dispense: 30 tablet; Refill: 2    3. Mixed hyperlipidemia  -     ezetimibe (ZETIA) 10 MG tablet; Take 1 tablet by mouth Daily.  Dispense: 90 tablet; Refill: 1  -     pitavastatin calcium (LIVALO) 2 MG tablet tablet; Take 1 tablet by mouth Every Night.  Dispense: 90 tablet; Refill: 1

## 2022-01-03 ENCOUNTER — OFFICE VISIT (OUTPATIENT)
Dept: FAMILY MEDICINE CLINIC | Facility: CLINIC | Age: 77
End: 2022-01-03

## 2022-01-03 ENCOUNTER — PRIOR AUTHORIZATION (OUTPATIENT)
Dept: FAMILY MEDICINE CLINIC | Facility: CLINIC | Age: 77
End: 2022-01-03

## 2022-01-03 VITALS
HEIGHT: 70 IN | HEART RATE: 66 BPM | DIASTOLIC BLOOD PRESSURE: 68 MMHG | TEMPERATURE: 97.4 F | SYSTOLIC BLOOD PRESSURE: 142 MMHG | WEIGHT: 178 LBS | BODY MASS INDEX: 25.48 KG/M2 | RESPIRATION RATE: 14 BRPM

## 2022-01-03 DIAGNOSIS — E78.2 MIXED HYPERLIPIDEMIA: Chronic | ICD-10-CM

## 2022-01-03 DIAGNOSIS — F41.9 ANXIETY: Chronic | ICD-10-CM

## 2022-01-03 DIAGNOSIS — I25.10 CORONARY ARTERY DISEASE INVOLVING NATIVE CORONARY ARTERY OF NATIVE HEART WITHOUT ANGINA PECTORIS: Primary | Chronic | ICD-10-CM

## 2022-01-03 PROCEDURE — 99214 OFFICE O/P EST MOD 30 MIN: CPT | Performed by: FAMILY MEDICINE

## 2022-01-03 RX ORDER — EZETIMIBE 10 MG/1
10 TABLET ORAL DAILY
Qty: 90 TABLET | Refills: 1 | Status: SHIPPED | OUTPATIENT
Start: 2022-01-03 | End: 2022-08-02 | Stop reason: SDUPTHER

## 2022-01-03 RX ORDER — ALPRAZOLAM 0.25 MG/1
0.25 TABLET ORAL DAILY PRN
Qty: 30 TABLET | Refills: 2 | Status: SHIPPED | OUTPATIENT
Start: 2022-01-03 | End: 2022-08-02 | Stop reason: SDUPTHER

## 2022-01-03 NOTE — TELEPHONE ENCOUNTER
PS Submitted verbally for Livalo  Called [phone]  Pt tried and failed Rosuvastatina nd Atorvastatin  [facility] letter in the mail the Livalo wasn't formulary anymore and the cost will be increasing.     Samanta

## 2022-01-07 ENCOUNTER — PATIENT MESSAGE (OUTPATIENT)
Dept: FAMILY MEDICINE CLINIC | Facility: CLINIC | Age: 77
End: 2022-01-07

## 2022-01-07 DIAGNOSIS — E78.2 MIXED HYPERLIPIDEMIA: Primary | ICD-10-CM

## 2022-01-10 RX ORDER — ATORVASTATIN CALCIUM 10 MG/1
10 TABLET, FILM COATED ORAL DAILY
Qty: 90 TABLET | Refills: 1 | Status: SHIPPED | OUTPATIENT
Start: 2022-01-10 | End: 2022-08-03

## 2022-01-10 NOTE — TELEPHONE ENCOUNTER
From: Td Melendez Jr.  To: Beau Lee MD  Sent: 1/7/2022 11:12 AM EST  Subject: Livalo Status    Recall from our visit on Rui. 3, we discussed my current statin (Livalo). [facility] has removed it from their preferred medication list, meaning radha rocketing cost. A request for an exception, in my case, may be made to return to preferred status. A request from a physician will carry more weight than mine. I did poorly on Crestor and Zocor in the past, but would consider experimenting with a lower dose of Lipitor (with a bunch of Co Q-10) if the this petition is not granted.

## 2022-01-20 ENCOUNTER — OFFICE VISIT (OUTPATIENT)
Dept: GASTROENTEROLOGY | Facility: CLINIC | Age: 77
End: 2022-01-20

## 2022-01-20 VITALS — TEMPERATURE: 97.3 F | BODY MASS INDEX: 25.74 KG/M2 | HEIGHT: 70 IN | WEIGHT: 179.8 LBS

## 2022-01-20 DIAGNOSIS — R10.13 DYSPEPSIA: Primary | ICD-10-CM

## 2022-01-20 DIAGNOSIS — R10.13 EPIGASTRIC PAIN: ICD-10-CM

## 2022-01-20 PROCEDURE — 99214 OFFICE O/P EST MOD 30 MIN: CPT | Performed by: NURSE PRACTITIONER

## 2022-01-20 NOTE — PROGRESS NOTES
Chief Complaint   Patient presents with   • Heartburn       HPI    Td Melendez Jr. is a  76 y.o. male here for a follow up visit for heartburn status post upper GI series.    This patient follows with Dr. Luna and myself.    He has a personal history of colon polyps and GERD.    EGD in January 2021 --gastritis otherwise normal.     Colonoscopy in April 2021--He had a few subcentimeter tubular adenomas resected.  There was some mild inflammation noted in the area of the splenic flexure and descending colon with biopsies returning showing nonspecific colitis.  [facility] which returned not consistent with inflammatory bowel disease.    Recent upper GI series prompted for complaints of breakthrough dyspeptic symptoms despite PPI therapy --moderate reflux.    Today today he continues to have epigastric discomfort described as a bloating pressure-like sensation worse with high-fat meals and continued dyspeptic symptoms.  Given trial of Dexilant previously but did not [place] changes and was quite expensive.  Currently on pantoprazole 40 mg at bedtime taking digestive enzymes throughout the day.  Denies nausea, vomiting, dysphagia, odynophagia.    No diarrhea, constipation, or rectal bleeding.    Gallbladder ultrasound summer 2020 -- mild gallbladder adenomyomatosis.    Past Medical History:   Diagnosis Date   • Anxiety    • CAD (coronary artery disease)     unstable angina, 7/2015: 20% LM, long 70% LAD with abnormal FFR (s/p 2.91c45ch Xience), 50% OM1, 99% prox RCA (s/p 3.5x18m Xience).  Normal Cardiolite 11/2015 and 6/2018   • Colon polyp 2010   • Fibromyalgia    • GERD (gastroesophageal reflux disease)    • H/O complete eye exam 04/2018   • Hyperlipidemia    • IBS (irritable bowel syndrome)    • Squamous cell skin cancer, face    • Urinary retention        Past Surgical History:   Procedure Laterality Date   • CARDIAC CATHETERIZATION      [facility] 07/2015: cath with 20% LM long 70% LAD  (with positive FFR), 50% OM1, 99% prox RCA, s/p 2.75x33 Xience Alpine to LAD and 3.5x18mm Xience Alpine to RCA. LVEF 64% Normal Cardiolite 2015   • CARDIAC SURGERY      cardiac stents, 2   • COLONOSCOPY  approx 2011    normal per patient  [facility]   • COLONOSCOPY N/A 2021    Procedure: COLONOSCOPY TO CECUM WITH COLD SNARE POLYPECTOMIES AND BIOPSIES ;  Surgeon: Catarino Luna MD;  Location: [facility] ENDOSCOPY;  Service: Gastroenterology;  Laterality: N/A;  PRE- HISTORY COLON POLYPS  POST- POLYPS, COLITIS, HEMORRHOIDS   • COLONOSCOPY W/ POLYPECTOMY N/A 2018    Procedure: COLONOSCOPY TO CECUM WITH COLD SNAREPOLYPECTOMIES, HOT SNARE POLYPECTOMIES;  Surgeon: Catarino Luna MD;  Location: [facility] ENDOSCOPY;  Service: Gastroenterology   • CORONARY ANGIOPLASTY WITH STENT PLACEMENT     • CYSTOSCOPY TRANSURETHRAL RESECTION OF PROSTATE     • ENDOSCOPY N/A 2018    Procedure: ESOPHAGOGASTRODUODENOSCOPY WITH COLD BIOPSIES;  Surgeon: Catarino Luna MD;  Location: [facility] ENDOSCOPY;  Service: Gastroenterology   • ENDOSCOPY N/A 2021    Procedure: ESOPHAGOGASTRODUODENOSCOPY with cold biopsies;  Surgeon: Catarino Luna MD;  Location: [facility] ENDOSCOPY;  Service: Gastroenterology;  Laterality: N/A;  PRE: EPIGASTRIC PAIN, DYSPEPSIA  POST: GASTRITIS   • RADIOFREQUENCY ABLATION      NECK   • SKIN CANCER EXCISION     • WISDOM TOOTH EXTRACTION         Scheduled Meds:     Continuous Infusions: No current facility-administered medications for this visit.      PRN Meds:     Allergies   Allergen Reactions   • Augmentin [Amoxicillin-Pot Clavulanate] Hives and Itching       Social History     Socioeconomic History   • Marital status:    Tobacco Use   • Smoking status: Former Smoker     Packs/day: 0.25     Years: 4.00     Pack years: 1.00     Types: Cigarettes     Start date: 1968     Quit date:      Years since quittin.0   • Smokeless tobacco: Never Used   Vaping Use   •  Vaping Use: Never used   Substance and Sexual Activity   • Alcohol use: No     Comment: caffeine use   • Drug use: No   • Sexual activity: Defer       Family History   Problem Relation Age of Onset   • Coronary artery disease Other    • Cervical cancer Mother    • Cancer Mother    • Depression Mother    • Heart disease Mother    • Liver disease Mother    • Alcohol abuse Mother    • Thyroid cancer Father    • Cancer Father    • Depression Father    • Diabetes Father    • Thyroid disease Father    • Malig Hyperthermia Neg Hx        Review of Systems   Constitutional: Negative for activity change, appetite change, fatigue, fever and unexpected weight change.   HENT: Negative for trouble swallowing.    Respiratory: Negative for apnea, cough, choking, chest tightness, shortness of breath and wheezing.    Cardiovascular: Negative for chest pain, palpitations and leg swelling.   Gastrointestinal: Positive for abdominal pain. Negative for abdominal distention, anal bleeding, blood in stool, constipation, diarrhea, nausea, rectal pain and vomiting.       Vitals:    01/20/22 1139   Temp: 97.3 °F (36.3 °C)       Physical Exam  Constitutional:       Appearance: He is well-developed.   Abdominal:      General: Bowel sounds are normal. There is no distension.      Palpations: Abdomen is soft. There is no mass.      Tenderness: There is no abdominal tenderness. There is no guarding.      Hernia: No hernia is present.   Skin:     General: Skin is warm and dry.      Capillary Refill: Capillary refill takes less than 2 seconds.   Neurological:      Mental Status: He is alert and oriented to person, place, and time.   Psychiatric:         Behavior: Behavior normal.       Assessment    Diagnoses and all orders for this visit:    1. Dyspepsia (Primary)  Overview:  Added automatically from request for surgery [id number]    Orders:  -     US Gallbladder; Future  -     NM HIDA SCAN WITH EJECTION FRACTION; Future    2. Epigastric  pain  Overview:  Added automatically from request for surgery [id number]    Orders:  -     US Gallbladder; Future  -     NM HIDA SCAN WITH EJECTION FRACTION; Future       Plan    Arrange gallbladder ultrasound and HIDA scan to further evaluate patient's GI complaints.  Increase PPI therapy to twice daily dosing.  Antireflux measures and dietary modifications reviewed. Low acid diet reviewed. Keep head of bed elevated. Stop eating/drinking at least 3 hours prior to bedtime. Eliminate caffeine and carbonated beverages.  Follow-up and further recommendations pending the aforementioned work-up.            RUSS Nicholas  [facility]  [address] [place] [address]  Office: [phone]

## 2022-02-14 ENCOUNTER — TELEPHONE (OUTPATIENT)
Dept: CARDIOLOGY | Facility: CLINIC | Age: 77
End: 2022-02-14

## 2022-02-17 ENCOUNTER — HOSPITAL ENCOUNTER (OUTPATIENT)
Dept: NUCLEAR MEDICINE | Facility: HOSPITAL | Age: 77
Discharge: HOME | End: 2022-02-17

## 2022-02-17 ENCOUNTER — HOSPITAL ENCOUNTER (OUTPATIENT)
Dept: ULTRASOUND IMAGING | Facility: HOSPITAL | Age: 77
Discharge: HOME | End: 2022-02-17
Admitting: NURSE PRACTITIONER

## 2022-02-17 DIAGNOSIS — R10.13 DYSPEPSIA: ICD-10-CM

## 2022-02-17 DIAGNOSIS — R10.13 EPIGASTRIC PAIN: ICD-10-CM

## 2022-02-17 PROCEDURE — 78227 HEPATOBIL SYST IMAGE W/DRUG: CPT

## 2022-02-17 PROCEDURE — 25010000002 SINCALIDE PER 5 MCG: Performed by: NURSE PRACTITIONER

## 2022-02-17 PROCEDURE — 76705 ECHO EXAM OF ABDOMEN: CPT

## 2022-02-17 PROCEDURE — 0 TECHNETIUM TC 99M MEBROFENIN KIT: Performed by: NURSE PRACTITIONER

## 2022-02-17 PROCEDURE — A9537 TC99M MEBROFENIN: HCPCS | Performed by: NURSE PRACTITIONER

## 2022-02-17 RX ORDER — KIT FOR THE PREPARATION OF TECHNETIUM TC 99M MEBROFENIN 45 MG/10ML
1 INJECTION, POWDER, LYOPHILIZED, FOR SOLUTION INTRAVENOUS
Status: COMPLETED | OUTPATIENT
Start: 2022-02-17 | End: 2022-02-17

## 2022-02-17 RX ADMIN — MEBROFENIN 1 DOSE: 45 INJECTION, POWDER, LYOPHILIZED, FOR SOLUTION INTRAVENOUS at 11:45

## 2022-02-17 RX ADMIN — SINCALIDE 1.6 MCG: 5 INJECTION, POWDER, LYOPHILIZED, FOR SOLUTION INTRAVENOUS at 12:54

## 2022-02-18 ENCOUNTER — TELEPHONE (OUTPATIENT)
Dept: GASTROENTEROLOGY | Facility: CLINIC | Age: 77
End: 2022-02-18

## 2022-02-18 DIAGNOSIS — R93.2 ABNORMAL ULTRASOUND OF GALLBLADDER: Primary | ICD-10-CM

## 2022-02-18 NOTE — TELEPHONE ENCOUNTER
----- Message from TEJAS Blancas sent at 2/18/2022  9:32 AM EST -----  Please contact patient and let him know that his HIDA scan was normal.

## 2022-02-18 NOTE — PROGRESS NOTES
See other message regarding HIDA scan.  Let the patient know that gallbladder ultrasound shows a normal gallbladder but he has 2 areas on the liver that are difficult to see well.  Could be a cluster of blood vessels but recommend a triple phase CT of the liver to get a better look if he is amendable.

## 2022-03-01 DIAGNOSIS — N52.9 ERECTILE DYSFUNCTION, UNSPECIFIED ERECTILE DYSFUNCTION TYPE: Chronic | ICD-10-CM

## 2022-03-01 RX ORDER — SILDENAFIL CITRATE 20 MG/1
TABLET ORAL
Qty: 30 TABLET | Refills: 5 | Status: SHIPPED | OUTPATIENT
Start: 2022-03-01 | End: 2022-08-02 | Stop reason: SDUPTHER

## 2022-03-01 NOTE — TELEPHONE ENCOUNTER
----- Message from Td Melendez Jr. sent at 3/1/2022 12:34 PM EST -----  Regarding: Prescription refill  I would like to refill script for Sildenafil 20mg.

## 2022-03-16 ENCOUNTER — HOSPITAL ENCOUNTER (OUTPATIENT)
Dept: CT IMAGING | Facility: HOSPITAL | Age: 77
Discharge: HOME | End: 2022-03-16
Admitting: NURSE PRACTITIONER

## 2022-03-16 DIAGNOSIS — R93.2 ABNORMAL ULTRASOUND OF GALLBLADDER: ICD-10-CM

## 2022-03-16 LAB — CREAT BLDA-MCNC: 1.1 MG/DL (ref 0.6–1.3)

## 2022-03-16 PROCEDURE — 82565 ASSAY OF CREATININE: CPT

## 2022-03-16 PROCEDURE — 25010000002 IOPAMIDOL 61 % SOLUTION: Performed by: NURSE PRACTITIONER

## 2022-03-16 PROCEDURE — 74178 CT ABD&PLV WO CNTR FLWD CNTR: CPT

## 2022-03-16 PROCEDURE — 0 DIATRIZOATE MEGLUMINE & SODIUM PER 1 ML: Performed by: NURSE PRACTITIONER

## 2022-03-16 RX ADMIN — DIATRIZOATE MEGLUMINE AND DIATRIZOATE SODIUM 30 ML: 660; 100 LIQUID ORAL; RECTAL at 09:30

## 2022-03-16 RX ADMIN — IOPAMIDOL 85 ML: 612 INJECTION, SOLUTION INTRAVENOUS at 10:32

## 2022-03-18 ENCOUNTER — TELEPHONE (OUTPATIENT)
Dept: GASTROENTEROLOGY | Facility: CLINIC | Age: 77
End: 2022-03-18

## 2022-03-18 ENCOUNTER — TELEPHONE (OUTPATIENT)
Dept: FAMILY MEDICINE CLINIC | Facility: CLINIC | Age: 77
End: 2022-03-18

## 2022-03-18 RX ORDER — PANTOPRAZOLE SODIUM 40 MG/1
40 TABLET, DELAYED RELEASE ORAL 2 TIMES DAILY
Qty: 180 TABLET | Refills: 3 | Status: ON HOLD | OUTPATIENT
Start: 2022-03-18 | End: 2022-09-12

## 2022-03-18 NOTE — TELEPHONE ENCOUNTER
----- Message from Wendy Cobb sent at 3/18/2022 12:41 PM EDT -----  Contact: [phone]  Please give the [facility] a call back jasmeet hoover [phone]

## 2022-03-18 NOTE — PROGRESS NOTES
Good morning.  Td is a mutual patient of ours.  He has some non-GI abnormalities on the CT.  Fibrosis, enlarged prostate, bladder wall thickening.  Just wanted to keep you in the loop.  Thanks Cinthya

## 2022-03-18 NOTE — PROGRESS NOTES
Please inform the patient that CT shows liver hemangiomas which are benign cluster of blood vessels.    He also has evidence of lung fibrosis, enlarged prostate, and bladder wall thickening.  Recommend he follow-up with his primary care provider for non-GI findings.    Imaging results can be discussed further when he sees Dr. Luna later this month.

## 2022-03-18 NOTE — TELEPHONE ENCOUNTER
GI contacted me about his recent CT scan, showing some enlarged prostate and bladder wall thickening. I would recommend a Urology consult. If pt ok with this, please order.  
I spoke with pt he see Dr Neal. He will call them. I have sent results.   
83

## 2022-03-18 NOTE — TELEPHONE ENCOUNTER
Returned Anita's phone call. She states the patient reports he takes Pantoprazole BID. Advised according to Cinthya's note dated 1/20/22 she had increased the dose to BID. Advised will send over a new script. She verb understanding.

## 2022-03-23 ENCOUNTER — OFFICE VISIT (OUTPATIENT)
Dept: GASTROENTEROLOGY | Facility: CLINIC | Age: 77
End: 2022-03-23

## 2022-03-23 VITALS
SYSTOLIC BLOOD PRESSURE: 128 MMHG | DIASTOLIC BLOOD PRESSURE: 76 MMHG | BODY MASS INDEX: 25.67 KG/M2 | WEIGHT: 179.3 LBS | HEART RATE: 69 BPM | OXYGEN SATURATION: 99 % | HEIGHT: 70 IN | TEMPERATURE: 96.9 F

## 2022-03-23 DIAGNOSIS — K21.9 GASTROESOPHAGEAL REFLUX DISEASE WITHOUT ESOPHAGITIS: Primary | ICD-10-CM

## 2022-03-23 PROCEDURE — 99214 OFFICE O/P EST MOD 30 MIN: CPT | Performed by: INTERNAL MEDICINE

## 2022-03-23 NOTE — PROGRESS NOTES
Chief Complaint   Patient presents with   • Heartburn     Subjective     HPI  Td Melendez Jr. is a 76 y.o. male who presents today for office follow up.  He has a longstanding history of GERD.  Last EGD performed January 2021 showed some mild gastritis no evidence of active esophagitis no evidence of a large hiatal hernia.  He did have a upper GI at the latter part of last year that did show modest amount of gastroesophageal reflux there is no evidence of intraesophageal reflux.  Is currently managed on pantoprazole 40 mg twice daily.  He occasionally supplements with Carafate.  Symptoms are marginally controlled he does find that if he eats late at night or does not sleep on a wedge or elevate his head of the bed then he will often wake up in the morning with some nausea and heartburn.  He denies any dysphagia.    Additional work-up for his symptoms has included right upper quadrant ultrasound and HIDA scan.  His HIDA scan showed a normal ejection fraction.  Ultrasound noted some incidental liver nodules this was followed up with a contrasted CT scan of the liver which demonstrated multiple hemangiomas.    Objective   Vitals:    03/23/22 0922   BP: 128/76   Pulse: 69   Temp: 96.9 °F (36.1 °C)   SpO2: 99%       Physical Exam  Vitals reviewed.   Constitutional:       Appearance: He is well-developed.   HENT:      Head: Normocephalic and atraumatic.   Neurological:      Mental Status: He is alert and oriented to person, place, and time.   Psychiatric:         Behavior: Behavior normal.         Thought Content: Thought content normal.         Judgment: Judgment normal.       The following data was reviewed by: Catarino Luna MD on 03/23/2022:  Common labs    Common Labsle 6/16/21 6/16/21 6/16/21 6/16/21 7/1/21 3/16/22    0829 0829 0829 0829     Glucose 101 (A)        BUN 19        Creatinine 1.14     1.10   eGFR Non  Am 63        eGFR African Am 76        Sodium 141        Potassium 4.6         Chloride 102        Calcium 10.3        Total Protein 7.0        Albumin 4.80        Total Bilirubin 1.1        Alkaline Phosphatase 82        AST (SGOT) 21        ALT (SGPT) 23        WBC   6.69  6.87    Hemoglobin   18.4 (A)  16.8    Hematocrit   53.1 (A)  48.3    Platelets   205  187    Total Cholesterol  140       Triglycerides  170 (A)       HDL Cholesterol  39 (A)       LDL Cholesterol   72       PSA    2.750     (A) Abnormal value       Comments are available for some flowsheets but are not being displayed.           Data reviewed: GI studies EGD and path from 2021   Radiology studies as per HPI    Assessment/Plan   Assessment:     1. Gastroesophageal reflux disease without esophagitis    2.      Hepatic hemangiomas    Plan:   Continue with current BID PPI regimen  Reviewed GERD lifestyle modification recommendations  Trial of Gavison Advance, advised patient how he can order this and how to take  No further workup for incidentally noted hepatic hemangiomas  Follow up 3 mos            Catarino Luna M.D.  [facility]  [address] [place] [address]  Office: [phone]

## 2022-04-28 ENCOUNTER — OFFICE VISIT (OUTPATIENT)
Dept: SURGERY | Facility: CLINIC | Age: 77
End: 2022-04-28

## 2022-04-28 ENCOUNTER — PREP FOR SURGERY (OUTPATIENT)
Dept: OTHER | Facility: HOSPITAL | Age: 77
End: 2022-04-28

## 2022-04-28 VITALS
SYSTOLIC BLOOD PRESSURE: 152 MMHG | TEMPERATURE: 98 F | HEART RATE: 77 BPM | WEIGHT: 178 LBS | DIASTOLIC BLOOD PRESSURE: 71 MMHG | OXYGEN SATURATION: 98 % | BODY MASS INDEX: 25.48 KG/M2 | HEIGHT: 70 IN

## 2022-04-28 DIAGNOSIS — K21.9 GASTROESOPHAGEAL REFLUX DISEASE WITHOUT ESOPHAGITIS: Primary | ICD-10-CM

## 2022-04-28 PROCEDURE — 99204 OFFICE O/P NEW MOD 45 MIN: CPT | Performed by: SURGERY

## 2022-04-28 RX ORDER — SUCRALFATE 1 G/1
1 TABLET ORAL 4 TIMES DAILY
COMMUNITY
End: 2022-07-26

## 2022-04-28 RX ORDER — SODIUM CHLORIDE 9 MG/ML
100 INJECTION, SOLUTION INTRAVENOUS CONTINUOUS
Status: CANCELLED | OUTPATIENT
Start: 2022-04-28

## 2022-04-28 NOTE — H&P
Subjective   Td Melendez Jr. is a 76 y.o. male.     History of present illness  Mr. Melendez is a 76-year-old seen in the office today at his own request for his GERD.  He has been bothered with GERD for several years and the medicines really are not controlling his symptoms.  As such he is interested in the TIF procedure and found us online.    He currently takes Protonix twice daily.  He has been on Dexilant in the past he has been on omeprazole.  He sleeps with his head elevated he is diligent about not eating and then laying down within 2 to 3 hours so he is doing all the lifestyle modifications that should help.  In spite of all that he still is having breakthrough symptoms.  He has a chronic sore throat as well.    In the office today we discussed reflux disease in great detail.  We have gone over 4 different brochures.  Its been several years since his last EGD.  I explained he needs an EGD with dilatation to see if he is a candidate for transoral fundoplication.  He understands and agrees and would like to proceed    Past Medical History:   Diagnosis Date   • Anxiety    • CAD (coronary artery disease)     unstable angina, 7/2015: 20% LM, long 70% LAD with abnormal FFR (s/p 2.29i89dy Xience), 50% OM1, 99% prox RCA (s/p 3.5x18m Xience).  Normal Cardiolite 11/2015 and 6/2018   • Colon polyp 2010   • Fibromyalgia    • GERD (gastroesophageal reflux disease)    • H/O complete eye exam 04/2018   • Hyperlipidemia    • IBS (irritable bowel syndrome)    • Squamous cell skin cancer, face    • Urinary retention        Past Surgical History:   Procedure Laterality Date   • CARDIAC CATHETERIZATION      [facility] 07/2015: cath with 20% LM long 70% LAD (with positive FFR), 50% OM1, 99% prox RCA, s/p 2.75x33 Xience Alpine to LAD and 3.5x18mm Xience Alpine to RCA. LVEF 64% Normal Cardiolite 11/2015   • CARDIAC SURGERY      cardiac stents, 2   • COLONOSCOPY  approx 6/2011    normal per patient  [facility]   • COLONOSCOPY N/A  4/26/2021    Procedure: COLONOSCOPY TO CECUM WITH COLD SNARE POLYPECTOMIES AND BIOPSIES ;  Surgeon: Catarino Luna MD;  Location: [facility] ENDOSCOPY;  Service: Gastroenterology;  Laterality: N/A;  PRE- HISTORY COLON POLYPS  POST- POLYPS, COLITIS, HEMORRHOIDS   • COLONOSCOPY W/ POLYPECTOMY N/A 7/25/2018    Procedure: COLONOSCOPY TO CECUM WITH COLD SNAREPOLYPECTOMIES, HOT SNARE POLYPECTOMIES;  Surgeon: Catarino Luna MD;  Location: [facility] ENDOSCOPY;  Service: Gastroenterology   • CORONARY ANGIOPLASTY WITH STENT PLACEMENT     • CYSTOSCOPY TRANSURETHRAL RESECTION OF PROSTATE     • ENDOSCOPY N/A 7/25/2018    Procedure: ESOPHAGOGASTRODUODENOSCOPY WITH COLD BIOPSIES;  Surgeon: Catarino Luna MD;  Location: [facility] ENDOSCOPY;  Service: Gastroenterology   • ENDOSCOPY N/A 1/7/2021    Procedure: ESOPHAGOGASTRODUODENOSCOPY with cold biopsies;  Surgeon: Catarino Luna MD;  Location: [facility] ENDOSCOPY;  Service: Gastroenterology;  Laterality: N/A;  PRE: EPIGASTRIC PAIN, DYSPEPSIA  POST: GASTRITIS   • RADIOFREQUENCY ABLATION      NECK   • SKIN CANCER EXCISION     • WISDOM TOOTH EXTRACTION         Outpatient Encounter Medications as of 4/28/2022   Medication Sig Dispense Refill   • ALPRAZolam (XANAX) 0.25 MG tablet Take 1 tablet by mouth Daily As Needed for Anxiety. 30 tablet 2   • aluminum hydroxide-mag carbonate (GAVISCON EXTRA RELIEF) 160-105 MG chewable tablet chewable tablet Chew 4 (Four) Times a Day With Meals & at Bedtime.     • atorvastatin (Lipitor) 10 MG tablet Take 1 tablet by mouth Daily. 90 tablet 1   • clopidogrel (PLAVIX) 75 MG tablet Take 1 tablet by mouth Daily. (Patient taking differently: Take 75 mg by mouth Daily. Taking 75mg tablet) 90 tablet 3   • coenzyme Q10 100 MG capsule Take 100 mg by mouth Daily.     • diphenhydrAMINE-acetaminophen (TYLENOL PM)  MG tablet per tablet Take 1 tablet by mouth At Night As Needed for Sleep.     • Emollient (COLLAGEN EX) Take 1 tablet by mouth  Daily.     • ezetimibe (ZETIA) 10 MG tablet Take 1 tablet by mouth Daily. 90 tablet 1   • HYDROcodone-acetaminophen (NORCO) 5-325 MG per tablet Take  by mouth See Admin Instructions. Take 1 tablet by mouth every 4-6 hours as needed for pain     • LITHIUM PO Take 5 mg by mouth 2 (two) times a day.     • melatonin 5 MG tablet tablet Take 5 mg by mouth At Night As Needed.     • pantoprazole (PROTONIX) 40 MG EC tablet Take 1 tablet by mouth 2 (Two) Times a Day. 180 tablet 3   • sildenafil (REVATIO) 20 MG tablet Take 2-3 tabs QD prn 30 tablet 5   • sucralfate (CARAFATE) 1 g tablet Take 1 g by mouth 4 (Four) Times a Day.     • Vitamin D-Vitamin K (VITAMIN K2-VITAMIN D3 PO) Take  by mouth Daily.       No facility-administered encounter medications on file as of 2022.       Allergies   Allergen Reactions   • Augmentin [Amoxicillin-Pot Clavulanate] Hives and Itching       Family History   Problem Relation Age of Onset   • Cervical cancer Mother    • Cancer Mother    • Depression Mother    • Heart disease Mother    • Liver disease Mother    • Alcohol abuse Mother    • Thyroid cancer Father    • Cancer Father    • Depression Father    • Diabetes Father    • Thyroid disease Father    • Coronary artery disease Other    • Malig Hyperthermia Neg Hx        Social History     Socioeconomic History   • Marital status:    Tobacco Use   • Smoking status: Former Smoker     Packs/day: 0.25     Years: 4.00     Pack years: 1.00     Types: Cigarettes     Start date: 1968     Quit date:      Years since quittin.3   • Smokeless tobacco: Never Used   Vaping Use   • Vaping Use: Never used   Substance and Sexual Activity   • Alcohol use: No     Comment: caffeine use   • Drug use: No   • Sexual activity: Defer       The following portions of the patient's history were reviewed and updated as appropriate: allergies, current medications, past family history, past medical history, past social history, past surgical history  and problem list.    Objective   Complete review of systems is done and unremarkable with exception the chief complaint.    Physical exam shows pleasant 76-year-old male.  HEENT is negative.  Heart regular.  Lungs are clear.  Abdomen is soft nontender without mass.  Extremities weak range of motion and usage.  Neuro with no obvious focal deficit.    Impression: 76-year-old with persistent reflux despite medication    Plan: EGD with dilatation to assess his suitability for TIF    Assessment/Plan   There are no diagnoses linked to this encounter.               Td Richey,   4/28/2022  15:42 EDT

## 2022-04-28 NOTE — PROGRESS NOTES
Subjective   Td Melendez Jr. is a 76 y.o. male.     History of present illness  Mr. Melendez is a 76-year-old seen in the office today at his own request for his GERD.  He has been bothered with GERD for several years and the medicines really are not controlling his symptoms.  As such he is interested in the TIF procedure and found us online.    He currently takes Protonix twice daily.  He has been on Dexilant in the past he has been on omeprazole.  He sleeps with his head elevated he is diligent about not eating and then laying down within 2 to 3 hours so he is doing all the lifestyle modifications that should help.  In spite of all that he still is having breakthrough symptoms.  He has a chronic sore throat as well.    In the office today we discussed reflux disease in great detail.  We have gone over 4 different brochures.  Its been several years since his last EGD.  I explained he needs an EGD with dilatation to see if he is a candidate for transoral fundoplication.  He understands and agrees and would like to proceed    Past Medical History:   Diagnosis Date   • Anxiety    • CAD (coronary artery disease)     unstable angina, 7/2015: 20% LM, long 70% LAD with abnormal FFR (s/p 2.87e54yu Xience), 50% OM1, 99% prox RCA (s/p 3.5x18m Xience).  Normal Cardiolite 11/2015 and 6/2018   • Colon polyp 2010   • Fibromyalgia    • GERD (gastroesophageal reflux disease)    • H/O complete eye exam 04/2018   • Hyperlipidemia    • IBS (irritable bowel syndrome)    • Squamous cell skin cancer, face    • Urinary retention        Past Surgical History:   Procedure Laterality Date   • CARDIAC CATHETERIZATION      [facility] 07/2015: cath with 20% LM long 70% LAD (with positive FFR), 50% OM1, 99% prox RCA, s/p 2.75x33 Xience Alpine to LAD and 3.5x18mm Xience Alpine to RCA. LVEF 64% Normal Cardiolite 11/2015   • CARDIAC SURGERY      cardiac stents, 2   • COLONOSCOPY  approx 6/2011    normal per patient  [facility]   • COLONOSCOPY N/A  4/26/2021    Procedure: COLONOSCOPY TO CECUM WITH COLD SNARE POLYPECTOMIES AND BIOPSIES ;  Surgeon: Catarino Luna MD;  Location: [facility] ENDOSCOPY;  Service: Gastroenterology;  Laterality: N/A;  PRE- HISTORY COLON POLYPS  POST- POLYPS, COLITIS, HEMORRHOIDS   • COLONOSCOPY W/ POLYPECTOMY N/A 7/25/2018    Procedure: COLONOSCOPY TO CECUM WITH COLD SNAREPOLYPECTOMIES, HOT SNARE POLYPECTOMIES;  Surgeon: Catarino Luna MD;  Location: [facility] ENDOSCOPY;  Service: Gastroenterology   • CORONARY ANGIOPLASTY WITH STENT PLACEMENT     • CYSTOSCOPY TRANSURETHRAL RESECTION OF PROSTATE     • ENDOSCOPY N/A 7/25/2018    Procedure: ESOPHAGOGASTRODUODENOSCOPY WITH COLD BIOPSIES;  Surgeon: Catarino Luna MD;  Location: [facility] ENDOSCOPY;  Service: Gastroenterology   • ENDOSCOPY N/A 1/7/2021    Procedure: ESOPHAGOGASTRODUODENOSCOPY with cold biopsies;  Surgeon: Catarino Luna MD;  Location: [facility] ENDOSCOPY;  Service: Gastroenterology;  Laterality: N/A;  PRE: EPIGASTRIC PAIN, DYSPEPSIA  POST: GASTRITIS   • RADIOFREQUENCY ABLATION      NECK   • SKIN CANCER EXCISION     • WISDOM TOOTH EXTRACTION         Outpatient Encounter Medications as of 4/28/2022   Medication Sig Dispense Refill   • ALPRAZolam (XANAX) 0.25 MG tablet Take 1 tablet by mouth Daily As Needed for Anxiety. 30 tablet 2   • aluminum hydroxide-mag carbonate (GAVISCON EXTRA RELIEF) 160-105 MG chewable tablet chewable tablet Chew 4 (Four) Times a Day With Meals & at Bedtime.     • atorvastatin (Lipitor) 10 MG tablet Take 1 tablet by mouth Daily. 90 tablet 1   • clopidogrel (PLAVIX) 75 MG tablet Take 1 tablet by mouth Daily. (Patient taking differently: Take 75 mg by mouth Daily. Taking 75mg tablet) 90 tablet 3   • coenzyme Q10 100 MG capsule Take 100 mg by mouth Daily.     • diphenhydrAMINE-acetaminophen (TYLENOL PM)  MG tablet per tablet Take 1 tablet by mouth At Night As Needed for Sleep.     • Emollient (COLLAGEN EX) Take 1 tablet by mouth  Daily.     • ezetimibe (ZETIA) 10 MG tablet Take 1 tablet by mouth Daily. 90 tablet 1   • HYDROcodone-acetaminophen (NORCO) 5-325 MG per tablet Take  by mouth See Admin Instructions. Take 1 tablet by mouth every 4-6 hours as needed for pain     • LITHIUM PO Take 5 mg by mouth 2 (two) times a day.     • melatonin 5 MG tablet tablet Take 5 mg by mouth At Night As Needed.     • pantoprazole (PROTONIX) 40 MG EC tablet Take 1 tablet by mouth 2 (Two) Times a Day. 180 tablet 3   • sildenafil (REVATIO) 20 MG tablet Take 2-3 tabs QD prn 30 tablet 5   • sucralfate (CARAFATE) 1 g tablet Take 1 g by mouth 4 (Four) Times a Day.     • Vitamin D-Vitamin K (VITAMIN K2-VITAMIN D3 PO) Take  by mouth Daily.       No facility-administered encounter medications on file as of 2022.       Allergies   Allergen Reactions   • Augmentin [Amoxicillin-Pot Clavulanate] Hives and Itching       Family History   Problem Relation Age of Onset   • Cervical cancer Mother    • Cancer Mother    • Depression Mother    • Heart disease Mother    • Liver disease Mother    • Alcohol abuse Mother    • Thyroid cancer Father    • Cancer Father    • Depression Father    • Diabetes Father    • Thyroid disease Father    • Coronary artery disease Other    • Malig Hyperthermia Neg Hx        Social History     Socioeconomic History   • Marital status:    Tobacco Use   • Smoking status: Former Smoker     Packs/day: 0.25     Years: 4.00     Pack years: 1.00     Types: Cigarettes     Start date: 1968     Quit date:      Years since quittin.3   • Smokeless tobacco: Never Used   Vaping Use   • Vaping Use: Never used   Substance and Sexual Activity   • Alcohol use: No     Comment: caffeine use   • Drug use: No   • Sexual activity: Defer       The following portions of the patient's history were reviewed and updated as appropriate: allergies, current medications, past family history, past medical history, past social history, past surgical history  and problem list.    Objective   Complete review of systems is done and unremarkable with exception the chief complaint.    Physical exam shows pleasant 76-year-old male.  HEENT is negative.  Heart regular.  Lungs are clear.  Abdomen is soft nontender without mass.  Extremities weak range of motion and usage.  Neuro with no obvious focal deficit.    Impression: 76-year-old with persistent reflux despite medication    Plan: EGD with dilatation to assess his suitability for TIF    Assessment/Plan   There are no diagnoses linked to this encounter.               Td Richey,   4/28/2022  14:30 EDT

## 2022-05-02 ENCOUNTER — TELEPHONE (OUTPATIENT)
Dept: CARDIOLOGY | Facility: CLINIC | Age: 77
End: 2022-05-02

## 2022-05-02 NOTE — TELEPHONE ENCOUNTER
Dr. Boucher Pt:  Out of office: [facility]   EST APC: PADMINI SERNA  [phone]  Please advise.        Pt is scheduled for EGD for  with Dr. Td Richey.  Pt is currently on Clopidogrel 75 mg daily.   They are requesting pre op  and med management.      Form is in Padmini Serna's inbox for review.

## 2022-05-06 NOTE — TELEPHONE ENCOUNTER
He has a history of coronary artery disease and stent placement in July 2015.  In August 2021, I saw him in the office and he denied any symptoms.    I received a fax stating that patient needs to undergo EGD with dilation on 5/25/2022 by Dr. Td Richey and they are requesting perioperative cardiac risk assessment.    I did spoke with patient via telephone and he denies chest pain or shortness of breath.  He is having heartburn symptoms.  I feel that he is at acceptable risk to proceed with the EGD from a cardiac standpoint.  He may hold his clopidogrel 3 days in preparation for the procedure.    Please fax surgical form to Dr. Td Richey.  Thank you.

## 2022-05-23 ENCOUNTER — LAB (OUTPATIENT)
Dept: LAB | Facility: HOSPITAL | Age: 77
End: 2022-05-23

## 2022-05-23 DIAGNOSIS — Z01.818 PREOP EXAMINATION: Primary | ICD-10-CM

## 2022-05-23 LAB — SARS-COV-2 ORF1AB RESP QL NAA+PROBE: NOT DETECTED

## 2022-05-23 PROCEDURE — U0004 COV-19 TEST NON-CDC HGH THRU: HCPCS

## 2022-05-23 PROCEDURE — C9803 HOPD COVID-19 SPEC COLLECT: HCPCS

## 2022-05-24 ENCOUNTER — ANESTHESIA EVENT (OUTPATIENT)
Dept: GASTROENTEROLOGY | Facility: HOSPITAL | Age: 77
End: 2022-05-24

## 2022-05-24 RX ORDER — SODIUM CHLORIDE 0.9 % (FLUSH) 0.9 %
10 SYRINGE (ML) INJECTION AS NEEDED
Status: CANCELLED | OUTPATIENT
Start: 2022-05-24

## 2022-05-24 RX ORDER — SODIUM CHLORIDE 0.9 % (FLUSH) 0.9 %
10 SYRINGE (ML) INJECTION EVERY 12 HOURS SCHEDULED
Status: CANCELLED | OUTPATIENT
Start: 2022-05-24

## 2022-05-24 RX ORDER — MIDAZOLAM HYDROCHLORIDE 1 MG/ML
0.5 INJECTION INTRAMUSCULAR; INTRAVENOUS
Status: CANCELLED | OUTPATIENT
Start: 2022-05-24

## 2022-05-24 RX ORDER — SODIUM CHLORIDE 9 MG/ML
9 INJECTION, SOLUTION INTRAVENOUS CONTINUOUS PRN
Status: CANCELLED | OUTPATIENT
Start: 2022-05-24

## 2022-05-25 ENCOUNTER — HOSPITAL ENCOUNTER (OUTPATIENT)
Facility: HOSPITAL | Age: 77
Setting detail: HOSPITAL OUTPATIENT SURGERY
Discharge: HOME | End: 2022-05-25
Attending: SURGERY | Admitting: SURGERY

## 2022-05-25 ENCOUNTER — ANESTHESIA (OUTPATIENT)
Dept: GASTROENTEROLOGY | Facility: HOSPITAL | Age: 77
End: 2022-05-25

## 2022-05-25 VITALS
SYSTOLIC BLOOD PRESSURE: 133 MMHG | OXYGEN SATURATION: 98 % | BODY MASS INDEX: 26.07 KG/M2 | HEART RATE: 60 BPM | WEIGHT: 182.1 LBS | DIASTOLIC BLOOD PRESSURE: 74 MMHG | TEMPERATURE: 98 F | HEIGHT: 70 IN | RESPIRATION RATE: 13 BRPM

## 2022-05-25 DIAGNOSIS — K21.9 GASTROESOPHAGEAL REFLUX DISEASE WITHOUT ESOPHAGITIS: ICD-10-CM

## 2022-05-25 PROCEDURE — 43239 EGD BIOPSY SINGLE/MULTIPLE: CPT | Performed by: SURGERY

## 2022-05-25 PROCEDURE — 88305 TISSUE EXAM BY PATHOLOGIST: CPT | Performed by: SURGERY

## 2022-05-25 PROCEDURE — 25010000002 PROPOFOL 200 MG/20ML EMULSION: Performed by: ANESTHESIOLOGY

## 2022-05-25 PROCEDURE — 43248 EGD GUIDE WIRE INSERTION: CPT | Performed by: SURGERY

## 2022-05-25 RX ORDER — SODIUM CHLORIDE 9 MG/ML
INJECTION, SOLUTION INTRAVENOUS CONTINUOUS PRN
Status: DISCONTINUED | OUTPATIENT
Start: 2022-05-25 | End: 2022-05-25 | Stop reason: SURG

## 2022-05-25 RX ORDER — LIDOCAINE HYDROCHLORIDE 10 MG/ML
INJECTION, SOLUTION EPIDURAL; INFILTRATION; INTRACAUDAL; PERINEURAL AS NEEDED
Status: DISCONTINUED | OUTPATIENT
Start: 2022-05-25 | End: 2022-05-25 | Stop reason: SURG

## 2022-05-25 RX ORDER — PROPOFOL 10 MG/ML
INJECTION, EMULSION INTRAVENOUS AS NEEDED
Status: DISCONTINUED | OUTPATIENT
Start: 2022-05-25 | End: 2022-05-25 | Stop reason: SURG

## 2022-05-25 RX ORDER — SODIUM CHLORIDE 9 MG/ML
10 INJECTION, SOLUTION INTRAVENOUS ONCE
Status: COMPLETED | OUTPATIENT
Start: 2022-05-25 | End: 2022-05-25

## 2022-05-25 RX ADMIN — LIDOCAINE HYDROCHLORIDE 50 MG: 10 INJECTION, SOLUTION EPIDURAL; INFILTRATION; INTRACAUDAL; PERINEURAL at 07:37

## 2022-05-25 RX ADMIN — SODIUM CHLORIDE: 0.9 INJECTION, SOLUTION INTRAVENOUS at 07:35

## 2022-05-25 RX ADMIN — SODIUM CHLORIDE 10 ML/HR: 9 INJECTION, SOLUTION INTRAVENOUS at 07:01

## 2022-05-25 RX ADMIN — PROPOFOL 200 MG: 10 INJECTION, EMULSION INTRAVENOUS at 07:38

## 2022-05-25 NOTE — ANESTHESIA POSTPROCEDURE EVALUATION
Patient: Td Melendez Jr.    Procedure Summary     Date: 05/25/22 Room / Location: [facility] ENDOSCOPY 4 / [facility] ENDOSCOPY    Anesthesia Start: 0735 Anesthesia Stop: 0748    Procedure: ESOPHAGOGASTRODUODENOSCOPY WITH DILATATION #48 to #58 bougie, gastric biopsy (N/A ) Diagnosis:       Gastroesophageal reflux disease without esophagitis      (Gastroesophageal reflux disease without esophagitis [K21.9])    Surgeons: Td Richey DO Provider: Shahid Connelly MD    Anesthesia Type: MAC ASA Status: 3          Anesthesia Type: MAC    Vitals  No vitals data found for the desired time range.          Post Anesthesia Care and Evaluation    Patient location during evaluation: PACU  Patient participation: complete - patient participated  Level of consciousness: sleepy but conscious and responsive to verbal stimuli  Pain score: 0  Pain management: adequate  Airway patency: patent  Anesthetic complications: No anesthetic complications  PONV Status: none  Cardiovascular status: acceptable  Respiratory status: acceptable  Hydration status: acceptable

## 2022-05-25 NOTE — ANESTHESIA PREPROCEDURE EVALUATION
Anesthesia Evaluation     Patient summary reviewed and Nursing notes reviewed   NPO Solid Status: > 8 hours  NPO Liquid Status: > 2 hours           Airway   Mallampati: II  TM distance: >3 FB  Neck ROM: full  Dental - normal exam     Pulmonary - normal exam   (+) a smoker Former,   Cardiovascular - normal exam    ECG reviewed    (+) CAD, cardiac stents Drug eluting stent more than 12 months ago hyperlipidemia,     ROS comment: Stents x 2 in 2015    Neuro/Psych  (+) psychiatric history Anxiety,    GI/Hepatic/Renal/Endo    (+)  GERD,      Musculoskeletal (-) negative ROS    Abdominal    Substance History - negative use     OB/GYN negative ob/gyn ROS         Other                        Anesthesia Plan    ASA 3     MAC     intravenous induction     Anesthetic plan, all risks, benefits, and alternatives have been provided, discussed and informed consent has been obtained with: patient.

## 2022-05-26 LAB
LAB AP CASE REPORT: NORMAL
PATH REPORT.FINAL DX SPEC: NORMAL
PATH REPORT.GROSS SPEC: NORMAL

## 2022-05-31 ENCOUNTER — PREP FOR SURGERY (OUTPATIENT)
Dept: OTHER | Facility: HOSPITAL | Age: 77
End: 2022-05-31

## 2022-05-31 DIAGNOSIS — K21.9 GASTROESOPHAGEAL REFLUX DISEASE WITHOUT ESOPHAGITIS: Primary | ICD-10-CM

## 2022-05-31 RX ORDER — SODIUM CHLORIDE 9 MG/ML
100 INJECTION, SOLUTION INTRAVENOUS CONTINUOUS
Status: CANCELLED | OUTPATIENT
Start: 2022-05-31

## 2022-05-31 RX ORDER — CLINDAMYCIN PHOSPHATE 900 MG/50ML
900 INJECTION, SOLUTION INTRAVENOUS ONCE
Status: CANCELLED | OUTPATIENT
Start: 2022-05-31 | End: 2022-05-31

## 2022-05-31 NOTE — H&P
Subjective   Td Melendez Jr. is a 76 y.o. male.     History of present illness  Mr. Melendez is a 76-year-old seen in the office today at his own request for his GERD.  He has been bothered with GERD for several years and the medicines really are not controlling his symptoms.  As such he is interested in the TIF procedure and found us online.    He currently takes Protonix twice daily.  He has been on Dexilant in the past he has been on omeprazole.  He sleeps with his head elevated he is diligent about not eating and then laying down within 2 to 3 hours so he is doing all the lifestyle modifications that should help.  In spite of all that he still is having breakthrough symptoms.  He has a chronic sore throat as well.    In the office today we discussed reflux disease in great detail.  We have gone over 4 different brochures.  Its been several years since his last EGD.  I explained he needs an EGD with dilatation to see if he is a candidate for transoral fundoplication.  He understands and agrees and would like to proceed    Past Medical History:   Diagnosis Date   • Anxiety    • CAD (coronary artery disease)     unstable angina, 7/2015: 20% LM, long 70% LAD with abnormal FFR (s/p 2.39h76ru Xience), 50% OM1, 99% prox RCA (s/p 3.5x18m Xience).  Normal Cardiolite 11/2015 and 6/2018   • Colon polyp 2010   • Fibromyalgia    • GERD (gastroesophageal reflux disease)    • H/O complete eye exam 04/2018   • Hyperlipidemia    • IBS (irritable bowel syndrome)    • Squamous cell skin cancer, face    • Urinary retention        Past Surgical History:   Procedure Laterality Date   • CARDIAC CATHETERIZATION      [facility] 07/2015: cath with 20% LM long 70% LAD (with positive FFR), 50% OM1, 99% prox RCA, s/p 2.75x33 Xience Alpine to LAD and 3.5x18mm Xience Alpine to RCA. LVEF 64% Normal Cardiolite 11/2015   • CARDIAC SURGERY      cardiac stents, 2   • COLONOSCOPY  approx 6/2011    normal per patient  [facility]   • COLONOSCOPY N/A  4/26/2021    Procedure: COLONOSCOPY TO CECUM WITH COLD SNARE POLYPECTOMIES AND BIOPSIES ;  Surgeon: Catarino Luna MD;  Location: [facility] ENDOSCOPY;  Service: Gastroenterology;  Laterality: N/A;  PRE- HISTORY COLON POLYPS  POST- POLYPS, COLITIS, HEMORRHOIDS   • COLONOSCOPY W/ POLYPECTOMY N/A 7/25/2018    Procedure: COLONOSCOPY TO CECUM WITH COLD SNAREPOLYPECTOMIES, HOT SNARE POLYPECTOMIES;  Surgeon: Catarino Luna MD;  Location: [facility] ENDOSCOPY;  Service: Gastroenterology   • CORONARY ANGIOPLASTY WITH STENT PLACEMENT     • CYSTOSCOPY TRANSURETHRAL RESECTION OF PROSTATE     • ENDOSCOPY N/A 7/25/2018    Procedure: ESOPHAGOGASTRODUODENOSCOPY WITH COLD BIOPSIES;  Surgeon: Catarino Luna MD;  Location: [facility] ENDOSCOPY;  Service: Gastroenterology   • ENDOSCOPY N/A 1/7/2021    Procedure: ESOPHAGOGASTRODUODENOSCOPY with cold biopsies;  Surgeon: Catarino Luna MD;  Location: [facility] ENDOSCOPY;  Service: Gastroenterology;  Laterality: N/A;  PRE: EPIGASTRIC PAIN, DYSPEPSIA  POST: GASTRITIS   • ENDOSCOPY N/A 5/25/2022    Procedure: ESOPHAGOGASTRODUODENOSCOPY WITH DILATATION #48 to #58 bougie, gastric biopsy;  Surgeon: Td Richey DO;  Location: [facility] ENDOSCOPY;  Service: General;  Laterality: N/A;  gastritis, small hiatal hernia   • RADIOFREQUENCY ABLATION      NECK   • SKIN CANCER EXCISION     • WISDOM TOOTH EXTRACTION         Outpatient Encounter Medications as of 5/31/2022   Medication Sig Dispense Refill   • ALPRAZolam (XANAX) 0.25 MG tablet Take 1 tablet by mouth Daily As Needed for Anxiety. 30 tablet 2   • aluminum hydroxide-mag carbonate (GAVISCON EXTRA RELIEF) 160-105 MG chewable tablet chewable tablet Chew 4 (Four) Times a Day With Meals & at Bedtime.     • atorvastatin (Lipitor) 10 MG tablet Take 1 tablet by mouth Daily. (Patient not taking: Reported on 5/16/2022) 90 tablet 1   • clopidogrel (PLAVIX) 75 MG tablet Take 1 tablet by mouth Daily. (Patient taking differently: Take 75 mg  by mouth Daily. Taking 75mg tablet) 90 tablet 3   • coenzyme Q10 100 MG capsule Take 100 mg by mouth Daily.     • diphenhydrAMINE-acetaminophen (TYLENOL PM)  MG tablet per tablet Take 1 tablet by mouth At Night As Needed for Sleep.     • Emollient (COLLAGEN EX) Take 1 tablet by mouth Daily.     • ezetimibe (ZETIA) 10 MG tablet Take 1 tablet by mouth Daily. 90 tablet 1   • HYDROcodone-acetaminophen (NORCO) 5-325 MG per tablet Take  by mouth See Admin Instructions. Take 1 tablet by mouth every 4-6 hours as needed for pain     • LITHIUM PO Take 5 mg by mouth 2 (two) times a day.     • melatonin 5 MG tablet tablet Take 5 mg by mouth At Night As Needed.     • pantoprazole (PROTONIX) 40 MG EC tablet Take 1 tablet by mouth 2 (Two) Times a Day. 180 tablet 3   • pitavastatin calcium (LIVALO) 2 MG tablet tablet Take 2 mg by mouth Every Night.     • sildenafil (REVATIO) 20 MG tablet Take 2-3 tabs QD prn 30 tablet 5   • sucralfate (CARAFATE) 1 g tablet Take 1 g by mouth 4 (Four) Times a Day.     • Vitamin D-Vitamin K (VITAMIN K2-VITAMIN D3 PO) Take  by mouth Daily.       No facility-administered encounter medications on file as of 2022.       Allergies   Allergen Reactions   • Augmentin [Amoxicillin-Pot Clavulanate] Hives and Itching       Family History   Problem Relation Age of Onset   • Cervical cancer Mother    • Cancer Mother    • Depression Mother    • Heart disease Mother    • Liver disease Mother    • Alcohol abuse Mother    • Thyroid cancer Father    • Cancer Father    • Depression Father    • Diabetes Father    • Thyroid disease Father    • Coronary artery disease Other    • Malig Hyperthermia Neg Hx        Social History     Socioeconomic History   • Marital status:    Tobacco Use   • Smoking status: Former Smoker     Packs/day: 0.25     Years: 4.00     Pack years: 1.00     Types: Cigarettes     Start date: 1968     Quit date:      Years since quittin.4   • Smokeless tobacco: Never Used    Vaping Use   • Vaping Use: Never used   Substance and Sexual Activity   • Alcohol use: No     Comment: caffeine use   • Drug use: No   • Sexual activity: Defer       The following portions of the patient's history were reviewed and updated as appropriate: allergies, current medications, past family history, past medical history, past social history, past surgical history and problem list.    Objective   Complete review of systems is done and unremarkable with exception the chief complaint.    Physical exam shows pleasant 76-year-old male.  HEENT is negative.  Heart regular.  Lungs are clear.  Abdomen is soft nontender without mass.  Extremities weak range of motion and usage.  Neuro with no obvious focal deficit.    Impression: 76-year-old with persistent reflux despite medication    Plan: EGD with dilatation to assess his suitability for TIF    Assessment & Plan   There are no diagnoses linked to this encounter.               Td Richey DO  5/31/2022  08:20 EDT

## 2022-06-10 ENCOUNTER — TELEPHONE (OUTPATIENT)
Dept: CARDIOLOGY | Facility: CLINIC | Age: 77
End: 2022-06-10

## 2022-06-10 NOTE — TELEPHONE ENCOUNTER
Pt is schedled for hiatal hernia repair lapraroscopic with transoral incisionless with  Dr. Td Richey.  They are requesting pre op risk assessment and med management of Plavix

## 2022-06-10 NOTE — TELEPHONE ENCOUNTER
Date of Office Visit:  06/10/2022  Encounter Provider:  Obi Boucher MD  Place of Service:  [facility] CARDIOLOGY  Patient Name: Td Melendez Jr.  :  1945    To Whom it May Concern:    Mr Melendez has stable CAD s/p previous PCI in .  He has been on clopidogrel as monotherapy as aspirin alone caused worsening reflux.  However, he needs to be on some antiplatelet agent at all times given his prior stenting.    His perioperative risk of major adverse CV events with intermediate risk surgery/GETA is low.    10 days prior to surgery, he should start enteric coated low dose aspirin, 81mg daily. Five days prior to surgery, he should stop clopidogrel (but remain on aspirin). Seven days after surgery, he is to resume clopidogrel and stop aspirin.     Please contact our office with any questions or concerns. As always, it has been a pleasure to participate in your patient's care.      Obi Boucher MD  [facility]

## 2022-06-24 ENCOUNTER — HOSPITAL ENCOUNTER (OUTPATIENT)
Dept: CARDIOLOGY | Facility: HOSPITAL | Age: 77
Discharge: HOME | End: 2022-06-24

## 2022-06-24 ENCOUNTER — LAB (OUTPATIENT)
Dept: LAB | Facility: HOSPITAL | Age: 77
End: 2022-06-24

## 2022-06-24 DIAGNOSIS — K21.9 GASTROESOPHAGEAL REFLUX DISEASE WITHOUT ESOPHAGITIS: ICD-10-CM

## 2022-06-24 LAB
ABO GROUP BLD: NORMAL
ALBUMIN SERPL-MCNC: 4.1 G/DL (ref 3.5–5.2)
ALBUMIN/GLOB SERPL: 1.7 G/DL
ALP SERPL-CCNC: 74 U/L (ref 39–117)
ALT SERPL W P-5'-P-CCNC: 15 U/L (ref 1–41)
ANION GAP SERPL CALCULATED.3IONS-SCNC: 7.2 MMOL/L (ref 5–15)
AST SERPL-CCNC: 16 U/L (ref 1–40)
BASOPHILS # BLD AUTO: 0.03 10*3/MM3 (ref 0–0.2)
BASOPHILS NFR BLD AUTO: 0.5 % (ref 0–1.5)
BILIRUB SERPL-MCNC: 0.7 MG/DL (ref 0–1.2)
BLD GP AB SCN SERPL QL: NEGATIVE
BUN SERPL-MCNC: 17 MG/DL (ref 8–23)
BUN/CREAT SERPL: 17.2 (ref 7–25)
CALCIUM SPEC-SCNC: 9.6 MG/DL (ref 8.6–10.5)
CHLORIDE SERPL-SCNC: 107 MMOL/L (ref 98–107)
CO2 SERPL-SCNC: 25.8 MMOL/L (ref 22–29)
CREAT SERPL-MCNC: 0.99 MG/DL (ref 0.76–1.27)
DEPRECATED RDW RBC AUTO: 41.8 FL (ref 37–54)
EGFRCR SERPLBLD CKD-EPI 2021: 79.9 ML/MIN/1.73
EOSINOPHIL # BLD AUTO: 0.22 10*3/MM3 (ref 0–0.4)
EOSINOPHIL NFR BLD AUTO: 3.7 % (ref 0.3–6.2)
ERYTHROCYTE [DISTWIDTH] IN BLOOD BY AUTOMATED COUNT: 12.3 % (ref 12.3–15.4)
GLOBULIN UR ELPH-MCNC: 2.4 GM/DL
GLUCOSE SERPL-MCNC: 75 MG/DL (ref 65–99)
HCT VFR BLD AUTO: 47.5 % (ref 37.5–51)
HGB BLD-MCNC: 16.1 G/DL (ref 13–17.7)
IMM GRANULOCYTES # BLD AUTO: 0.01 10*3/MM3 (ref 0–0.05)
IMM GRANULOCYTES NFR BLD AUTO: 0.2 % (ref 0–0.5)
LYMPHOCYTES # BLD AUTO: 1.64 10*3/MM3 (ref 0.7–3.1)
LYMPHOCYTES NFR BLD AUTO: 27.6 % (ref 19.6–45.3)
MCH RBC QN AUTO: 31.4 PG (ref 26.6–33)
MCHC RBC AUTO-ENTMCNC: 33.9 G/DL (ref 31.5–35.7)
MCV RBC AUTO: 92.6 FL (ref 79–97)
MONOCYTES # BLD AUTO: 0.57 10*3/MM3 (ref 0.1–0.9)
MONOCYTES NFR BLD AUTO: 9.6 % (ref 5–12)
NEUTROPHILS NFR BLD AUTO: 3.48 10*3/MM3 (ref 1.7–7)
NEUTROPHILS NFR BLD AUTO: 58.4 % (ref 42.7–76)
NRBC BLD AUTO-RTO: 0 /100 WBC (ref 0–0.2)
PLATELET # BLD AUTO: 173 10*3/MM3 (ref 140–450)
PMV BLD AUTO: 9.8 FL (ref 6–12)
POTASSIUM SERPL-SCNC: 4 MMOL/L (ref 3.5–5.2)
PROT SERPL-MCNC: 6.5 G/DL (ref 6–8.5)
RBC # BLD AUTO: 5.13 10*6/MM3 (ref 4.14–5.8)
RH BLD: POSITIVE
SODIUM SERPL-SCNC: 140 MMOL/L (ref 136–145)
T&S EXPIRATION DATE: NORMAL
WBC NRBC COR # BLD: 5.95 10*3/MM3 (ref 3.4–10.8)

## 2022-06-24 PROCEDURE — 36415 COLL VENOUS BLD VENIPUNCTURE: CPT

## 2022-06-24 PROCEDURE — 93010 ELECTROCARDIOGRAM REPORT: CPT | Performed by: INTERNAL MEDICINE

## 2022-06-24 PROCEDURE — 86900 BLOOD TYPING SEROLOGIC ABO: CPT

## 2022-06-24 PROCEDURE — 93005 ELECTROCARDIOGRAM TRACING: CPT | Performed by: SURGERY

## 2022-06-24 PROCEDURE — 85025 COMPLETE CBC W/AUTO DIFF WBC: CPT

## 2022-06-24 PROCEDURE — 80053 COMPREHEN METABOLIC PANEL: CPT

## 2022-06-24 PROCEDURE — 86850 RBC ANTIBODY SCREEN: CPT

## 2022-06-24 PROCEDURE — 86901 BLOOD TYPING SEROLOGIC RH(D): CPT

## 2022-06-26 LAB — QT INTERVAL: 382 MS

## 2022-07-05 ENCOUNTER — ANESTHESIA EVENT (OUTPATIENT)
Dept: PERIOP | Facility: HOSPITAL | Age: 77
End: 2022-07-05

## 2022-07-05 ENCOUNTER — LAB (OUTPATIENT)
Dept: LAB | Facility: HOSPITAL | Age: 77
End: 2022-07-05

## 2022-07-05 DIAGNOSIS — K21.9 GASTROESOPHAGEAL REFLUX DISEASE WITHOUT ESOPHAGITIS: Primary | ICD-10-CM

## 2022-07-05 LAB — SARS-COV-2 ORF1AB RESP QL NAA+PROBE: NOT DETECTED

## 2022-07-05 PROCEDURE — U0004 COV-19 TEST NON-CDC HGH THRU: HCPCS

## 2022-07-05 PROCEDURE — C9803 HOPD COVID-19 SPEC COLLECT: HCPCS

## 2022-07-06 ENCOUNTER — HOSPITAL ENCOUNTER (OUTPATIENT)
Facility: HOSPITAL | Age: 77
Discharge: HOME | End: 2022-07-07
Attending: SURGERY | Admitting: SURGERY

## 2022-07-06 ENCOUNTER — ANESTHESIA (OUTPATIENT)
Dept: PERIOP | Facility: HOSPITAL | Age: 77
End: 2022-07-06

## 2022-07-06 DIAGNOSIS — K21.9 GASTROESOPHAGEAL REFLUX DISEASE WITHOUT ESOPHAGITIS: ICD-10-CM

## 2022-07-06 PROBLEM — E78.5 HYPERLIPIDEMIA: Chronic | Status: ACTIVE | Noted: 2017-01-04

## 2022-07-06 PROBLEM — G89.29 CHRONIC PAIN: Chronic | Status: ACTIVE | Noted: 2022-07-06

## 2022-07-06 PROBLEM — F41.9 ANXIETY: Chronic | Status: ACTIVE | Noted: 2018-01-11

## 2022-07-06 PROBLEM — R31.9 HEMATURIA: Status: ACTIVE | Noted: 2022-07-06

## 2022-07-06 PROCEDURE — 43280 LAPAROSCOPY FUNDOPLASTY: CPT | Performed by: REGISTERED NURSE

## 2022-07-06 PROCEDURE — 25010000002 DEXAMETHASONE PER 1 MG: Performed by: ANESTHESIOLOGIST ASSISTANT

## 2022-07-06 PROCEDURE — 25010000002 PROPOFOL 200 MG/20ML EMULSION: Performed by: ANESTHESIOLOGIST ASSISTANT

## 2022-07-06 PROCEDURE — G0378 HOSPITAL OBSERVATION PER HR: HCPCS

## 2022-07-06 PROCEDURE — 25010000002 ONDANSETRON PER 1 MG: Performed by: ANESTHESIOLOGIST ASSISTANT

## 2022-07-06 PROCEDURE — 25010000002 FENTANYL CITRATE (PF) 100 MCG/2ML SOLUTION: Performed by: ANESTHESIOLOGIST ASSISTANT

## 2022-07-06 PROCEDURE — 25010000002 HYDROMORPHONE PER 4 MG: Performed by: ANESTHESIOLOGIST ASSISTANT

## 2022-07-06 PROCEDURE — 43280 LAPAROSCOPY FUNDOPLASTY: CPT | Performed by: SURGERY

## 2022-07-06 PROCEDURE — 25010000002 HYDROMORPHONE 1 MG/ML SOLUTION: Performed by: ANESTHESIOLOGIST ASSISTANT

## 2022-07-06 PROCEDURE — 99214 OFFICE O/P EST MOD 30 MIN: CPT | Performed by: NURSE PRACTITIONER

## 2022-07-06 PROCEDURE — 25010000002 FENTANYL CITRATE (PF) 50 MCG/ML SOLUTION: Performed by: ANESTHESIOLOGIST ASSISTANT

## 2022-07-06 PROCEDURE — 0 MEPERIDINE PER 100 MG: Performed by: ANESTHESIOLOGIST ASSISTANT

## 2022-07-06 PROCEDURE — 25010000002 MORPHINE PER 10 MG: Performed by: SURGERY

## 2022-07-06 DEVICE — ESOPHYX Z+ FASTENER DELIVERY DEVICE WITH SEROSAFUSE FASTENERS AND ACCESSORIES
Type: IMPLANTABLE DEVICE | Site: ESOPHAGUS | Status: FUNCTIONAL
Brand: ESOPHYX Z+

## 2022-07-06 DEVICE — CARTRIDGE, 20 FASTENERS, 0.010" SLOT, 7.5MM WEB
Type: IMPLANTABLE DEVICE | Site: ESOPHAGUS | Status: FUNCTIONAL
Brand: 7.5MM CARTRIDGE

## 2022-07-06 RX ORDER — DEXAMETHASONE SODIUM PHOSPHATE 4 MG/ML
INJECTION, SOLUTION INTRA-ARTICULAR; INTRALESIONAL; INTRAMUSCULAR; INTRAVENOUS; SOFT TISSUE AS NEEDED
Status: DISCONTINUED | OUTPATIENT
Start: 2022-07-06 | End: 2022-07-06 | Stop reason: SURG

## 2022-07-06 RX ORDER — DIPHENHYDRAMINE HYDROCHLORIDE 50 MG/ML
12.5 INJECTION INTRAMUSCULAR; INTRAVENOUS
Status: DISCONTINUED | OUTPATIENT
Start: 2022-07-06 | End: 2022-07-06 | Stop reason: HOSPADM

## 2022-07-06 RX ORDER — ONDANSETRON 2 MG/ML
INJECTION INTRAMUSCULAR; INTRAVENOUS AS NEEDED
Status: DISCONTINUED | OUTPATIENT
Start: 2022-07-06 | End: 2022-07-06 | Stop reason: SURG

## 2022-07-06 RX ORDER — ACETAMINOPHEN 650 MG/1
650 SUPPOSITORY RECTAL EVERY 4 HOURS PRN
Status: DISCONTINUED | OUTPATIENT
Start: 2022-07-06 | End: 2022-07-07 | Stop reason: HOSPADM

## 2022-07-06 RX ORDER — EPHEDRINE SULFATE 5 MG/ML
5 INJECTION INTRAVENOUS ONCE AS NEEDED
Status: DISCONTINUED | OUTPATIENT
Start: 2022-07-06 | End: 2022-07-06 | Stop reason: HOSPADM

## 2022-07-06 RX ORDER — FLUMAZENIL 0.1 MG/ML
0.5 INJECTION INTRAVENOUS AS NEEDED
Status: DISCONTINUED | OUTPATIENT
Start: 2022-07-06 | End: 2022-07-06 | Stop reason: HOSPADM

## 2022-07-06 RX ORDER — DIPHENHYDRAMINE HCL 25 MG
25 CAPSULE ORAL
Status: DISCONTINUED | OUTPATIENT
Start: 2022-07-06 | End: 2022-07-06 | Stop reason: HOSPADM

## 2022-07-06 RX ORDER — ATORVASTATIN CALCIUM 10 MG/1
10 TABLET, FILM COATED ORAL DAILY
Status: DISCONTINUED | OUTPATIENT
Start: 2022-07-06 | End: 2022-07-07 | Stop reason: HOSPADM

## 2022-07-06 RX ORDER — ONDANSETRON 4 MG/1
4 TABLET, FILM COATED ORAL EVERY 6 HOURS PRN
Status: DISCONTINUED | OUTPATIENT
Start: 2022-07-06 | End: 2022-07-07 | Stop reason: HOSPADM

## 2022-07-06 RX ORDER — HYDROMORPHONE HCL 110MG/55ML
PATIENT CONTROLLED ANALGESIA SYRINGE INTRAVENOUS AS NEEDED
Status: DISCONTINUED | OUTPATIENT
Start: 2022-07-06 | End: 2022-07-06 | Stop reason: SURG

## 2022-07-06 RX ORDER — LIDOCAINE 50 MG/G
1 PATCH TOPICAL
Status: DISCONTINUED | OUTPATIENT
Start: 2022-07-06 | End: 2022-07-07 | Stop reason: HOSPADM

## 2022-07-06 RX ORDER — PROPOFOL 10 MG/ML
INJECTION, EMULSION INTRAVENOUS AS NEEDED
Status: DISCONTINUED | OUTPATIENT
Start: 2022-07-06 | End: 2022-07-06 | Stop reason: SURG

## 2022-07-06 RX ORDER — FENTANYL CITRATE 50 UG/ML
INJECTION, SOLUTION INTRAMUSCULAR; INTRAVENOUS AS NEEDED
Status: DISCONTINUED | OUTPATIENT
Start: 2022-07-06 | End: 2022-07-06 | Stop reason: SURG

## 2022-07-06 RX ORDER — IPRATROPIUM BROMIDE AND ALBUTEROL SULFATE 2.5; .5 MG/3ML; MG/3ML
3 SOLUTION RESPIRATORY (INHALATION) ONCE AS NEEDED
Status: DISCONTINUED | OUTPATIENT
Start: 2022-07-06 | End: 2022-07-06 | Stop reason: HOSPADM

## 2022-07-06 RX ORDER — NALOXONE HCL 0.4 MG/ML
0.4 VIAL (ML) INJECTION
Status: DISCONTINUED | OUTPATIENT
Start: 2022-07-06 | End: 2022-07-07 | Stop reason: HOSPADM

## 2022-07-06 RX ORDER — CLINDAMYCIN PHOSPHATE 600 MG/50ML
600 INJECTION, SOLUTION INTRAVENOUS EVERY 8 HOURS
Status: COMPLETED | OUTPATIENT
Start: 2022-07-06 | End: 2022-07-06

## 2022-07-06 RX ORDER — LIDOCAINE HYDROCHLORIDE 10 MG/ML
INJECTION, SOLUTION EPIDURAL; INFILTRATION; INTRACAUDAL; PERINEURAL AS NEEDED
Status: DISCONTINUED | OUTPATIENT
Start: 2022-07-06 | End: 2022-07-06 | Stop reason: SURG

## 2022-07-06 RX ORDER — SODIUM CHLORIDE 0.9 % (FLUSH) 0.9 %
3 SYRINGE (ML) INJECTION EVERY 12 HOURS SCHEDULED
Status: DISCONTINUED | OUTPATIENT
Start: 2022-07-06 | End: 2022-07-06 | Stop reason: HOSPADM

## 2022-07-06 RX ORDER — ESMOLOL HYDROCHLORIDE 10 MG/ML
INJECTION INTRAVENOUS AS NEEDED
Status: DISCONTINUED | OUTPATIENT
Start: 2022-07-06 | End: 2022-07-06 | Stop reason: SURG

## 2022-07-06 RX ORDER — PHENYLEPHRINE HCL IN 0.9% NACL 1 MG/10 ML
SYRINGE (ML) INTRAVENOUS AS NEEDED
Status: DISCONTINUED | OUTPATIENT
Start: 2022-07-06 | End: 2022-07-06 | Stop reason: SURG

## 2022-07-06 RX ORDER — POTASSIUM CHLORIDE 7.45 MG/ML
10 INJECTION INTRAVENOUS
Status: DISCONTINUED | OUTPATIENT
Start: 2022-07-06 | End: 2022-07-07 | Stop reason: HOSPADM

## 2022-07-06 RX ORDER — SODIUM CHLORIDE 0.9 % (FLUSH) 0.9 %
3-10 SYRINGE (ML) INJECTION AS NEEDED
Status: DISCONTINUED | OUTPATIENT
Start: 2022-07-06 | End: 2022-07-06 | Stop reason: HOSPADM

## 2022-07-06 RX ORDER — SODIUM CHLORIDE, SODIUM LACTATE, POTASSIUM CHLORIDE, CALCIUM CHLORIDE 600; 310; 30; 20 MG/100ML; MG/100ML; MG/100ML; MG/100ML
9 INJECTION, SOLUTION INTRAVENOUS CONTINUOUS PRN
Status: DISCONTINUED | OUTPATIENT
Start: 2022-07-06 | End: 2022-07-07 | Stop reason: HOSPADM

## 2022-07-06 RX ORDER — CLINDAMYCIN PHOSPHATE 900 MG/50ML
900 INJECTION, SOLUTION INTRAVENOUS ONCE
Status: COMPLETED | OUTPATIENT
Start: 2022-07-06 | End: 2022-07-06

## 2022-07-06 RX ORDER — POTASSIUM CHLORIDE 20 MEQ/1
40 TABLET, EXTENDED RELEASE ORAL AS NEEDED
Status: DISCONTINUED | OUTPATIENT
Start: 2022-07-06 | End: 2022-07-07 | Stop reason: HOSPADM

## 2022-07-06 RX ORDER — ROCURONIUM BROMIDE 10 MG/ML
INJECTION, SOLUTION INTRAVENOUS AS NEEDED
Status: DISCONTINUED | OUTPATIENT
Start: 2022-07-06 | End: 2022-07-06 | Stop reason: SURG

## 2022-07-06 RX ORDER — ACETAMINOPHEN 325 MG/1
650 TABLET ORAL ONCE AS NEEDED
Status: DISCONTINUED | OUTPATIENT
Start: 2022-07-06 | End: 2022-07-06 | Stop reason: HOSPADM

## 2022-07-06 RX ORDER — GLYCOPYRROLATE 0.2 MG/ML
INJECTION INTRAMUSCULAR; INTRAVENOUS AS NEEDED
Status: DISCONTINUED | OUTPATIENT
Start: 2022-07-06 | End: 2022-07-06 | Stop reason: SURG

## 2022-07-06 RX ORDER — EPHEDRINE SULFATE 5 MG/ML
INJECTION INTRAVENOUS AS NEEDED
Status: DISCONTINUED | OUTPATIENT
Start: 2022-07-06 | End: 2022-07-06 | Stop reason: SURG

## 2022-07-06 RX ORDER — LIDOCAINE HYDROCHLORIDE 10 MG/ML
0.5 INJECTION, SOLUTION EPIDURAL; INFILTRATION; INTRACAUDAL; PERINEURAL ONCE AS NEEDED
Status: DISCONTINUED | OUTPATIENT
Start: 2022-07-06 | End: 2022-07-06 | Stop reason: HOSPADM

## 2022-07-06 RX ORDER — BUPIVACAINE HYDROCHLORIDE AND EPINEPHRINE 2.5; 5 MG/ML; UG/ML
INJECTION, SOLUTION EPIDURAL; INFILTRATION; INTRACAUDAL; PERINEURAL AS NEEDED
Status: DISCONTINUED | OUTPATIENT
Start: 2022-07-06 | End: 2022-07-06 | Stop reason: HOSPADM

## 2022-07-06 RX ORDER — LABETALOL HYDROCHLORIDE 5 MG/ML
5 INJECTION, SOLUTION INTRAVENOUS
Status: DISCONTINUED | OUTPATIENT
Start: 2022-07-06 | End: 2022-07-06 | Stop reason: HOSPADM

## 2022-07-06 RX ORDER — HYDROCODONE BITARTRATE AND ACETAMINOPHEN 5; 325 MG/1; MG/1
1 TABLET ORAL ONCE AS NEEDED
Status: DISCONTINUED | OUTPATIENT
Start: 2022-07-06 | End: 2022-07-06 | Stop reason: HOSPADM

## 2022-07-06 RX ORDER — ONDANSETRON 4 MG/1
4 TABLET, FILM COATED ORAL DAILY PRN
Qty: 30 TABLET | Refills: 1 | Status: ON HOLD | OUTPATIENT
Start: 2022-07-06 | End: 2022-09-12

## 2022-07-06 RX ORDER — SODIUM CHLORIDE 9 MG/ML
100 INJECTION, SOLUTION INTRAVENOUS CONTINUOUS
Status: DISCONTINUED | OUTPATIENT
Start: 2022-07-06 | End: 2022-07-07 | Stop reason: HOSPADM

## 2022-07-06 RX ORDER — MEPERIDINE HYDROCHLORIDE 25 MG/ML
12.5 INJECTION INTRAMUSCULAR; INTRAVENOUS; SUBCUTANEOUS
Status: COMPLETED | OUTPATIENT
Start: 2022-07-06 | End: 2022-07-06

## 2022-07-06 RX ORDER — HYDRALAZINE HYDROCHLORIDE 20 MG/ML
5 INJECTION INTRAMUSCULAR; INTRAVENOUS
Status: DISCONTINUED | OUTPATIENT
Start: 2022-07-06 | End: 2022-07-06 | Stop reason: HOSPADM

## 2022-07-06 RX ORDER — HYDRALAZINE HYDROCHLORIDE 20 MG/ML
10 INJECTION INTRAMUSCULAR; INTRAVENOUS EVERY 6 HOURS PRN
Status: DISCONTINUED | OUTPATIENT
Start: 2022-07-06 | End: 2022-07-07 | Stop reason: HOSPADM

## 2022-07-06 RX ORDER — HYDROCODONE BITARTRATE AND ACETAMINOPHEN 7.5; 325 MG/1; MG/1
1 TABLET ORAL EVERY 6 HOURS PRN
Qty: 30 TABLET | Refills: 0 | Status: ON HOLD | OUTPATIENT
Start: 2022-07-06 | End: 2022-09-12

## 2022-07-06 RX ORDER — ONDANSETRON 2 MG/ML
4 INJECTION INTRAMUSCULAR; INTRAVENOUS EVERY 6 HOURS PRN
Status: DISCONTINUED | OUTPATIENT
Start: 2022-07-06 | End: 2022-07-07 | Stop reason: HOSPADM

## 2022-07-06 RX ORDER — ACETAMINOPHEN 325 MG/1
650 TABLET ORAL EVERY 4 HOURS PRN
Status: DISCONTINUED | OUTPATIENT
Start: 2022-07-06 | End: 2022-07-07 | Stop reason: HOSPADM

## 2022-07-06 RX ORDER — ONDANSETRON 2 MG/ML
4 INJECTION INTRAMUSCULAR; INTRAVENOUS ONCE AS NEEDED
Status: DISCONTINUED | OUTPATIENT
Start: 2022-07-06 | End: 2022-07-06 | Stop reason: HOSPADM

## 2022-07-06 RX ORDER — SUCRALFATE 1 G/1
1 TABLET ORAL 4 TIMES DAILY
Status: DISCONTINUED | OUTPATIENT
Start: 2022-07-06 | End: 2022-07-07 | Stop reason: HOSPADM

## 2022-07-06 RX ORDER — MIDAZOLAM HYDROCHLORIDE 1 MG/ML
2 INJECTION INTRAMUSCULAR; INTRAVENOUS
Status: DISCONTINUED | OUTPATIENT
Start: 2022-07-06 | End: 2022-07-06 | Stop reason: HOSPADM

## 2022-07-06 RX ORDER — NALOXONE HCL 0.4 MG/ML
0.4 VIAL (ML) INJECTION AS NEEDED
Status: DISCONTINUED | OUTPATIENT
Start: 2022-07-06 | End: 2022-07-06 | Stop reason: HOSPADM

## 2022-07-06 RX ORDER — OXYCODONE HYDROCHLORIDE 5 MG/1
10 TABLET ORAL EVERY 4 HOURS PRN
Status: DISCONTINUED | OUTPATIENT
Start: 2022-07-06 | End: 2022-07-07 | Stop reason: HOSPADM

## 2022-07-06 RX ORDER — NALOXONE HCL 0.4 MG/ML
0.1 VIAL (ML) INJECTION
Status: DISCONTINUED | OUTPATIENT
Start: 2022-07-06 | End: 2022-07-07 | Stop reason: HOSPADM

## 2022-07-06 RX ORDER — POTASSIUM CHLORIDE 1.5 G/1.77G
40 POWDER, FOR SOLUTION ORAL AS NEEDED
Status: DISCONTINUED | OUTPATIENT
Start: 2022-07-06 | End: 2022-07-07 | Stop reason: HOSPADM

## 2022-07-06 RX ORDER — ACETAMINOPHEN 650 MG/1
650 SUPPOSITORY RECTAL ONCE AS NEEDED
Status: DISCONTINUED | OUTPATIENT
Start: 2022-07-06 | End: 2022-07-06 | Stop reason: HOSPADM

## 2022-07-06 RX ORDER — HYDROCODONE BITARTRATE AND ACETAMINOPHEN 5; 325 MG/1; MG/1
1 TABLET ORAL EVERY 4 HOURS PRN
Status: DISCONTINUED | OUTPATIENT
Start: 2022-07-06 | End: 2022-07-07 | Stop reason: HOSPADM

## 2022-07-06 RX ORDER — PANTOPRAZOLE SODIUM 40 MG/10ML
40 INJECTION, POWDER, LYOPHILIZED, FOR SOLUTION INTRAVENOUS
Status: DISCONTINUED | OUTPATIENT
Start: 2022-07-07 | End: 2022-07-07 | Stop reason: HOSPADM

## 2022-07-06 RX ORDER — MORPHINE SULFATE 4 MG/ML
4 INJECTION, SOLUTION INTRAMUSCULAR; INTRAVENOUS
Status: DISCONTINUED | OUTPATIENT
Start: 2022-07-06 | End: 2022-07-07 | Stop reason: HOSPADM

## 2022-07-06 RX ORDER — FENTANYL CITRATE 50 UG/ML
50 INJECTION, SOLUTION INTRAMUSCULAR; INTRAVENOUS
Status: DISCONTINUED | OUTPATIENT
Start: 2022-07-06 | End: 2022-07-06 | Stop reason: HOSPADM

## 2022-07-06 RX ORDER — ACETAMINOPHEN 325 MG/1
325 TABLET ORAL ONCE AS NEEDED
Status: DISCONTINUED | OUTPATIENT
Start: 2022-07-06 | End: 2022-07-06 | Stop reason: HOSPADM

## 2022-07-06 RX ADMIN — ROCURONIUM BROMIDE 10 MG: 50 INJECTION, SOLUTION INTRAVENOUS at 09:25

## 2022-07-06 RX ADMIN — LIDOCAINE 1 PATCH: 50 PATCH TOPICAL at 19:26

## 2022-07-06 RX ADMIN — DEXAMETHASONE SODIUM PHOSPHATE 8 MG: 4 INJECTION, SOLUTION INTRAMUSCULAR; INTRAVENOUS at 07:58

## 2022-07-06 RX ADMIN — CLINDAMYCIN PHOSPHATE 900 MG: 900 INJECTION, SOLUTION INTRAVENOUS at 07:54

## 2022-07-06 RX ADMIN — Medication 100 MCG: at 08:28

## 2022-07-06 RX ADMIN — MEPERIDINE HYDROCHLORIDE 12.5 MG: 25 INJECTION INTRAMUSCULAR; INTRAVENOUS; SUBCUTANEOUS at 10:40

## 2022-07-06 RX ADMIN — ESMOLOL HYDROCHLORIDE 20 MG: 10 INJECTION, SOLUTION INTRAVENOUS at 09:37

## 2022-07-06 RX ADMIN — ROCURONIUM BROMIDE 20 MG: 50 INJECTION, SOLUTION INTRAVENOUS at 08:16

## 2022-07-06 RX ADMIN — MORPHINE SULFATE 4 MG: 4 INJECTION, SOLUTION INTRAMUSCULAR; INTRAVENOUS at 16:50

## 2022-07-06 RX ADMIN — GLYCOPYRROLATE 0.2 MG: 0.2 INJECTION INTRAMUSCULAR; INTRAVENOUS at 08:25

## 2022-07-06 RX ADMIN — HYDROMORPHONE HYDROCHLORIDE 0.5 MG: 1 INJECTION, SOLUTION INTRAMUSCULAR; INTRAVENOUS; SUBCUTANEOUS at 11:50

## 2022-07-06 RX ADMIN — FENTANYL CITRATE 50 MCG: 50 INJECTION, SOLUTION INTRAMUSCULAR; INTRAVENOUS at 08:20

## 2022-07-06 RX ADMIN — SODIUM CHLORIDE 100 ML/HR: 9 INJECTION, SOLUTION INTRAVENOUS at 16:25

## 2022-07-06 RX ADMIN — SODIUM CHLORIDE, POTASSIUM CHLORIDE, SODIUM LACTATE AND CALCIUM CHLORIDE 9 ML/HR: 600; 310; 30; 20 INJECTION, SOLUTION INTRAVENOUS at 07:38

## 2022-07-06 RX ADMIN — ROCURONIUM BROMIDE 20 MG: 50 INJECTION, SOLUTION INTRAVENOUS at 08:49

## 2022-07-06 RX ADMIN — SUGAMMADEX 200 MG: 100 INJECTION, SOLUTION INTRAVENOUS at 09:49

## 2022-07-06 RX ADMIN — EPHEDRINE SULFATE 10 MG: 5 INJECTION INTRAVENOUS at 08:25

## 2022-07-06 RX ADMIN — HYDROMORPHONE HYDROCHLORIDE 0.4 MG: 2 INJECTION, SOLUTION INTRAMUSCULAR; INTRAVENOUS; SUBCUTANEOUS at 08:50

## 2022-07-06 RX ADMIN — HYDROMORPHONE HYDROCHLORIDE 0.5 MG: 1 INJECTION, SOLUTION INTRAMUSCULAR; INTRAVENOUS; SUBCUTANEOUS at 11:00

## 2022-07-06 RX ADMIN — LIDOCAINE HYDROCHLORIDE 50 MG: 10 INJECTION, SOLUTION EPIDURAL; INFILTRATION; INTRACAUDAL; PERINEURAL at 07:49

## 2022-07-06 RX ADMIN — PROPOFOL 200 MG: 10 INJECTION, EMULSION INTRAVENOUS at 07:49

## 2022-07-06 RX ADMIN — CLINDAMYCIN PHOSPHATE 600 MG: 600 INJECTION, SOLUTION INTRAVENOUS at 16:51

## 2022-07-06 RX ADMIN — FENTANYL CITRATE 50 MCG: 50 INJECTION, SOLUTION INTRAMUSCULAR; INTRAVENOUS at 07:49

## 2022-07-06 RX ADMIN — CLINDAMYCIN PHOSPHATE 600 MG: 600 INJECTION, SOLUTION INTRAVENOUS at 23:45

## 2022-07-06 RX ADMIN — HYDROCODONE BITARTRATE AND ACETAMINOPHEN 1 TABLET: 5; 325 TABLET ORAL at 13:23

## 2022-07-06 RX ADMIN — HYDROMORPHONE HYDROCHLORIDE 0.2 MG: 2 INJECTION, SOLUTION INTRAMUSCULAR; INTRAVENOUS; SUBCUTANEOUS at 09:42

## 2022-07-06 RX ADMIN — MORPHINE SULFATE 4 MG: 4 INJECTION, SOLUTION INTRAMUSCULAR; INTRAVENOUS at 22:59

## 2022-07-06 RX ADMIN — ONDANSETRON 4 MG: 2 INJECTION INTRAMUSCULAR; INTRAVENOUS at 09:29

## 2022-07-06 RX ADMIN — HYDROMORPHONE HYDROCHLORIDE 0.4 MG: 2 INJECTION, SOLUTION INTRAMUSCULAR; INTRAVENOUS; SUBCUTANEOUS at 09:27

## 2022-07-06 RX ADMIN — FENTANYL CITRATE 50 MCG: 50 INJECTION, SOLUTION INTRAMUSCULAR; INTRAVENOUS at 10:25

## 2022-07-06 RX ADMIN — ROCURONIUM BROMIDE 50 MG: 50 INJECTION, SOLUTION INTRAVENOUS at 07:49

## 2022-07-06 RX ADMIN — ALUMINA, MAGNESIA, AND SIMETHICONE: 2400; 2400; 240 SUSPENSION ORAL at 11:47

## 2022-07-06 RX ADMIN — ATORVASTATIN CALCIUM 10 MG: 10 TABLET, FILM COATED ORAL at 19:25

## 2022-07-06 RX ADMIN — MEPERIDINE HYDROCHLORIDE 12.5 MG: 25 INJECTION INTRAMUSCULAR; INTRAVENOUS; SUBCUTANEOUS at 10:45

## 2022-07-06 RX ADMIN — HYDROMORPHONE HYDROCHLORIDE 0.4 MG: 2 INJECTION, SOLUTION INTRAMUSCULAR; INTRAVENOUS; SUBCUTANEOUS at 08:35

## 2022-07-06 RX ADMIN — SODIUM CHLORIDE 100 ML/HR: 9 INJECTION, SOLUTION INTRAVENOUS at 18:19

## 2022-07-06 RX ADMIN — MORPHINE SULFATE 4 MG: 4 INJECTION, SOLUTION INTRAMUSCULAR; INTRAVENOUS at 19:37

## 2022-07-06 NOTE — H&P
Subjective   Td Melendez Jr. is a 76 y.o. male.     History of present illness  Mr. Melendez is a pleasant 76-year-old who was seen in the office at his own request for a long standing GERD.  He states the medicines that he takes really are not controlling his symptoms.  He researched and found the TIF procedure and sought us out for more information.  We recommended that he undergo an EGD.  That has been completed and we know that he has a small 2 cm hernia.  He has no Cheema's disease.  We have recommended that he consider a laparoscopic hiatal hernia repair with transoral fundoplication.  We have gone over a number of brochures and drawn him pictures and all questions have been answered.  He presents today for the definitive procedure.    Past Medical History:   Diagnosis Date   • Anxiety    • CAD (coronary artery disease)     unstable angina, 7/2015: 20% LM, long 70% LAD with abnormal FFR (s/p 2.47r47ck Xience), 50% OM1, 99% prox RCA (s/p 3.5x18m Xience).  Normal Cardiolite 11/2015 and 6/2018   • Colon polyp 2010   • Fibromyalgia    • GERD (gastroesophageal reflux disease)    • H/O complete eye exam 04/2018   • Hyperlipidemia    • IBS (irritable bowel syndrome)    • Squamous cell skin cancer, face    • Urinary retention        Past Surgical History:   Procedure Laterality Date   • CARDIAC CATHETERIZATION      [facility] 07/2015: cath with 20% LM long 70% LAD (with positive FFR), 50% OM1, 99% prox RCA, s/p 2.75x33 Xience Alpine to LAD and 3.5x18mm Xience Alpine to RCA. LVEF 64% Normal Cardiolite 11/2015   • CARDIAC SURGERY      cardiac stents, 2   • COLONOSCOPY  approx 6/2011    normal per patient  [facility]   • COLONOSCOPY N/A 4/26/2021    Procedure: COLONOSCOPY TO CECUM WITH COLD SNARE POLYPECTOMIES AND BIOPSIES ;  Surgeon: Catarino Luna MD;  Location: [facility] ENDOSCOPY;  Service: Gastroenterology;  Laterality: N/A;  PRE- HISTORY COLON POLYPS  POST- POLYPS, COLITIS, HEMORRHOIDS   • COLONOSCOPY W/  POLYPECTOMY N/A 2018    Procedure: COLONOSCOPY TO CECUM WITH COLD SNAREPOLYPECTOMIES, HOT SNARE POLYPECTOMIES;  Surgeon: Catarino Luna MD;  Location: [facility] ENDOSCOPY;  Service: Gastroenterology   • CORONARY ANGIOPLASTY WITH STENT PLACEMENT     • CYSTOSCOPY TRANSURETHRAL RESECTION OF PROSTATE     • ENDOSCOPY N/A 2018    Procedure: ESOPHAGOGASTRODUODENOSCOPY WITH COLD BIOPSIES;  Surgeon: Catarino Luna MD;  Location: [facility] ENDOSCOPY;  Service: Gastroenterology   • ENDOSCOPY N/A 2021    Procedure: ESOPHAGOGASTRODUODENOSCOPY with cold biopsies;  Surgeon: Catarino Luna MD;  Location: [facility] ENDOSCOPY;  Service: Gastroenterology;  Laterality: N/A;  PRE: EPIGASTRIC PAIN, DYSPEPSIA  POST: GASTRITIS   • ENDOSCOPY N/A 2022    Procedure: ESOPHAGOGASTRODUODENOSCOPY WITH DILATATION #48 to #58 bougie, gastric biopsy;  Surgeon: Td Richey DO;  Location: [facility] ENDOSCOPY;  Service: General;  Laterality: N/A;  gastritis, small hiatal hernia   • RADIOFREQUENCY ABLATION      NECK   • SKIN CANCER EXCISION     • WISDOM TOOTH EXTRACTION         [unfilled]    Allergies   Allergen Reactions   • Augmentin [Amoxicillin-Pot Clavulanate] Hives and Itching       Family History   Problem Relation Age of Onset   • Cervical cancer Mother    • Cancer Mother    • Depression Mother    • Heart disease Mother    • Liver disease Mother    • Alcohol abuse Mother    • Thyroid cancer Father    • Cancer Father    • Depression Father    • Diabetes Father    • Thyroid disease Father    • Coronary artery disease Other    • Malig Hyperthermia Neg Hx        Social History     Socioeconomic History   • Marital status:    Tobacco Use   • Smoking status: Former Smoker     Packs/day: 0.25     Years: 4.00     Pack years: 1.00     Types: Cigarettes     Start date: 1968     Quit date:      Years since quittin.5   • Smokeless tobacco: Never Used   Vaping Use   • Vaping Use: Never used   Substance and  Sexual Activity   • Alcohol use: No     Comment: caffeine use   • Drug use: No   • Sexual activity: Defer       The following portions of the patient's history were reviewed and updated as appropriate: allergies, current medications, past family history, past medical history, past social history, past surgical history and problem list.    Objective   Complete review of systems is done and unremarkable with exception the chief complaint and the above noted specific exceptions.    Physical exam shows a pleasant 76-year-old male.  HEENT is negative.  Heart regular.  Lungs are clear.  Abdomen is soft and nontender without mass.  Extremities show equal range of motion and usage.  Neuro with no obvious focal deficit.    Impression: 6-year-old with small hiatal hernia with symptomatic reflux disease refractory to medicine    Plan: Laparoscopic hiatal hernia repair with transoral fundoplication    Assessment & Plan                  Td Richey DO  7/6/2022  07:16 EDT

## 2022-07-06 NOTE — OP NOTE
HIATAL HERNIA REPAIR LAPAROSCOPIC WITH TRANSORAL INCISIONLESS FUNDOPLICATION  Procedure Report    Patient Name:  Td Melendez Jr.  YOB: 1945    Date of Surgery:  7/6/2022     Indications: Sliding type I hiatal hernia with reflux    Pre-op Diagnosis:   Gastroesophageal reflux disease without esophagitis [K21.9]       Post-Op Diagnosis Codes:     * Gastroesophageal reflux disease without esophagitis [K21.9]    Procedure/CPT® Codes:      Procedure(s):  HIATAL HERNIA REPAIR LAPAROSCOPIC WITH TRANSORAL  FUNDOPLICATION    Staff:  Surgeon(s):  Td Richey DO    Assistant: Pretty Bettencourt RNFA    Anesthesia: General    Anesthetist: CHAGO Gregory    Estimated Blood Loss: minimal    Implants:    Implant Name Type Inv. Item Serial No.  Lot No. LRB No. Used Action   CARTRDG FASTNR GI SEROSAFUSE 7.5MM EA/20 - [id number] Implant CARTRDG FASTNR GI SEROSAFUSE 7.5MM EA/20  [employer] [id number] N/A 1 Implanted   DEV DS GI FASTNR ESOPHYXZ PLS W/SEROSAFUSE IMP - [id number] Implant DEV DS GI FASTNR ESOPHYXZ PLS W/SEROSAFUSE IMP  [employer] [id number] N/A 1 Implanted       Specimen:          None        Findings: 3 to 4 cm sliding type I hiatal hernia    Complications: None    Description of Procedure: Mr. Melendez is a 76-year-old seen in the office with complaint of several year history of refractory reflux disease despite taking medicines.  We recently scoped him and found him to have a hiatal hernia and recommended that he consider a lap hiatal hernia repair with transoral fundoplication.  We have drawn and pictures gone over 3 brochures and all questions have been answered.  He is ready to proceed with surgery today.    Patient was taken the operating room placed in the supine position.  General was done by Dr. Mark and Ms. Rudolph.  Timeout done identity verified.  He is placed on a pink and thigh slide pad and banana boot lithotomy position with thigh straps and  chest strap securing them to the table.  Cervantes catheter was then inserted aseptically and then abdomen prepped and draped after 3-minute drying time.  A left paraumbilical incision is made and varies needle was passed through all layers.  Saline drop test is done is negative and the abdomen insufflated with CO2 to approximately 15 mmHg pressure.  Disposable 11 mm trocar and sheath is passed and the laparoscope was introduced confirming intra-abdominal positioning with no injury.  The other 4 ports were then placed under direct vision.  The iron intern liver retractor was then positioned on the table was placed in steep reverse Trendelenburg position and rolled to the right.  We began by placing the stomach on downward traction and the gastrohepatic ligament is opened with the [place] LigaSure device.  This is carried up to near the diaphragm.  The peritoneum overlying the right niles is then scored and the appropriate mediastinal plane is entered.  Dissection is carried up into the mediastinum circumferentially freeing the esophagus from its attachments to gain additional length.  The left niles was then dissected out and then using an Endo Stitch device the crura were approximated.  It required 4 stitches to close the defect down sufficiently.  We then used an L med suture passer and 0 Vicryl sutures stitches were placed through the fascia at each of the 11 mm port sites under direct vision.  All ports were then removed allowing CO2 to escape the atmosphere proving no bleeding from the port sites themselves.  The fascial stitches were tied down then skin closed with 4-0 Vicryl in a subcu manner throughout.  Sterile OpSite dressings were applied over Mastisol and Steri-Strips.  He was then taken out of banana boot lithotomy position turned to the left lateral decub positioning on the blue positioning pillow and secured to the table with wide straps and tape.  The Olympus upper Pan videoscope was then passed by the  cricopharyngeus into the esophagus stomach and duodenum.  Duodenum was normal.  Scope withdrawn back into the stomach and the stomach inspected.  Stomach walls inflate and decompressed normally.  The scope was retroflexed upon itself viewing the EG junction.  The hiatal hernia is now nicely closed down.  Scope was then withdrawn back to the Z-line which was located at 44 to 45 cm from the incisors.  The remainder the esophageal mucosa was unremarkable on the way out.  We then sequentially dilated his esophagus from 48-58 Guamanian using Hardin type dilators.  The Soffix 2 device was then opened and prepared by lubricating it down its channel and at its pivot points.  The scope was then lubricated and passed down the channel and the device and the device was then passed down the esophagus under direct vision on into the stomach.  We began firings at 6:00.  2 were done at 6 2 between 7 and 8 to between 9 and 10 to between 10 and 11.  2 firings were then done at 5:00 to between 3 and 4 and 2 between 1 and 2.  The remainder the 20 firings were then done between 5 and 7:00.  We had a good 2-1/2 cm long valve.  We then removed the scope and the device under direct vision.  We went back down with the scope to inspect the valve check for any injury.  Pictures were taken of the valve.  There was no injury to the cardia to the esophagus or other structures.  The scope was then withdrawn decompressing the upper GI tract was much fluid and air on the way out as possible.  He tolerated the procedure well was wakened and transferred to recovery in satisfactory condition.  The final sponge instrument and needle counts were correct.    Assistant: Pretty Bettencourt RNFA  was responsible for performing the following activities: Retraction, Suturing, Closing, Placing Dressing and Held/Positioned Camera and their skilled assistance was necessary for the success of this case.    Td Richey,      Date: 7/6/2022  Time: 09:51 EDT

## 2022-07-06 NOTE — ANESTHESIA POSTPROCEDURE EVALUATION
Patient: Td Melendez Jr.    Procedure Summary     Date: 07/06/22 Room / Location: [facility] OR  / [facility] MAIN OR    Anesthesia Start: 0744 Anesthesia Stop: 1001    Procedure: HIATAL HERNIA REPAIR LAPAROSCOPIC WITH TRANSORAL INCISIONLESS FUNDOPLICATION (N/A Abdomen) Diagnosis:       Gastroesophageal reflux disease without esophagitis      (Gastroesophageal reflux disease without esophagitis [K21.9])    Surgeons: Td Richey DO Provider: Lionel Soares MD    Anesthesia Type: general ASA Status: 3          Anesthesia Type: general    Vitals  Vitals Value Taken Time   /68 07/06/22 1542   Temp 97.7 °F (36.5 °C) 07/06/22 1535   Pulse 98 07/06/22 1545   Resp 10 07/06/22 1535   SpO2 94 % 07/06/22 1545   Vitals shown include unvalidated device data.        Post Anesthesia Care and Evaluation    Patient location during evaluation: PACU  Patient participation: complete - patient participated  Level of consciousness: awake  Pain score: 0  Pain management: adequate    Airway patency: patent  Anesthetic complications: No anesthetic complications  PONV Status: none  Cardiovascular status: acceptable  Respiratory status: acceptable  Hydration status: acceptable    Comments: Patient seen and examined postoperatively; vital signs stable; SpO2 greater than or equal to 90%; cardiopulmonary status stable; nausea/vomiting adequately controlled; pain adequately controlled; no apparent anesthesia complications; patient discharged from anesthesia care when discharge criteria were met

## 2022-07-06 NOTE — ANESTHESIA PREPROCEDURE EVALUATION
Anesthesia Evaluation     Patient summary reviewed and Nursing notes reviewed   NPO Solid Status: > 8 hours  NPO Liquid Status: > 2 hours           Airway   Mallampati: II  TM distance: >3 FB  Neck ROM: full  Dental - normal exam     Pulmonary - normal exam   (+) a smoker Former,   Cardiovascular - normal exam    ECG reviewed    (+) CAD, cardiac stents Drug eluting stent more than 12 months ago hyperlipidemia,     ROS comment: Stents x 2 in 2015    Stress 2018:  Interpretation Summary  ·Myocardial perfusion imaging indicates a normal myocardial perfusion study with no evidence of ischemia.  ·Left ventricular ejection fraction is normal (Calculated EF = 65%).  ·Impressions are consistent with a low risk study.  ·GI artifact is present.        Neuro/Psych  (+) psychiatric history Anxiety,    GI/Hepatic/Renal/Endo    (+)  GERD poorly controlled,      Musculoskeletal (-) negative ROS    Abdominal    Substance History - negative use     OB/GYN negative ob/gyn ROS         Other      history of cancer remission                      Anesthesia Plan    ASA 3     general     intravenous induction     Anesthetic plan, risks, benefits, and alternatives have been provided, discussed and informed consent has been obtained with: patient.  Use of blood products discussed with patient  Consented to blood products.   Plan discussed with CAA.

## 2022-07-06 NOTE — CONSULTS
[facility] Medicine Services - Consult Note    Patient Name: Td Melendez Jr.  : 1945  MRN: [id number]  Primary Care Physician:  Beau Lee MD  Referring Physician: Td Richey DO  Date of admission: 2022    Consults    Subjective      Reason for Consult/ Chief Complaint: neck pain, abdominal pain     History of Present Illness: Td Melendez Jr. is a 76 y.o. male with a past medical history of CAD, anxiety, GERD, and hyperlipidemia who presented to [facility] on 2022 to undergo surgery.  Patient underwent hiatal hernia repair laparoscopic with transoral fundoplication by Dr. Richey.  Hospitalist were consulted for medical management.  Patient reports postoperatively he is having neck pain, which is chronic for him but it is worse.  His current neck pain is a 6 out of 10.  Hot and cold compresses help.  He reports being in awkward positions makes his neck pain worse.  He is currently having 2 out of 10 abdominal pain.  Pain medication is helped.  He denies any aggravating factors.  He noted that he was having hematuria postoperatively and has had a prostate procedure in the past where this has happened.  He reports this has also happen with insertion of Cervantes catheters.    Review of Systems   Constitutional: Negative.   HENT: Negative.    Eyes: Negative.    Cardiovascular: Negative.    Respiratory: Negative.    Skin: Negative.    Musculoskeletal: Positive for neck pain.   Gastrointestinal: Positive for abdominal pain.   Genitourinary: Positive for hematuria.   Neurological: Negative.    Psychiatric/Behavioral: Negative.         Personal History     Past Medical History:   Diagnosis Date   • Anxiety    • CAD (coronary artery disease)     unstable angina, 2015: 20% LM, long 70% LAD with abnormal FFR (s/p 2.54k16co Xience), 50% OM1, 99% prox RCA (s/p 3.5x18m Xience).  Normal Cardiolite 2015 and 2018   • Colon polyp    • Fibromyalgia    • GERD  (gastroesophageal reflux disease)    • H/O complete eye exam 04/2018   • Hyperlipidemia    • IBS (irritable bowel syndrome)    • Squamous cell skin cancer, face    • Urinary retention        Past Surgical History:   Procedure Laterality Date   • CARDIAC CATHETERIZATION      [facility] 07/2015: cath with 20% LM long 70% LAD (with positive FFR), 50% OM1, 99% prox RCA, s/p 2.75x33 Xience Alpine to LAD and 3.5x18mm Xience Alpine to RCA. LVEF 64% Normal Cardiolite 11/2015   • CARDIAC SURGERY      cardiac stents, 2   • COLONOSCOPY  approx 6/2011    normal per patient  [facility]   • COLONOSCOPY N/A 4/26/2021    Procedure: COLONOSCOPY TO CECUM WITH COLD SNARE POLYPECTOMIES AND BIOPSIES ;  Surgeon: Catarino Luna MD;  Location: [facility] ENDOSCOPY;  Service: Gastroenterology;  Laterality: N/A;  PRE- HISTORY COLON POLYPS  POST- POLYPS, COLITIS, HEMORRHOIDS   • COLONOSCOPY W/ POLYPECTOMY N/A 7/25/2018    Procedure: COLONOSCOPY TO CECUM WITH COLD SNAREPOLYPECTOMIES, HOT SNARE POLYPECTOMIES;  Surgeon: Catarino Luna MD;  Location: [facility] ENDOSCOPY;  Service: Gastroenterology   • CORONARY ANGIOPLASTY WITH STENT PLACEMENT     • CYSTOSCOPY TRANSURETHRAL RESECTION OF PROSTATE     • ENDOSCOPY N/A 7/25/2018    Procedure: ESOPHAGOGASTRODUODENOSCOPY WITH COLD BIOPSIES;  Surgeon: Catarino Luna MD;  Location: [facility] ENDOSCOPY;  Service: Gastroenterology   • ENDOSCOPY N/A 1/7/2021    Procedure: ESOPHAGOGASTRODUODENOSCOPY with cold biopsies;  Surgeon: Catarino Luna MD;  Location: [facility] ENDOSCOPY;  Service: Gastroenterology;  Laterality: N/A;  PRE: EPIGASTRIC PAIN, DYSPEPSIA  POST: GASTRITIS   • ENDOSCOPY N/A 5/25/2022    Procedure: ESOPHAGOGASTRODUODENOSCOPY WITH DILATATION #48 to #58 bougie, gastric biopsy;  Surgeon: Td Richey DO;  Location: [facility] ENDOSCOPY;  Service: General;  Laterality: N/A;  gastritis, small hiatal hernia   • RADIOFREQUENCY ABLATION      NECK   • SKIN CANCER EXCISION     • WISDOM  TOOTH EXTRACTION         Family History: family history includes Alcohol abuse in his mother; Cancer in his father and mother; Cervical cancer in his mother; Coronary artery disease in an other family member; Depression in his father and mother; Diabetes in his father; Heart disease in his mother; Liver disease in his mother; Thyroid cancer in his father; Thyroid disease in his father. Otherwise pertinent FHx was reviewed and not pertinent to current issue.    Social History:  reports that he quit smoking about 50 years ago. His smoking use included cigarettes. He started smoking about 54 years ago. He has a 1.00 pack-year smoking history. He has never used smokeless tobacco. He reports that he does not drink alcohol and does not use drugs.    Home Medications:   ALPRAZolam, Emollient, HYDROcodone-acetaminophen, Lithium, Vitamin D-Vitamin K, aluminum hydroxide-mag carbonate, atorvastatin, clopidogrel, coenzyme Q10, diphenhydrAMINE-acetaminophen, ezetimibe, melatonin, ondansetron, pantoprazole, pitavastatin calcium, sildenafil, and sucralfate    Allergies:  Allergies   Allergen Reactions   • Augmentin [Amoxicillin-Pot Clavulanate] Hives and Itching         Objective      Vitals:  Temp:  [97.1 °F (36.2 °C)-98.2 °F (36.8 °C)] 98.2 °F (36.8 °C)  Heart Rate:  [] 89  Resp:  [7-19] 18  BP: (139-167)/(68-83) 147/69  Flow (L/min):  [6] 6    Physical Exam  Vitals reviewed.   Constitutional:       Appearance: Normal appearance. He is normal weight.   HENT:      Head: Normocephalic and atraumatic.      Nose: Nose normal.      Mouth/Throat:      Mouth: Mucous membranes are moist.      Pharynx: Oropharynx is clear.   Eyes:      Extraocular Movements: Extraocular movements intact.      Conjunctiva/sclera: Conjunctivae normal.      Pupils: Pupils are equal, round, and reactive to light.   Cardiovascular:      Rate and Rhythm: Normal rate and regular rhythm.      Pulses: Normal pulses.      Heart sounds: Normal heart sounds.       Comments: S1, S2 audible  Pulmonary:      Effort: Pulmonary effort is normal.      Breath sounds: Normal breath sounds.      Comments: On room air   Abdominal:      General: Abdomen is flat.      Palpations: Abdomen is soft.      Comments: Hypoactive bowel sounds   Musculoskeletal:         General: Normal range of motion.      Cervical back: Normal range of motion.   Skin:     General: Skin is warm and dry.   Neurological:      General: No focal deficit present.      Mental Status: He is alert and oriented to person, place, and time. Mental status is at baseline.   Psychiatric:         Mood and Affect: Mood normal.         Behavior: Behavior normal.         Thought Content: Thought content normal.         Judgment: Judgment normal.         Result Review    Result Review:  I have personally reviewed the results from the time of this admission to 7/6/2022 18:24 EDT and agree with these findings:  [x]  Laboratory  []  Microbiology  []  Radiology  []  EKG/Telemetry   []  Cardiology/Vascular   []  Pathology  []  Old records  []  Other:        Assessment & Plan        Active Hospital Problems:  Active Hospital Problems    Diagnosis    • **Gastroesophageal reflux disease without esophagitis    • Hematuria    • Chronic pain    • Anxiety    • Hyperlipidemia    • CAD (coronary artery disease)      unstable angina, 7/2015: 20% LM, long 70% LAD with abnormal FFR (s/p 2.43k20qf Xience), 50% OM1, 99% prox RCA (s/p 3.5x18m Xience).  Normal Cardiolite 11/2015.        Plan:     GERD  - S/P hiatal hernia repair laparoscopic with transoral fundoplication  -Encourage incentive spirometry  -Pain medication per general surgery  -Continue Protonix and clindamycin  -General surgery following    Acute hematuria  - Check CBC and CMP in am   - Urology consulted     Chronic CAD  -Continue statin, holding home Plavix as patient has had surgery-defer to general surgery    Hyperlipidemia  -Continue statin    Anxiety  - Stable  - On lithium  at home    Chronic pain   - Holding home norco- pain medication per general surgery   - Lidocaine patch ordered     This patient has been examined wearing appropriate Personal Protective Equipment. 07/06/22      Signature: Electronically signed by RUSS Ingram, 07/06/22, 6:20 PM EDT.

## 2022-07-06 NOTE — ANESTHESIA PROCEDURE NOTES
Airway  Urgency: elective    Date/Time: 7/6/2022 7:51 AM  End Time:7/6/2022 7:51 AM  Airway not difficult    General Information and Staff    Patient location during procedure: OR  Anesthesiologist: Lionel Soares MD  CRNA/CAA: Carla Rudolph CAA    Indications and Patient Condition  Indications for airway management: airway protection    Preoxygenated: yes  Mask difficulty assessment: 0 - not attempted    Final Airway Details  Final airway type: endotracheal airway      Successful airway: ETT  Cuffed: yes   Successful intubation technique: direct laryngoscopy and RSI  Facilitating devices/methods: intubating stylet and cricoid pressure  Endotracheal tube insertion site: oral  Blade: Dotty  Blade size: 4  ETT size (mm): 7.0  Cormack-Lehane Classification: grade I - full view of glottis  Placement verified by: chest auscultation, capnometry and palpation of cuff   Measured from: lips  ETT/EBT  to lips (cm): 22  Number of attempts at approach: 1  Assessment: lips, teeth, and gum same as pre-op and atraumatic intubation    Additional Comments  Surgeon requests 7.0 ETT for all hiatal hernia/TIF procedures, taped to the left.

## 2022-07-06 NOTE — PLAN OF CARE
Goal Outcome Evaluation:   Pt admitted to [facility] from PACU. Aox4. VSS. Incisional dressings clean, dry, and intact. Pain managed w/IV morphine. Will continue to monitor.

## 2022-07-07 ENCOUNTER — READMISSION MANAGEMENT (OUTPATIENT)
Dept: CALL CENTER | Facility: HOSPITAL | Age: 77
End: 2022-07-07

## 2022-07-07 VITALS
OXYGEN SATURATION: 96 % | HEART RATE: 65 BPM | WEIGHT: 176 LBS | HEIGHT: 70 IN | SYSTOLIC BLOOD PRESSURE: 138 MMHG | RESPIRATION RATE: 18 BRPM | DIASTOLIC BLOOD PRESSURE: 65 MMHG | TEMPERATURE: 97.9 F | BODY MASS INDEX: 25.2 KG/M2

## 2022-07-07 LAB
ALBUMIN SERPL-MCNC: 3.7 G/DL (ref 3.5–5.2)
ALBUMIN/GLOB SERPL: 1.5 G/DL
ALP SERPL-CCNC: 71 U/L (ref 39–117)
ALT SERPL W P-5'-P-CCNC: 13 U/L (ref 1–41)
ANION GAP SERPL CALCULATED.3IONS-SCNC: 11 MMOL/L (ref 5–15)
AST SERPL-CCNC: 17 U/L (ref 1–40)
BACTERIA UR QL AUTO: ABNORMAL /HPF
BASOPHILS # BLD AUTO: 0 10*3/MM3 (ref 0–0.2)
BASOPHILS NFR BLD AUTO: 0.1 % (ref 0–1.5)
BILIRUB SERPL-MCNC: 0.6 MG/DL (ref 0–1.2)
BILIRUB UR QL STRIP: NEGATIVE
BUN SERPL-MCNC: 16 MG/DL (ref 8–23)
BUN/CREAT SERPL: 17.8 (ref 7–25)
CALCIUM SPEC-SCNC: 8.3 MG/DL (ref 8.6–10.5)
CHLORIDE SERPL-SCNC: 105 MMOL/L (ref 98–107)
CLARITY UR: CLEAR
CO2 SERPL-SCNC: 23 MMOL/L (ref 22–29)
COLOR UR: YELLOW
CREAT SERPL-MCNC: 0.9 MG/DL (ref 0.76–1.27)
DEPRECATED RDW RBC AUTO: 41.1 FL (ref 37–54)
EGFRCR SERPLBLD CKD-EPI 2021: 89.6 ML/MIN/1.73
EOSINOPHIL # BLD AUTO: 0 10*3/MM3 (ref 0–0.4)
EOSINOPHIL NFR BLD AUTO: 0 % (ref 0.3–6.2)
ERYTHROCYTE [DISTWIDTH] IN BLOOD BY AUTOMATED COUNT: 13 % (ref 12.3–15.4)
GLOBULIN UR ELPH-MCNC: 2.4 GM/DL
GLUCOSE SERPL-MCNC: 138 MG/DL (ref 65–99)
GLUCOSE UR STRIP-MCNC: NEGATIVE MG/DL
HCT VFR BLD AUTO: 43.1 % (ref 37.5–51)
HGB BLD-MCNC: 15 G/DL (ref 13–17.7)
HGB UR QL STRIP.AUTO: ABNORMAL
HYALINE CASTS UR QL AUTO: ABNORMAL /LPF
KETONES UR QL STRIP: NEGATIVE
LEUKOCYTE ESTERASE UR QL STRIP.AUTO: NEGATIVE
LYMPHOCYTES # BLD AUTO: 1.3 10*3/MM3 (ref 0.7–3.1)
LYMPHOCYTES NFR BLD AUTO: 10.4 % (ref 19.6–45.3)
MAGNESIUM SERPL-MCNC: 2.3 MG/DL (ref 1.6–2.4)
MCH RBC QN AUTO: 31.3 PG (ref 26.6–33)
MCHC RBC AUTO-ENTMCNC: 34.7 G/DL (ref 31.5–35.7)
MCV RBC AUTO: 90.1 FL (ref 79–97)
MONOCYTES # BLD AUTO: 0.9 10*3/MM3 (ref 0.1–0.9)
MONOCYTES NFR BLD AUTO: 7.2 % (ref 5–12)
NEUTROPHILS NFR BLD AUTO: 10.1 10*3/MM3 (ref 1.7–7)
NEUTROPHILS NFR BLD AUTO: 82.3 % (ref 42.7–76)
NITRITE UR QL STRIP: NEGATIVE
NRBC BLD AUTO-RTO: 0 /100 WBC (ref 0–0.2)
PH UR STRIP.AUTO: 5.5 [PH] (ref 5–8)
PHOSPHATE SERPL-MCNC: 3.2 MG/DL (ref 2.5–4.5)
PLATELET # BLD AUTO: 199 10*3/MM3 (ref 140–450)
PMV BLD AUTO: 7.9 FL (ref 6–12)
POTASSIUM SERPL-SCNC: 4.8 MMOL/L (ref 3.5–5.2)
PROT SERPL-MCNC: 6.1 G/DL (ref 6–8.5)
PROT UR QL STRIP: ABNORMAL
RBC # BLD AUTO: 4.78 10*6/MM3 (ref 4.14–5.8)
RBC # UR STRIP: ABNORMAL /HPF
REF LAB TEST METHOD: ABNORMAL
SODIUM SERPL-SCNC: 139 MMOL/L (ref 136–145)
SP GR UR STRIP: 1.02 (ref 1–1.03)
SQUAMOUS #/AREA URNS HPF: ABNORMAL /HPF
UROBILINOGEN UR QL STRIP: ABNORMAL
WBC # UR STRIP: ABNORMAL /HPF
WBC NRBC COR # BLD: 12.2 10*3/MM3 (ref 3.4–10.8)

## 2022-07-07 PROCEDURE — 84100 ASSAY OF PHOSPHORUS: CPT | Performed by: SURGERY

## 2022-07-07 PROCEDURE — 99213 OFFICE O/P EST LOW 20 MIN: CPT | Performed by: FAMILY MEDICINE

## 2022-07-07 PROCEDURE — G0378 HOSPITAL OBSERVATION PER HR: HCPCS

## 2022-07-07 PROCEDURE — 81001 URINALYSIS AUTO W/SCOPE: CPT | Performed by: UROLOGY

## 2022-07-07 PROCEDURE — 83735 ASSAY OF MAGNESIUM: CPT | Performed by: SURGERY

## 2022-07-07 PROCEDURE — 25010000002 MORPHINE PER 10 MG: Performed by: SURGERY

## 2022-07-07 PROCEDURE — 80053 COMPREHEN METABOLIC PANEL: CPT | Performed by: SURGERY

## 2022-07-07 PROCEDURE — 85025 COMPLETE CBC W/AUTO DIFF WBC: CPT | Performed by: SURGERY

## 2022-07-07 RX ORDER — HYDROCODONE BITARTRATE AND ACETAMINOPHEN 7.5; 325 MG/1; MG/1
1 TABLET ORAL EVERY 6 HOURS PRN
Qty: 30 TABLET | Refills: 0 | Status: ON HOLD | OUTPATIENT
Start: 2022-07-07 | End: 2022-09-12

## 2022-07-07 RX ORDER — TAMSULOSIN HYDROCHLORIDE 0.4 MG/1
0.4 CAPSULE ORAL DAILY
Status: DISCONTINUED | OUTPATIENT
Start: 2022-07-07 | End: 2022-07-07 | Stop reason: HOSPADM

## 2022-07-07 RX ORDER — ONDANSETRON 4 MG/1
4 TABLET, FILM COATED ORAL DAILY PRN
Qty: 30 TABLET | Refills: 1 | Status: ON HOLD | OUTPATIENT
Start: 2022-07-07 | End: 2022-09-12

## 2022-07-07 RX ADMIN — LIDOCAINE 1 PATCH: 50 PATCH TOPICAL at 08:40

## 2022-07-07 RX ADMIN — MORPHINE SULFATE 4 MG: 4 INJECTION, SOLUTION INTRAMUSCULAR; INTRAVENOUS at 01:05

## 2022-07-07 RX ADMIN — MORPHINE SULFATE 4 MG: 4 INJECTION, SOLUTION INTRAMUSCULAR; INTRAVENOUS at 03:15

## 2022-07-07 RX ADMIN — TAMSULOSIN HYDROCHLORIDE 0.4 MG: 0.4 CAPSULE ORAL at 08:36

## 2022-07-07 RX ADMIN — HYDROCODONE BITARTRATE AND ACETAMINOPHEN 1 TABLET: 5; 325 TABLET ORAL at 09:36

## 2022-07-07 RX ADMIN — ATORVASTATIN CALCIUM 10 MG: 10 TABLET, FILM COATED ORAL at 08:36

## 2022-07-07 RX ADMIN — SUCRALFATE 1 G: 1 TABLET ORAL at 08:38

## 2022-07-07 RX ADMIN — PANTOPRAZOLE SODIUM 40 MG: 40 INJECTION, POWDER, FOR SOLUTION INTRAVENOUS at 05:29

## 2022-07-07 RX ADMIN — HYDROCODONE BITARTRATE AND ACETAMINOPHEN 1 TABLET: 5; 325 TABLET ORAL at 05:29

## 2022-07-07 RX ADMIN — HYDROCODONE BITARTRATE AND ACETAMINOPHEN 1 TABLET: 5; 325 TABLET ORAL at 13:30

## 2022-07-07 NOTE — DISCHARGE SUMMARY
Date of Discharge:  7/7/2022    Admitting Diagnosis: Hiatal hernia with reflux, sliding-type 1    Discharge Diagnosis: Same    Presenting Problem/History of Present Illness  Active Hospital Problems    Diagnosis  POA   • **Gastroesophageal reflux disease without esophagitis [K21.9]  Yes   • Hematuria [R31.9]  Yes   • Chronic pain [G89.29]  Yes   • Anxiety [F41.9]  Yes   • Hyperlipidemia [E78.5]  Yes   • CAD (coronary artery disease) [I25.10]  Yes      Resolved Hospital Problems   No resolved problems to display.          Hospital Course  Patient is a 76 y.o. male presented with type I sliding hiatal hernia with reflux.  Yesterday he underwent a laparoscopic hiatal hernia repair with transoral fundoplication.  He developed some hematuria after his catheter was removed so urology saw him.  That is cleared now.  He he is tolerating his full liquid diet well.  He is ready for discharge to home.  He is to see me in the office in 2 weeks.  He is to stay on the full liquid diet.  He is to take his proton pump inhibitor as he was preop at home.  He is to call sooner should he have any questions or problems..      Procedures Performed    Procedure(s):  HIATAL HERNIA REPAIR LAPAROSCOPIC WITH TRANSORAL INCISIONLESS FUNDOPLICATION  -------------------       Consults:   Consults     Date and Time Order Name Status Description    7/6/2022  4:20 PM Inpatient Hospitalist Consult      7/6/2022  4:00 PM Inpatient Urology Consult Completed           Pertinent Test Results:     Condition on Discharge: Stable    Vital Signs  Temp:  [97.2 °F (36.2 °C)-99.3 °F (37.4 °C)] 97.9 °F (36.6 °C)  Heart Rate:  [57-94] 65  Resp:  [10-18] 18  BP: (124-161)/(57-81) 138/65    Physical Exam:   Physical Exam    Discharge Disposition  Home or Self Care    Discharge Medications     Discharge Medications      New Medications      Instructions Start Date   ondansetron 4 MG tablet  Commonly known as: Zofran   4 mg, Oral, Daily PRN      ondansetron 4 MG  tablet  Commonly known as: Zofran   4 mg, Oral, Daily PRN         Changes to Medications      Instructions Start Date   clopidogrel 75 MG tablet  Commonly known as: PLAVIX  What changed: additional instructions   75 mg, Oral, Daily      HYDROcodone-acetaminophen 5-325 MG per tablet  Commonly known as: NORCO  What changed: Another medication with the same name was added. Make sure you understand how and when to take each.   Oral, See Admin Instructions, Take 1 tablet by mouth every 4-6 hours as needed for pain      HYDROcodone-acetaminophen 7.5-325 MG per tablet  Commonly known as: Brewster  What changed: You were already taking a medication with the same name, and this prescription was added. Make sure you understand how and when to take each.   1 tablet, Oral, Every 6 Hours PRN      HYDROcodone-acetaminophen 7.5-325 MG per tablet  Commonly known as: Brewster  What changed: You were already taking a medication with the same name, and this prescription was added. Make sure you understand how and when to take each.   1 tablet, Oral, Every 6 Hours PRN      sildenafil 20 MG tablet  Commonly known as: REVATIO  What changed: additional instructions   Take 2-3 tabs QD prn         Continue These Medications      Instructions Start Date   ALPRAZolam 0.25 MG tablet  Commonly known as: XANAX   0.25 mg, Oral, Daily PRN      aluminum hydroxide-mag carbonate 160-105 MG chewable tablet chewable tablet  Commonly known as: GAVISCON EXTRA RELIEF   Oral, 4 Times Daily With Meals & Nightly      atorvastatin 10 MG tablet  Commonly known as: Lipitor   10 mg, Oral, Daily      coenzyme Q10 100 MG capsule   100 mg, Oral, Daily, LD 6/30      COLLAGEN EX   1 tablet, Oral, Daily, LD 6/30      diphenhydrAMINE-acetaminophen  MG tablet per tablet  Commonly known as: TYLENOL PM   1 tablet, Oral, Nightly PRN      ezetimibe 10 MG tablet  Commonly known as: ZETIA   10 mg, Oral, Daily      LITHIUM PO   5 mg, Oral, 2 times daily      melatonin 5 MG  tablet tablet   5 mg, Oral, Nightly PRN      pantoprazole 40 MG EC tablet  Commonly known as: PROTONIX   40 mg, Oral, 2 Times Daily      pitavastatin calcium 2 MG tablet tablet  Commonly known as: LIVALO   2 mg, Oral, Nightly      sucralfate 1 g tablet  Commonly known as: CARAFATE   1 g, Oral, 4 Times Daily      VITAMIN K2-VITAMIN D3 PO   Oral, Daily, Hold for surgery             Discharge Diet:   Diet Instructions     Diet: Full Liquid; Thin Liquids, No Restrictions      Discharge Diet: Full Liquid    Fluid Consistency: Thin Liquids, No Restrictions    No carbonated beverages or acidic foods          Activity at Discharge:   Activity Instructions     Bathing Restrictions      You may shower but do not sit with your incisions submerged in a tub of water    Type of Restriction: Bathing    Bathing Restrictions: No Tub Bath    Driving Restrictions      Type of Restriction: Driving    Driving Restrictions: No Driving While Taking Narcotics    Gradually Increase Activity Until at Pre-Hospitalization Level      Lifting Restrictions      Type of Restriction: Lifting    Lifting Restrictions: Lifting Restriction (Indicate Limit)    Weight Limit (Pounds): 15    Length of Lifting Restriction: 4 weeks          Follow-up Appointments  Future Appointments   Date Time Provider Department [place]   8/3/2022 10:30 AM Beau Lee MD [id number]   9/7/2022  9:00 AM Obi Boucher MD [facility]     Additional Instructions for the Follow-ups that You Need to Schedule     Discharge Follow-up with Specified Provider: Dr. Richey; 2 Weeks   As directed      To: Dr. Richey    Follow Up: 2 Weeks               Test Results Pending at Discharge       Td Richey DO  07/07/22  12:30 EDT

## 2022-07-07 NOTE — OUTREACH NOTE
Prep Survey    Flowsheet Row Responses   [facility] patient discharged from? [facility]   Is LACE score < 7 ? Yes   Emergency Room discharge w/ pulse ox? No   Eligibility [facility]   Date of Admission 07/06/22   Date of Discharge 07/07/22   Discharge Disposition Home or Self Care   Discharge diagnosis HIATAL HERNIA REPAIR LAPAROSCOPIC    Does the patient have one of the following disease processes/diagnoses(primary or secondary)? General Surgery   Does the patient have Home health ordered? No   Is there a DME ordered? No   Prep survey completed? Yes          MALICK ARIZMENDI - Registered Nurse

## 2022-07-07 NOTE — PLAN OF CARE
Goal Outcome Evaluation:   VSS. Aox4. Pain controlled w/po norco. Dressings clean, dry, and intact. Pt ambulates to bathroom independently. To discharge home with family this afternoon.

## 2022-07-07 NOTE — PROGRESS NOTES
[facility] Medicine Services Daily Progress Note    Patient Name: Td Melendez Jr.  : 1945  MRN: [id number]  Primary Care Physician:  Beau Lee MD  Date of admission: 2022      Subjective      Chief Complaint: Abdominal pain      Patient reports having some postsurgical pain, somewhat trolled with pain medication.  No chest pain or shortness of breath.  Patient having some neck pain after surgery reports history of chronic neck pain.  No bowel function yet.  Patient evaluated by urology for hematuria started Flomax.    Review of Systems   Constitutional: Positive for malaise/fatigue.   Musculoskeletal: Positive for neck pain.   Gastrointestinal: Positive for abdominal pain.   All other systems reviewed and are negative.      Objective      Vitals:   Temp:  [97.2 °F (36.2 °C)-99.3 °F (37.4 °C)] 98.4 °F (36.9 °C)  Heart Rate:  [57-94] 64  Resp:  [10-18] 16  BP: (124-161)/(57-81) 124/63    Physical Exam     General: Elderly male lying in bed breathing comfortably on room air no acute distress  HEENT: NC/AT, EOMI, mucosa moist  Heart: Regular, rate controlled  Chest: Normal work of breathing moving air well no wheezing  Abdominal: Soft.  Minimal distention, tenderness epigastric area, normoactive bowel sounds  Musculoskeletal: Normal ROM.  No edema. No calf tenderness.  Neurological: AAOx3, no focal deficits  Skin: Skin is warm and dry. No rash  Psychiatric: Normal mood and affect.         Result Review    Result Review:  I have personally reviewed the results from the time of this admission to 2022 11:54 EDT and agree with these findings:  [x]  Laboratory  [x]  Microbiology  [x]  Radiology  [x]  EKG/Telemetry   []  Cardiology/Vascular   []  Pathology  []  Old records  []  Other:  Most notable findings include: Hematuria    Wounds (last 24 hours)     LDA Wound     Row Name 22 0415 22 0025 22 2030       Wound 22 0816 abdomen Incision    Wound -  Properties Group Placement Date: 07/06/22  -MS Placement Time: 0816 -MS Present on Hospital Admission: N  -MS Location: abdomen  -MS Primary Wound Type: Incision  -MS    Dressing Appearance dry;intact;no drainage  -JW dry;intact;no drainage  -JW dry;intact;no drainage  -JW    Closure VISHNU  -JW VISHNU  -JW VISHNU  -JW    Base dressing in place, unable to visualize  -JW dressing in place, unable to visualize  -JW dressing in place, unable to visualize  -JW    Retired Wound - Properties Group Placement Date: 07/06/22  -MS Placement Time: 0816 -MS Present on Hospital Admission: N  -MS Location: abdomen  -MS Primary Wound Type: Incision  -MS    Retired Wound - Properties Group Date first assessed: 07/06/22  -MS Time first assessed: 0816 -MS Present on Hospital Admission: N  -MS Location: abdomen  -MS Primary Wound Type: Incision  -MS    Row Name 07/06/22 1650 07/06/22 1544 07/06/22 1430       Wound 07/06/22 0816 abdomen Incision    Wound - Properties Group Placement Date: 07/06/22  -MS Placement Time: 0816 -MS Present on Hospital Admission: N  -MS Location: abdomen  -MS Primary Wound Type: Incision  -MS    Dressing Appearance dry;intact;no drainage  -RS dry;intact;no drainage  -NS dry;intact;no drainage  -NS    Closure VISHNU  -RS VISHNU  mastisol, steri strips, 2x2s, tegaderm x5  -NS VISHNU  mastisol, steri strips, 2x2s, tegaderm x5  -NS    Base dressing in place, unable to visualize  -RS dressing in place, unable to visualize  -NS dressing in place, unable to visualize  -NS    Drainage Amount none  -RS -- --    Retired Wound - Properties Group Placement Date: 07/06/22  -MS Placement Time: 0816 -MS Present on Hospital Admission: N  -MS Location: abdomen  -MS Primary Wound Type: Incision  -MS    Retired Wound - Properties Group Date first assessed: 07/06/22  -MS Time first assessed: 0816 -MS Present on Hospital Admission: N  -MS Location: abdomen  -MS Primary Wound Type: Incision  -MS    Row Name 07/06/22 1200             Wound  07/06/22 0816 abdomen Incision    Wound - Properties Group Placement Date: 07/06/22  -MS Placement Time: 0816 -MS Present on Hospital Admission: N  -MS Location: abdomen  -MS Primary Wound Type: Incision  -MS      Dressing Appearance dry;intact;no drainage  -      Closure VISHNU  mastisol, steri strips, 2x2s, tegaderm x5  -AH      Base dressing in place, unable to visualize  -AH      Retired Wound - Properties Group Placement Date: 07/06/22  -MS Placement Time: 0816  -MS Present on Hospital Admission: N  -MS Location: abdomen  -MS Primary Wound Type: Incision  -MS      Retired Wound - Properties Group Date first assessed: 07/06/22  -MS Time first assessed: 0816  -MS Present on Hospital Admission: N  -MS Location: abdomen  -MS Primary Wound Type: Incision  -MS            User Key  (r) = Recorded By, (t) = Taken By, (c) = Cosigned By    Initials Name Provider Type    Rain Sanon RN Registered Nurse    Jeanie Gustafson RN Registered Nurse    Chanda Price RN Registered Nurse    NS Sprigler, Naomi, RN Registered Nurse    Magda Chandler LPN Licensed Nurse                  Assessment & Plan      Brief Patient Summary:    Td Melendez Jr. is a 76 y.o. male with a past medical history of CAD, anxiety, GERD, and hyperlipidemia who presented to [facility] on 7/6/2022 to undergo surgery.  Patient underwent hiatal hernia repair laparoscopic with transoral fundoplication by Dr. Richey.  Hospitalist were consulted for medical management.  Patient reports postoperatively he is having neck pain, which is chronic for him but it is worse.  His current neck pain is a 6 out of 10.  Hot and cold compresses help.  He reports being in awkward positions makes his neck pain worse.  He is currently having 2 out of 10 abdominal pain.  Pain medication is helped.  He denies any aggravating factors.  He noted that he was having hematuria postoperatively and has had a prostate procedure in the past where this has  happened.  He reports this has also happen with insertion of Cervantes catheters.    atorvastatin, 10 mg, Oral, Daily  lidocaine, 1 patch, Transdermal, Q24H  pantoprazole, 40 mg, Intravenous, Q AM  sucralfate, 1 g, Oral, 4x Daily  tamsulosin, 0.4 mg, Oral, Daily       lactated ringers, 9 mL/hr, Last Rate: 9 mL/hr (07/06/22 0744)  phenylephrine, 0.5-3 mcg/kg/min  sodium chloride, 100 mL/hr, Last Rate: 100 mL/hr (07/06/22 1625)  sodium chloride, 100 mL/hr, Last Rate: 100 mL/hr (07/06/22 1819)         Active Hospital Problems:  Active Hospital Problems    Diagnosis    • **Gastroesophageal reflux disease without esophagitis    • Hematuria    • Chronic pain    • Anxiety    • Hyperlipidemia    • CAD (coronary artery disease)      unstable angina, 7/2015: 20% LM, long 70% LAD with abnormal FFR (s/p 2.03z87td Xience), 50% OM1, 99% prox RCA (s/p 3.5x18m Xience).  Normal Cardiolite 11/2015.        Plan:     Abdominal pain-secondary to patient's history of reflux disease and hiatal hernia, status post transoral fundoplication overall doing well  -Pain control per surgery  -IV fluids  -Diet per surgery  -DVT prophylaxis  -Ambulate out of bed  -Incentive spirometry    Hematuria-patient with history of enlarged prostate with previous procedures possible disruption of blood vessel with catheterization  -Resolving  -Flomax  -Evaluated by urology  -UA with gross hematuria  -Urine clearing up    Leukocytosis-mild most likely secondary to postsurgical inflammation  -Daily labs    CAD-patient denies any chest pain at this time  -Resume antiplatelet when cleared by surgery  -Continue home medication otherwise  -Cardiac monitoring    Hyperlipidemia/anxiety/chronic pain-chronic in nature  -Resume home medications clinically appropriate    DVT prophylaxis:  Mechanical DVT prophylaxis orders are present.    CODE STATUS:    Code Status (Patient has no pulse and is not breathing): CPR (Attempt to Resuscitate)  Medical Interventions (Patient has  pulse or is breathing): Full      Disposition:  I expect patient to be discharged in 24 to 48 hours if cleared by surgery.    This patient has been examined wearing appropriate Personal Protective Equipment and discussed with hospital infection control department. 07/07/22      Electronically signed by Ramón Parson MD, 07/07/22, 11:54 EDT.  [facility] Hospitalist Team

## 2022-07-07 NOTE — PLAN OF CARE
Goal Outcome Evaluation:           Progress: improving  Outcome Evaluation: Pt has had c/o pain this shift treated per MAR and using ice pack, call light in reach, vss, plan of care ongoing

## 2022-07-07 NOTE — CONSULTS
Urology Consult Note    Patient:Td Melendez Jr. :1945  Room:[facility]  Admit Date2022  Age:76 y.o.     SEX:male     DOS:2022     MR:[id number]     Visit:[id number]       Attending: Td Richey DO  Referring Provider: Dr. Richey  Reason for Consultation: Gross hematuria    Patient Care Team:  Beau Lee MD as PCP - General (Family Medicine)  Obi Boucher MD as Consulting Physician (Cardiology)  Venkat Salas MD (Pain Medicine)  Pedrito Neal Jr., MD as Consulting Physician (Urology)    Chief complaint blood in urine    Subjective .     History of present illness: 76-year-old gentleman who is a patient of my partner Dr. Neal.  Patient had a fundoplication yesterday and developed gross hematuria.  He did have a catheter placed during the procedure.  He has noted some decreased stream force but otherwise seems to be urinating fairly well.  His initial void showed red blood but since then has cleared and is currently grossly yellow.  Patient normally voids without difficulty.  He did have a Rezum procedure approximately a year ago and his responded well to it.  Patient did have a CT scan in March of this year which did not show any  pathology.    Review of Systems  10 point review of systems were reviewed and are negative except for:  Constitution:  positive for See HPI    History  Past Medical History:   Diagnosis Date   • Anxiety    • CAD (coronary artery disease)     unstable angina, 2015: 20% LM, long 70% LAD with abnormal FFR (s/p [id number] Xience), 50% OM1, 99% prox RCA (s/p 3.5x18m Xience).  Normal Cardiolite 2015 and 2018   • Colon polyp    • Fibromyalgia    • GERD (gastroesophageal reflux disease)    • H/O complete eye exam 2018   • Hyperlipidemia    • IBS (irritable bowel syndrome)    • Squamous cell skin cancer, face    • Urinary retention      Past Surgical History:   Procedure Laterality Date   • CARDIAC CATHETERIZATION      [facility] 2015: cath with 20% LM  long 70% LAD (with positive FFR), 50% OM1, 99% prox RCA, s/p 2.75x33 Xience Alpine to LAD and 3.5x18mm Xience Alpine to RCA. LVEF 64% Normal Cardiolite 2015   • CARDIAC SURGERY      cardiac stents, 2   • COLONOSCOPY  approx 2011    normal per patient  [facility]   • COLONOSCOPY N/A 2021    Procedure: COLONOSCOPY TO CECUM WITH COLD SNARE POLYPECTOMIES AND BIOPSIES ;  Surgeon: Caatrino Luna MD;  Location: [facility] ENDOSCOPY;  Service: Gastroenterology;  Laterality: N/A;  PRE- HISTORY COLON POLYPS  POST- POLYPS, COLITIS, HEMORRHOIDS   • COLONOSCOPY W/ POLYPECTOMY N/A 2018    Procedure: COLONOSCOPY TO CECUM WITH COLD SNAREPOLYPECTOMIES, HOT SNARE POLYPECTOMIES;  Surgeon: Catarino Luna MD;  Location: [facility] ENDOSCOPY;  Service: Gastroenterology   • CORONARY ANGIOPLASTY WITH STENT PLACEMENT     • CYSTOSCOPY TRANSURETHRAL RESECTION OF PROSTATE     • ENDOSCOPY N/A 2018    Procedure: ESOPHAGOGASTRODUODENOSCOPY WITH COLD BIOPSIES;  Surgeon: Catarino Luna MD;  Location: [facility] ENDOSCOPY;  Service: Gastroenterology   • ENDOSCOPY N/A 2021    Procedure: ESOPHAGOGASTRODUODENOSCOPY with cold biopsies;  Surgeon: Catarino Luna MD;  Location: [facility] ENDOSCOPY;  Service: Gastroenterology;  Laterality: N/A;  PRE: EPIGASTRIC PAIN, DYSPEPSIA  POST: GASTRITIS   • ENDOSCOPY N/A 2022    Procedure: ESOPHAGOGASTRODUODENOSCOPY WITH DILATATION #48 to #58 bougie, gastric biopsy;  Surgeon: Td Richey DO;  Location: [facility] ENDOSCOPY;  Service: General;  Laterality: N/A;  gastritis, small hiatal hernia   • RADIOFREQUENCY ABLATION      NECK   • SKIN CANCER EXCISION     • WISDOM TOOTH EXTRACTION       Social History     Socioeconomic History   • Marital status:    Tobacco Use   • Smoking status: Former Smoker     Packs/day: 0.25     Years: 4.00     Pack years: 1.00     Types: Cigarettes     Start date: 1968     Quit date:      Years since quittin.5   • Smokeless  tobacco: Never Used   Vaping Use   • Vaping Use: Never used   Substance and Sexual Activity   • Alcohol use: No     Comment: caffeine use   • Drug use: No   • Sexual activity: Defer     Family History   Problem Relation Age of Onset   • Cervical cancer Mother    • Cancer Mother    • Depression Mother    • Heart disease Mother    • Liver disease Mother    • Alcohol abuse Mother    • Thyroid cancer Father    • Cancer Father    • Depression Father    • Diabetes Father    • Thyroid disease Father    • Coronary artery disease Other    • Malig Hyperthermia Neg Hx      Allergy  Allergies   Allergen Reactions   • Augmentin [Amoxicillin-Pot Clavulanate] Hives and Itching     Prior to Admission medications    Medication Sig Start Date End Date Taking? Authorizing Provider   ALPRAZolam (XANAX) 0.25 MG tablet Take 1 tablet by mouth Daily As Needed for Anxiety. 1/3/22  Yes Beau Lee MD   aluminum hydroxide-mag carbonate (GAVISCON EXTRA RELIEF) 160-105 MG chewable tablet chewable tablet Chew 4 (Four) Times a Day With Meals & at Bedtime.   Yes Lilian Knowles MD   atorvastatin (Lipitor) 10 MG tablet Take 1 tablet by mouth Daily. 1/10/22  Yes Beau Lee MD   clopidogrel (PLAVIX) 75 MG tablet Take 1 tablet by mouth Daily.  Patient taking differently: Take 75 mg by mouth Daily. HOLD 5 days per heart dr and Dr. Richey     LD 6/30 5/28/21  Yes Obi Boucher MD   coenzyme Q10 100 MG capsule Take 100 mg by mouth Daily. LD 6/30   Yes Lilian Knowles MD   diphenhydrAMINE-acetaminophen (TYLENOL PM)  MG tablet per tablet Take 1 tablet by mouth At Night As Needed for Sleep.   Yes Lilian Knowles MD   Emollient (COLLAGEN EX) Take 1 tablet by mouth Daily. LD 6/30   Yes Lilian Knowles MD   ezetimibe (ZETIA) 10 MG tablet Take 1 tablet by mouth Daily. 1/3/22  Yes Beau Lee MD   HYDROcodone-acetaminophen (NORCO) 5-325 MG per tablet Take  by mouth See Admin Instructions. Take 1 tablet by mouth every  4-6 hours as needed for pain 21  Yes Lilian Knowles MD   LITHIUM PO Take 5 mg by mouth 2 (two) times a day.   Yes Lilian Knowles MD   melatonin 5 MG tablet tablet Take 5 mg by mouth At Night As Needed.   Yes Lilian Knowles MD   pantoprazole (PROTONIX) 40 MG EC tablet Take 1 tablet by mouth 2 (Two) Times a Day. 3/18/22  Yes Cinthya Hadley APRN   sildenafil (REVATIO) 20 MG tablet Take 2-3 tabs QD prn  Patient taking differently: Take 2-3 tabs QD prn     HOLD 24 hours before anes 3/1/22  Yes Beau Lee MD   sucralfate (CARAFATE) 1 g tablet Take 1 g by mouth 4 (Four) Times a Day.   Yes Lilian Knowles MD   Vitamin D-Vitamin K (VITAMIN K2-VITAMIN D3 PO) Take  by mouth Daily. Hold for surgery   Yes Lilian Knowles MD   HYDROcodone-acetaminophen (Norco) 7.5-325 MG per tablet Take 1 tablet by mouth Every 6 (Six) Hours As Needed for Moderate Pain . 22   Td Richey DO   ondansetron (Zofran) 4 MG tablet Take 1 tablet by mouth Daily As Needed for Nausea or Vomiting. 22  Td Richey DO   pitavastatin calcium (LIVALO) 2 MG tablet tablet Take 2 mg by mouth Every Night.    Lilian Knowles MD         Objective     tMax 24 hours:  Temp (24hrs), Av.9 °F (36.6 °C), Min:97.1 °F (36.2 °C), Max:99.3 °F (37.4 °C)    Vital Sign Ranges:  Temp:  [97.1 °F (36.2 °C)-99.3 °F (37.4 °C)] 99.3 °F (37.4 °C)  Heart Rate:  [] 57  Resp:  [8-19] 16  BP: (125-167)/(57-83) 125/57  Intake and Output Last 3 Shifts:  I/O last 3 completed shifts:  In: 1970 [P.O.:120; I.V.:1800; IV Piggyback:50]  Out: 930 [Urine:930]      Physical Exam:   General Appearance alert, appears stated age and cooperative  Head normocephalic, without obvious abnormality and atraumatic  Eyes lids and lashes normal, conjunctivae and sclerae normal, no icterus, no pallor and corneas clear  Lungs respirations regular, respirations even and respirations unlabored  Heart regular rhythm & normal  rate  Abdomen no guarding and no rebound tenderness  Extremities moves extremities well, no edema, no cyanosis and no redness  Skin no bleeding, bruising or rash  Neurologic Mental Status orientated to person, place, time and situation    Results Review:     Lab Results (last 24 hours)     Procedure Component Value Units Date/Time    Comprehensive Metabolic Panel [[id number]]  (Abnormal) Collected: 07/07/22 0254    Specimen: Blood Updated: 07/07/22 0400     Glucose 138 mg/dL      BUN 16 mg/dL      Creatinine 0.90 mg/dL      Sodium 139 mmol/L      Potassium 4.8 mmol/L      Chloride 105 mmol/L      CO2 23.0 mmol/L      Calcium 8.3 mg/dL      Total Protein 6.1 g/dL      Albumin 3.70 g/dL      ALT (SGPT) 13 U/L      AST (SGOT) 17 U/L      Alkaline Phosphatase 71 U/L      Total Bilirubin 0.6 mg/dL      Globulin 2.4 gm/dL      A/G Ratio 1.5 g/dL      BUN/Creatinine Ratio 17.8     Anion Gap 11.0 mmol/L      eGFR 89.6 mL/min/1.73      Comment: National Kidney Foundation and American Society of Nephrology (ASN) Task Force recommended calculation based on the Chronic Kidney Disease Epidemiology Collaboration (CKD-EPI) equation refit without adjustment for race.       Narrative:      GFR Normal >60  Chronic Kidney Disease <60  Kidney Failure <15      Phosphorus [[id number]]  (Normal) Collected: 07/07/22 0254    Specimen: Blood Updated: 07/07/22 0400     Phosphorus 3.2 mg/dL     Magnesium [[id number]]  (Normal) Collected: 07/07/22 0254    Specimen: Blood Updated: 07/07/22 0400     Magnesium 2.3 mg/dL     CBC & Differential [[id number]]  (Abnormal) Collected: 07/07/22 0254    Specimen: Blood Updated: 07/07/22 0338    Narrative:      The following orders were created for panel order CBC & Differential.  Procedure                               Abnormality         Status                     ---------                               -----------         ------                     CBC Auto Differential[[id number]]        Abnormal             Final result                 Please view results for these tests on the individual orders.    CBC Auto Differential [[id number]]  (Abnormal) Collected: 07/07/22 0254    Specimen: Blood Updated: 07/07/22 0338     WBC 12.20 10*3/mm3      RBC 4.78 10*6/mm3      Hemoglobin 15.0 g/dL      Hematocrit 43.1 %      MCV 90.1 fL      MCH 31.3 pg      MCHC 34.7 g/dL      RDW 13.0 %      RDW-SD 41.1 fl      MPV 7.9 fL      Platelets 199 10*3/mm3      Neutrophil % 82.3 %      Lymphocyte % 10.4 %      Monocyte % 7.2 %      Eosinophil % 0.0 %      Basophil % 0.1 %      Neutrophils, Absolute 10.10 10*3/mm3      Lymphocytes, Absolute 1.30 10*3/mm3      Monocytes, Absolute 0.90 10*3/mm3      Eosinophils, Absolute 0.00 10*3/mm3      Basophils, Absolute 0.00 10*3/mm3      nRBC 0.0 /100 WBC          No results found for: URINECX     Imaging Results (Last 7 Days)     ** No results found for the last 168 hours. **          Inpatient Meds:   Scheduled Meds:atorvastatin, 10 mg, Oral, Daily  lidocaine, 1 patch, Transdermal, Q24H  pantoprazole, 40 mg, Intravenous, Q AM  sucralfate, 1 g, Oral, 4x Daily  tamsulosin, 0.4 mg, Oral, Daily       Continuous Infusions:lactated ringers, 9 mL/hr, Last Rate: 9 mL/hr (07/06/22 0744)  phenylephrine, 0.5-3 mcg/kg/min  sodium chloride, 100 mL/hr, Last Rate: 100 mL/hr (07/06/22 1625)  sodium chloride, 100 mL/hr, Last Rate: 100 mL/hr (07/06/22 1819)       PRN Meds:.•  acetaminophen **OR** acetaminophen  •  GI cocktail  •  hydrALAZINE  •  HYDROcodone-acetaminophen  •  HYDROmorphone **AND** naloxone  •  lactated ringers  •  Morphine **AND** naloxone  •  ondansetron **OR** ondansetron  •  oxyCODONE  •  phenylephrine  •  potassium chloride **OR** potassium chloride **OR** potassium chloride      Assessment & Plan     Principal Problem:    Gastroesophageal reflux disease without esophagitis  Active Problems:    CAD (coronary artery disease)    Hyperlipidemia    Anxiety    Hematuria    Chronic pain    Gross hematuria  likely due to Cervantes catheter placement during the procedure  BPH with lower urinary tract symptoms worsened after surgery    Plan  Urinalysis  Check postvoid residual  Begin Flomax for the postoperative period  No indication for acute intervention at this time as his hematuria has cleared.  Patient will need to follow-up with his urologist in approximately 2 weeks to monitor this.      I discussed the patient's findings and my recommendations with patient    Thank you for this  consult    Carmelo Vázquez MD  07/07/22  07:50 EDT

## 2022-07-08 ENCOUNTER — TELEPHONE (OUTPATIENT)
Dept: SURGERY | Facility: CLINIC | Age: 77
End: 2022-07-08

## 2022-07-08 ENCOUNTER — TRANSITIONAL CARE MANAGEMENT TELEPHONE ENCOUNTER (OUTPATIENT)
Dept: CALL CENTER | Facility: HOSPITAL | Age: 77
End: 2022-07-08

## 2022-07-08 NOTE — CASE MANAGEMENT/SOCIAL WORK
Continued Stay Note   [facility]     Patient Name: Td Melendez Jr.  MRN: [id number]  Today's Date: 7/8/2022    Admit Date: 7/6/2022     Discharge Plan     Row Name 07/08/22 1431       Plan    Final Discharge Disposition Code 01 - home or self-care    Final Note home. dc prior to cm assessment.                Expected Discharge Date and Time     Expected Discharge Date Expected Discharge Time    Jul 7, 2022         Phone communication or documentation only - no physical contact with patient or family.      Shoshana Mayberry RN

## 2022-07-08 NOTE — OUTREACH NOTE
Call Center TCM Note    Flowsheet Row Responses   [facility] patient discharged from? [facility]   Does the patient have one of the following disease processes/diagnoses(primary or secondary)? General Surgery   TCM attempt successful? Yes   Call start time 1542   Call end time 1546   Discharge diagnosis HIATAL HERNIA REPAIR LAPAROSCOPIC    Meds reviewed with patient/caregiver? Yes   Is the patient having any side effects they believe may be caused by any medication additions or changes? No   Does the patient have all medications related to this admission filled (includes all antibiotics, pain medications, etc.) Yes   Is the patient taking all medications as directed (includes completed medication regime)? Yes   Does the patient have a follow up appointment scheduled with their surgeon? Yes   Has the patient kept scheduled appointments due by today? N/A   Comments Patient declines a followup at present time with Dr Lee. He does have a followup scheduled with surgeon on 8/9/2022   Psychosocial issues? No   Did the patient receive a copy of their discharge instructions? Yes   Nursing interventions Reviewed instructions with patient   What is the patient's perception of their health status since discharge? Improving   Nursing interventions Nurse provided patient education   Is the patient /caregiver able to teach back basic post-op care? Take showers only when approved by MD-sponge bathe until then, No tub bath, swimming, or hot tub until instructed by MD, Keep incision areas clean,dry and protected   Is the patient/caregiver able to teach back signs and symptoms of incisional infection? Fever, Increased redness, swelling or pain at the incisonal site   Is the patient/caregiver able to teach back steps to recovery at home? Set small, achievable goals for return to baseline health, Make a list of questions for surgeon's appointment   If the patient is a current smoker, are they able to teach back resources for  cessation? Not a smoker   Is the patient/caregiver able to teach back the hierarchy of who to call/visit for symptoms/problems? PCP, Specialist, Home health nurse, Urgent Care, ED, 911 Yes   TCM call completed? Yes   Wrap up additional comments Patient reports he is doing well. Alittle soreness. No nausea. No needs or concerns.           Jean Fairbanks RN    7/8/2022, 15:46 EDT

## 2022-07-20 ENCOUNTER — HOSPITAL ENCOUNTER (EMERGENCY)
Facility: HOSPITAL | Age: 77
Discharge: HOME | End: 2022-07-20
Attending: EMERGENCY MEDICINE | Admitting: EMERGENCY MEDICINE

## 2022-07-20 ENCOUNTER — APPOINTMENT (OUTPATIENT)
Dept: CT IMAGING | Facility: HOSPITAL | Age: 77
End: 2022-07-20

## 2022-07-20 VITALS
TEMPERATURE: 98.1 F | SYSTOLIC BLOOD PRESSURE: 139 MMHG | OXYGEN SATURATION: 96 % | DIASTOLIC BLOOD PRESSURE: 74 MMHG | HEART RATE: 54 BPM | RESPIRATION RATE: 16 BRPM

## 2022-07-20 DIAGNOSIS — M54.2 NECK PAIN ON LEFT SIDE: Primary | ICD-10-CM

## 2022-07-20 DIAGNOSIS — G50.0 TRIGEMINAL NEURALGIA OF LEFT SIDE OF FACE: ICD-10-CM

## 2022-07-20 DIAGNOSIS — M62.838 TRAPEZIUS MUSCLE SPASM: ICD-10-CM

## 2022-07-20 DIAGNOSIS — E78.2 MIXED HYPERLIPIDEMIA: Primary | ICD-10-CM

## 2022-07-20 DIAGNOSIS — I25.10 CORONARY ARTERY DISEASE INVOLVING NATIVE CORONARY ARTERY OF NATIVE HEART WITHOUT ANGINA PECTORIS: ICD-10-CM

## 2022-07-20 DIAGNOSIS — Z12.5 SPECIAL SCREENING FOR MALIGNANT NEOPLASM OF PROSTATE: ICD-10-CM

## 2022-07-20 DIAGNOSIS — F41.9 ANXIETY: ICD-10-CM

## 2022-07-20 LAB
ALBUMIN SERPL-MCNC: 3.8 G/DL (ref 3.5–5.2)
ALBUMIN/GLOB SERPL: 1.4 G/DL
ALP SERPL-CCNC: 85 U/L (ref 39–117)
ALT SERPL W P-5'-P-CCNC: 15 U/L (ref 1–41)
ANION GAP SERPL CALCULATED.3IONS-SCNC: 8.8 MMOL/L (ref 5–15)
AST SERPL-CCNC: 17 U/L (ref 1–40)
BASOPHILS # BLD AUTO: 0.03 10*3/MM3 (ref 0–0.2)
BASOPHILS NFR BLD AUTO: 0.4 % (ref 0–1.5)
BILIRUB SERPL-MCNC: 0.5 MG/DL (ref 0–1.2)
BUN SERPL-MCNC: 13 MG/DL (ref 8–23)
BUN/CREAT SERPL: 13.8 (ref 7–25)
CALCIUM SPEC-SCNC: 9.2 MG/DL (ref 8.6–10.5)
CHLORIDE SERPL-SCNC: 106 MMOL/L (ref 98–107)
CO2 SERPL-SCNC: 24.2 MMOL/L (ref 22–29)
CREAT SERPL-MCNC: 0.94 MG/DL (ref 0.76–1.27)
DEPRECATED RDW RBC AUTO: 39.9 FL (ref 37–54)
EGFRCR SERPLBLD CKD-EPI 2021: 85.1 ML/MIN/1.73
EOSINOPHIL # BLD AUTO: 0.12 10*3/MM3 (ref 0–0.4)
EOSINOPHIL NFR BLD AUTO: 1.7 % (ref 0.3–6.2)
ERYTHROCYTE [DISTWIDTH] IN BLOOD BY AUTOMATED COUNT: 12.4 % (ref 12.3–15.4)
GLOBULIN UR ELPH-MCNC: 2.7 GM/DL
GLUCOSE SERPL-MCNC: 97 MG/DL (ref 65–99)
HCT VFR BLD AUTO: 44.9 % (ref 37.5–51)
HGB BLD-MCNC: 15.6 G/DL (ref 13–17.7)
IMM GRANULOCYTES # BLD AUTO: 0.02 10*3/MM3 (ref 0–0.05)
IMM GRANULOCYTES NFR BLD AUTO: 0.3 % (ref 0–0.5)
LYMPHOCYTES # BLD AUTO: 1.66 10*3/MM3 (ref 0.7–3.1)
LYMPHOCYTES NFR BLD AUTO: 23.5 % (ref 19.6–45.3)
MCH RBC QN AUTO: 31.5 PG (ref 26.6–33)
MCHC RBC AUTO-ENTMCNC: 34.7 G/DL (ref 31.5–35.7)
MCV RBC AUTO: 90.5 FL (ref 79–97)
MONOCYTES # BLD AUTO: 0.47 10*3/MM3 (ref 0.1–0.9)
MONOCYTES NFR BLD AUTO: 6.7 % (ref 5–12)
NEUTROPHILS NFR BLD AUTO: 4.75 10*3/MM3 (ref 1.7–7)
NEUTROPHILS NFR BLD AUTO: 67.4 % (ref 42.7–76)
NRBC BLD AUTO-RTO: 0 /100 WBC (ref 0–0.2)
PLATELET # BLD AUTO: 238 10*3/MM3 (ref 140–450)
PMV BLD AUTO: 9.1 FL (ref 6–12)
POTASSIUM SERPL-SCNC: 4.5 MMOL/L (ref 3.5–5.2)
PROT SERPL-MCNC: 6.5 G/DL (ref 6–8.5)
RBC # BLD AUTO: 4.96 10*6/MM3 (ref 4.14–5.8)
SODIUM SERPL-SCNC: 139 MMOL/L (ref 136–145)
TROPONIN T SERPL-MCNC: <0.01 NG/ML (ref 0–0.03)
WBC NRBC COR # BLD: 7.05 10*3/MM3 (ref 3.4–10.8)

## 2022-07-20 PROCEDURE — 99284 EMERGENCY DEPT VISIT MOD MDM: CPT

## 2022-07-20 PROCEDURE — 85025 COMPLETE CBC W/AUTO DIFF WBC: CPT | Performed by: NURSE PRACTITIONER

## 2022-07-20 PROCEDURE — 80053 COMPREHEN METABOLIC PANEL: CPT | Performed by: NURSE PRACTITIONER

## 2022-07-20 PROCEDURE — 93005 ELECTROCARDIOGRAM TRACING: CPT | Performed by: NURSE PRACTITIONER

## 2022-07-20 PROCEDURE — 71250 CT THORAX DX C-: CPT

## 2022-07-20 PROCEDURE — 84484 ASSAY OF TROPONIN QUANT: CPT | Performed by: NURSE PRACTITIONER

## 2022-07-20 PROCEDURE — 99283 EMERGENCY DEPT VISIT LOW MDM: CPT

## 2022-07-20 PROCEDURE — 93010 ELECTROCARDIOGRAM REPORT: CPT | Performed by: INTERNAL MEDICINE

## 2022-07-20 RX ORDER — DIAZEPAM 5 MG/1
5 TABLET ORAL ONCE
Status: COMPLETED | OUTPATIENT
Start: 2022-07-20 | End: 2022-07-20

## 2022-07-20 RX ORDER — CARBAMAZEPINE 200 MG/1
200 TABLET ORAL 2 TIMES DAILY
Qty: 30 TABLET | Refills: 0 | Status: SHIPPED | OUTPATIENT
Start: 2022-07-20 | End: 2022-07-26

## 2022-07-20 RX ORDER — SODIUM CHLORIDE 0.9 % (FLUSH) 0.9 %
10 SYRINGE (ML) INJECTION AS NEEDED
Status: DISCONTINUED | OUTPATIENT
Start: 2022-07-20 | End: 2022-07-21 | Stop reason: HOSPADM

## 2022-07-20 RX ADMIN — DIAZEPAM 5 MG: 5 TABLET ORAL at 21:40

## 2022-07-20 NOTE — ED TRIAGE NOTES
L shoulder/neck/face pain s/p repair of hiatal hernia 2 wks ago.   States shoulder pain started 2 wks ago but pain moved to neck and face the last 2 days.  Pt sent from [facility] for further eval.  Pt wearing mask on arrival.

## 2022-07-21 ENCOUNTER — PATIENT OUTREACH (OUTPATIENT)
Dept: CASE MANAGEMENT | Facility: OTHER | Age: 77
End: 2022-07-21

## 2022-07-21 LAB — QT INTERVAL: 403 MS

## 2022-07-21 NOTE — OUTREACH NOTE
AMBULATORY CASE MANAGEMENT NOTE    Name and Relationship of Patient/Support Person: Td Melendez Jr. - Self    Adult Patient Profile  Questions/Answers    Flowsheet Row Most Recent Value   Barriers to Managing Health none        Patient Outreach    Outgoing call to the patient for follow up with recent ED visit.  Introduced self and explained the role of RN-ACM.  Patient has not been able to  his medication at the pharmacy but states she will and transportation.  He denies any other needs.  Discussed HRCM program and he declines at this time.          ARYAN ESCOBAR  Ambulatory Case Management    7/21/2022, 15:11 EDT

## 2022-07-21 NOTE — ED PROVIDER NOTES
EMERGENCY DEPARTMENT ENCOUNTER    Room Number:  08/08  Date seen:  7/20/2022  Time seen: 20:47 EDT  PCP: Beau Lee MD  Historian: pt, wife, recent d/c summary    HPI:  Chief complaint:left shoulder pain, neck pain, mouth problem  A complete HPI/ROS/PMH/PSH/SH/FH are unobtainable due to: n/a  Context:Td Melendez Jr. is a 76 y.o. male who is s/p TIF procedure for hiatal hernia about 2 weeks ago.  He presents to the ED with c/o moderate left shoulder pain for the past 2 weeks and reports over the past several days moderate left neck pain, headache and left facial pain with abnormal sensation in roof of his mouth on left side.  The problems are mild to moderate and not made better/worse by anything.  He has not had these problems before.  Denies numbness/tingling and has no problems with chewing, opening/closing his mouth and no airway complaints.  He is s/p laparoscopic hiatal hernia repair with transoral fundoplication (TIF procedure) on 7/6/22 by Dr. Richey @ [facility].       Patient was placed in face mask in first look. Patient was wearing facemask when I entered the room and throughout our encounter. I wore full protective equipment throughout this patient encounter including a N95 face mask, eye shield and gloves. Hand hygiene/washing of hands was performed before donning protective equipment and after removal when leaving the room.    MEDICAL RECORD REVIEW      ALLERGIES  Augmentin [amoxicillin-pot clavulanate]    PAST MEDICAL HISTORY  Active Ambulatory Problems     Diagnosis Date Noted   • CAD (coronary artery disease)    • Hyperlipidemia 01/04/2017   • Anxiety 01/11/2018   • Gastroesophageal reflux disease without esophagitis 06/28/2018   • Polyp of colon 07/13/2018   • Dyspepsia 12/11/2020   • Epigastric pain 12/11/2020   • Poor appetite 12/11/2020   • Personal history of colonic polyps 03/31/2021   • Screening for colon cancer 03/31/2021   • Hypertriglyceridemia 08/27/2021   • Abnormal  EKG 08/27/2021   • Hematuria 07/06/2022   • Chronic pain 07/06/2022     Resolved Ambulatory Problems     Diagnosis Date Noted   • Chest pain 06/24/2018   • Gastroesophageal reflux disease 07/13/2018     Past Medical History:   Diagnosis Date   • Colon polyp 2010   • Fibromyalgia    • GERD (gastroesophageal reflux disease)    • H/O complete eye exam 04/2018   • IBS (irritable bowel syndrome)    • Squamous cell skin cancer, face    • Urinary retention        PAST SURGICAL HISTORY  Past Surgical History:   Procedure Laterality Date   • CARDIAC CATHETERIZATION      [facility] 07/2015: cath with 20% LM long 70% LAD (with positive FFR), 50% OM1, 99% prox RCA, s/p 2.75x33 Xience Alpine to LAD and 3.5x18mm Xience Alpine to RCA. LVEF 64% Normal Cardiolite 11/2015   • CARDIAC SURGERY      cardiac stents, 2   • COLONOSCOPY  approx 6/2011    normal per patient  [facility]   • COLONOSCOPY N/A 4/26/2021    Procedure: COLONOSCOPY TO CECUM WITH COLD SNARE POLYPECTOMIES AND BIOPSIES ;  Surgeon: Catarino Luna MD;  Location: [facility] ENDOSCOPY;  Service: Gastroenterology;  Laterality: N/A;  PRE- HISTORY COLON POLYPS  POST- POLYPS, COLITIS, HEMORRHOIDS   • COLONOSCOPY W/ POLYPECTOMY N/A 7/25/2018    Procedure: COLONOSCOPY TO CECUM WITH COLD SNAREPOLYPECTOMIES, HOT SNARE POLYPECTOMIES;  Surgeon: Catarino Luna MD;  Location: [facility] ENDOSCOPY;  Service: Gastroenterology   • CORONARY ANGIOPLASTY WITH STENT PLACEMENT     • CYSTOSCOPY TRANSURETHRAL RESECTION OF PROSTATE     • ENDOSCOPY N/A 7/25/2018    Procedure: ESOPHAGOGASTRODUODENOSCOPY WITH COLD BIOPSIES;  Surgeon: Catarino Luna MD;  Location: [facility] ENDOSCOPY;  Service: Gastroenterology   • ENDOSCOPY N/A 1/7/2021    Procedure: ESOPHAGOGASTRODUODENOSCOPY with cold biopsies;  Surgeon: Catarino Luna MD;  Location: [facility] ENDOSCOPY;  Service: Gastroenterology;  Laterality: N/A;  PRE: EPIGASTRIC PAIN, DYSPEPSIA  POST: GASTRITIS   • ENDOSCOPY N/A 5/25/2022     Procedure: ESOPHAGOGASTRODUODENOSCOPY WITH DILATATION #48 to #58 bougie, gastric biopsy;  Surgeon: Td Richey DO;  Location: [facility] ENDOSCOPY;  Service: General;  Laterality: N/A;  gastritis, small hiatal hernia   • HIATAL HERNIA REPAIR N/A 2022    Procedure: HIATAL HERNIA REPAIR LAPAROSCOPIC WITH TRANSORAL INCISIONLESS FUNDOPLICATION;  Surgeon: Td Richey DO;  Location: [facility] MAIN OR;  Service: General;  Laterality: N/A;   • RADIOFREQUENCY ABLATION      NECK   • SKIN CANCER EXCISION     • WISDOM TOOTH EXTRACTION         FAMILY HISTORY  Family History   Problem Relation Age of Onset   • Cervical cancer Mother    • Cancer Mother    • Depression Mother    • Heart disease Mother    • Liver disease Mother    • Alcohol abuse Mother    • Thyroid cancer Father    • Cancer Father    • Depression Father    • Diabetes Father    • Thyroid disease Father    • Coronary artery disease Other    • Malig Hyperthermia Neg Hx        SOCIAL HISTORY  Social History     Socioeconomic History   • Marital status:    Tobacco Use   • Smoking status: Former Smoker     Packs/day: 0.25     Years: 4.00     Pack years: 1.00     Types: Cigarettes     Start date: 1968     Quit date:      Years since quittin.5   • Smokeless tobacco: Never Used   Vaping Use   • Vaping Use: Never used   Substance and Sexual Activity   • Alcohol use: No     Comment: caffeine use   • Drug use: No   • Sexual activity: Defer       REVIEW OF SYSTEMS  Review of Systems    All systems reviewed and negative except for those discussed in HPI.     PHYSICAL EXAM    ED Triage Vitals [22 1907]   Temp Heart Rate Resp BP SpO2   98.1 °F (36.7 °C) 69 16 -- 97 %      Temp src Heart Rate Source Patient Position BP Location FiO2 (%)   -- -- -- -- --     Physical Exam    I have reviewed the triage vital signs and nursing notes.      GENERAL: not distressed  HENT: nares patent, mm moist, no facial droop.  TM's normal bilaterally, no posterior oral  pharynx abnormalities, no erythema.  Roof of mouth is normal  EYES: no scleral icterus, PERRL  NECK: mild limited ROM when turning head to right, no cervical spinal rigidity  CV: regular rhythm, regular rate, no murmur  RESPIRATORY: normal effort, CTAB  ABDOMEN: soft  : deferred  MUSCULOSKELETAL: no deformity  NEURO: alert, moves all extremities, follows commands  SKIN: warm, dry    LAB RESULTS  Recent Results (from the past 24 hour(s))   ECG 12 Lead    Collection Time: 07/20/22  9:34 PM   Result Value Ref Range    QT Interval 403 ms   Comprehensive Metabolic Panel    Collection Time: 07/20/22  9:40 PM    Specimen: Blood   Result Value Ref Range    Glucose 97 65 - 99 mg/dL    BUN 13 8 - 23 mg/dL    Creatinine 0.94 0.76 - 1.27 mg/dL    Sodium 139 136 - 145 mmol/L    Potassium 4.5 3.5 - 5.2 mmol/L    Chloride 106 98 - 107 mmol/L    CO2 24.2 22.0 - 29.0 mmol/L    Calcium 9.2 8.6 - 10.5 mg/dL    Total Protein 6.5 6.0 - 8.5 g/dL    Albumin 3.80 3.50 - 5.20 g/dL    ALT (SGPT) 15 1 - 41 U/L    AST (SGOT) 17 1 - 40 U/L    Alkaline Phosphatase 85 39 - 117 U/L    Total Bilirubin 0.5 0.0 - 1.2 mg/dL    Globulin 2.7 gm/dL    A/G Ratio 1.4 g/dL    BUN/Creatinine Ratio 13.8 7.0 - 25.0    Anion Gap 8.8 5.0 - 15.0 mmol/L    eGFR 85.1 >60.0 mL/min/1.73   Troponin    Collection Time: 07/20/22  9:40 PM    Specimen: Blood   Result Value Ref Range    Troponin T <0.010 0.000 - 0.030 ng/mL   CBC Auto Differential    Collection Time: 07/20/22  9:40 PM    Specimen: Blood   Result Value Ref Range    WBC 7.05 3.40 - 10.80 10*3/mm3    RBC 4.96 4.14 - 5.80 10*6/mm3    Hemoglobin 15.6 13.0 - 17.7 g/dL    Hematocrit 44.9 37.5 - 51.0 %    MCV 90.5 79.0 - 97.0 fL    MCH 31.5 26.6 - 33.0 pg    MCHC 34.7 31.5 - 35.7 g/dL    RDW 12.4 12.3 - 15.4 %    RDW-SD 39.9 37.0 - 54.0 fl    MPV 9.1 6.0 - 12.0 fL    Platelets 238 140 - 450 10*3/mm3    Neutrophil % 67.4 42.7 - 76.0 %    Lymphocyte % 23.5 19.6 - 45.3 %    Monocyte % 6.7 5.0 - 12.0 %    Eosinophil  % 1.7 0.3 - 6.2 %    Basophil % 0.4 0.0 - 1.5 %    Immature Grans % 0.3 0.0 - 0.5 %    Neutrophils, Absolute 4.75 1.70 - 7.00 10*3/mm3    Lymphocytes, Absolute 1.66 0.70 - 3.10 10*3/mm3    Monocytes, Absolute 0.47 0.10 - 0.90 10*3/mm3    Eosinophils, Absolute 0.12 0.00 - 0.40 10*3/mm3    Basophils, Absolute 0.03 0.00 - 0.20 10*3/mm3    Immature Grans, Absolute 0.02 0.00 - 0.05 10*3/mm3    nRBC 0.0 0.0 - 0.2 /100 WBC         RADIOLOGY RESULTS  CT Chest Without Contrast Diagnostic    Result Date: 7/20/2022  Patient: MICHAEL FRIEND  Time Out: 22:49 Exam(s): CT CHEST Without Contrast EXAM:   CT Chest Without Intravenous Contrast CLINICAL HISTORY:    Reason for exam: left shoulder and neck pain s p TIF procedure 2 weeks ago. TECHNIQUE:   Axial computed tomography images of the chest without intravenous contrast.  CTDI is 20.4 mGy and DLP is 876.1 mGy-cm.  This CT exam was performed according to the principle of ALARA (As Low As Reasonably Achievable) by using one or more of the following dose reduction techniques: automated exposure control, adjustment of the mA and or kV according to patient size, and or use of iterative reconstruction technique. COMPARISON:   Chest x-ray of 11 13 2020. FINDINGS:   Lungs:  Minimal subsegmental atelectasis noted posteriorly the mid lower lung zones.  Minimal areas of scarring at the lung bases bilaterally.  Airway is normal.   Pleural space:  No pleural effusions.  No pneumothorax.   Heart:  Unremarkable.  No cardiomegaly.  No significant pericardial effusion.  No significant coronary artery calcifications.   Mediastinum:  No pathologic hilar, mediastinal, or axillary lymphadenopathy.  Small hiatal hernia and distal esophagitis.   Thyroid:  The visualized thyroid gland is unremarkable.   Bones joints:  The clavicles are unremarkable.  The visualized ribs are unremarkable bilaterally.  Mild to moderate degenerative disc disease of the thoracic spine.  Sternum is unremarkable.  Gentle  dextroscoliosis of the thoracic spine.  No acute fracture.  No dislocation.   Soft tissues:  Unremarkable.   Vasculature:  Atherosclerotic disease of the thoracic aorta.  No thoracic aortic aneurysm.   Lymph nodes:  See above.   Liver:  Fatty liver.   Other findings:  ASCVD. IMPRESSION:     1.  Minimal areas of scarring and subsegmental atelectasis within the pulmonary parenchyma bilaterally most notably in the mid lower lung zones. 2.  No pleural effusions. 3.  Atherosclerotic disease and ASCVD. 4.  Small hiatal hernia distal esophagitis. 5.  Fatty liver.     Electronically signed by Riley Greene MD on 07-20-22 at 2249         PROGRESS, DATA ANALYSIS, CONSULTS AND MEDICAL DECISION MAKING  All labs have been independently reviewed by me.  All radiology studies have been reviewed by me and discussed with radiologist dictating the report.  EKG's independently viewed and interpreted by me unless stated otherwise. Discussion below represents my analysis of pertinent findings related to patient's condition, differential diagnosis, treatment plan and final disposition.     ED Course as of 07/20/22 2331 Wed Jul 20, 2022 2242 EKG     viewed by ER MD prior to my interpretation     EKG time: 2134  Rhythm/Rate: 59, sinus rhythm  P waves and AL: Normal AL, normal MARCY  QRS, axis: Normal QRS, normal axis  ST and T waves: Minimal ST elevation anteriorly    Interpreted Contemporaneously by me, independently viewed  Similar to prior dated 6/24/22   [EW]   2311 MDM/dispo:  I updated patient and wife on status of workup.  This could be trigeminal neuralgia and I did discuss treatment with Tegretol.  Pt is agreeable.  I also discussed that if rash develops this could be shingles.  He will call PCP if he develops rash so that he can promptly be started on Acyclovir.  [EW]      ED Course User Index  [EW] Laura Lorenz, RUSS     DDX: post operative complication, trigeminal neuralgia, neck spasm     Reviewed pt's history and  workup with Dr. Isaac.  After a bedside evaluation, Dr. Isaac agrees with the plan of care.    The patient's history, physical exam, and lab findings were discussed with the physician, who also performed a face to face history and physical exam.  I discussed all results and noted any abnormalities with patient.  Discussed absoute need to recheck abnormalities with their family physician.  I answered any of the patient's questions.  Discussed plan for discharge, as there is no emergent indication for admission.  Pt is agreeable and understands need for follow up and repeat testing.  Pt is aware that discharge does not mean that nothing is wrong but it indicates no emergency is present and they must continue care with their family physician.  Pt is discharged with instructions to follow up with primary care doctor to have their blood pressure rechecked.     Disposition vitals:  /74   Pulse 54   Temp 98.1 °F (36.7 °C)   Resp 16   SpO2 96%       DIAGNOSIS  Final diagnoses:   Neck pain on left side   Trapezius muscle spasm   Trigeminal neuralgia of left side of face       FOLLOW UP   Beau Lee MD  [id number] [id number] [phone]    Schedule an appointment as soon as possible for a visit in 2 days  As needed         Laura Lorenz, RUSS  07/20/22 [id number]

## 2022-07-22 LAB
BASOPHILS # BLD AUTO: 0 X10E3/UL (ref 0–0.2)
BASOPHILS NFR BLD AUTO: 1 %
BUN SERPL-MCNC: 12 MG/DL (ref 8–27)
BUN/CREAT SERPL: 10 (ref 10–24)
CALCIUM SERPL-MCNC: 9.8 MG/DL (ref 8.6–10.2)
CHLORIDE SERPL-SCNC: 104 MMOL/L (ref 96–106)
CHOLEST SERPL-MCNC: 146 MG/DL (ref 100–199)
CO2 SERPL-SCNC: 25 MMOL/L (ref 20–29)
CREAT SERPL-MCNC: 1.18 MG/DL (ref 0.76–1.27)
EGFRCR SERPLBLD CKD-EPI 2021: 65 ML/MIN/1.73
EOSINOPHIL # BLD AUTO: 0.1 X10E3/UL (ref 0–0.4)
EOSINOPHIL NFR BLD AUTO: 2 %
ERYTHROCYTE [DISTWIDTH] IN BLOOD BY AUTOMATED COUNT: 12.4 % (ref 11.6–15.4)
GLUCOSE SERPL-MCNC: 101 MG/DL (ref 65–99)
HCT VFR BLD AUTO: 47.4 % (ref 37.5–51)
HDLC SERPL-MCNC: 34 MG/DL
HGB BLD-MCNC: 16.5 G/DL (ref 13–17.7)
IMM GRANULOCYTES # BLD AUTO: 0 X10E3/UL (ref 0–0.1)
IMM GRANULOCYTES NFR BLD AUTO: 0 %
LDLC SERPL CALC-MCNC: 85 MG/DL (ref 0–99)
LYMPHOCYTES # BLD AUTO: 1.5 X10E3/UL (ref 0.7–3.1)
LYMPHOCYTES NFR BLD AUTO: 23 %
MCH RBC QN AUTO: 31.7 PG (ref 26.6–33)
MCHC RBC AUTO-ENTMCNC: 34.8 G/DL (ref 31.5–35.7)
MCV RBC AUTO: 91 FL (ref 79–97)
MONOCYTES # BLD AUTO: 0.4 X10E3/UL (ref 0.1–0.9)
MONOCYTES NFR BLD AUTO: 7 %
NEUTROPHILS # BLD AUTO: 4.4 X10E3/UL (ref 1.4–7)
NEUTROPHILS NFR BLD AUTO: 67 %
PLATELET # BLD AUTO: 267 X10E3/UL (ref 150–450)
POTASSIUM SERPL-SCNC: 4.6 MMOL/L (ref 3.5–5.2)
PSA SERPL-MCNC: 3.4 NG/ML (ref 0–4)
RBC # BLD AUTO: 5.2 X10E6/UL (ref 4.14–5.8)
SODIUM SERPL-SCNC: 141 MMOL/L (ref 134–144)
TRIGL SERPL-MCNC: 155 MG/DL (ref 0–149)
TSH SERPL DL<=0.005 MIU/L-ACNC: 2.04 UIU/ML (ref 0.45–4.5)
VLDLC SERPL CALC-MCNC: 27 MG/DL (ref 5–40)
WBC # BLD AUTO: 6.5 X10E3/UL (ref 3.4–10.8)

## 2022-07-26 ENCOUNTER — OFFICE VISIT (OUTPATIENT)
Dept: SURGERY | Facility: CLINIC | Age: 77
End: 2022-07-26

## 2022-07-26 VITALS
HEIGHT: 70 IN | DIASTOLIC BLOOD PRESSURE: 63 MMHG | TEMPERATURE: 97.7 F | WEIGHT: 172 LBS | BODY MASS INDEX: 24.62 KG/M2 | HEART RATE: 73 BPM | SYSTOLIC BLOOD PRESSURE: 131 MMHG | OXYGEN SATURATION: 96 %

## 2022-07-26 DIAGNOSIS — K21.9 GASTROESOPHAGEAL REFLUX DISEASE WITHOUT ESOPHAGITIS: Primary | ICD-10-CM

## 2022-07-26 PROCEDURE — 99024 POSTOP FOLLOW-UP VISIT: CPT | Performed by: SURGERY

## 2022-07-26 RX ORDER — OXCARBAZEPINE 300 MG/1
300 TABLET, FILM COATED ORAL 2 TIMES DAILY
Status: ON HOLD | COMMUNITY
End: 2022-09-12

## 2022-07-26 NOTE — PROGRESS NOTES
SUBJECTIVE:    Mr. Melendez is seen in the office today follow-up from his lap hiatal hernia repair with transoral fundoplication about 3 weeks ago.  He is doing well.  No fevers or chills.  No nausea or vomiting.  Started soft foods and tolerating those well    OBJECTIVE:    Incisions are healing appropriately without infection    ASSESSMENT:    Satisfactory postop progress    PLAN:    At this point he is to go to once daily on his Protonix.  He is to increase diet to soft foods as we have outlined.  Recheck in the office in 2 to 3 weeks.

## 2022-08-02 RX ORDER — ATORVASTATIN CALCIUM 10 MG/1
10 TABLET, FILM COATED ORAL DAILY
Qty: 90 TABLET | Refills: 1 | Status: CANCELLED | OUTPATIENT
Start: 2022-08-02

## 2022-08-03 ENCOUNTER — OFFICE VISIT (OUTPATIENT)
Dept: FAMILY MEDICINE CLINIC | Facility: CLINIC | Age: 77
End: 2022-08-03

## 2022-08-03 VITALS
SYSTOLIC BLOOD PRESSURE: 127 MMHG | WEIGHT: 174 LBS | BODY MASS INDEX: 24.91 KG/M2 | DIASTOLIC BLOOD PRESSURE: 67 MMHG | RESPIRATION RATE: 16 BRPM | TEMPERATURE: 97.8 F | HEART RATE: 70 BPM | HEIGHT: 70 IN

## 2022-08-03 DIAGNOSIS — Z00.00 MEDICARE ANNUAL WELLNESS VISIT, SUBSEQUENT: Primary | ICD-10-CM

## 2022-08-03 DIAGNOSIS — I25.10 CORONARY ARTERY DISEASE INVOLVING NATIVE CORONARY ARTERY OF NATIVE HEART WITHOUT ANGINA PECTORIS: Chronic | ICD-10-CM

## 2022-08-03 DIAGNOSIS — E78.2 MIXED HYPERLIPIDEMIA: Chronic | ICD-10-CM

## 2022-08-03 DIAGNOSIS — N52.9 ERECTILE DYSFUNCTION, UNSPECIFIED ERECTILE DYSFUNCTION TYPE: Chronic | ICD-10-CM

## 2022-08-03 DIAGNOSIS — F41.9 ANXIETY: Chronic | ICD-10-CM

## 2022-08-03 PROCEDURE — 1170F FXNL STATUS ASSESSED: CPT | Performed by: FAMILY MEDICINE

## 2022-08-03 PROCEDURE — 1126F AMNT PAIN NOTED NONE PRSNT: CPT | Performed by: FAMILY MEDICINE

## 2022-08-03 PROCEDURE — 1159F MED LIST DOCD IN RCRD: CPT | Performed by: FAMILY MEDICINE

## 2022-08-03 PROCEDURE — G0439 PPPS, SUBSEQ VISIT: HCPCS | Performed by: FAMILY MEDICINE

## 2022-08-03 RX ORDER — PITAVASTATIN CALCIUM 4.18 MG/1
4 TABLET, FILM COATED ORAL NIGHTLY
Qty: 100 TABLET | Refills: 3 | Status: SHIPPED | OUTPATIENT
Start: 2022-08-03

## 2022-08-03 RX ORDER — ALPRAZOLAM 0.25 MG/1
0.25 TABLET ORAL DAILY PRN
Qty: 30 TABLET | Refills: 2 | Status: SHIPPED | OUTPATIENT
Start: 2022-08-03 | End: 2023-02-14 | Stop reason: SDUPTHER

## 2022-08-03 RX ORDER — EZETIMIBE 10 MG/1
10 TABLET ORAL DAILY
Qty: 90 TABLET | Refills: 1 | Status: SHIPPED | OUTPATIENT
Start: 2022-08-03 | End: 2023-02-14 | Stop reason: SDUPTHER

## 2022-08-03 RX ORDER — CLOPIDOGREL BISULFATE 75 MG/1
75 TABLET ORAL DAILY
Qty: 90 TABLET | Refills: 3 | Status: SHIPPED | OUTPATIENT
Start: 2022-08-03

## 2022-08-03 RX ORDER — SILDENAFIL CITRATE 20 MG/1
TABLET ORAL
Qty: 30 TABLET | Refills: 5 | Status: SHIPPED | OUTPATIENT
Start: 2022-08-03

## 2022-08-03 NOTE — PATIENT INSTRUCTIONS
Medicare Wellness  Personal Prevention Plan of Service     Date of Office Visit:    Encounter Provider:  Beau Lee MD  Place of Service:  [facility] PRIMARY CARE  Patient Name: Td Melendez Jr.  :  1945    As part of the Medicare Wellness portion of your visit today, we are providing you with this personalized preventive plan of services (PPPS). This plan is based upon recommendations of the [place] Preventive Services Task Force (USPSTF) and the Advisory Committee on Immunization Practices (ACIP).    This lists the preventive care services that should be considered, and provides dates of when you are due. Items listed as completed are up-to-date and do not require any further intervention.    Health Maintenance   Topic Date Due    Pneumococcal Vaccine 65+ (2 - PCV) 2020    TDAP/TD VACCINES (2 - Td or Tdap) 2022 (Originally 2018)    ZOSTER VACCINE (2 of 2) 2022 (Originally 2012)    INFLUENZA VACCINE  10/01/2022    LIPID PANEL  2023    ANNUAL WELLNESS VISIT  2023    COLORECTAL CANCER SCREENING  2024    COVID-19 Vaccine  Completed    HEPATITIS C SCREENING  Discontinued    AAA SCREEN (ONE-TIME)  Discontinued       No orders of the defined types were placed in this encounter.      Return in about 6 months (around 2/3/2023) for Recheck.

## 2022-08-03 NOTE — PROGRESS NOTES
The ABCs of the Annual Wellness Visit  Subsequent Medicare Wellness Visit    Chief Complaint   Patient presents with   • Anxiety     Med refill due    • Hyperlipidemia   • Erectile Dysfunction   • MEDICARE WELLNESS     DUE       Subjective    History of Present Illness:  Td Melendez Jr. is a 76 y.o. male who presents for a Subsequent Medicare Wellness Visit.    The following portions of the patient's history were reviewed and   updated as appropriate: allergies, current medications, past family history, past medical history, past social history, past surgical history and problem list.    Compared to one year ago, the patient feels his physical   health is the same.    Compared to one year ago, the patient feels his mental   health is the same.    Recent Hospitalizations:  He was not admitted to the hospital during the last year.       Current Medical Providers:  Patient Care Team:  Beau Lee MD as PCP - General (Family Medicine)  Obi Boucher MD as Consulting Physician (Cardiology)  Venkat Salas MD (Pain Medicine)  Pedrito Neal Jr., MD as Consulting Physician (Urology)    Outpatient Medications Prior to Visit   Medication Sig Dispense Refill   • coenzyme Q10 100 MG capsule Take 100 mg by mouth Daily. LD 6/30     • diphenhydrAMINE-acetaminophen (TYLENOL PM)  MG tablet per tablet Take 1 tablet by mouth At Night As Needed for Sleep.     • Emollient (COLLAGEN EX) Take 1 tablet by mouth Daily. LD 6/30     • HYDROcodone-acetaminophen (NORCO) 5-325 MG per tablet Take  by mouth See Admin Instructions. Take 1 tablet by mouth every 4-6 hours as needed for pain     • HYDROcodone-acetaminophen (Norco) 7.5-325 MG per tablet Take 1 tablet by mouth Every 6 (Six) Hours As Needed for Moderate Pain . 30 tablet 0   • HYDROcodone-acetaminophen (Norco) 7.5-325 MG per tablet Take 1 tablet by mouth Every 6 (Six) Hours As Needed for Moderate Pain . 30 tablet 0   • LITHIUM PO Take 5 mg by mouth 2 (two) times a day.      • melatonin 5 MG tablet tablet Take 5 mg by mouth At Night As Needed.     • ondansetron (Zofran) 4 MG tablet Take 1 tablet by mouth Daily As Needed for Nausea or Vomiting. 30 tablet 1   • ondansetron (Zofran) 4 MG tablet Take 1 tablet by mouth Daily As Needed for Nausea or Vomiting. 30 tablet 1   • OXcarbazepine (TRILEPTAL) 300 MG tablet Take 300 mg by mouth 2 (Two) Times a Day.     • pantoprazole (PROTONIX) 40 MG EC tablet Take 1 tablet by mouth 2 (Two) Times a Day. 180 tablet 3   • Vitamin D-Vitamin K (VITAMIN K2-VITAMIN D3 PO) Take  by mouth Daily. Hold for surgery     • ALPRAZolam (XANAX) 0.25 MG tablet Take 1 tablet by mouth Daily As Needed for Anxiety. 30 tablet 2   • atorvastatin (Lipitor) 10 MG tablet Take 1 tablet by mouth Daily. 90 tablet 1   • clopidogrel (PLAVIX) 75 MG tablet Take 1 tablet by mouth Daily. (Patient taking differently: Take 75 mg by mouth Daily. HOLD 5 days per heart dr and Dr. Richey     LD 6/30) 90 tablet 3   • ezetimibe (ZETIA) 10 MG tablet Take 1 tablet by mouth Daily. 90 tablet 1   • sildenafil (REVATIO) 20 MG tablet Take 2-3 tabs QD prn (Patient taking differently: Take 2-3 tabs QD prn     HOLD 24 hours before anes) 30 tablet 5     No facility-administered medications prior to visit.       Opioid medication/s are on active medication list.  and I have evaluated his active treatment plan and pain score trends (see table).  Vitals:    08/02/22 1357   PainSc: 0-No pain     I have reviewed the chart for potential of high risk medication and harmful drug interactions in the elderly.            Aspirin is not on active medication list.  Aspirin use is not indicated based on review of current medical condition/s. Risk of harm outweighs potential benefits.  .    Patient Active Problem List   Diagnosis   • CAD (coronary artery disease)   • Hyperlipidemia   • Anxiety   • Gastroesophageal reflux disease without esophagitis   • Polyp of colon   • Dyspepsia   • Epigastric pain   • Poor  "appetite   • Personal history of colonic polyps   • Screening for colon cancer   • Hypertriglyceridemia   • Abnormal EKG   • Hematuria   • Chronic pain     Advance Care Planning  Advance Directive is not on file.  ACP discussion was held with the patient during this visit. Patient does not have an advance directive, declines further assistance.          Objective    Vitals:    22 1357   BP: 127/67   Pulse: 70   Resp: 16   Temp: 97.8 °F (36.6 °C)   TempSrc: Oral   Weight: 78.9 kg (174 lb)   Height: 177.8 cm (70\")   PainSc: 0-No pain     Estimated body mass index is 24.97 kg/m² as calculated from the following:    Height as of this encounter: 177.8 cm (70\").    Weight as of this encounter: 78.9 kg (174 lb).    BMI is within normal parameters. No other follow-up for BMI required.      Does the patient have evidence of cognitive impairment? No    Physical Exam  Lab Results   Component Value Date    CHLPL 146 2022    TRIG 155 (H) 2022    HDL 34 (L) 2022    LDL 85 2022    VLDL 27 2022            HEALTH RISK ASSESSMENT    Smoking Status:  Social History     Tobacco Use   Smoking Status Former Smoker   • Packs/day: 0.25   • Years: 4.00   • Pack years: 1.00   • Types: Cigarettes   • Start date: 1968   • Quit date:    • Years since quittin.6   Smokeless Tobacco Never Used     Alcohol Consumption:  Social History     Substance and Sexual Activity   Alcohol Use No    Comment: caffeine use     Fall Risk Screen:    MARILU Fall Risk Assessment has not been completed.    Depression Screening:  PHQ-2/PHQ-9 Depression Screening 1/3/2022   Retired PHQ-9 Total Score 0   Retired Total Score 0       Health Habits and Functional and Cognitive Screening:  Functional & Cognitive Status 8/3/2022   Do you have difficulty preparing food and eating? No   Do you have difficulty bathing yourself, getting dressed or grooming yourself? No   Do you have difficulty using the toilet? No   Do you have " difficulty moving around from place to place? No   Do you have trouble with steps or getting out of a bed or a chair? No   Current Diet Well Balanced Diet   Dental Exam Up to date   Eye Exam Not up to date   Exercise (times per week) 4 times per week   Current Exercises Include Walking   Current Exercise Activities Include -   Do you need help using the phone?  No   Are you deaf or do you have serious difficulty hearing?  No   Do you need help with transportation? No   Do you need help shopping? No   Do you need help preparing meals?  No   Do you need help with housework?  No   Do you need help with laundry? No   Do you need help taking your medications? No   Do you need help managing money? No   Do you ever drive or ride in a car without wearing a seat belt? No   Have you felt unusual stress, anger or loneliness in the last month? No   Who do you live with? Spouse   If you need help, do you have trouble finding someone available to you? Yes   Have you been bothered in the last four weeks by sexual problems? No   Do you have difficulty concentrating, remembering or making decisions? No       Age-appropriate Screening Schedule:  Refer to the list below for future screening recommendations based on patient's age, sex and/or medical conditions. Orders for these recommended tests are listed in the plan section. The patient has been provided with a written plan.    Health Maintenance   Topic Date Due   • TDAP/TD VACCINES (2 - Td or Tdap) 08/03/2022 (Originally 1/1/2018)   • ZOSTER VACCINE (2 of 2) 08/03/2022 (Originally 2/26/2012)   • INFLUENZA VACCINE  10/01/2022   • LIPID PANEL  07/21/2023              Assessment & Plan   CMS Preventative Services Quick Reference  Risk Factors Identified During Encounter  Cardiovascular Disease  The above risks/problems have been discussed with the patient.  Follow up actions/plans if indicated are seen below in the Assessment/Plan Section.  Pertinent information has been shared with  the patient in the After Visit Summary.    Diagnoses and all orders for this visit:    1. Medicare annual wellness visit, subsequent (Primary)    2. Coronary artery disease involving native coronary artery of native heart without angina pectoris  -     ezetimibe (ZETIA) 10 MG tablet; Take 1 tablet by mouth Daily.  Dispense: 90 tablet; Refill: 1  -     Pitavastatin Calcium (Livalo) 4 MG tablet; Take 1 tablet by mouth Every Night.  Dispense: 100 tablet; Refill: 3  -     clopidogrel (PLAVIX) 75 MG tablet; Take 1 tablet by mouth Daily.  Dispense: 90 tablet; Refill: 3    3. Mixed hyperlipidemia  -     ezetimibe (ZETIA) 10 MG tablet; Take 1 tablet by mouth Daily.  Dispense: 90 tablet; Refill: 1  -     Pitavastatin Calcium (Livalo) 4 MG tablet; Take 1 tablet by mouth Every Night.  Dispense: 100 tablet; Refill: 3    4. Anxiety  -     ALPRAZolam (XANAX) 0.25 MG tablet; Take 1 tablet by mouth Daily As Needed for Anxiety.  Dispense: 30 tablet; Refill: 2    5. Erectile dysfunction, unspecified erectile dysfunction type  -     sildenafil (REVATIO) 20 MG tablet; Take 2-3 tabs QD prn  Dispense: 30 tablet; Refill: 5        Follow Up:   No follow-ups on file.     An After Visit Summary and PPPS were made available to the patient.          I spent 15 minutes caring for Td on this date of service. This time includes time spent by me in the following activities:preparing for the visit and reviewing tests

## 2022-08-16 ENCOUNTER — OFFICE VISIT (OUTPATIENT)
Dept: SURGERY | Facility: CLINIC | Age: 77
End: 2022-08-16

## 2022-08-16 VITALS
HEIGHT: 70 IN | HEART RATE: 74 BPM | TEMPERATURE: 97.7 F | SYSTOLIC BLOOD PRESSURE: 152 MMHG | DIASTOLIC BLOOD PRESSURE: 75 MMHG | BODY MASS INDEX: 24.77 KG/M2 | WEIGHT: 173 LBS | OXYGEN SATURATION: 95 %

## 2022-08-16 DIAGNOSIS — K21.00 GASTROESOPHAGEAL REFLUX DISEASE WITH ESOPHAGITIS WITHOUT HEMORRHAGE: Primary | ICD-10-CM

## 2022-08-16 PROCEDURE — 99024 POSTOP FOLLOW-UP VISIT: CPT | Performed by: SURGERY

## 2022-08-16 NOTE — PROGRESS NOTES
SUBJECTIVE:    Mr. Melendez was seen in the office today follow-up from his lap hiatal hernia repair with transoral fundoplication about 6 weeks ago.  He had been doing well and then last night had an episode of heartburn.  He drank some Mylanta and went away quickly.    OBJECTIVE:    Incisions have healed well    ASSESSMENT:    Satisfactory postop progress    PLAN:    Recheck in the office in 3 weeks.  He may resume regular foods.

## 2022-09-06 ENCOUNTER — OFFICE VISIT (OUTPATIENT)
Dept: SURGERY | Facility: CLINIC | Age: 77
End: 2022-09-06

## 2022-09-06 VITALS
DIASTOLIC BLOOD PRESSURE: 71 MMHG | HEART RATE: 74 BPM | OXYGEN SATURATION: 97 % | HEIGHT: 70 IN | SYSTOLIC BLOOD PRESSURE: 134 MMHG | TEMPERATURE: 97.8 F | BODY MASS INDEX: 24.94 KG/M2 | WEIGHT: 174.2 LBS

## 2022-09-06 DIAGNOSIS — K21.9 GASTROESOPHAGEAL REFLUX DISEASE WITHOUT ESOPHAGITIS: Primary | ICD-10-CM

## 2022-09-06 PROCEDURE — 99024 POSTOP FOLLOW-UP VISIT: CPT | Performed by: SURGERY

## 2022-09-06 RX ORDER — DIAZEPAM 10 MG/1
TABLET ORAL
Status: ON HOLD | COMMUNITY
Start: 2022-08-22 | End: 2022-09-12

## 2022-09-06 NOTE — PROGRESS NOTES
SUBJECTIVE:    Mr. Melendez is seen in the office today follow-up from his lap hiatal hernia repair with transoral fundoplication 8 weeks ago.  He is doing well.  Occasional discomfort at the epigastrium with certain activities but is no reflux    OBJECTIVE:    Incisions have healed well.    ASSESSMENT:    Satisfactory postop progress    PLAN:    Recheck in the office as needed.  He is to follow-up with Dr. Franco if needed

## 2022-09-07 ENCOUNTER — OFFICE VISIT (OUTPATIENT)
Dept: CARDIOLOGY | Facility: CLINIC | Age: 77
End: 2022-09-07

## 2022-09-07 VITALS
BODY MASS INDEX: 25.2 KG/M2 | HEIGHT: 70 IN | HEART RATE: 65 BPM | WEIGHT: 176 LBS | SYSTOLIC BLOOD PRESSURE: 120 MMHG | DIASTOLIC BLOOD PRESSURE: 70 MMHG

## 2022-09-07 DIAGNOSIS — I25.10 CORONARY ARTERY DISEASE INVOLVING NATIVE CORONARY ARTERY OF NATIVE HEART WITHOUT ANGINA PECTORIS: Primary | Chronic | ICD-10-CM

## 2022-09-07 DIAGNOSIS — E78.2 MIXED HYPERLIPIDEMIA: Chronic | ICD-10-CM

## 2022-09-07 DIAGNOSIS — R94.31 ABNORMAL EKG: ICD-10-CM

## 2022-09-07 PROCEDURE — 99213 OFFICE O/P EST LOW 20 MIN: CPT | Performed by: INTERNAL MEDICINE

## 2022-09-07 PROCEDURE — 93000 ELECTROCARDIOGRAM COMPLETE: CPT | Performed by: INTERNAL MEDICINE

## 2022-09-07 NOTE — PROGRESS NOTES
Date of Office Visit: 2022  Encounter Provider: Obi Boucher MD  Place of Service: [facility] CARDIOLOGY  Patient Name: Td Melendez Jr.  :1945    Chief Complaint   Patient presents with   • Coronary Artery Disease   :     HPI: Td Melendez Jr. is a 76 y.o. male who presents today to follow-up. I have reviewed prior notes and there are no changes except for any new updates described below. I have also reviewed any information entered into the medical record by the patient or by ancillary staff.     He has a history of unstable angina and underwent drug-eluting stent placement to the left anterior descending artery and the right coronary artery in 2015. He was noted to have a nonobstructive lesion in the circumflex. In 2015, he presented with chest discomfort ; a Cardiolite stress was normal. In 2018, he was admitted with chest pain that sounded GI in etiology; a Cardiolite stress test was normal.     He's doing well. He has no worrisome issues. He denies chest pain, dyspnea, palpitations, lightheadedness, or syncope.     Past Medical History:   Diagnosis Date   • Anxiety    • CAD (coronary artery disease)     unstable angina, 2015: 20% LM, long 70% LAD with abnormal FFR (s/p [id number] Xience), 50% OM1, 99% prox RCA (s/p 3.5x18m Xience).  Normal Cardiolite 2015 and 2018   • Colon polyp    • Fibromyalgia    • GERD (gastroesophageal reflux disease)    • H/O complete eye exam 2018   • Hyperlipidemia    • IBS (irritable bowel syndrome)    • Squamous cell skin cancer, face    • Urinary retention        Past Surgical History:   Procedure Laterality Date   • CARDIAC CATHETERIZATION      [facility] 2015: cath with 20% LM long 70% LAD (with positive FFR), 50% OM1, 99% prox RCA, s/p 2.75x33 Xience Alpine to LAD and 3.5x18mm Xience Alpine to RCA. LVEF 64% Normal Cardiolite 2015   • CARDIAC SURGERY      cardiac stents, 2   • COLONOSCOPY  approx  2011    normal per patient  [facility]   • COLONOSCOPY N/A 2021    Procedure: COLONOSCOPY TO CECUM WITH COLD SNARE POLYPECTOMIES AND BIOPSIES ;  Surgeon: Catarino Luna MD;  Location: [facility] ENDOSCOPY;  Service: Gastroenterology;  Laterality: N/A;  PRE- HISTORY COLON POLYPS  POST- POLYPS, COLITIS, HEMORRHOIDS   • COLONOSCOPY W/ POLYPECTOMY N/A 2018    Procedure: COLONOSCOPY TO CECUM WITH COLD SNAREPOLYPECTOMIES, HOT SNARE POLYPECTOMIES;  Surgeon: Catarino Luna MD;  Location: [facility] ENDOSCOPY;  Service: Gastroenterology   • CORONARY ANGIOPLASTY WITH STENT PLACEMENT     • CYSTOSCOPY TRANSURETHRAL RESECTION OF PROSTATE     • ENDOSCOPY N/A 2018    Procedure: ESOPHAGOGASTRODUODENOSCOPY WITH COLD BIOPSIES;  Surgeon: Catarino Luna MD;  Location: [facility] ENDOSCOPY;  Service: Gastroenterology   • ENDOSCOPY N/A 2021    Procedure: ESOPHAGOGASTRODUODENOSCOPY with cold biopsies;  Surgeon: Catarino Luna MD;  Location: [facility] ENDOSCOPY;  Service: Gastroenterology;  Laterality: N/A;  PRE: EPIGASTRIC PAIN, DYSPEPSIA  POST: GASTRITIS   • ENDOSCOPY N/A 2022    Procedure: ESOPHAGOGASTRODUODENOSCOPY WITH DILATATION #48 to #58 bougie, gastric biopsy;  Surgeon: Td Richey DO;  Location: [facility] ENDOSCOPY;  Service: General;  Laterality: N/A;  gastritis, small hiatal hernia   • HIATAL HERNIA REPAIR N/A 2022    Procedure: HIATAL HERNIA REPAIR LAPAROSCOPIC WITH TRANSORAL INCISIONLESS FUNDOPLICATION;  Surgeon: Td Richey DO;  Location: [facility] MAIN OR;  Service: General;  Laterality: N/A;   • RADIOFREQUENCY ABLATION      NECK   • SKIN CANCER EXCISION     • WISDOM TOOTH EXTRACTION         Social History     Socioeconomic History   • Marital status:    Tobacco Use   • Smoking status: Former Smoker     Packs/day: 0.25     Years: 4.00     Pack years: 1.00     Types: Cigarettes     Start date: 1968     Quit date:      Years since quittin.7   • Smokeless  tobacco: Never Used   Vaping Use   • Vaping Use: Never used   Substance and Sexual Activity   • Alcohol use: No     Comment: caffeine use   • Drug use: No   • Sexual activity: Defer       Family History   Problem Relation Age of Onset   • Cervical cancer Mother    • Cancer Mother    • Depression Mother    • Heart disease Mother    • Liver disease Mother    • Alcohol abuse Mother    • Thyroid cancer Father    • Cancer Father    • Depression Father    • Diabetes Father    • Thyroid disease Father    • Coronary artery disease Other    • Malig Hyperthermia Neg Hx        Review of Systems   Hematologic/Lymphatic: Bruises/bleeds easily.   All other systems reviewed and are negative.      Allergies   Allergen Reactions   • Augmentin [Amoxicillin-Pot Clavulanate] Hives and Itching        Current Outpatient Medications:   •  ALPRAZolam (XANAX) 0.25 MG tablet, Take 1 tablet by mouth Daily As Needed for Anxiety., Disp: 30 tablet, Rfl: 2  •  clopidogrel (PLAVIX) 75 MG tablet, Take 1 tablet by mouth Daily., Disp: 90 tablet, Rfl: 3  •  coenzyme Q10 100 MG capsule, Take 100 mg by mouth Daily. LD 6/30, Disp: , Rfl:   •  diazePAM (VALIUM) 10 MG tablet, TAKE 1 TABLET 30 MINUTES BEFORE PROCEDURE, Disp: , Rfl:   •  diphenhydrAMINE-acetaminophen (TYLENOL PM)  MG tablet per tablet, Take 1 tablet by mouth At Night As Needed for Sleep., Disp: , Rfl:   •  Emollient (COLLAGEN EX), Take 1 tablet by mouth Daily. LD 6/30, Disp: , Rfl:   •  ezetimibe (ZETIA) 10 MG tablet, Take 1 tablet by mouth Daily., Disp: 90 tablet, Rfl: 1  •  HYDROcodone-acetaminophen (NORCO) 5-325 MG per tablet, Take  by mouth See Admin Instructions. Take 1 tablet by mouth every 4-6 hours as needed for pain, Disp: , Rfl:   •  HYDROcodone-acetaminophen (Norco) 7.5-325 MG per tablet, Take 1 tablet by mouth Every 6 (Six) Hours As Needed for Moderate Pain ., Disp: 30 tablet, Rfl: 0  •  HYDROcodone-acetaminophen (Norco) 7.5-325 MG per tablet, Take 1 tablet by mouth Every 6  "(Six) Hours As Needed for Moderate Pain ., Disp: 30 tablet, Rfl: 0  •  LITHIUM PO, Take 5 mg by mouth 2 (two) times a day., Disp: , Rfl:   •  melatonin 5 MG tablet tablet, Take 5 mg by mouth At Night As Needed., Disp: , Rfl:   •  ondansetron (Zofran) 4 MG tablet, Take 1 tablet by mouth Daily As Needed for Nausea or Vomiting., Disp: 30 tablet, Rfl: 1  •  ondansetron (Zofran) 4 MG tablet, Take 1 tablet by mouth Daily As Needed for Nausea or Vomiting., Disp: 30 tablet, Rfl: 1  •  OXcarbazepine (TRILEPTAL) 300 MG tablet, Take 300 mg by mouth 2 (Two) Times a Day., Disp: , Rfl:   •  pantoprazole (PROTONIX) 40 MG EC tablet, Take 1 tablet by mouth 2 (Two) Times a Day., Disp: 180 tablet, Rfl: 3  •  Pitavastatin Calcium (Livalo) 4 MG tablet, Take 1 tablet by mouth Every Night., Disp: 100 tablet, Rfl: 3  •  sildenafil (REVATIO) 20 MG tablet, Take 2-3 tabs QD prn, Disp: 30 tablet, Rfl: 5  •  TURMERIC PO, Take  by mouth., Disp: , Rfl:   •  Vitamin D-Vitamin K (VITAMIN K2-VITAMIN D3 PO), Take  by mouth Daily. Hold for surgery, Disp: , Rfl:      Objective:     Vitals:    09/07/22 0917   BP: 120/70   BP Location: Left arm   Pulse: 65   Weight: 79.8 kg (176 lb)   Height: 177.8 cm (70\")     Body mass index is 25.25 kg/m².    Physical Exam  Vitals reviewed.   Constitutional:       Appearance: He is well-developed.   HENT:      Head: Normocephalic.      Nose: Nose normal.      Mouth/Throat:      Comments: masked  Eyes:      Conjunctiva/sclera: Conjunctivae normal.   Neck:      Vascular: No JVD.   Cardiovascular:      Rate and Rhythm: Normal rate and regular rhythm.      Pulses: Normal pulses and intact distal pulses.      Heart sounds: Normal heart sounds. No murmur heard.  Pulmonary:      Effort: Pulmonary effort is normal.      Breath sounds: Normal breath sounds.   Abdominal:      Palpations: Abdomen is soft.      Tenderness: There is no abdominal tenderness.   Musculoskeletal:         General: No swelling. Normal range of motion. "      Cervical back: Normal range of motion.   Lymphadenopathy:      Cervical: No cervical adenopathy.   Skin:     General: Skin is warm and dry.      Findings: No rash.   Neurological:      General: No focal deficit present.      Mental Status: He is alert and oriented to person, place, and time.      Cranial Nerves: No cranial nerve deficit.   Psychiatric:         Mood and Affect: Mood normal.         Behavior: Behavior normal.         Thought Content: Thought content normal.         ECG 12 Lead    Date/Time: 9/7/2022 9:29 AM  Performed by: Obi Boucher MD  Authorized by: Obi Boucher MD   Comparison: compared with previous ECG   Similar to previous ECG  Rhythm: sinus rhythm  Conduction: conduction normal  T Waves: T waves normal  QRS axis: normal  Other: no other findings    Clinical impression: normal ECG  Comments: Upsloping inferior SANTA, unchanged from prior             Assessment:       Diagnosis Plan   1. Coronary artery disease involving native coronary artery of native heart without angina pectoris  ECG 12 Lead   2. Mixed hyperlipidemia     3. Abnormal EKG        Plan:       1.  Coronary Artery Disease  Assessment  • The patient has no angina  • He's doing well.  He's on ezetimibe and pitavastatin.    Subjective - Objective  • There has been a previous stent procedure using MADEILN 7/2015  • Current antiplatelet therapy includes aspirin 81 mg      Sincerely,     Obi Boucher MD

## 2022-09-12 ENCOUNTER — APPOINTMENT (OUTPATIENT)
Dept: CT IMAGING | Facility: HOSPITAL | Age: 77
End: 2022-09-12

## 2022-09-12 ENCOUNTER — APPOINTMENT (OUTPATIENT)
Dept: GENERAL RADIOLOGY | Facility: HOSPITAL | Age: 77
End: 2022-09-12

## 2022-09-12 ENCOUNTER — HOSPITAL ENCOUNTER (OUTPATIENT)
Facility: HOSPITAL | Age: 77
Setting detail: OBSERVATION
Discharge: HOME | End: 2022-09-13
Attending: EMERGENCY MEDICINE | Admitting: EMERGENCY MEDICINE

## 2022-09-12 DIAGNOSIS — R10.13 ABDOMINAL PAIN, ACUTE, EPIGASTRIC: Primary | ICD-10-CM

## 2022-09-12 DIAGNOSIS — I25.10 CORONARY ARTERY DISEASE INVOLVING NATIVE HEART, UNSPECIFIED VESSEL OR LESION TYPE, UNSPECIFIED WHETHER ANGINA PRESENT: ICD-10-CM

## 2022-09-12 DIAGNOSIS — R93.3 ABNORMAL CT SCAN, STOMACH: ICD-10-CM

## 2022-09-12 PROBLEM — R07.9 CHEST PAIN: Status: ACTIVE | Noted: 2022-09-12

## 2022-09-12 LAB
ALBUMIN SERPL-MCNC: 4 G/DL (ref 3.5–5.2)
ALBUMIN/GLOB SERPL: 1.7 G/DL
ALP SERPL-CCNC: 96 U/L (ref 39–117)
ALT SERPL W P-5'-P-CCNC: 10 U/L (ref 1–41)
ANION GAP SERPL CALCULATED.3IONS-SCNC: 8.6 MMOL/L (ref 5–15)
AST SERPL-CCNC: 15 U/L (ref 1–40)
BASOPHILS # BLD AUTO: 0.02 10*3/MM3 (ref 0–0.2)
BASOPHILS NFR BLD AUTO: 0.3 % (ref 0–1.5)
BILIRUB SERPL-MCNC: 0.8 MG/DL (ref 0–1.2)
BUN SERPL-MCNC: 23 MG/DL (ref 8–23)
BUN/CREAT SERPL: 22.3 (ref 7–25)
CALCIUM SPEC-SCNC: 10.2 MG/DL (ref 8.6–10.5)
CHLORIDE SERPL-SCNC: 108 MMOL/L (ref 98–107)
CHOLEST SERPL-MCNC: 121 MG/DL (ref 0–200)
CO2 SERPL-SCNC: 25.4 MMOL/L (ref 22–29)
CREAT SERPL-MCNC: 1.03 MG/DL (ref 0.76–1.27)
DEPRECATED RDW RBC AUTO: 40.8 FL (ref 37–54)
DEPRECATED RDW RBC AUTO: 42.4 FL (ref 37–54)
EGFRCR SERPLBLD CKD-EPI 2021: 76.2 ML/MIN/1.73
EOSINOPHIL # BLD AUTO: 0.08 10*3/MM3 (ref 0–0.4)
EOSINOPHIL NFR BLD AUTO: 1.4 % (ref 0.3–6.2)
ERYTHROCYTE [DISTWIDTH] IN BLOOD BY AUTOMATED COUNT: 12.4 % (ref 12.3–15.4)
ERYTHROCYTE [DISTWIDTH] IN BLOOD BY AUTOMATED COUNT: 12.8 % (ref 12.3–15.4)
GLOBULIN UR ELPH-MCNC: 2.4 GM/DL
GLUCOSE SERPL-MCNC: 118 MG/DL (ref 65–99)
HCT VFR BLD AUTO: 41 % (ref 37.5–51)
HCT VFR BLD AUTO: 45 % (ref 37.5–51)
HDLC SERPL-MCNC: 35 MG/DL (ref 40–60)
HGB BLD-MCNC: 14.5 G/DL (ref 13–17.7)
HGB BLD-MCNC: 16 G/DL (ref 13–17.7)
HOLD SPECIMEN: NORMAL
HOLD SPECIMEN: NORMAL
IMM GRANULOCYTES # BLD AUTO: 0.01 10*3/MM3 (ref 0–0.05)
IMM GRANULOCYTES NFR BLD AUTO: 0.2 % (ref 0–0.5)
LDLC SERPL CALC-MCNC: 56 MG/DL (ref 0–100)
LDLC/HDLC SERPL: 1.42 {RATIO}
LIPASE SERPL-CCNC: 27 U/L (ref 13–60)
LYMPHOCYTES # BLD AUTO: 1.25 10*3/MM3 (ref 0.7–3.1)
LYMPHOCYTES NFR BLD AUTO: 21.2 % (ref 19.6–45.3)
MCH RBC QN AUTO: 31.4 PG (ref 26.6–33)
MCH RBC QN AUTO: 31.9 PG (ref 26.6–33)
MCHC RBC AUTO-ENTMCNC: 35.4 G/DL (ref 31.5–35.7)
MCHC RBC AUTO-ENTMCNC: 35.6 G/DL (ref 31.5–35.7)
MCV RBC AUTO: 88.4 FL (ref 79–97)
MCV RBC AUTO: 90.1 FL (ref 79–97)
MONOCYTES # BLD AUTO: 0.67 10*3/MM3 (ref 0.1–0.9)
MONOCYTES NFR BLD AUTO: 11.3 % (ref 5–12)
NEUTROPHILS NFR BLD AUTO: 3.88 10*3/MM3 (ref 1.7–7)
NEUTROPHILS NFR BLD AUTO: 65.6 % (ref 42.7–76)
NRBC BLD AUTO-RTO: 0 /100 WBC (ref 0–0.2)
PLATELET # BLD AUTO: 159 10*3/MM3 (ref 140–450)
PLATELET # BLD AUTO: 181 10*3/MM3 (ref 140–450)
PMV BLD AUTO: 9.5 FL (ref 6–12)
PMV BLD AUTO: 9.8 FL (ref 6–12)
POTASSIUM SERPL-SCNC: 4 MMOL/L (ref 3.5–5.2)
PROT SERPL-MCNC: 6.4 G/DL (ref 6–8.5)
QT INTERVAL: 360 MS
RBC # BLD AUTO: 4.55 10*6/MM3 (ref 4.14–5.8)
RBC # BLD AUTO: 5.09 10*6/MM3 (ref 4.14–5.8)
SODIUM SERPL-SCNC: 142 MMOL/L (ref 136–145)
TRIGL SERPL-MCNC: 181 MG/DL (ref 0–150)
TROPONIN T SERPL-MCNC: <0.01 NG/ML (ref 0–0.03)
TROPONIN T SERPL-MCNC: <0.01 NG/ML (ref 0–0.03)
VLDLC SERPL-MCNC: 30 MG/DL (ref 5–40)
WBC NRBC COR # BLD: 5.16 10*3/MM3 (ref 3.4–10.8)
WBC NRBC COR # BLD: 5.91 10*3/MM3 (ref 3.4–10.8)
WHOLE BLOOD HOLD SPECIMEN: NORMAL

## 2022-09-12 PROCEDURE — 36415 COLL VENOUS BLD VENIPUNCTURE: CPT

## 2022-09-12 PROCEDURE — 84484 ASSAY OF TROPONIN QUANT: CPT

## 2022-09-12 PROCEDURE — 85025 COMPLETE CBC W/AUTO DIFF WBC: CPT | Performed by: EMERGENCY MEDICINE

## 2022-09-12 PROCEDURE — 25010000002 IOPAMIDOL 61 % SOLUTION: Performed by: EMERGENCY MEDICINE

## 2022-09-12 PROCEDURE — 85027 COMPLETE CBC AUTOMATED: CPT

## 2022-09-12 PROCEDURE — 83735 ASSAY OF MAGNESIUM: CPT

## 2022-09-12 PROCEDURE — G0378 HOSPITAL OBSERVATION PER HR: HCPCS

## 2022-09-12 PROCEDURE — 93005 ELECTROCARDIOGRAM TRACING: CPT

## 2022-09-12 PROCEDURE — 74177 CT ABD & PELVIS W/CONTRAST: CPT

## 2022-09-12 PROCEDURE — 80053 COMPREHEN METABOLIC PANEL: CPT | Performed by: EMERGENCY MEDICINE

## 2022-09-12 PROCEDURE — 80061 LIPID PANEL: CPT

## 2022-09-12 PROCEDURE — 84484 ASSAY OF TROPONIN QUANT: CPT | Performed by: EMERGENCY MEDICINE

## 2022-09-12 PROCEDURE — 93010 ELECTROCARDIOGRAM REPORT: CPT | Performed by: INTERNAL MEDICINE

## 2022-09-12 PROCEDURE — 71046 X-RAY EXAM CHEST 2 VIEWS: CPT

## 2022-09-12 PROCEDURE — 93005 ELECTROCARDIOGRAM TRACING: CPT | Performed by: EMERGENCY MEDICINE

## 2022-09-12 PROCEDURE — 83690 ASSAY OF LIPASE: CPT | Performed by: EMERGENCY MEDICINE

## 2022-09-12 PROCEDURE — 99284 EMERGENCY DEPT VISIT MOD MDM: CPT

## 2022-09-12 RX ORDER — SODIUM CHLORIDE 0.9 % (FLUSH) 0.9 %
10 SYRINGE (ML) INJECTION EVERY 12 HOURS SCHEDULED
Status: DISCONTINUED | OUTPATIENT
Start: 2022-09-12 | End: 2022-09-13 | Stop reason: HOSPADM

## 2022-09-12 RX ORDER — SODIUM CHLORIDE 0.9 % (FLUSH) 0.9 %
10 SYRINGE (ML) INJECTION AS NEEDED
Status: DISCONTINUED | OUTPATIENT
Start: 2022-09-12 | End: 2022-09-13 | Stop reason: HOSPADM

## 2022-09-12 RX ORDER — CHOLECALCIFEROL (VITAMIN D3) 125 MCG
5 CAPSULE ORAL NIGHTLY PRN
Status: DISCONTINUED | OUTPATIENT
Start: 2022-09-12 | End: 2022-09-13 | Stop reason: HOSPADM

## 2022-09-12 RX ORDER — CLOPIDOGREL BISULFATE 75 MG/1
75 TABLET ORAL DAILY
Status: DISCONTINUED | OUTPATIENT
Start: 2022-09-13 | End: 2022-09-13 | Stop reason: HOSPADM

## 2022-09-12 RX ORDER — ASPIRIN 325 MG
325 TABLET ORAL ONCE
Status: DISCONTINUED | OUTPATIENT
Start: 2022-09-12 | End: 2022-09-12

## 2022-09-12 RX ADMIN — IOPAMIDOL 85 ML: 612 INJECTION, SOLUTION INTRAVENOUS at 17:51

## 2022-09-12 NOTE — ED TRIAGE NOTES
Patient to ER via car from home for chest pain x 3 days    Patient does have heart hx    Patient wearing mask this Rn in PPE

## 2022-09-12 NOTE — ED NOTES
"Nursing report ED to floor  Td Melendez Jr.  76 y.o.  male    HPI :   Chief Complaint   Patient presents with   • Chest Pain       Admitting doctor:   Felton Gan MD    Admitting diagnosis:   The primary encounter diagnosis was Abdominal pain, acute, epigastric. Diagnoses of Coronary artery disease involving native heart, unspecified vessel or lesion type, unspecified whether angina present and Abnormal CT scan, stomach were also pertinent to this visit.    Code status:   Current Code Status     Date Active Code Status Order ID Comments User Context       9/12/2022 1932 CPR (Attempt to Resuscitate) [id number]  Yanely Chapman, [facility]     Advance Care Planning Activity      Questions for Current Code Status     Question Answer    Code Status (Patient has no pulse and is not breathing) CPR (Attempt to Resuscitate)    Medical Interventions (Patient has pulse or is breathing) Full Support    Release to patient Routine Release          Allergies:   Augmentin [amoxicillin-pot clavulanate]    Intake and Output  No intake or output data in the 24 hours ending 09/12/22 1939    Weight:       09/12/22  1731   Weight: 76.7 kg (169 lb)       Most recent vitals:   Vitals:    09/12/22 1545 09/12/22 1731 09/12/22 1731   BP: 157/81 140/83    Pulse: 92 66    Resp: 16     Temp: 97.9 °F (36.6 °C)     SpO2: 97% 93%    Weight:   76.7 kg (169 lb)   Height:   177.8 cm (70\")       Active LDAs/IV Access:   Lines, Drains & Airways     Active LDAs     Name Placement date Placement time Site Days    Peripheral IV 09/12/22 1624 Right Antecubital 09/12/22  1624  Antecubital  less than 1                Labs (abnormal labs have a star):   Labs Reviewed   COMPREHENSIVE METABOLIC PANEL - Abnormal; Notable for the following components:       Result Value    Glucose 118 (*)     Chloride 108 (*)     All other components within normal limits    Narrative:     GFR Normal >60  Chronic Kidney Disease <60  Kidney Failure <15     TROPONIN (IN-HOUSE) " - Normal    Narrative:     Troponin T Reference Range:  <= 0.03 ng/mL-   Negative for AMI  >0.03 ng/mL-     Abnormal for myocardial necrosis.  Clinicians would have to utilize clinical acumen, EKG, Troponin and serial changes to determine if it is an Acute Myocardial Infarction or myocardial injury due to an underlying chronic condition.       Results may be falsely decreased if patient taking Biotin.     TROPONIN (IN-HOUSE) - Normal    Narrative:     Troponin T Reference Range:  <= 0.03 ng/mL-   Negative for AMI  >0.03 ng/mL-     Abnormal for myocardial necrosis.  Clinicians would have to utilize clinical acumen, EKG, Troponin and serial changes to determine if it is an Acute Myocardial Infarction or myocardial injury due to an underlying chronic condition.       Results may be falsely decreased if patient taking Biotin.     CBC WITH AUTO DIFFERENTIAL - Normal   RAINBOW DRAW    Narrative:     The following orders were created for panel order Fluker Draw.  Procedure                               Abnormality         Status                     ---------                               -----------         ------                     Green Top (Gel)[[id number]]                                  Final result               Lavender Top[[id number]]                                     Final result               Gold Top - SST[[id number]]                                   Final result               Light Blue Top[[id number]]                                                                Please view results for these tests on the individual orders.   LIPASE   LIPID PANEL   TROPONIN (IN-HOUSE)   CBC AND DIFFERENTIAL    Narrative:     The following orders were created for panel order CBC & Differential.  Procedure                               Abnormality         Status                     ---------                               -----------         ------                     CBC Auto Differential[[id number]]        Normal               Final result                 Please view results for these tests on the individual orders.   GREEN TOP   LAVENDER TOP   GOLD TOP - SST   LIGHT BLUE TOP       EKG:   ECG 12 Lead   Final Result   HEART RATE= 77  bpm   RR Interval= 779  ms   AZ Interval= 169  ms   P Horizontal Axis= -11  deg   P Front Axis= 76  deg   QRSD Interval= 93  ms   QT Interval= 360  ms   QRS Axis= 87  deg   T Wave Axis= 34  deg   - OTHERWISE NORMAL ECG -   Sinus rhythm   Atrial premature complex   Borderline right axis deviation   No change from previous tracing   Electronically Signed By: Felton MeléndezART) ([facility]) 12-Sep-2022 16:35:49   Date and Time of Study: 2022 15:48:31      ECG 12 Lead    (Results Pending)       Meds given in ED:   Medications   sodium chloride 0.9 % flush 10 mL (has no administration in time range)   sodium chloride 0.9 % flush 10 mL (has no administration in time range)   sodium chloride 0.9 % flush 10 mL (has no administration in time range)   iopamidol (ISOVUE-300) 61 % injection 100 mL (85 mL Intravenous Given 22 1751)       Imaging results:  XR Chest 2 View    Result Date: 2022  FINDINGS AND IMPRESSION: No pulmonary consolidation, pleural effusion or pneumothorax is seen. Sequela of prior granulomatous disease is present, as before. Cardiac silhouette is within normal limits for size.  This report was finalized on 2022 4:08 PM by Dr. Fidencio Garcia M.D.        Ambulatory status:   - independent     Social issues:   Social History     Socioeconomic History   • Marital status:    Tobacco Use   • Smoking status: Former Smoker     Packs/day: 0.25     Years: 4.00     Pack years: 1.00     Types: Cigarettes     Start date: 1968     Quit date:      Years since quittin.7   • Smokeless tobacco: Never Used   Vaping Use   • Vaping Use: Never used   Substance and Sexual Activity   • Alcohol use: No     Comment: caffeine use   • Drug use: No   • Sexual activity: Defer       NIH Stroke  Scale:        Nursing report ED to floor:

## 2022-09-12 NOTE — CASE MANAGEMENT/SOCIAL WORK
Discharge Planning Assessment  [facility]     Patient Name: Td Melendez Jr.  MRN: [id number]  Today's Date: 9/12/2022    Admit Date: 9/12/2022     Discharge Needs Assessment     Row Name 09/12/22 1953       Living Environment    People in Home spouse    Name(s) of People in Home Lindsey Melendez - wife    Current Living Arrangements home    Primary Care Provided by self    Provides Primary Care For no one    Family Caregiver if Needed spouse    Family Caregiver Names Lindsey Melendez - wife    Quality of Family Relationships supportive    Able to Return to Prior Arrangements yes       Resource/Environmental Concerns    Resource/Environmental Concerns none    Transportation Concerns none       Transition Planning    Patient/Family Anticipates Transition to home with family    Patient/Family Anticipated Services at Transition none    Transportation Anticipated family or friend will provide       Discharge Needs Assessment    Readmission Within the Last 30 Days no previous admission in last 30 days    Equipment Currently Used at Home none    Concerns to be Addressed no discharge needs identified;denies needs/concerns at this time    Anticipated Changes Related to Illness none    Equipment Needed After Discharge none    Patient's Choice of Community Agency(s) Denies any needs at discharge               Discharge Plan     Row Name 09/12/22 1954       Plan    Plan Discharge home with family    Patient/Family in Agreement with Plan yes    Plan Comments I entered the patients room in proper PPE, introduced myself and my role.  The patient was accompanied by his wife Lindsey and permission was given to speak with her in the room.  The patient currently uses [facility] at [phone].  He denies ever having used Home Health or Rehab.  His wife Lindsey will transport him home when he is discharged.  At this time the patient denies any needs at discharge.  I did encourage him to request Case Management should his  needs change.  He verbalized understanding and had no further questions/concerns at this time.              Continued Care and Services - Admitted Since 9/12/2022    Coordination has not been started for this encounter.          Demographic Summary    No documentation.                Functional Status    No documentation.                Psychosocial    No documentation.                Abuse/Neglect    No documentation.                Legal    No documentation.                Substance Abuse    No documentation.                Patient Forms    No documentation.                   Loli De La Vega RN

## 2022-09-12 NOTE — ED PROVIDER NOTES
EMERGENCY DEPARTMENT ENCOUNTER    Room Number:  38/38  Date of encounter:  9/12/2022  PCP: Beau Lee MD  Patient Care Team:  Beau Lee MD as PCP - General (Family Medicine)  Obi Boucher MD as Consulting Physician (Cardiology)  Venkat Salas MD (Pain Medicine)  Pedrito Neal Jr., MD as Consulting Physician (Urology)   Historian: Patient, family    HPI:  Chief Complaint: Epigastric pain  A complete HPI/ROS/PMH/PSH/SH/FH are unobtainable due to: Nothing    Context: Td Melendez Jr. is a 76 y.o. male who presents to the ED c/o epigastric pain since Saturday.  He reports 2 months ago he had a surgical procedure to treat his esophagus for GERD.  He states that since Saturday he has had constant pain in his epigastric area.  It does not radiate.  Nothing makes it worse or better.  He states it waxes and wanes in severity.  He reports some mild shortness of breath today.  He reports a history of coronary artery disease with multiple stents.  He denies any nausea or vomiting.  He reports no measured fevers.  He has not take any medicine for his symptoms.  He states his surgery was at [facility] in the beginning of July.    Prior record review: Discharge summary 7/7/2022 with GERD, anxiety, coronary disease, chronic pain.  He had a laparoscopic hiatal hernia repair with transoral fundoplication.    PAST MEDICAL HISTORY  Active Ambulatory Problems     Diagnosis Date Noted   • CAD (coronary artery disease)    • Hyperlipidemia 01/04/2017   • Anxiety 01/11/2018   • Gastroesophageal reflux disease without esophagitis 06/28/2018   • Polyp of colon 07/13/2018   • Dyspepsia 12/11/2020   • Epigastric pain 12/11/2020   • Poor appetite 12/11/2020   • Personal history of colonic polyps 03/31/2021   • Screening for colon cancer 03/31/2021   • Hypertriglyceridemia 08/27/2021   • Abnormal EKG 08/27/2021   • Hematuria 07/06/2022   • Chronic pain 07/06/2022     Resolved Ambulatory Problems     Diagnosis Date Noted   • Chest  pain 06/24/2018   • Gastroesophageal reflux disease 07/13/2018     Past Medical History:   Diagnosis Date   • Colon polyp 2010   • Fibromyalgia    • GERD (gastroesophageal reflux disease)    • H/O complete eye exam 04/2018   • IBS (irritable bowel syndrome)    • Squamous cell skin cancer, face    • Urinary retention        The patient has a COVID HM Topic on their chart, and they are fully vaccinated.    PAST SURGICAL HISTORY  Past Surgical History:   Procedure Laterality Date   • CARDIAC CATHETERIZATION      [facility] 07/2015: cath with 20% LM long 70% LAD (with positive FFR), 50% OM1, 99% prox RCA, s/p 2.75x33 Xience Alpine to LAD and 3.5x18mm Xience Alpine to RCA. LVEF 64% Normal Cardiolite 11/2015   • CARDIAC SURGERY      cardiac stents, 2   • COLONOSCOPY  approx 6/2011    normal per patient  [facility]   • COLONOSCOPY N/A 4/26/2021    Procedure: COLONOSCOPY TO CECUM WITH COLD SNARE POLYPECTOMIES AND BIOPSIES ;  Surgeon: Catarino Luna MD;  Location: [facility] ENDOSCOPY;  Service: Gastroenterology;  Laterality: N/A;  PRE- HISTORY COLON POLYPS  POST- POLYPS, COLITIS, HEMORRHOIDS   • COLONOSCOPY W/ POLYPECTOMY N/A 7/25/2018    Procedure: COLONOSCOPY TO CECUM WITH COLD SNAREPOLYPECTOMIES, HOT SNARE POLYPECTOMIES;  Surgeon: Catarino Luna MD;  Location: [facility] ENDOSCOPY;  Service: Gastroenterology   • CORONARY ANGIOPLASTY WITH STENT PLACEMENT     • CYSTOSCOPY TRANSURETHRAL RESECTION OF PROSTATE     • ENDOSCOPY N/A 7/25/2018    Procedure: ESOPHAGOGASTRODUODENOSCOPY WITH COLD BIOPSIES;  Surgeon: Catarino Luna MD;  Location: [facility] ENDOSCOPY;  Service: Gastroenterology   • ENDOSCOPY N/A 1/7/2021    Procedure: ESOPHAGOGASTRODUODENOSCOPY with cold biopsies;  Surgeon: Catarino Luna MD;  Location: [facility] ENDOSCOPY;  Service: Gastroenterology;  Laterality: N/A;  PRE: EPIGASTRIC PAIN, DYSPEPSIA  POST: GASTRITIS   • ENDOSCOPY N/A 5/25/2022    Procedure: ESOPHAGOGASTRODUODENOSCOPY WITH DILATATION #48  to #58 bougie, gastric biopsy;  Surgeon: Td Richey DO;  Location: [facility] ENDOSCOPY;  Service: General;  Laterality: N/A;  gastritis, small hiatal hernia   • HIATAL HERNIA REPAIR N/A 2022    Procedure: HIATAL HERNIA REPAIR LAPAROSCOPIC WITH TRANSORAL INCISIONLESS FUNDOPLICATION;  Surgeon: Td Richey DO;  Location: [facility] MAIN OR;  Service: General;  Laterality: N/A;   • RADIOFREQUENCY ABLATION      NECK   • SKIN CANCER EXCISION     • WISDOM TOOTH EXTRACTION           FAMILY HISTORY  Family History   Problem Relation Age of Onset   • Cervical cancer Mother    • Cancer Mother    • Depression Mother    • Heart disease Mother    • Liver disease Mother    • Alcohol abuse Mother    • Thyroid cancer Father    • Cancer Father    • Depression Father    • Diabetes Father    • Thyroid disease Father    • Coronary artery disease Other    • Malig Hyperthermia Neg Hx          SOCIAL HISTORY  Social History     Socioeconomic History   • Marital status:    Tobacco Use   • Smoking status: Former Smoker     Packs/day: 0.25     Years: 4.00     Pack years: 1.00     Types: Cigarettes     Start date: 1968     Quit date:      Years since quittin.7   • Smokeless tobacco: Never Used   Vaping Use   • Vaping Use: Never used   Substance and Sexual Activity   • Alcohol use: No     Comment: caffeine use   • Drug use: No   • Sexual activity: Defer         ALLERGIES  Augmentin [amoxicillin-pot clavulanate]        REVIEW OF SYSTEMS  Review of Systems   No chest pain, positive shortness of breath, no nausea, no vomiting, no fever, no chills, no headache, positive diarrhea  All systems reviewed and negative except for those discussed in HPI.       PHYSICAL EXAM    I have reviewed the triage vital signs and nursing notes.    ED Triage Vitals [22 1545]   Temp Heart Rate Resp BP SpO2   97.9 °F (36.6 °C) 92 16 157/81 97 %      Temp src Heart Rate Source Patient Position BP Location FiO2 (%)   -- -- -- -- --        Physical Exam  GENERAL: Awake, alert, no acute distress  SKIN: Warm, dry  HENT: Normocephalic, atraumatic  EYES: no scleral icterus  CV: regular rhythm, regular rate  RESPIRATORY: normal effort, lungs clear  ABDOMEN: soft, nontender, nondistended  MUSCULOSKELETAL: no deformity  NEURO: alert, moves all extremities, follows commands          LAB RESULTS  Recent Results (from the past 24 hour(s))   ECG 12 Lead    Collection Time: 09/12/22  3:48 PM   Result Value Ref Range    QT Interval 360 ms   Comprehensive Metabolic Panel    Collection Time: 09/12/22  4:28 PM    Specimen: Blood   Result Value Ref Range    Glucose 118 (H) 65 - 99 mg/dL    BUN 23 8 - 23 mg/dL    Creatinine 1.03 0.76 - 1.27 mg/dL    Sodium 142 136 - 145 mmol/L    Potassium 4.0 3.5 - 5.2 mmol/L    Chloride 108 (H) 98 - 107 mmol/L    CO2 25.4 22.0 - 29.0 mmol/L    Calcium 10.2 8.6 - 10.5 mg/dL    Total Protein 6.4 6.0 - 8.5 g/dL    Albumin 4.00 3.50 - 5.20 g/dL    ALT (SGPT) 10 1 - 41 U/L    AST (SGOT) 15 1 - 40 U/L    Alkaline Phosphatase 96 39 - 117 U/L    Total Bilirubin 0.8 0.0 - 1.2 mg/dL    Globulin 2.4 gm/dL    A/G Ratio 1.7 g/dL    BUN/Creatinine Ratio 22.3 7.0 - 25.0    Anion Gap 8.6 5.0 - 15.0 mmol/L    eGFR 76.2 >60.0 mL/min/1.73   Troponin    Collection Time: 09/12/22  4:28 PM    Specimen: Blood   Result Value Ref Range    Troponin T <0.010 0.000 - 0.030 ng/mL   Green Top (Gel)    Collection Time: 09/12/22  4:28 PM   Result Value Ref Range    Extra Tube Hold for add-ons.    Lavender Top    Collection Time: 09/12/22  4:28 PM   Result Value Ref Range    Extra Tube hold for add-on    Gold Top - SST    Collection Time: 09/12/22  4:28 PM   Result Value Ref Range    Extra Tube Hold for add-ons.    CBC Auto Differential    Collection Time: 09/12/22  4:28 PM    Specimen: Blood   Result Value Ref Range    WBC 5.91 3.40 - 10.80 10*3/mm3    RBC 5.09 4.14 - 5.80 10*6/mm3    Hemoglobin 16.0 13.0 - 17.7 g/dL    Hematocrit 45.0 37.5 - 51.0 %    MCV  88.4 79.0 - 97.0 fL    MCH 31.4 26.6 - 33.0 pg    MCHC 35.6 31.5 - 35.7 g/dL    RDW 12.4 12.3 - 15.4 %    RDW-SD 40.8 37.0 - 54.0 fl    MPV 9.5 6.0 - 12.0 fL    Platelets 181 140 - 450 10*3/mm3    Neutrophil % 65.6 42.7 - 76.0 %    Lymphocyte % 21.2 19.6 - 45.3 %    Monocyte % 11.3 5.0 - 12.0 %    Eosinophil % 1.4 0.3 - 6.2 %    Basophil % 0.3 0.0 - 1.5 %    Immature Grans % 0.2 0.0 - 0.5 %    Neutrophils, Absolute 3.88 1.70 - 7.00 10*3/mm3    Lymphocytes, Absolute 1.25 0.70 - 3.10 10*3/mm3    Monocytes, Absolute 0.67 0.10 - 0.90 10*3/mm3    Eosinophils, Absolute 0.08 0.00 - 0.40 10*3/mm3    Basophils, Absolute 0.02 0.00 - 0.20 10*3/mm3    Immature Grans, Absolute 0.01 0.00 - 0.05 10*3/mm3    nRBC 0.0 0.0 - 0.2 /100 WBC   Troponin    Collection Time: 09/12/22  5:36 PM    Specimen: Blood   Result Value Ref Range    Troponin T <0.010 0.000 - 0.030 ng/mL       Ordered the above labs and independently reviewed the results.        RADIOLOGY  XR Chest 2 View    Result Date: 9/12/2022  Chest radiograph  HISTORY:Chest pain  TECHNIQUE: Two PA and lateral radiographs  COMPARISON:Chest radiograph 11/13/2020      FINDINGS AND IMPRESSION: No pulmonary consolidation, pleural effusion or pneumothorax is seen. Sequela of prior granulomatous disease is present, as before. Cardiac silhouette is within normal limits for size.  This report was finalized on 9/12/2022 4:08 PM by Dr. Fidencio Garcia M.D.      CT Abdomen Pelvis With Contrast    Result Date: 9/12/2022  CT SCAN OF THE ABDOMEN AND PELVIS WITH INTRAVENOUS CONTRAST  HISTORY: Epigastric pain. Had laparoscopic hiatus hernia repair approximately 2 months ago.  TECHNIQUE: The CT scan was performed as an emergency procedure through the abdomen and pelvis with intravenous contrast.  COMPARISON: It is compared to the preoperative CT scan of the abdomen and pelvis dated 03/16/2022. It is also compared to a postoperative CT scan of the chest dated 07/20/2022.  FINDINGS: The following  findings are present: 1. The patient has had relatively recent laparoscopic repair of a small hiatus hernia. There is a tiny collection of air that may possibly be extraluminal near the surgical repair site as best seen on image 21. It is not present on the study of 07/20/2022. It measures only 7 x 8 mm. It might possibly relate to a tiny diverticulum of the gastric fundus but I cannot completely exclude an extraluminal collection. There is no adjacent soft tissue abnormality and no free air is present elsewhere. 2. There is some minimal interstitial change and scarring at the lung bases. There are several small hemangiomas in the liver including a peripheral hemangioma in the right lobe posteriorly that measures up to 3.3 cm. These show no change from the CT scan performed with hemangioma protocol on 03/16/2022. The spleen and both adrenal glands, and both kidneys are unremarkable. The gallbladder appears normal. 3. There is some vague fullness and suggestion of ill-defined heterogeneous density in the body of the pancreas and involving an area measuring up to 2.1 x 2.4 cm. There is an adjacent small calcification present. This is similar to the recent CT scan dated 03/16/2022 and further evaluation with dedicated MRI scan without and with contrast for possible pancreatic mass is recommended. 4. There is prominent calcification of the abdominal aorta without evidence of aneurysm. There is some plaque formation near the origin of the right common iliac artery producing mild stenosis. There is no retroperitoneal lymphadenopathy.  5. The large and small bowel loops are normal in caliber and show no inflammatory change. In the pelvis there is enlargement of the prostate measuring 5.5 cm. The urinary bladder has a smooth contour and is not abnormally distended.  These findings were discussed with Dr. Chuckie Carpenter in the emergency room.      Radiation dose reduction techniques were utilized, including automated exposure  control and exposure modulation based on body size.         I ordered the above noted radiological studies. Reviewed by me and discussed with radiologist.  See dictation for official radiology interpretation.      PROCEDURES    Procedures      MEDICATIONS GIVEN IN ER    Medications   sodium chloride 0.9 % flush 10 mL (has no administration in time range)   iopamidol (ISOVUE-300) 61 % injection 100 mL (85 mL Intravenous Given 9/12/22 1751)         PROGRESS, DATA ANALYSIS, CONSULTS, AND MEDICAL DECISION MAKING    All labs have been independently reviewed by me.  All radiology studies have been reviewed by me and discussed with radiologist dictating the report.   EKG's independently viewed and interpreted by me.  Discussion below represents my analysis of pertinent findings related to patient's condition, differential diagnosis, treatment plan and final disposition.    Differential diagnosis includes but is not limited to non-STEMI, STEMI, PE, unstable angina, intra-abdominal infection, postoperative complication, GERD, pancreatitis, acute aortic syndrome.    ED Course as of 09/12/22 1931   Mon Sep 12, 2022   1655 EKG          EKG time: 1548  Rhythm/Rate: Normal sinus, rate 77  P waves and MA: Normal P, normal MA  QRS, axis: Narrow QRS, normal axis  ST and T waves: No acute    Interpreted Contemporaneously by me, independently viewed  Not significant changed compared to prior 7/20/2022   [TR]   1856 Speaking with radiologist by phone.  The patient has small foci of air that may be extraluminal next to the stomach.  It is 7 x 8 mm.  It was not there on postop CT in July 20.  He also has some area fullness in the pancreas and needs outpatient dedicated MRI of the pancreas. [TR]   1917 Speaking with the general surgeon that is on for Dr. Richey.  He reports that this procedure can have small perforations well after the procedure.  He recommends the patient receive an upper GI and possibly an upper endoscopy.  Plan to  admit him to the hospital for GI work-up and potential trending of his troponins and cardiology evaluation. [TR]   1924 I reviewed the work-up and findings with the patient and family at the bedside.  Plan admission for upper GI, further evaluation.  They are agreeable.  I notified him of his pancreas findings on CT and need for outpatient follow-up.  He is agreeable. [TR]   1928 The patient's troponin is negative.  His EKG is unremarkable.  His chemistries and blood counts are normal.  I reviewed the work-up and findings with the patient and family at the bedside.  Answered all questions.  Plan admission to the observation unit for further evaluation.  He is agreeable.  I spoke with Yanely with the observation unit.  She accepts. [TR]   1930 Heart Score Calculation:    History slightly suspicious: 0  History moderately suspicious: +1  History highly suspicious: +2    EKG normal: 0  EKG nonspecific repolarization disturbance: +1  EKG significant ST deviation: +2    Age less than 45: 0  Age 45-64: +1  Age greater than 65: +2    No known risk factors: 0  1-2 risk factors: +1  Greater than 3 risk factors: +2    Initial troponin less than the normal limit: 0  Initial troponin I-III times normal limit: +1  Initial troponin greater than 3 times normal limit: +2    Total score: 4 [TR]      ED Course User Index  [TR] Chuckie Carpenter MD           PPE: The patient wore a mask and I wore an N95 mask throughout the entire patient encounter.       AS OF 19:31 EDT VITALS:    BP - 140/83  HR - 66  TEMP - 97.9 °F (36.6 °C)  O2 SATS - 93%        DIAGNOSIS  Final diagnoses:   Abdominal pain, acute, epigastric   Coronary artery disease involving native heart, unspecified vessel or lesion type, unspecified whether angina present   Abnormal CT scan, stomach         DISPOSITION  ED Disposition     ED Disposition   Decision to Admit    Condition   --    Comment   --                Note Disclaimer: At [facility], we believe that  sharing information builds trust and better relationships. You are receiving this note because you recently visited [facility]. It is possible you will see health information before a provider has talked with you about it. This kind of information can be easy to misunderstand. To help you fully understand what it means for your health, we urge you to discuss this note with your provider.       Chuckie Carpenter MD  09/12/22 1931

## 2022-09-13 ENCOUNTER — APPOINTMENT (OUTPATIENT)
Dept: GENERAL RADIOLOGY | Facility: HOSPITAL | Age: 77
End: 2022-09-13

## 2022-09-13 ENCOUNTER — READMISSION MANAGEMENT (OUTPATIENT)
Dept: CALL CENTER | Facility: HOSPITAL | Age: 77
End: 2022-09-13

## 2022-09-13 VITALS
OXYGEN SATURATION: 96 % | HEIGHT: 70 IN | BODY MASS INDEX: 25.34 KG/M2 | TEMPERATURE: 98 F | SYSTOLIC BLOOD PRESSURE: 145 MMHG | DIASTOLIC BLOOD PRESSURE: 70 MMHG | WEIGHT: 177 LBS | RESPIRATION RATE: 16 BRPM | HEART RATE: 66 BPM

## 2022-09-13 LAB
ANION GAP SERPL CALCULATED.3IONS-SCNC: 13.2 MMOL/L (ref 5–15)
BUN SERPL-MCNC: 17 MG/DL (ref 8–23)
BUN/CREAT SERPL: 20.7 (ref 7–25)
CALCIUM SPEC-SCNC: 8.3 MG/DL (ref 8.6–10.5)
CHLORIDE SERPL-SCNC: 111 MMOL/L (ref 98–107)
CO2 SERPL-SCNC: 19.8 MMOL/L (ref 22–29)
CREAT SERPL-MCNC: 0.82 MG/DL (ref 0.76–1.27)
EGFRCR SERPLBLD CKD-EPI 2021: 92.2 ML/MIN/1.73
GLUCOSE SERPL-MCNC: 127 MG/DL (ref 65–99)
MAGNESIUM SERPL-MCNC: 1.7 MG/DL (ref 1.6–2.4)
POTASSIUM SERPL-SCNC: 3.3 MMOL/L (ref 3.5–5.2)
QT INTERVAL: 403 MS
QT INTERVAL: 406 MS
QT INTERVAL: 420 MS
SODIUM SERPL-SCNC: 144 MMOL/L (ref 136–145)
TROPONIN T SERPL-MCNC: <0.01 NG/ML (ref 0–0.03)
TROPONIN T SERPL-MCNC: <0.01 NG/ML (ref 0–0.03)

## 2022-09-13 PROCEDURE — G0378 HOSPITAL OBSERVATION PER HR: HCPCS

## 2022-09-13 PROCEDURE — 74246 X-RAY XM UPR GI TRC 2CNTRST: CPT

## 2022-09-13 PROCEDURE — 93010 ELECTROCARDIOGRAM REPORT: CPT | Performed by: INTERNAL MEDICINE

## 2022-09-13 PROCEDURE — 84484 ASSAY OF TROPONIN QUANT: CPT | Performed by: STUDENT IN AN ORGANIZED HEALTH CARE EDUCATION/TRAINING PROGRAM

## 2022-09-13 PROCEDURE — 74248 X-RAY SM INT F-THRU STD: CPT

## 2022-09-13 PROCEDURE — 93005 ELECTROCARDIOGRAM TRACING: CPT

## 2022-09-13 PROCEDURE — 99213 OFFICE O/P EST LOW 20 MIN: CPT | Performed by: STUDENT IN AN ORGANIZED HEALTH CARE EDUCATION/TRAINING PROGRAM

## 2022-09-13 PROCEDURE — 93005 ELECTROCARDIOGRAM TRACING: CPT | Performed by: STUDENT IN AN ORGANIZED HEALTH CARE EDUCATION/TRAINING PROGRAM

## 2022-09-13 RX ORDER — PANTOPRAZOLE SODIUM 40 MG/1
40 TABLET, DELAYED RELEASE ORAL DAILY
Qty: 90 TABLET | Refills: 0
Start: 2022-09-13

## 2022-09-13 RX ORDER — POTASSIUM CHLORIDE 750 MG/1
40 TABLET, FILM COATED, EXTENDED RELEASE ORAL ONCE
Status: COMPLETED | OUTPATIENT
Start: 2022-09-13 | End: 2022-09-13

## 2022-09-13 RX ADMIN — POTASSIUM CHLORIDE 40 MEQ: 750 TABLET, EXTENDED RELEASE ORAL at 08:39

## 2022-09-13 RX ADMIN — ANTACID/ANTIFLATULENT 1 PACKET: 380; 550; 10; 10 GRANULE, EFFERVESCENT ORAL at 13:30

## 2022-09-13 RX ADMIN — BARIUM SULFATE 183 ML: 960 POWDER, FOR SUSPENSION ORAL at 13:29

## 2022-09-13 RX ADMIN — CLOPIDOGREL 75 MG: 75 TABLET, FILM COATED ORAL at 08:42

## 2022-09-13 RX ADMIN — BARIUM SULFATE 135 ML: 980 POWDER, FOR SUSPENSION ORAL at 13:30

## 2022-09-13 RX ADMIN — Medication 10 ML: at 08:46

## 2022-09-13 RX ADMIN — Medication 10 ML: at 06:15

## 2022-09-13 NOTE — CONSULTS
Addendum: Upper GI shows a diverticulum in the distal esophagus, no evidence of perforation.  Changes consistent with esophagitis.  Okay to trial p.o. and discharged on antireflux medication.  Patient will need to follow-up with his primary surgeon Dr. Richey for further management.    General Surgery Consult    Summary:    Mr. Td Melendez Jr. is a 76 y.o. year old gentleman with chest pain.  CT scan with concern for contained air around the fundoplication.  Will order an upper GI to evaluate the anatomy further.    Chief Complaint:    Chest pain    History of Present Illness:    Mr. Td Melendez Jr. is a 76 y.o. year old gentleman who presented to the ER last night with chest pain.  He states this was midsternal.  It was not made better or worse by eating or drinking.  It was not made better or worse by positional changes. In May 2022 he underwent an EGD with dilation by Dr. Richey.  He subsequently underwent a laparoscopic hiatal hernia repair with transoral incision less fundoplication July 6, 2022.  Since then he had some midepigastric and mid chest discomfort but this is slowly improved over the last several weeks.  He states when he had 6 weeks he went back to physical activity and noticed some increased discomfort again after that.  It worsened over the weekend to the point he came to the ER.  No prior similar events.    Past Medical History:   • History of coronary artery disease  • Reflux  • Hyperlipidemia    Past Surgical History:    • Cardiac cath  • Upper and lower endoscopy  • Coronary angioplasty with stent placement  • Laparoscopic hiatal hernia repair with transoral incision less fundoplication    Family History:    • No family history of colon cancer    Social History:    • Denies tobacco use  • Denies alcohol use    Allergies:   Allergies   Allergen Reactions   • Augmentin [Amoxicillin-Pot Clavulanate] Hives and Itching       Medications:     Current Facility-Administered Medications:   •   clopidogrel (PLAVIX) tablet 75 mg, 75 mg, Oral, Daily, Yanely Chapman APRN, 75 mg at 09/13/22 0842  •  melatonin tablet 5 mg, 5 mg, Oral, Nightly PRN, Yanely Chapman APRN  •  sodium chloride 0.9 % flush 10 mL, 10 mL, Intravenous, PRN, Chuckie Carpenter MD  •  sodium chloride 0.9 % flush 10 mL, 10 mL, Intravenous, Q12H, Yanely Chapman APRN, 10 mL at 09/13/22 0846  •  sodium chloride 0.9 % flush 10 mL, 10 mL, Intravenous, PRN, Yanely Chapman, RUSS    Radiology/Endoscopy:    • CT abdomen and pelvis reviewed: Less than 1 cm area of area just superior to the fundoplication, possibly extraluminal; no free air or free fluid    Labs:    • White blood cell count 5 hemoglobin 14 platelets 159 creatinine 0.8    Review of Systems:   Influenza-like illness: no fever, no  cough, no  sore throat, no  body aches, no loss of sense of taste or smell, no known exposure to person with Covid-19.  Constitutional: Negative for fevers or chills  HENT: Negative for hearing loss or runny nose  Eyes: Negative for vision changes or scleral icterus  Respiratory: Negative for cough or shortness of breath  Cardiovascular: Negative for chest pain or heart palpitations  Gastrointestinal: + for chest pain; Negative for abdominal pain, nausea, vomiting, constipation, melena, or hematochezia  Genitourinary: Negative for hematuria or dysuria  Musculoskeletal: Negative for joint swelling or gait instability  Neurologic: Negative for tremors or seizures  Psychiatric: Negative for suicidal ideations or depression  All other systems reviewed and negative    Physical Exam:   • Constitutional: Well-developed, well-nourished, no acute distress  • Vital signs: Temperature 98.1 heart rate 60 respiratory rate 16 blood pressure 140/75 oxygen 99%  • Eyes: Conjunctiva normal, sclera nonicteric  • ENMT: Hearing grossly normal, oral mucosa moist  • Neck: Supple, trachea midline  • Respiratory: On room air, normal inspiratory effort  • Cardiovascular: Regular rate,  no peripheral edema, no jugular venous distention  • Gastrointestinal: Soft, nontender, nondistended  • Skin:  Warm, dry, no rash on visualized skin surfaces  • Musculoskeletal: Symmetric strength, normal gait  • Psychiatric: Alert and oriented ×3, normal affect     ELEAZAR VICKERS M.D.  General and Endoscopic Surgery  [facility]    [address], [address]  [place], [place], [address]  P: [phone]  F: [phone]

## 2022-09-13 NOTE — H&P
[facility]   HISTORY AND PHYSICAL    Patient Name: Td Melendez Jr.  : 1945  MRN: [id number]  Primary Care Physician:  Beau Lee MD  Date of admission: 2022    Subjective   Subjective     Chief Complaint: Chest pain    History of Present Illness   Td Melendez Jr. is a 76-year-old male that presented to the emergency department today with a complaint of chest pain.  He states that he has had this pain since Saturday and felt that he needed to make sure it was not cardiac related.  He also had a surgical procedure 2 months ago for treatment of his GERD at [facility], operated by [facility].  He denied any radiation of pain, nausea, diaphoresis, vomiting, or headache.  He has a history of, but not limited to, anxiety, fibromyalgia, GERD, hyperlipidemia, and coronary artery disease.      Review of Systems   Constitutional: Negative.    HENT: Negative.    Eyes: Negative.    Respiratory: Negative.  Negative for cough, chest tightness and shortness of breath.    Cardiovascular: Positive for chest pain. Negative for palpitations and leg swelling.   Gastrointestinal: Positive for abdominal pain. Negative for abdominal distention, constipation, diarrhea and nausea.        Epigastric pain   Endocrine: Negative.    Genitourinary: Negative.    Musculoskeletal: Negative.    Skin: Negative.    Allergic/Immunologic: Negative.    Neurological: Negative.    Hematological: Negative.    Psychiatric/Behavioral: Negative.         Personal History     Past Medical History:   Diagnosis Date   • Anxiety    • CAD (coronary artery disease)     unstable angina, 2015: 20% LM, long 70% LAD with abnormal FFR (s/p 2.53g73bg Xience), 50% OM1, 99% prox RCA (s/p 3.5x18m Xience).  Normal Cardiolite 2015 and 2018   • Colon polyp    • Fibromyalgia    • GERD (gastroesophageal reflux disease)    • H/O complete eye exam 2018   • Hyperlipidemia    • IBS (irritable bowel syndrome)    • Squamous cell skin cancer, face    • Urinary  retention        Past Surgical History:   Procedure Laterality Date   • CARDIAC CATHETERIZATION      [facility] 07/2015: cath with 20% LM long 70% LAD (with positive FFR), 50% OM1, 99% prox RCA, s/p 2.75x33 Xience Alpine to LAD and 3.5x18mm Xience Alpine to RCA. LVEF 64% Normal Cardiolite 11/2015   • CARDIAC SURGERY      cardiac stents, 2   • COLONOSCOPY  approx 6/2011    normal per patient  [facility]   • COLONOSCOPY N/A 4/26/2021    Procedure: COLONOSCOPY TO CECUM WITH COLD SNARE POLYPECTOMIES AND BIOPSIES ;  Surgeon: Catarino Luna MD;  Location: [facility] ENDOSCOPY;  Service: Gastroenterology;  Laterality: N/A;  PRE- HISTORY COLON POLYPS  POST- POLYPS, COLITIS, HEMORRHOIDS   • COLONOSCOPY W/ POLYPECTOMY N/A 7/25/2018    Procedure: COLONOSCOPY TO CECUM WITH COLD SNAREPOLYPECTOMIES, HOT SNARE POLYPECTOMIES;  Surgeon: Catarino Luna MD;  Location: [facility] ENDOSCOPY;  Service: Gastroenterology   • CORONARY ANGIOPLASTY WITH STENT PLACEMENT     • CYSTOSCOPY TRANSURETHRAL RESECTION OF PROSTATE     • ENDOSCOPY N/A 7/25/2018    Procedure: ESOPHAGOGASTRODUODENOSCOPY WITH COLD BIOPSIES;  Surgeon: Catarino Luna MD;  Location: [facility] ENDOSCOPY;  Service: Gastroenterology   • ENDOSCOPY N/A 1/7/2021    Procedure: ESOPHAGOGASTRODUODENOSCOPY with cold biopsies;  Surgeon: Catarino Luna MD;  Location: [facility] ENDOSCOPY;  Service: Gastroenterology;  Laterality: N/A;  PRE: EPIGASTRIC PAIN, DYSPEPSIA  POST: GASTRITIS   • ENDOSCOPY N/A 5/25/2022    Procedure: ESOPHAGOGASTRODUODENOSCOPY WITH DILATATION #48 to #58 bougie, gastric biopsy;  Surgeon: Td Richey DO;  Location: [facility] ENDOSCOPY;  Service: General;  Laterality: N/A;  gastritis, small hiatal hernia   • HIATAL HERNIA REPAIR N/A 7/6/2022    Procedure: HIATAL HERNIA REPAIR LAPAROSCOPIC WITH TRANSORAL INCISIONLESS FUNDOPLICATION;  Surgeon: Td Richey DO;  Location: [facility] MAIN OR;  Service: General;  Laterality: N/A;   • RADIOFREQUENCY ABLATION       NECK   • SKIN CANCER EXCISION     • WISDOM TOOTH EXTRACTION         Family History: family history includes Alcohol abuse in his mother; Cancer in his father and mother; Cervical cancer in his mother; Coronary artery disease in an other family member; Depression in his father and mother; Diabetes in his father; Heart disease in his mother; Liver disease in his mother; Thyroid cancer in his father; Thyroid disease in his father. Otherwise pertinent FHx was reviewed and not pertinent to current issue.    Social History:  reports that he quit smoking about 50 years ago. His smoking use included cigarettes. He started smoking about 54 years ago. He has a 1.00 pack-year smoking history. He has never used smokeless tobacco. He reports that he does not drink alcohol and does not use drugs.    Home Medications:  ALPRAZolam, Emollient, HYDROcodone-acetaminophen, Lithium, OXcarbazepine, Pitavastatin Calcium, Turmeric, Vitamin D-Vitamin K, clopidogrel, coenzyme Q10, diphenhydrAMINE-acetaminophen, ezetimibe, melatonin, and sildenafil    Allergies:  Allergies   Allergen Reactions   • Augmentin [Amoxicillin-Pot Clavulanate] Hives and Itching       Objective    Objective     Vitals:   Temp:  [97.7 °F (36.5 °C)-97.9 °F (36.6 °C)] 97.7 °F (36.5 °C)  Heart Rate:  [60-92] 66  Resp:  [16] 16  BP: (135-157)/(70-83) 135/70    Physical Exam  Vitals and nursing note reviewed.   Constitutional:       General: He is not in acute distress.     Appearance: Normal appearance.   Cardiovascular:      Rate and Rhythm: Normal rate and regular rhythm.      Pulses: Normal pulses.      Heart sounds: Normal heart sounds.   Pulmonary:      Effort: Pulmonary effort is normal.      Breath sounds: Normal breath sounds.   Abdominal:      General: Abdomen is flat. Bowel sounds are normal.      Palpations: Abdomen is soft.   Musculoskeletal:         General: Normal range of motion.   Skin:     General: Skin is warm and dry.      Capillary Refill: Capillary  refill takes less than 2 seconds.   Neurological:      General: No focal deficit present.      Mental Status: He is alert and oriented to person, place, and time.   Psychiatric:         Mood and Affect: Mood normal.         Behavior: Behavior normal.         Thought Content: Thought content normal.         Judgment: Judgment normal.         Result Review    Result Review:  I have personally reviewed the results from the time of this admission to 9/12/2022 23:06 EDT and agree with these findings:  [x]  Laboratory list / accordion  []  Microbiology  [x]  Radiology  [x]  EKG/Telemetry   []  Cardiology/Vascular   []  Pathology  [x]  Old records  []  Other      Assessment & Plan   Assessment / Plan     Brief Patient Summary:  Td Melendez Jr. is a 76 y.o. male who was admitted to the observation unit for further evaluation of his chest pain.    Active Hospital Problems:  Active Hospital Problems    Diagnosis    • Chest pain      Plan:   Chest pain  -Cardiac monitoring  -cardiology consult  -Troponin <0.010 x2 trend  -EKG-nonischemic    Epigastric pain  -General surgery consult  -NPO after MN  -Continue Protonix      DVT prophylaxis:  Mechanical DVT prophylaxis orders are present.    CODE STATUS:    Code Status (Patient has no pulse and is not breathing): CPR (Attempt to Resuscitate)  Medical Interventions (Patient has pulse or is breathing): Full Support  Release to patient: Routine Release    Admission Status:  I believe this patient meets Observation status.    Yanely Chapman, RUSS

## 2022-09-13 NOTE — PROGRESS NOTES
MD ATTESTATION NOTE    The NAOMI and I have discussed this patient's history, physical exam, and treatment plan.  I have reviewed the documentation and personally had a face to face interaction with the patient. I affirm the documentation and agree with the treatment and plan.  The attached note describes my personal findings.      I provided a substantive portion of the care of the patient.  I personally performed the physical exam in its entirety, and below are my findings.  For this patient encounter, the patient wore surgical mask, I wore full protective PPE including N95 and eye protection.      Brief HPI: Patient complains of intermittent epigastric/lower chest pain for the past few weeks.  Pain has been constant for the past 2 days.  Pain is dull.  It does not radiate.  Nothing makes it better or worse.  Pain is not related to exertion.  Reports mild shortness of breath but denies nausea, vomiting, or sweating.  He had a laparoscopic hiatal hernia repair with transoral fundoplication in July 2022.  He has a history of CAD with prior stents and is followed by Dr. Boucher.    PHYSICAL EXAM  ED Triage Vitals   Temp Heart Rate Resp BP SpO2   09/12/22 1545 09/12/22 1545 09/12/22 1545 09/12/22 1545 09/12/22 1545   97.9 °F (36.6 °C) 92 16 157/81 97 %      Temp src Heart Rate Source Patient Position BP Location FiO2 (%)   09/12/22 2137 09/12/22 2137 09/12/22 2137 09/12/22 2137 --   Oral Monitor Sitting Right arm          GENERAL: Awake, alert, oriented x3.  Well-developed, well-nourished male.  Resting comfortably in no acute distress  HENT: nares patent  EYES: no scleral icterus  CV: regular rhythm, normal rate  RESPIRATORY: normal effort, clear to auscultation bilaterally  ABDOMEN: soft, nontender  MUSCULOSKELETAL: no calf tenderness, no pedal edema  NEURO: alert, moves all extremities, follows commands  PSYCH:  calm, cooperative  SKIN: warm, dry    Vital signs and nursing notes reviewed.    ED work-up reviewed.   Troponin was negative x2.  Chest x-ray was negative acute.  EKG was negative for acute ischemic changes. CT abdomen/pelvis showed a tiny collection of air that may possibly be extraluminal near the surgical repair site or might possibly relate to a tiny diverticulum of the gastric fundus.  Dr. Carpenter, the ED physician, spoke with Dr. Richey, the patient's surgeon, who recommended upper GI or upper endoscopy for further evaluation.      Plan: Cardiac monitor.  Follow troponins.  Consult general surgery and cardiology.

## 2022-09-13 NOTE — DISCHARGE INSTRUCTIONS
Take Protonix 40 mg daily.  Continue home medications as prescribed.  Call and make an appointment with your general surgeon at [facility] for follow-up in 2 to 3 weeks.  Follow-up with your primary care provider in 1 to 2 weeks.  It has been advised for you to have further evaluation of a area that is chronic appearing on the pancreas with an MRI, this can be set up outpatient through your PCP.    Return to the emergency department with worsening symptoms, uncontrolled pain, inability to tolerate oral liquids, fever greater than 101°F not controlled by Tylenol or as needed with emergent concerns.

## 2022-09-13 NOTE — CONSULTS
Patient Name: Td Melendez Jr.  :1945  76 y.o.    Date of Admission: 2022  Date of Consultation:  22  Encounter Provider: Low Garcia MD  Place of Service: [facility] CARDIOLOGY  Referring Provider: Felton Gan MD  Patient Care Team:  Beau Lee MD as PCP - General (Family Medicine)  Obi Boucher MD as Consulting Physician (Cardiology)  Venkat Salas MD (Pain Medicine)  Pedrito Neal Jr., MD as Consulting Physician (Urology)      Chief complaint: Chest Pain     Reason for Consult:  Chest pain      History of Present Illness:    Mr Melendez is a 76 year old man with past medical history of CAD status post drug-eluting stent to his LAD and RCA in , hyperlipidemia, also with recent history of hiatal hernia surgery in July([facility]). He is followed by Dr. Boucher and was just seen on 2022 was doing well without angina.  He presented to the ER yesterday after 2 days of epigastric/chest discomfort. He notes he was doing yard work on Saturday and began feeling worsening of epigastric pain which he rates a 5 out of 10 at the time.  He denies any associated symptoms including shortness of breath. He has been having lingering epigastric pain since his surgery but this episode was worse.    On arrival his EKG showed NSR and was at baseline compared to his recent outpatient visit.  Labs included negative troponin x 2.  CT of abdomen showed tiny collection of air near his surgical repair that was not found on previous study, small calcification on the pancreas.  Awaiting possible endoscopy for further evaluation.    He was admitted to the observation unit.  He continues to complain of chest/epigastric overnight.  His repeat troponin is also negative.        Of note his last 2 stress tests in  and 2018 were both normal.      Previous Cardiac Testing  Stress Test 2018  · Myocardial perfusion imaging indicates a normal myocardial perfusion study with no  evidence of ischemia.  · Left ventricular ejection fraction is normal (Calculated EF = 65%).  · Impressions are consistent with a low risk study.  · GI artifact is present.        ECHO 2015  Left Ventricle:  The left ventricular chamber size is normal. There is no left  ventricular hypertrophy. Global left ventricular wall motion and  contractility are within normal limits. The ejection fraction was  measured at 64%. The left ventricular diastolic filling pattern is  normal.      Left Atrium:  Borderline left atrial enlargement.      Right Ventricle:  The right ventricular chamber size and systolic function are within  normal limits.      Right Atrium:  The right atrial cavity size is normal.      Aortic Valve:  There is no evidence of aortic stenosis. There is no evidence of aortic  regurgitation.      Mitral Valve:  There is no evidence of mitral stenosis. There is mild mitral  regurgitation.       Tricuspid Valve:  There is no tricuspid stenosis. There is a physiological tricuspid  regurgitation.  The right ventricular systolic pressure is normal.      Pulmonic Valve:  Doppler indicates normal function      Pericardium:  There is no pericardial effusion.      Conclusions  Global left ventricular wall motion and contractility are within normal  limits.  The ejection fraction was measured at 64%.  The left ventricular diastolic filling pattern is normal.  There is no pericardial effusion.    Cardiac Catheterization 7/6/2015  FINDINGS:  1. LEFT MAIN: A large-caliber vessel that bifurcates to an LAD and left  circumflex coronary artery.    2. LEFT MAIN: The left main has a discrete ostial 20% stenosis.   3. LAD: A medium-caliber vessel that gives rise to a medium-caliber diagonal  branch in the midsegment. The LAD has a diffuse 70% [id number]  stenosis.   4. LEFT CIRCUMFLEX: A medium-caliber vessel gives rise to a medium-caliber  first obtuse marginal branch. The OM-1 branch has a discrete 50%  proximal  stenosis.   5. RCA: Large-caliber, dominant vessel that gives rise to a medium-caliber PDA  and 3 small-caliber RPL branches. The RCA has a discrete 99% proximal stenosis  with SANDER-2 flow distally.      LEFT VENTRICULAR ANGIOGRAPHY: Mildly depressed left ventricular systolic  function. LVEF 45%. Severe inferior wall hypokinesis.      No mitral regurgitation.      HEMODYNAMICS: /22. /56.      PERCUTANEOUS INTERVENTION REPORT: Unfractionated heparin was used for  anticoagulation from therapeutic by ACT. A 0.014 inch 300 cm run-through  guidewire was used to cross the proximal RCA stenosis seated in the distal  vessel. A 3.25 x 12 mm Trek RX balloon was used to predilate the lesion to 16  atmospheres. A 3.5 x 18 mm Xience Alpine drug-eluting stent was then deployed  across the lesion at 16 atmospheres. The patient had a short period of ST  elevation with the balloon inflated; however, once the stent was deployed  SANDER-3 flow was resumed and the patient tolerated this well.             Past Medical History:   Diagnosis Date   • Anxiety    • CAD (coronary artery disease)     unstable angina, 7/2015: 20% LM, long 70% LAD with abnormal FFR (s/p [id number] Xience), 50% OM1, 99% prox RCA (s/p 3.5x18m Xience).  Normal Cardiolite 11/2015 and 6/2018   • Colon polyp 2010   • Fibromyalgia    • GERD (gastroesophageal reflux disease)    • H/O complete eye exam 04/2018   • Hyperlipidemia    • IBS (irritable bowel syndrome)    • Squamous cell skin cancer, face    • Urinary retention        Past Surgical History:   Procedure Laterality Date   • CARDIAC CATHETERIZATION      [facility] 07/2015: cath with 20% LM long 70% LAD (with positive FFR), 50% OM1, 99% prox RCA, s/p 2.75x33 Xience Alpine to LAD and 3.5x18mm Xience Alpine to RCA. LVEF 64% Normal Cardiolite 11/2015   • CARDIAC SURGERY      cardiac stents, 2   • COLONOSCOPY  approx 6/2011    normal per patient  [facility]   • COLONOSCOPY N/A 4/26/2021    Procedure:  COLONOSCOPY TO CECUM WITH COLD SNARE POLYPECTOMIES AND BIOPSIES ;  Surgeon: Catarino Luna MD;  Location: [facility] ENDOSCOPY;  Service: Gastroenterology;  Laterality: N/A;  PRE- HISTORY COLON POLYPS  POST- POLYPS, COLITIS, HEMORRHOIDS   • COLONOSCOPY W/ POLYPECTOMY N/A 7/25/2018    Procedure: COLONOSCOPY TO CECUM WITH COLD SNAREPOLYPECTOMIES, HOT SNARE POLYPECTOMIES;  Surgeon: Catarino Luna MD;  Location: [facility] ENDOSCOPY;  Service: Gastroenterology   • CORONARY ANGIOPLASTY WITH STENT PLACEMENT     • CYSTOSCOPY TRANSURETHRAL RESECTION OF PROSTATE     • ENDOSCOPY N/A 7/25/2018    Procedure: ESOPHAGOGASTRODUODENOSCOPY WITH COLD BIOPSIES;  Surgeon: Catarino Luna MD;  Location: [facility] ENDOSCOPY;  Service: Gastroenterology   • ENDOSCOPY N/A 1/7/2021    Procedure: ESOPHAGOGASTRODUODENOSCOPY with cold biopsies;  Surgeon: Catarino Luna MD;  Location: [facility] ENDOSCOPY;  Service: Gastroenterology;  Laterality: N/A;  PRE: EPIGASTRIC PAIN, DYSPEPSIA  POST: GASTRITIS   • ENDOSCOPY N/A 5/25/2022    Procedure: ESOPHAGOGASTRODUODENOSCOPY WITH DILATATION #48 to #58 bougie, gastric biopsy;  Surgeon: Td Richey DO;  Location: [facility] ENDOSCOPY;  Service: General;  Laterality: N/A;  gastritis, small hiatal hernia   • HIATAL HERNIA REPAIR N/A 7/6/2022    Procedure: HIATAL HERNIA REPAIR LAPAROSCOPIC WITH TRANSORAL INCISIONLESS FUNDOPLICATION;  Surgeon: Td Richey DO;  Location: [facility] MAIN OR;  Service: General;  Laterality: N/A;   • RADIOFREQUENCY ABLATION      NECK   • SKIN CANCER EXCISION     • WISDOM TOOTH EXTRACTION           Prior to Admission medications    Medication Sig Start Date End Date Taking? Authorizing Provider   ALPRAZolam (XANAX) 0.25 MG tablet Take 1 tablet by mouth Daily As Needed for Anxiety. 8/3/22  Yes Beau Lee MD   clopidogrel (PLAVIX) 75 MG tablet Take 1 tablet by mouth Daily. 8/3/22  Yes Beau Lee MD   coenzyme Q10 100 MG capsule Take 100 mg by mouth Daily.  LD    Yes Lilian Knowles MD   diphenhydrAMINE-acetaminophen (TYLENOL PM)  MG tablet per tablet Take 1 tablet by mouth At Night As Needed for Sleep.   Yes Lilian Knowles MD   Emollient (COLLAGEN EX) Take 1 tablet by mouth Daily. LD    Yes Lilian Knowles MD   ezetimibe (ZETIA) 10 MG tablet Take 1 tablet by mouth Daily. 8/3/22  Yes Beau Lee MD   LITHIUM PO Take 5 mg by mouth 2 (two) times a day.   Yes Lilian Knowles MD   melatonin 5 MG tablet tablet Take 5 mg by mouth At Night As Needed.   Yes Lilian Knowles MD   Pitavastatin Calcium (Livalo) 4 MG tablet Take 1 tablet by mouth Every Night. 8/3/22  Yes Beau Lee MD   sildenafil (REVATIO) 20 MG tablet Take 2-3 tabs QD prn 8/3/22  Yes Beau Lee MD   TURMERIC PO Take  by mouth.   Yes Lilian Knowles MD   Vitamin D-Vitamin K (VITAMIN K2-VITAMIN D3 PO) Take  by mouth Daily. Hold for surgery   Yes Lilian Knowles MD   HYDROcodone-acetaminophen (NORCO) 5-325 MG per tablet Take  by mouth See Admin Instructions. Take 1 tablet by mouth every 4-6 hours as needed for pain 21   Lilian Knowles MD   OXcarbazepine (TRILEPTAL) 300 MG tablet Take 300 mg by mouth 2 (Two) Times a Day.  22  Lilian Knowles MD       Allergies   Allergen Reactions   • Augmentin [Amoxicillin-Pot Clavulanate] Hives and Itching       Social History     Socioeconomic History   • Marital status:    Tobacco Use   • Smoking status: Former Smoker     Packs/day: 0.25     Years: 4.00     Pack years: 1.00     Types: Cigarettes     Start date: 1968     Quit date:      Years since quittin.7   • Smokeless tobacco: Never Used   Vaping Use   • Vaping Use: Never used   Substance and Sexual Activity   • Alcohol use: No     Comment: caffeine use   • Drug use: No   • Sexual activity: Defer       Family History   Problem Relation Age of Onset   • Cervical cancer Mother    • Cancer Mother    • Depression  "Mother    • Heart disease Mother    • Liver disease Mother    • Alcohol abuse Mother    • Thyroid cancer Father    • Cancer Father    • Depression Father    • Diabetes Father    • Thyroid disease Father    • Coronary artery disease Other    • Malig Hyperthermia Neg Hx        REVIEW OF SYSTEMS:   All systems reviewed.  Pertinent positives identified in HPI.  All other systems are negative.      Objective:     Vitals:    09/12/22 2137 09/13/22 0055 09/13/22 0324 09/13/22 0750   BP: 135/70 145/63 136/71 140/75   BP Location: Right arm Right arm Right arm Right arm   Patient Position: Sitting Lying Lying Lying   Pulse: 66 68 63 60   Resp: 16 16 16 16   Temp: 97.7 °F (36.5 °C) 97.6 °F (36.4 °C) 97.5 °F (36.4 °C) 98.1 °F (36.7 °C)   TempSrc: Oral Oral Oral Oral   SpO2: 93% 96% 93% 99%   Weight: 80.3 kg (177 lb)      Height: 177.8 cm (70\")        Body mass index is 25.4 kg/m².    General Appearance:    Alert, cooperative, in no acute distress   Head:    Normocephalic, without obvious abnormality, atraumatic   Eyes:            Lids and lashes normal, conjunctivae and sclerae normal, no icterus, no pallor, corneas clear, PERRLA   Ears:    Ears appear intact with no abnormalities noted   Throat:   No oral lesions, no thrush, oral mucosa moist   Neck:   No adenopathy, supple, trachea midline, no thyromegaly, no carotid bruit, no JVD   Back:     No kyphosis present, no scoliosis present, no skin lesions, erythema or scars, no tenderness to percussion or palpation, range of motion normal   Lungs:     Clear to auscultation, respirations regular, even and unlabored    Heart:    Regular rhythm and normal rate, normal S1 and S2, no murmur, rubs, or gallops.   Chest Wall:    No abnormalities observed. Chest wall nontender to palpation.   Abdomen:     Normal bowel sounds, no masses, no organomegaly, soft, nontender, nondistended, no guarding, no rebound  tenderness   Extremities:   Moves all extremities well, no edema, no cyanosis, no " redness   Pulses:   Pulses palpable and equal bilaterally. Normal radial, carotid, femoral, dorsalis pedis and posterior tibial pulses bilaterally. Normal abdominal aorta   Skin:  Psychiatric:   No bleeding, bruising or rash    Alert and oriented x 3, normal mood and affect   Lab Review:     Results from last 7 days   Lab Units 09/12/22  2303 09/12/22  1628   SODIUM mmol/L 144 142   POTASSIUM mmol/L 3.3* 4.0   CHLORIDE mmol/L 111* 108*   CO2 mmol/L 19.8* 25.4   BUN mg/dL 17 23   CREATININE mg/dL 0.82 1.03   CALCIUM mg/dL 8.3* 10.2   BILIRUBIN mg/dL  --  0.8   ALK PHOS U/L  --  96   ALT (SGPT) U/L  --  10   AST (SGOT) U/L  --  15   GLUCOSE mg/dL 127* 118*     Results from last 7 days   Lab Units 09/13/22  0842 09/12/22  2303 09/12/22  1736   TROPONIN T ng/mL <0.010 <0.010 <0.010     Results from last 7 days   Lab Units 09/12/22  2303   WBC 10*3/mm3 5.16   HEMOGLOBIN g/dL 14.5   HEMATOCRIT % 41.0   PLATELETS 10*3/mm3 159         Results from last 7 days   Lab Units 09/12/22  2303   MAGNESIUM mg/dL 1.7     Results from last 7 days   Lab Units 09/12/22  1736   CHOLESTEROL mg/dL 121   TRIGLYCERIDES mg/dL 181*   HDL CHOL mg/dL 35*   LDL CHOL mg/dL 56             CXR  IMPRESSION:  FINDINGS AND IMPRESSION:  No pulmonary consolidation, pleural effusion or pneumothorax is seen.  Sequela of prior granulomatous disease is present, as before. Cardiac  silhouette is within normal limits for size.    CT of abdomen  FINDINGS: The following findings are present:  1. The patient has had relatively recent laparoscopic repair of a small  hiatus hernia. There is a tiny collection of air that may possibly be  extraluminal near the surgical repair site as best seen on image 21. It  is not present on the study of 07/20/2022. It measures only 7 x 8 mm. It  might possibly relate to a tiny diverticulum of the gastric fundus but I  cannot completely exclude an extraluminal collection. There is no  adjacent soft tissue abnormality and no free  air is present elsewhere.  2. There is some minimal interstitial change and scarring at the lung  bases. There are several small hemangiomas in the liver including a  peripheral hemangioma in the right lobe posteriorly that measures up to  3.3 cm. These show no change from the CT scan performed with hemangioma  protocol on 03/16/2022. The spleen and both adrenal glands, and both  kidneys are unremarkable. The gallbladder appears normal.  3. There is some vague fullness and suggestion of ill-defined  heterogeneous density in the body of the pancreas and involving an area  measuring up to 2.1 x 2.4 cm. There is an adjacent small calcification  present. This is similar to the recent CT scan dated 03/16/2022 and  further evaluation with dedicated MRI scan without and with contrast for  possible pancreatic mass is recommended.  4. There is prominent calcification of the abdominal aorta without  evidence of aneurysm. There is some plaque formation near the origin of  the right common iliac artery producing mild stenosis. There is no  retroperitoneal lymphadenopathy.     5. The large and small bowel loops are normal in caliber and show no  inflammatory change. In the pelvis there is enlargement of the prostate  measuring 5.5 cm. The urinary bladder has a smooth contour and is not  abnormally distended.    EKG this AM       EKG on arrival       EKG baseline       I personally viewed and interpreted the patient's EKG/Telemetry data.        Assessment and Plan:   #CAD status post prior stents to LAD and RCA in 2015  #Chest pain  #epigastric pain  #Abdominal air collection  #Hyperlipidemia    Likely noncardiac chest pain given negative enzymes, EKG at baseline and CT abd findings.  - defer workup of CT abd findings to general surgery  - no ischemic evaluation needed at this time  - continue ASA 81mg daily  - continue pitavastatin and ezetimibe    Low Garcia MD  09/13/22  10:49 EDT

## 2022-09-13 NOTE — PLAN OF CARE
Goal Outcome Evaluation:  Plan of Care Reviewed With: patient        Progress: improving   Patient has been NPO since MN, chest pain decreasing. Cardiology and General Surgery to see this morning.

## 2022-09-13 NOTE — PROGRESS NOTES
MD ATTESTATION NOTE    The NAOMI and I have discussed this patient's history, physical exam, and treatment plan.  I have reviewed the documentation and personally had a face to face interaction with the patient. I affirm the documentation and agree with the treatment and plan.  The attached note describes my personal findings.      I provided a substantive portion of the care of the patient.  I personally performed the physical exam in its entirety, and below are my findings.  For this patient encounter, the patient wore surgical mask, I wore full protective PPE including N95 and eye protection.      Brief HPI: This patient is a 76-year-old male admitted to the observation unit secondary to chest discomfort.  The patient reports that he has had intermittent bouts of this since he surgical procedure for hiatal hernia 2 months ago.  He currently denies any abdominal discomfort, nausea/vomiting, or diaphoresis.  Currently, he reports that his chest discomfort has resolved.    PHYSICAL EXAM  ED Triage Vitals   Temp Heart Rate Resp BP SpO2   09/12/22 1545 09/12/22 1545 09/12/22 1545 09/12/22 1545 09/12/22 1545   97.9 °F (36.6 °C) 92 16 157/81 97 %      Temp src Heart Rate Source Patient Position BP Location FiO2 (%)   09/12/22 2137 09/12/22 2137 09/12/22 2137 09/12/22 2137 --   Oral Monitor Sitting Right arm          GENERAL: Resting comfortably and in no acute distress, nontoxic in appearance  HENT: nares patent  EYES: no scleral icterus  CV: regular rhythm, normal rate, no M/R/G  RESPIRATORY: normal effort, lungs clear bilaterally  ABDOMEN: soft, nontender, no rebound or guarding  MUSCULOSKELETAL: no deformity, no edema  NEURO: alert, moves all extremities, follows commands  PSYCH:  calm, cooperative  SKIN: warm, dry    Vital signs and nursing notes reviewed.        Plan: EKG shows no signs of acute ischemia and serial cardiac enzymes are currently negative.  We will ask cardiology to see in consultation.  Reassessment  and disposition to follow.

## 2022-09-13 NOTE — PLAN OF CARE
Goal Outcome Evaluation:              Outcome Evaluation: patient left observation unit at this time via private vehicle with all his belongings, discharge summary provided and education included patient is aware to follow up with [facility] care withiin one week and with primary surgeon within 2-3  weeks. patient is aware to take medication as prescribed, stable at the time of discharge.

## 2022-09-14 ENCOUNTER — TRANSITIONAL CARE MANAGEMENT TELEPHONE ENCOUNTER (OUTPATIENT)
Dept: CALL CENTER | Facility: HOSPITAL | Age: 77
End: 2022-09-14

## 2022-09-14 NOTE — OUTREACH NOTE
Call Center TCM Note    Flowsheet Row Responses   [facility] patient discharged from? [place]   Does the patient have one of the following disease processes/diagnoses(primary or secondary)? Other   TCM attempt successful? Yes   Call start time 0912   Call end time 0914   Discharge diagnosis Chest pain   Meds reviewed with patient/caregiver? Yes   Is the patient having any side effects they believe may be caused by any medication additions or changes? No   Does the patient have all medications ordered at discharge? Yes   Is the patient taking all medications as directed (includes completed medication regime)? Yes   Has home health visited the patient within 72 hours of discharge? N/A   Psychosocial issues? No   Did the patient receive a copy of their discharge instructions? Yes   Nursing interventions Reviewed instructions with patient   What is the patient's perception of their health status since discharge? Improving   Is the patient/caregiver able to teach back signs and symptoms related to disease process for when to call PCP? Yes   Is the patient/caregiver able to teach back signs and symptoms related to disease process for when to call 911? Yes   Is the patient/caregiver able to teach back the hierarchy of who to call/visit for symptoms/problems? PCP, Specialist, Home health nurse, Urgent Care, ED, 911 Yes   If the patient is a current smoker, are they able to teach back resources for cessation? Not a smoker   TCM call completed? Yes          Aydee Sol LPN    9/14/2022, 09:14 EDT

## 2022-09-14 NOTE — PROGRESS NOTES
Transitional Care Follow Up Visit  Subjective     Td Melendez Jr. is a 76 y.o. male who presents for a transitional care management visit.    Within 48 business hours after discharge our office contacted him via telephone to coordinate his care and needs.      I reviewed and discussed the details of that call along with the discharge summary, hospital problems, inpatient lab results, inpatient diagnostic studies, and consultation reports with Td.     Current outpatient and discharge medications have been reconciled for the patient.  Reviewed by: Beau Lee MD      Date of TCM Phone Call 9/13/2022   [facility]   Date of Admission 9/12/2022   Date of Discharge 9/13/2022   Discharge Disposition Home or Self Care     Risk for Readmission (LACE) Score: 2 (9/13/2022  6:00 AM)      History of Present Illness   Course During Hospital Stay:      History of Present Illness:    Mr. Td Melendez Jr. is a 76 y.o. year old gentleman who presented to the ER last night with chest pain.  He states this was midsternal.  It was not made better or worse by eating or drinking.  It was not made better or worse by positional changes. In May 2022 he underwent an EGD with dilation by Dr. Richey.  He subsequently underwent a laparoscopic hiatal hernia repair with transoral incision less fundoplication July 6, 2022.  Since then he had some midepigastric and mid chest discomfort but this is slowly improved over the last several weeks.  He states when he had 6 weeks he went back to physical activity and noticed some increased discomfort again after that.  It worsened over the weekend to the point he came to the ER.  No prior similar events.    CT showed:  FINDINGS: The following findings are present:   1. The patient has had relatively recent laparoscopic repair of a small   hiatus hernia. There is a tiny collection of air that may possibly be   extraluminal near the surgical repair site as best seen on image 21. It   is  not present on the study of 07/20/2022. It measures only 7 x 8 mm. It   might possibly relate to a tiny diverticulum of the gastric fundus but I   cannot completely exclude an extraluminal collection. There is no   adjacent soft tissue abnormality and no free air is present elsewhere.   2. There is some minimal interstitial change and scarring at the lung   bases. There are several small hemangiomas in the liver including a   peripheral hemangioma in the right lobe posteriorly that measures up to   3.3 cm. These show no change from the CT scan performed with hemangioma   protocol on 03/16/2022. The spleen and both adrenal glands, and both   kidneys are unremarkable. The gallbladder appears normal.   3. There is some vague fullness and suggestion of ill-defined   heterogeneous density in the body of the pancreas and involving an area   measuring up to 2.1 x 2.4 cm. There is an adjacent small calcification   present. This is similar to the recent CT scan dated 03/16/2022 and   further evaluation with dedicated MRI scan without and with contrast for   possible pancreatic mass is recommended.   4. There is prominent calcification of the abdominal aorta without   evidence of aneurysm. There is some plaque formation near the origin of   the right common iliac artery producing mild stenosis. There is no   retroperitoneal lymphadenopathy.   5. The large and small bowel loops are normal in caliber and show no   inflammatory change. In the pelvis there is enlargement of the prostate   measuring 5.5 cm. The urinary bladder has a smooth contour and is not   abnormally distended.    Upper GI with Air Contrast showed:   CONCLUSION:   1. Distal esophageal diverticulum accounting for the CT abnormality   described on 09/12/2022.   2. Mild mucosal irregularity of the distal esophagus, question   esophagitis.   3. Stomach, duodenum and small bowel within normal limits. Satisfactory   transit time to cecum.    Cardiology saw pt and  "signed off.    Surgery said:  Addendum: Upper GI shows a diverticulum in the distal esophagus, no evidence of perforation.  Changes consistent with esophagitis.  Okay to trial p.o. and discharged on antireflux medication.  Patient will need to follow-up with his primary surgeon Dr. Richey for further management.    Current outpatient and discharge medications have been reconciled for the patient.  Reviewed by: Beau Lee MD        The following portions of the patient's history were reviewed and updated as appropriate: allergies, current medications, past family history, past medical history, past social history, past surgical history and problem list.    Review of Systems   Constitutional: Negative for activity change, chills and fever.   Respiratory: Negative for cough.    Cardiovascular: Negative for chest pain.   Psychiatric/Behavioral: Negative for dysphoric mood.       /67   Pulse 70   Temp 97.4 °F (36.3 °C) (Oral)   Resp 16   Ht 177.8 cm (70\")   Wt 79.4 kg (175 lb)   BMI 25.11 kg/m²     Objective   Physical Exam  Constitutional:       General: He is not in acute distress.     Appearance: He is well-developed.   Cardiovascular:      Rate and Rhythm: Normal rate and regular rhythm.   Pulmonary:      Effort: Pulmonary effort is normal.      Breath sounds: Normal breath sounds.   Neurological:      Mental Status: He is alert and oriented to person, place, and time.   Psychiatric:         Behavior: Behavior normal.         Thought Content: Thought content normal.     Hospital records reviewed with pt confirming HPI.      Assessment & Plan   Diagnoses and all orders for this visit:    1. Epigastric pain (Primary)    2. Abnormal CT of the abdomen  -     MRI abdomen w wo contrast; Future    Pt with several prior cardiac stents and unsure if an MRI is doable in that situation in regards to the pancreas. Pt going to see his general surgeon (Dr Richey) and will discuss with him further.   Pt has Xience " [place] cardiac stents in place. Will make sure this is ok to do the test with these in his heart.

## 2022-09-14 NOTE — OUTREACH NOTE
Prep Survey    Flowsheet Row Responses   [facility] facility patient discharged from? [facility]   Is LACE score < 7 ? Yes   Emergency Room discharge w/ pulse ox? No   Eligibility [facility]   Date of Admission 09/12/22   Date of Discharge 09/13/22   Discharge Disposition Home or Self Care   Discharge diagnosis Chest pain   Does the patient have one of the following disease processes/diagnoses(primary or secondary)? Other   Does the patient have Home health ordered? No   Is there a DME ordered? No   Prep survey completed? Yes          KEMAR LOPEZ - Registered Nurse

## 2022-09-15 ENCOUNTER — OFFICE VISIT (OUTPATIENT)
Dept: FAMILY MEDICINE CLINIC | Facility: CLINIC | Age: 77
End: 2022-09-15

## 2022-09-15 VITALS
SYSTOLIC BLOOD PRESSURE: 133 MMHG | BODY MASS INDEX: 25.05 KG/M2 | HEIGHT: 70 IN | TEMPERATURE: 97.4 F | HEART RATE: 70 BPM | WEIGHT: 175 LBS | RESPIRATION RATE: 16 BRPM | DIASTOLIC BLOOD PRESSURE: 67 MMHG

## 2022-09-15 DIAGNOSIS — R10.13 EPIGASTRIC PAIN: Primary | ICD-10-CM

## 2022-09-15 DIAGNOSIS — R93.5 ABNORMAL CT OF THE ABDOMEN: ICD-10-CM

## 2022-09-15 PROCEDURE — 99495 TRANSJ CARE MGMT MOD F2F 14D: CPT | Performed by: FAMILY MEDICINE

## 2022-10-09 ENCOUNTER — HOSPITAL ENCOUNTER (OUTPATIENT)
Dept: MRI IMAGING | Facility: HOSPITAL | Age: 77
Discharge: HOME | End: 2022-10-09
Admitting: FAMILY MEDICINE

## 2022-10-09 DIAGNOSIS — R93.5 ABNORMAL CT OF THE ABDOMEN: ICD-10-CM

## 2022-10-09 PROCEDURE — 74183 MRI ABD W/O CNTR FLWD CNTR: CPT

## 2022-10-09 PROCEDURE — 0 GADOBENATE DIMEGLUMINE 529 MG/ML SOLUTION: Performed by: FAMILY MEDICINE

## 2022-10-09 PROCEDURE — A9577 INJ MULTIHANCE: HCPCS | Performed by: FAMILY MEDICINE

## 2022-10-09 RX ADMIN — GADOBENATE DIMEGLUMINE 16 ML: 529 INJECTION, SOLUTION INTRAVENOUS at 09:34

## 2022-10-10 ENCOUNTER — OFFICE VISIT (OUTPATIENT)
Dept: SURGERY | Facility: CLINIC | Age: 77
End: 2022-10-10

## 2022-10-10 VITALS
BODY MASS INDEX: 25.48 KG/M2 | TEMPERATURE: 97.7 F | SYSTOLIC BLOOD PRESSURE: 137 MMHG | HEIGHT: 70 IN | DIASTOLIC BLOOD PRESSURE: 72 MMHG | WEIGHT: 178 LBS | HEART RATE: 71 BPM | OXYGEN SATURATION: 97 %

## 2022-10-10 DIAGNOSIS — R10.13 EPIGASTRIC PAIN: Primary | ICD-10-CM

## 2022-10-10 PROCEDURE — 99213 OFFICE O/P EST LOW 20 MIN: CPT | Performed by: STUDENT IN AN ORGANIZED HEALTH CARE EDUCATION/TRAINING PROGRAM

## 2022-10-11 NOTE — PROGRESS NOTES
"Chief Complaint  New Problem (Esophagitis)    Subjective        Td Melendez Jr. presents to [facility] GENER[facility] SUR[facility]ERY  History of Present Illness    76-year-old gentleman here for follow-up 3 months after his hiatal hernia repair and TIF with Dr. Richey.  He had recurrent epigastric abdominal pain and reflux.  He presented to [facility] with CT scan with concern for possible distal esophageal injury, however contrasted study showed esophageal diverticulum and distal esophageal inflammation.  He is now back on his PPI and no longer having epigastric pain or reflux.     Objective   Vital Signs:  /72 (Cuff Size: Adult)   Pulse 71   Temp 97.7 °F (36.5 °C) (Infrared)   Ht 177.8 cm (70\")   Wt 80.7 kg (178 lb)   SpO2 97%   BMI 25.54 kg/m²   Estimated body mass index is 25.54 kg/m² as calculated from the following:    Height as of this encounter: 177.8 cm (70\").    Weight as of this encounter: 80.7 kg (178 lb).          Physical Exam  Constitutional:       General: He is not in acute distress.     Appearance: Normal appearance. He is not ill-appearing.   HENT:      Head: Normocephalic and atraumatic.      Right Ear: External ear normal.      Left Ear: External ear normal.   Eyes:      Extraocular Movements: Extraocular movements intact.      Conjunctiva/sclera: Conjunctivae normal.   Cardiovascular:      Rate and Rhythm: Normal rate and regular rhythm.   Pulmonary:      Effort: Pulmonary effort is normal. No respiratory distress.   Abdominal:      General: There is no distension.      Palpations: Abdomen is soft.      Tenderness: There is no abdominal tenderness.   Musculoskeletal:         General: No swelling or deformity.   Skin:     General: Skin is warm and dry.   Neurological:      Mental Status: He is alert and oriented to person, place, and time. Mental status is at baseline.        Result Review :                Assessment and Plan   Diagnoses and all orders for this " visit:    1. Epigastric pain (Primary)      76-year-old gentleman here for follow-up 3 months after his hiatal hernia repair and TIF with Dr. Richey.  He had recurrent epigastric abdominal pain and reflux.  He presented to [facility] with CT scan with concern for possible distal esophageal injury, however contrasted study showed esophageal diverticulum and distal esophageal inflammation.  He is now back on his PPI and no longer having epigastric pain or reflux.  Given his early recurrence of his reflux I agree with 3-month trial of PPI and that he would likely need repeat EGD for assessment of his TIF valve with pH probe placement at that time.  Follow-up with me in 3 months to discuss progress.         Follow Up   No follow-ups on file.  Patient was given instructions and counseling regarding his condition or for health maintenance advice. Please see specific information pulled into the AVS if appropriate.

## 2023-01-12 ENCOUNTER — OFFICE VISIT (OUTPATIENT)
Dept: SURGERY | Facility: CLINIC | Age: 78
End: 2023-01-12
Payer: MEDICARE

## 2023-01-12 VITALS
WEIGHT: 179 LBS | DIASTOLIC BLOOD PRESSURE: 75 MMHG | HEIGHT: 70 IN | OXYGEN SATURATION: 98 % | BODY MASS INDEX: 25.62 KG/M2 | SYSTOLIC BLOOD PRESSURE: 125 MMHG | HEART RATE: 70 BPM | TEMPERATURE: 97.8 F

## 2023-01-12 DIAGNOSIS — K21.9 GASTROESOPHAGEAL REFLUX DISEASE WITHOUT ESOPHAGITIS: Primary | ICD-10-CM

## 2023-01-12 PROCEDURE — 99213 OFFICE O/P EST LOW 20 MIN: CPT | Performed by: STUDENT IN AN ORGANIZED HEALTH CARE EDUCATION/TRAINING PROGRAM

## 2023-01-12 RX ORDER — ACETAMINOPHEN AND CODEINE PHOSPHATE 300; 30 MG/1; MG/1
TABLET ORAL
COMMUNITY
Start: 2022-11-25

## 2023-01-12 NOTE — PROGRESS NOTES
"Chief Complaint  Follow-up (3 month fu TIF)    Subjective        Td Melendez Jr. presents to [facility] SUR[facility]ERY  History of Present Illness    77-year-old gentleman 6 months out from his hiatal hernia repair with TIF by Dr. Richey.  He remains on once daily PPI and has tried to stop this a couple times with recurrence of his reflux.  Preoperatively he had severe reflux that was very painful and life limiting and was on twice daily PPI.  His symptoms are much better than they were preoperatively he was hoping to be able to come off of PPI, however we discussed at this point he would likely need to remain on PPI for symptom control.  Discussed other option is to proceed with redo hiatal hernia repair with full Nissen wrap for complete control of his reflux with potential side effect of dysphagia and patient is not interested in this.  He had no Cheema's on his preoperative work-up.      Objective   Vital Signs:  /75 (Cuff Size: Adult)   Pulse 70   Temp 97.8 °F (36.6 °C) (Infrared)   Ht 177.8 cm (70\")   Wt 81.2 kg (179 lb)   SpO2 98%   BMI 25.68 kg/m²   Estimated body mass index is 25.68 kg/m² as calculated from the following:    Height as of this encounter: 177.8 cm (70\").    Weight as of this encounter: 81.2 kg (179 lb).             Physical Exam  Constitutional:       General: He is not in acute distress.     Appearance: Normal appearance. He is not ill-appearing.   HENT:      Head: Normocephalic and atraumatic.      Right Ear: External ear normal.      Left Ear: External ear normal.   Eyes:      Extraocular Movements: Extraocular movements intact.      Conjunctiva/sclera: Conjunctivae normal.   Cardiovascular:      Rate and Rhythm: Normal rate and regular rhythm.   Pulmonary:      Effort: Pulmonary effort is normal. No respiratory distress.   Abdominal:      General: There is no distension.      Palpations: Abdomen is soft.      Tenderness: There is no abdominal " tenderness.   Musculoskeletal:         General: No swelling or deformity.   Skin:     General: Skin is warm and dry.   Neurological:      Mental Status: He is alert and oriented to person, place, and time. Mental status is at baseline.        Result Review :                   Assessment and Plan   There are no diagnoses linked to this encounter.    77-year-old gentleman 6 months out from his hiatal hernia repair with TIF by Dr. Richey.  He remains on once daily PPI and has tried to stop this a couple times with recurrence of his reflux.  Preoperatively he had severe reflux that was very painful and life limiting and was on twice daily PPI.  His symptoms are much better than they were preoperatively he was hoping to be able to come off of PPI, however we discussed at this point he would likely need to remain on PPI for symptom control.  Discussed other option is to proceed with redo hiatal hernia repair with full Nissen wrap for complete control of his reflux with potential side effect of dysphagia and patient is not interested in this.  He had no Cheema's on his preoperative work-up.  At this point he is satisfied with his surgical result, can follow-up me again in the future if develops worsening reflux or any other surgical issue.  Otherwise can follow-up with me as needed.         Follow Up   No follow-ups on file.  Patient was given instructions and counseling regarding his condition or for health maintenance advice. Please see specific information pulled into the AVS if appropriate.

## 2023-02-14 NOTE — PROGRESS NOTES
Subjective   Td Melendez Jr. is a 77 y.o. male.     History of Present Illness     Chief Complaint:   Chief Complaint   Patient presents with   • Anxiety   • Hyperlipidemia     MED REFILL DUE        Td Melendez Jr. 77 y.o. male who presents today for Medical Management of the below listed issues. He  has a problem list of   Patient Active Problem List   Diagnosis   • CAD (coronary artery disease)   • Hyperlipidemia   • Anxiety   • Gastroesophageal reflux disease without esophagitis   • Polyp of colon   • Dyspepsia   • Epigastric pain   • Poor appetite   • Personal history of colonic polyps   • Screening for colon cancer   • Hypertriglyceridemia   • Abnormal EKG   • Hematuria   • Chronic pain   • Chest pain   .  Since the last visit, He has overall felt well, although snores and is more tired in the AM with time.   he has been compliant with   Current Outpatient Medications:   •  ALPRAZolam (XANAX) 0.25 MG tablet, Take 1 tablet by mouth Daily As Needed for Anxiety., Disp: 30 tablet, Rfl: 2  •  ezetimibe (ZETIA) 10 MG tablet, Take 1 tablet by mouth Daily., Disp: 90 tablet, Rfl: 1  •  acetaminophen-codeine (TYLENOL #3) 300-30 MG per tablet, TAKE 1 TABLET BY MOUTH EVERY 4-6 HOURS AS NEEDED FOR PAIN, Disp: , Rfl:   •  clopidogrel (PLAVIX) 75 MG tablet, Take 1 tablet by mouth Daily., Disp: 90 tablet, Rfl: 3  •  coenzyme Q10 100 MG capsule, Take 100 mg by mouth Daily. LD 6/30, Disp: , Rfl:   •  diphenhydrAMINE-acetaminophen (TYLENOL PM)  MG tablet per tablet, Take 1 tablet by mouth At Night As Needed for Sleep., Disp: , Rfl:   •  Emollient (COLLAGEN EX), Take 1 tablet by mouth Daily. LD 6/30, Disp: , Rfl:   •  HYDROcodone-acetaminophen (NORCO) 5-325 MG per tablet, Take  by mouth See Admin Instructions. Take 1 tablet by mouth every 4-6 hours as needed for pain, Disp: , Rfl:   •  LITHIUM PO, Take 5 mg by mouth 2 (two) times a day., Disp: , Rfl:   •  melatonin 5 MG tablet tablet, Take 5 mg by mouth At Night As  "Needed., Disp: , Rfl:   •  pantoprazole (Protonix) 40 MG EC tablet, Take 1 tablet by mouth Daily., Disp: 90 tablet, Rfl: 0  •  Pitavastatin Calcium (Livalo) 4 MG tablet, Take 1 tablet by mouth Every Night., Disp: 100 tablet, Rfl: 3  •  sildenafil (REVATIO) 20 MG tablet, Take 2-3 tabs QD prn, Disp: 30 tablet, Rfl: 5  •  TURMERIC PO, Take  by mouth., Disp: , Rfl:   •  Vitamin D-Vitamin K (VITAMIN K2-VITAMIN D3 PO), Take  by mouth Daily. Hold for surgery, Disp: , Rfl: .  He denies medication side effects.    All of the other chronic condition(s) listed above are stable w/o issues.    /76   Pulse 70   Temp 97.6 °F (36.4 °C) (Oral)   Resp 16   Ht 177.8 cm (70\")   Wt 82.6 kg (182 lb)   SpO2 98%   BMI 26.11 kg/m²     Results for orders placed or performed during the hospital encounter of 09/12/22   Comprehensive Metabolic Panel    Specimen: Blood   Result Value Ref Range    Glucose 118 (H) 65 - 99 mg/dL    BUN 23 8 - 23 mg/dL    Creatinine 1.03 0.76 - 1.27 mg/dL    Sodium 142 136 - 145 mmol/L    Potassium 4.0 3.5 - 5.2 mmol/L    Chloride 108 (H) 98 - 107 mmol/L    CO2 25.4 22.0 - 29.0 mmol/L    Calcium 10.2 8.6 - 10.5 mg/dL    Total Protein 6.4 6.0 - 8.5 g/dL    Albumin 4.00 3.50 - 5.20 g/dL    ALT (SGPT) 10 1 - 41 U/L    AST (SGOT) 15 1 - 40 U/L    Alkaline Phosphatase 96 39 - 117 U/L    Total Bilirubin 0.8 0.0 - 1.2 mg/dL    Globulin 2.4 gm/dL    A/G Ratio 1.7 g/dL    BUN/Creatinine Ratio 22.3 7.0 - 25.0    Anion Gap 8.6 5.0 - 15.0 mmol/L    eGFR 76.2 >60.0 mL/min/1.73   Troponin    Specimen: Blood   Result Value Ref Range    Troponin T <0.010 0.000 - 0.030 ng/mL   Troponin    Specimen: Blood   Result Value Ref Range    Troponin T <0.010 0.000 - 0.030 ng/mL   CBC Auto Differential    Specimen: Blood   Result Value Ref Range    WBC 5.91 3.40 - 10.80 10*3/mm3    RBC 5.09 4.14 - 5.80 10*6/mm3    Hemoglobin 16.0 13.0 - 17.7 g/dL    Hematocrit 45.0 37.5 - 51.0 %    MCV 88.4 79.0 - 97.0 fL    MCH 31.4 26.6 - 33.0 " pg    MCHC 35.6 31.5 - 35.7 g/dL    RDW 12.4 12.3 - 15.4 %    RDW-SD 40.8 37.0 - 54.0 fl    MPV 9.5 6.0 - 12.0 fL    Platelets 181 140 - 450 10*3/mm3    Neutrophil % 65.6 42.7 - 76.0 %    Lymphocyte % 21.2 19.6 - 45.3 %    Monocyte % 11.3 5.0 - 12.0 %    Eosinophil % 1.4 0.3 - 6.2 %    Basophil % 0.3 0.0 - 1.5 %    Immature Grans % 0.2 0.0 - 0.5 %    Neutrophils, Absolute 3.88 1.70 - 7.00 10*3/mm3    Lymphocytes, Absolute 1.25 0.70 - 3.10 10*3/mm3    Monocytes, Absolute 0.67 0.10 - 0.90 10*3/mm3    Eosinophils, Absolute 0.08 0.00 - 0.40 10*3/mm3    Basophils, Absolute 0.02 0.00 - 0.20 10*3/mm3    Immature Grans, Absolute 0.01 0.00 - 0.05 10*3/mm3    nRBC 0.0 0.0 - 0.2 /100 WBC   Lipase    Specimen: Blood   Result Value Ref Range    Lipase 27 13 - 60 U/L   Lipid Panel    Specimen: Blood   Result Value Ref Range    Total Cholesterol 121 0 - 200 mg/dL    Triglycerides 181 (H) 0 - 150 mg/dL    HDL Cholesterol 35 (L) 40 - 60 mg/dL    LDL Cholesterol  56 0 - 100 mg/dL    VLDL Cholesterol 30 5 - 40 mg/dL    LDL/HDL Ratio 1.42    Troponin    Specimen: Blood   Result Value Ref Range    Troponin T <0.010 0.000 - 0.030 ng/mL   Basic Metabolic Panel    Specimen: Blood   Result Value Ref Range    Glucose 127 (H) 65 - 99 mg/dL    BUN 17 8 - 23 mg/dL    Creatinine 0.82 0.76 - 1.27 mg/dL    Sodium 144 136 - 145 mmol/L    Potassium 3.3 (L) 3.5 - 5.2 mmol/L    Chloride 111 (H) 98 - 107 mmol/L    CO2 19.8 (L) 22.0 - 29.0 mmol/L    Calcium 8.3 (L) 8.6 - 10.5 mg/dL    BUN/Creatinine Ratio 20.7 7.0 - 25.0    Anion Gap 13.2 5.0 - 15.0 mmol/L    eGFR 92.2 >60.0 mL/min/1.73   CBC (No Diff)    Specimen: Blood   Result Value Ref Range    WBC 5.16 3.40 - 10.80 10*3/mm3    RBC 4.55 4.14 - 5.80 10*6/mm3    Hemoglobin 14.5 13.0 - 17.7 g/dL    Hematocrit 41.0 37.5 - 51.0 %    MCV 90.1 79.0 - 97.0 fL    MCH 31.9 26.6 - 33.0 pg    MCHC 35.4 31.5 - 35.7 g/dL    RDW 12.8 12.3 - 15.4 %    RDW-SD 42.4 37.0 - 54.0 fl    MPV 9.8 6.0 - 12.0 fL    Platelets  159 140 - 450 10*3/mm3   Magnesium    Specimen: Blood   Result Value Ref Range    Magnesium 1.7 1.6 - 2.4 mg/dL   Troponin    Specimen: Blood   Result Value Ref Range    Troponin T <0.010 0.000 - 0.030 ng/mL   ECG 12 Lead   Result Value Ref Range    QT Interval 360 ms   ECG 12 Lead   Result Value Ref Range    QT Interval 403 ms   ECG 12 Lead   Result Value Ref Range    QT Interval 420 ms   ECG 12 Lead   Result Value Ref Range    QT Interval 406 ms   Green Top (Gel)   Result Value Ref Range    Extra Tube Hold for add-ons.    Lavender Top   Result Value Ref Range    Extra Tube hold for add-on    Gold Top - SST   Result Value Ref Range    Extra Tube Hold for add-ons.              The following portions of the patient's history were reviewed and updated as appropriate: allergies, current medications, past family history, past medical history, past social history, past surgical history, and problem list.    Review of Systems   Constitutional: Positive for fatigue. Negative for activity change, chills and fever.   Respiratory: Negative for cough.    Cardiovascular: Negative for chest pain.   Psychiatric/Behavioral: Negative for dysphoric mood.       Objective   Physical Exam  Constitutional:       General: He is not in acute distress.     Appearance: He is well-developed.   Cardiovascular:      Rate and Rhythm: Normal rate and regular rhythm.   Pulmonary:      Effort: Pulmonary effort is normal.      Breath sounds: Normal breath sounds.   Neurological:      Mental Status: He is alert and oriented to person, place, and time.   Psychiatric:         Behavior: Behavior normal.         Thought Content: Thought content normal.             Diagnoses and all orders for this visit:    1. Anxiety (Primary)  -     ALPRAZolam (XANAX) 0.25 MG tablet; Take 1 tablet by mouth Daily As Needed for Anxiety.  Dispense: 30 tablet; Refill: 2    2. Coronary artery disease involving native coronary artery of native heart without angina  pectoris  -     ezetimibe (ZETIA) 10 MG tablet; Take 1 tablet by mouth Daily.  Dispense: 90 tablet; Refill: 1    3. Mixed hyperlipidemia  -     ezetimibe (ZETIA) 10 MG tablet; Take 1 tablet by mouth Daily.  Dispense: 90 tablet; Refill: 1    4. Vertigo  -     Ambulatory Referral to Physical Therapy Evaluate and treat

## 2023-02-15 ENCOUNTER — OFFICE VISIT (OUTPATIENT)
Dept: FAMILY MEDICINE CLINIC | Facility: CLINIC | Age: 78
End: 2023-02-15
Payer: MEDICARE

## 2023-02-15 VITALS
TEMPERATURE: 97.6 F | OXYGEN SATURATION: 98 % | SYSTOLIC BLOOD PRESSURE: 139 MMHG | HEART RATE: 70 BPM | RESPIRATION RATE: 16 BRPM | HEIGHT: 70 IN | DIASTOLIC BLOOD PRESSURE: 76 MMHG | BODY MASS INDEX: 26.05 KG/M2 | WEIGHT: 182 LBS

## 2023-02-15 DIAGNOSIS — R42 VERTIGO: ICD-10-CM

## 2023-02-15 DIAGNOSIS — F41.9 ANXIETY: Primary | Chronic | ICD-10-CM

## 2023-02-15 DIAGNOSIS — R53.83 OTHER FATIGUE: ICD-10-CM

## 2023-02-15 DIAGNOSIS — E78.2 MIXED HYPERLIPIDEMIA: Chronic | ICD-10-CM

## 2023-02-15 DIAGNOSIS — I25.10 CORONARY ARTERY DISEASE INVOLVING NATIVE CORONARY ARTERY OF NATIVE HEART WITHOUT ANGINA PECTORIS: Chronic | ICD-10-CM

## 2023-02-15 DIAGNOSIS — R06.83 SNORING: ICD-10-CM

## 2023-02-15 PROCEDURE — 99214 OFFICE O/P EST MOD 30 MIN: CPT | Performed by: FAMILY MEDICINE

## 2023-02-15 RX ORDER — ALPRAZOLAM 0.25 MG/1
0.25 TABLET ORAL DAILY PRN
Qty: 30 TABLET | Refills: 2 | Status: SHIPPED | OUTPATIENT
Start: 2023-02-15

## 2023-02-15 RX ORDER — EZETIMIBE 10 MG/1
10 TABLET ORAL DAILY
Qty: 90 TABLET | Refills: 1 | Status: SHIPPED | OUTPATIENT
Start: 2023-02-15

## 2023-03-01 ENCOUNTER — TREATMENT (OUTPATIENT)
Dept: PHYSICAL THERAPY | Facility: CLINIC | Age: 78
End: 2023-03-01
Payer: MEDICARE

## 2023-03-01 DIAGNOSIS — R42 VERTIGO: Primary | ICD-10-CM

## 2023-03-01 DIAGNOSIS — H81.12 BPPV (BENIGN PAROXYSMAL POSITIONAL VERTIGO), LEFT: ICD-10-CM

## 2023-03-01 PROCEDURE — 97161 PT EVAL LOW COMPLEX 20 MIN: CPT | Performed by: PHYSICAL THERAPIST

## 2023-03-01 PROCEDURE — 95992 CANALITH REPOSITIONING PROC: CPT | Performed by: PHYSICAL THERAPIST

## 2023-03-01 PROCEDURE — 97530 THERAPEUTIC ACTIVITIES: CPT | Performed by: PHYSICAL THERAPIST

## 2023-03-01 NOTE — PROGRESS NOTES
Physical Therapy Initial Evaluation and Plan of Care  [facility] Physical Therapy [place]   [address], [address]  [place] [address]  P: [phone]  F: [phone]    Patient: Td Melendez Jr.   : 1945  Diagnosis/ICD-10 Code:  Vertigo [R42]  Referring practitioner: Beau Lee MD  Date of Initial Visit: 3/1/2023  Today's Date: 3/1/2023  Patient seen for 1 session         Visit Diagnoses:    ICD-10-CM ICD-9-CM   1. Vertigo  R42 780.4   2. BPPV (benign paroxysmal positional vertigo), left  H81.12 386.11       PMHx Reviewed : 3/1/2023      Subjective Evaluation    History of Present Illness  Mechanism of injury: Pt presents to PT with a hx of vertigo for the last 2-3 yrs.  Went to urgent care with no treatment that he can remember.  He reports that his symptoms eventually got better but never went away.      I get dizziness with leaning forward and looking.  I feel that standing is tiring, it's weird, but a slight dizziness.      Pt reports a hx of allergies - Flonase PRN & Benedrayl at night if needed, Neddy Pot occasional    Hx of cervical spine issues - OA and receives radio frequency ablasion occasionally in the cervical.      Occupation:   [employer] - [employer] for [employer] 10 hrs wk  Activities: Read, house work, walk 2-3x wk, grandchildren, yard work  PLOF: Independent  Medical Hx Reviewed.      Quality of life: excellent    Social Support  Lives in: multiple-level home  Lives with: spouse             Objective       Assessment & Plan     Assessment  Impairments: activity intolerance, impaired balance and safety issue  Functional Limitations: moving in bed, standing and reaching overhead  Assessment details: Pt presents to PT with symptoms consistent with BPPV that is not super clear.  We treated the most significant side today, the (L) side.  Pt would benefit from skilled PT intervention to address the deficits noted.     S/P Rx provided pt was  escorted to a chair with CGA to a full seated position.  Pt sat for 10 mins with head stationary.      Pt not to lay down or do activities that change head level beyond 45 degrees from upright until laying down for bed for the day.      Educated the pt to BPPV symptoms, anatomy, pathology and plan to treat.  Reviewed and answered questions at the end of the session.    Prognosis: good    Goals  Plan Goals: SHORT TERM GOALS: Time for Goal: 3 weeks    1.  Pt reports that understand the information about BPPV and the treatment.  2.  Pt to understand, not to lay down the rest of the day secondary to BPPV Tx.    LONG TERM GOALS: Time for Goal: 6 weeks  1.  Pt to report absence of vertigo symptoms with all ADL's, IADL's, household, recreational and community activities, including all motions and positions.       Plan  Therapy options: will be seen for skilled therapy services  Planned therapy interventions: neuromuscular re-education and therapeutic activities  Other planned therapy interventions: Canalith repositioning, Vestibular Strengthening  Frequency: 2x week  Duration in weeks: 6  Treatment plan discussed with: patient  Plan details: If no improvement is seen with BPPV is seen, then an appropriate vestibular exercise program will be started.      Resume PT in 2 weeks when the pt gets back from visiting his dying brother [place] of state.            Manual Therapy:          mins  79319;  Therapeutic Exercise:          mins  82370;     Neuromuscular Dinorah:          mins  17360;    Therapeutic Activity:      10     mins  03556;     Gait Training:            mins  56283;     Ultrasound:           mins  07355;    Electrical Stimulation:          mins  31845 ( );  Dry Needling           mins self-pay  Traction           mins 68049  Canalith Repositioning    15     mins 85537      Timed Treatment:   10   mins   Total Treatment:     60   mins      PT: Sha Heart PT     License Number: [id number]  Electronically signed by  Sha Heart, PT, 03/01/23, 7:42 AM EST    Certification Period: 3/1/2023 thru 5/29/2023  I certify that the therapy services are furnished while this patient is under my care.  The services outlined above are required by this patient, and will be reviewed every 90 days.         Physician Signature:__________________________________________________    PHYSICIAN: Beau Lee MD  NPI: [id number]                                      DATE:      Please sign in Epic or return via fax to [email or web address] . Thank you, [facility] Physical Therapy.

## 2023-03-22 ENCOUNTER — TREATMENT (OUTPATIENT)
Dept: PHYSICAL THERAPY | Facility: CLINIC | Age: 78
End: 2023-03-22
Payer: MEDICARE

## 2023-03-22 DIAGNOSIS — M25.552 LEFT HIP PAIN: ICD-10-CM

## 2023-03-22 DIAGNOSIS — R42 VERTIGO: Primary | ICD-10-CM

## 2023-03-22 DIAGNOSIS — G89.29 CHRONIC PAIN OF LEFT KNEE: Primary | ICD-10-CM

## 2023-03-22 DIAGNOSIS — M25.562 CHRONIC PAIN OF LEFT KNEE: Primary | ICD-10-CM

## 2023-03-22 DIAGNOSIS — H81.12 BPPV (BENIGN PAROXYSMAL POSITIONAL VERTIGO), LEFT: ICD-10-CM

## 2023-03-22 PROCEDURE — 95992 CANALITH REPOSITIONING PROC: CPT | Performed by: PHYSICAL THERAPIST

## 2023-03-22 PROCEDURE — 97530 THERAPEUTIC ACTIVITIES: CPT | Performed by: PHYSICAL THERAPIST

## 2023-03-22 PROCEDURE — 97110 THERAPEUTIC EXERCISES: CPT | Performed by: PHYSICAL THERAPIST

## 2023-03-29 ENCOUNTER — TREATMENT (OUTPATIENT)
Dept: PHYSICAL THERAPY | Facility: CLINIC | Age: 78
End: 2023-03-29
Payer: MEDICARE

## 2023-03-29 DIAGNOSIS — R42 VERTIGO: Primary | ICD-10-CM

## 2023-03-29 DIAGNOSIS — H81.12 BPPV (BENIGN PAROXYSMAL POSITIONAL VERTIGO), LEFT: ICD-10-CM

## 2023-03-29 PROCEDURE — 97530 THERAPEUTIC ACTIVITIES: CPT | Performed by: PHYSICAL THERAPIST

## 2023-03-29 PROCEDURE — 95992 CANALITH REPOSITIONING PROC: CPT | Performed by: PHYSICAL THERAPIST

## 2023-03-29 NOTE — PROGRESS NOTES
Physical Therapy Daily Treatment Note  [facility] Therapy [place]   [address], [address]  [place] [address]  P: [phone]  F: [phone]    Patient: Td Melendez Jr.   : 1945  Referring practitioner: Beau Lee MD  Date of Initial Visit: Type: THERAPY  Noted: 3/1/2023  Today's Date: 3/29/2023  Patient seen for 3 sessions       Visit Diagnoses:    ICD-10-CM ICD-9-CM   1. Vertigo  R42 780.4   2. BPPV (benign paroxysmal positional vertigo), left  H81.12 386.11         Td Melendez reports: My (L) knee is much better, but my (L) hip is still hurting.  The vertigo is still present occasionally.  I did the vertigo (Epley exercise at home) and saw no difference, so I did the other side and the vertigo improved.  That was Thursday and had some symptoms on Friday. And no symptoms since then.      Subjective     Objective   See Exercise, Manual, and Modality Logs for complete treatment.   See Vestibular Section of Logs for testing and Rx.    S/P Rx provided pt was escorted to a chair with CGA to a full seated position.  Pt sat for 10 mins with head stationary.      Pt not to lay down or do activities that change head level beyond 45 degrees from upright until laying down for bed for the day.        Assessment & Plan     Assessment    Assessment details: The pt demos improved BPPV symptoms but his symptoms are present on the (L) side at this time.  His knee is doing better.  Will assess the knee and hip upon next visit.  (referral from Jesus in the chart.)      Plan  Plan details: Re-assess upon next visit for continued BPPV symptoms and treat appropriately.     Assess the knee and hip.            Timed:         Manual Therapy:         mins  10799;     Therapeutic Exercise:         mins  70290;     Neuromuscular Dinorah:        mins  70775;    Therapeutic Activity:     15     mins  00503;     Gait Training:           mins  95755;     Ultrasound:          mins  80311;    Ionto                                    mins  47365  Self Care                            mins  75847  Traction          mins 52281      Un-Timed:  Canalith Repos    15     mins 83056  Electrical Stimulation:         mins  99697 ( );  Dry Needling          mins self-pay  Traction          mins 76758        Timed Treatment:   15   mins   Total Treatment:     40   mins    Sha Heart, PT  [place] License #: [id number]    Physical Therapist

## 2023-04-05 ENCOUNTER — TREATMENT (OUTPATIENT)
Dept: PHYSICAL THERAPY | Facility: CLINIC | Age: 78
End: 2023-04-05
Payer: MEDICARE

## 2023-04-05 DIAGNOSIS — H81.12 BPPV (BENIGN PAROXYSMAL POSITIONAL VERTIGO), LEFT: ICD-10-CM

## 2023-04-05 DIAGNOSIS — R42 VERTIGO: Primary | ICD-10-CM

## 2023-04-05 DIAGNOSIS — S76.012A HIP STRAIN, LEFT, INITIAL ENCOUNTER: ICD-10-CM

## 2023-04-05 DIAGNOSIS — M25.562 ACUTE PAIN OF LEFT KNEE: ICD-10-CM

## 2023-04-05 PROCEDURE — 97110 THERAPEUTIC EXERCISES: CPT | Performed by: PHYSICAL THERAPIST

## 2023-04-05 PROCEDURE — 97530 THERAPEUTIC ACTIVITIES: CPT | Performed by: PHYSICAL THERAPIST

## 2023-04-05 NOTE — PROGRESS NOTES
Physical Therapy Initial Evaluation and Plan of Care  [facility] [place]   [address], [address]  [place] [address]  P: [phone]  F: [phone]    Patient: Td Melendez Jr.   : 1945  Diagnosis/ICD-10 Code:  Vertigo [R42]  Referring practitioner: Beau Lee MD  Date of Initial Visit: 2023  Today's Date: 2023  Patient seen for 4 session         Visit Diagnoses:    ICD-10-CM ICD-9-CM   1. Vertigo  R42 780.4   2. Hip strain, left, initial encounter  S76.012A 843.9   3. Acute pain of left knee  M25.562 719.46   4. BPPV (benign paroxysmal positional vertigo), left  H81.12 386.11       PMHx Reviewed : 2023      Subjective Evaluation    History of Present Illness  Mechanism of injury: Pt who is in PT for BPPV.      Started to have (L) hip pain in  with no known injury.  Maybe related to the way the pt sleeps.  He sleeps on an incline pillow, primarily on the (L) side.      (L) knee pain started about 6 weeks ago, with insidious onset.  The pain is focus along the medial side.      No prior injury to the (L) hip and (L) knee, but does report he has some (R) lumbar issues that have caused him to be careful to no over do it.      Pain  Pain scale: (L) Knee:  0/10, (L) Hip: 0/10.  Pain scale at highest: (L) Knee:  7/10, (L) Hip: 4/10.  Location: (L) Hip and (L) medial knee  Quality: sharp (L) Hip: ache  Relieving factors: change in position (stretching)  Aggravating factors: stairs and ambulation (L) Hip Hurts at night             Objective          Strength/Myotome Testing     Left Hip   Planes of Motion   Flexion: 4 (pain)  Abduction: 4-  External rotation: 5  Internal rotation: 4    Right Hip   Planes of Motion   Flexion: 5  Abduction: 5  External rotation: 5  Internal rotation: 5    Left Knee   Flexion: 4+  Extension: 5    Right Knee   Flexion: 5  Extension: 5          Assessment & Plan     Assessment    Assessment details: Td  returns to PT today with a known (L) knee and hip pain.  We assessed the (L) knee & hip.  He has a (L) hip strain with weakness, which I believe is leading to his (L) knee pain.  We are going to continue to work on his vertigo symptoms and now start to work on his (L) hip and knee.  We started exercises today with good tolerance.      Plan  Plan details: Progress ROM / strengthening / stabilization / functional activity as tolerated    Re-assess upon next visit for continued BPPV symptoms and treat appropriately.               Manual Therapy:          mins  33943;  Therapeutic Exercise:     25     mins  55023;     Neuromuscular Dinorah:           mins  47753;    Therapeutic Activity:      30     mins  63689;     Gait Training:            mins  23695;     Ultrasound:           mins  93985;    Electrical Stimulation:          mins  89164 ( );  Dry Needling           mins self-pay  Traction           mins 28208  Canalith Repositioning         mins 83443      Timed Treatment:   55   mins   Total Treatment:     55   mins      PT: Sha Heart PT     License Number: [id number]  Electronically signed by Sha Heart PT, 04/05/23, 10:45 AM EDT    Certification Period: 4/16/2023 thru 7/14/2023  I certify that the therapy services are furnished while this patient is under my care.  The services outlined above are required by this patient, and will be reviewed every 90 days.         Physician Signature:__________________________________________________    PHYSICIAN: Beau Lee MD  NPI: [id number]                                      DATE:      Please sign in Epic or return via fax to [email or web address] . Thank you, [facility] Physical Therapy.

## 2023-04-12 ENCOUNTER — TREATMENT (OUTPATIENT)
Dept: PHYSICAL THERAPY | Facility: CLINIC | Age: 78
End: 2023-04-12
Payer: MEDICARE

## 2023-04-12 DIAGNOSIS — R42 VERTIGO: Primary | ICD-10-CM

## 2023-04-12 DIAGNOSIS — H81.12 BPPV (BENIGN PAROXYSMAL POSITIONAL VERTIGO), LEFT: ICD-10-CM

## 2023-04-12 PROCEDURE — 97530 THERAPEUTIC ACTIVITIES: CPT | Performed by: PHYSICAL THERAPIST

## 2023-04-12 PROCEDURE — 97110 THERAPEUTIC EXERCISES: CPT | Performed by: PHYSICAL THERAPIST

## 2023-04-12 PROCEDURE — 95992 CANALITH REPOSITIONING PROC: CPT | Performed by: PHYSICAL THERAPIST

## 2023-04-12 NOTE — PROGRESS NOTES
Physical Therapy Daily Treatment Note  [facility] Therapy [place]   [address], [address]  [place] [address]  P: [phone]  F: [phone]    Patient: Td Melendez Jr.   : 1945  Referring practitioner: Beau Lee MD  Date of Initial Visit: Type: THERAPY  Noted: 3/1/2023  Today's Date: 2023  Patient seen for 5 sessions       Visit Diagnoses:    ICD-10-CM ICD-9-CM   1. Vertigo  R42 780.4   2. BPPV (benign paroxysmal positional vertigo), left  H81.12 386.11         Td Melendez reports: My (L) Knee and Hip are better, but my vertigo was really bad the last 2 days.    Subjective     Objective   See Exercise, Manual, and Modality Logs for complete treatment.     See Vestibular Section of Logs for testing and Rx.    S/P Rx provided pt was escorted to a chair with CGA to a full seated position.  Pt sat for 10 mins with head stationary.              Assessment & Plan     Assessment    Assessment details: The pt demos (I) w/ his HEP for his (L) knee and hip.  His vertigo is not specific upon testing.  Gave the BD for now, but may need to wait until we have Infrared Goggles back in the clinic.      Plan  Plan details: Progress ROM / strengthening / stabilization / functional activity as tolerated            Timed:         Manual Therapy:         mins  91428;     Therapeutic Exercise:    25     mins  68175;     Neuromuscular Dinorah:        mins  20036;    Therapeutic Activity:     15     mins  89392;     Gait Training:           mins  48334;     Ultrasound:          mins  22497;    Ionto                                   mins  14780  Self Care                            mins  75823  Traction          mins 48060      Un-Timed:  Canalith Repos    15     mins 41359  Electrical Stimulation:         mins  65754 ( );  Dry Needling          mins self-pay  Traction          mins 78104        Timed Treatment:   40   mins   Total Treatment:     65   mins    Sha Heart  PT  [place] License #: [id number]    Physical Therapist

## 2023-04-26 ENCOUNTER — TREATMENT (OUTPATIENT)
Dept: PHYSICAL THERAPY | Facility: CLINIC | Age: 78
End: 2023-04-26
Payer: MEDICARE

## 2023-04-26 DIAGNOSIS — M25.562 ACUTE PAIN OF LEFT KNEE: ICD-10-CM

## 2023-04-26 DIAGNOSIS — H81.12 BPPV (BENIGN PAROXYSMAL POSITIONAL VERTIGO), LEFT: ICD-10-CM

## 2023-04-26 DIAGNOSIS — R42 VERTIGO: Primary | ICD-10-CM

## 2023-04-26 DIAGNOSIS — S76.012A HIP STRAIN, LEFT, INITIAL ENCOUNTER: ICD-10-CM

## 2023-04-26 NOTE — PROGRESS NOTES
Physical Therapy Daily Treatment Note  [facility] Physical Therapy [place]   [address], [address]  [place] [address]  P: [phone]  F: [phone]    Patient: Td Melendez Jr.   : 1945  Referring practitioner: Beau Lee MD  Date of Initial Visit: Type: THERAPY  Noted: 3/1/2023  Today's Date: 2023  Patient seen for 6 sessions       Visit Diagnoses:    ICD-10-CM ICD-9-CM   1. Vertigo  R42 780.4   2. BPPV (benign paroxysmal positional vertigo), left  H81.12 386.11   3. Hip strain, left, initial encounter  S76.012A 843.9   4. Acute pain of left knee  M25.562 719.46         Td Melendez reports: My (L) Hip and (L) Knee is doing better.  I am overall better with my dizziness, but I still notice a slight dizziness with standing and walking around.       Subjective Evaluation    Pain  Pain scale at highest: (L) Hip: 0/10, (L) Knee: 2/10.  Aggravating factors: stairs (starting exercise)           Objective          Strength/Myotome Testing     Left Hip   Planes of Motion   Flexion: 4+  Abduction: 4+  External rotation: 5  Internal rotation: 4+    Left Knee   Flexion: 4+  Extension: 5    Right Knee   Flexion: 5  Extension: 5      See Exercise, Manual, and Modality Logs for complete treatment.       Assessment & Plan     Assessment    Assessment details: Td has improved with his (L) knee and (L) hip, but continues to have deficits present with pain, listed above.  Reviewed that the limited number of visits has slowed our ability to treatment.  His vestibular symptoms seem better but his hip is still weak.  He travels for the next several weeks and will  Not be able to attend PT so this will also limit his progression.  The pt would continue to benefit from skilled care at this time to help restore the pt to their prior level of function.     Plan  Plan details: Progress ROM / strengthening / stabilization / functional activity as tolerated            Timed:         Manual  Therapy:         mins  20416;     Therapeutic Exercise:    25     mins  22552;     Neuromuscular Dinorah:        mins  68358;    Therapeutic Activity:     15     mins  94537;     Gait Training:           mins  21480;     Ultrasound:          mins  70583;    Ionto                                   mins  30926  Self Care                            mins  01959  Traction          mins 94215      Un-Timed:  Canalith Repos         mins 21142  Electrical Stimulation:         mins  65076 ( );  Dry Needling          mins self-pay  Traction          mins 52559        Timed Treatment:   40   mins   Total Treatment:     40   mins    EDUARDO May License #: [id number]    Physical Therapist

## 2023-05-17 ENCOUNTER — TREATMENT (OUTPATIENT)
Dept: PHYSICAL THERAPY | Facility: CLINIC | Age: 78
End: 2023-05-17
Payer: MEDICARE

## 2023-05-17 DIAGNOSIS — S76.012A HIP STRAIN, LEFT, INITIAL ENCOUNTER: ICD-10-CM

## 2023-05-17 DIAGNOSIS — H81.12 BPPV (BENIGN PAROXYSMAL POSITIONAL VERTIGO), LEFT: ICD-10-CM

## 2023-05-17 DIAGNOSIS — M25.562 ACUTE PAIN OF LEFT KNEE: ICD-10-CM

## 2023-05-17 DIAGNOSIS — R42 VERTIGO: Primary | ICD-10-CM

## 2023-05-17 NOTE — PROGRESS NOTES
Physical Therapy Daily Treatment Note  [facility] Physical Therapy [place]   [address], [address]  [place] [address]  P: [phone]  F: [phone]    Patient: Td Melendez Jr.   : 1945  Referring practitioner: Beau Lee MD  Date of Initial Visit: Type: THERAPY  Noted: 3/1/2023  Today's Date: 2023  Patient seen for 7 sessions       Visit Diagnoses:    ICD-10-CM ICD-9-CM   1. Vertigo  R42 780.4   2. BPPV (benign paroxysmal positional vertigo), left  H81.12 386.11   3. Hip strain, left, initial encounter  S76.012A 843.9   4. Acute pain of left knee  M25.562 719.46         Td Melendez reports: Pt returns to PT today after about 3+ weeks off because of traveling.  I have had not issues with my (L) knee and (L) hip with performance of the HEP, they really seem to help my symptoms of pain.      I am going to be traveling again for a couple weeks and won't be able to return until mid .       Subjective     Objective   See Exercise, Manual, and Modality Logs for complete treatment.       Assessment & Plan     Assessment    Assessment details: The pt demos improved with BPPV, but definitely a presence on (B) sides.   Reviewed the HEP for the home Epley.  Reviewed with the pt that his limited visits have limited progression.       Plan  Plan details: Progress ROM / strengthening / stabilization / functional activity as tolerated            Timed:         Manual Therapy:         mins  43881;     Therapeutic Exercise:    25     mins  82053;     Neuromuscular Dinorah:        mins  21438;    Therapeutic Activity:     15     mins  51946;     Gait Training:           mins  81715;     Ultrasound:          mins  33448;    Ionto                                   mins  97083  Self Care                            mins  15372  Traction          mins 19831      Un-Timed:  Canalith Repos    15     mins 33529  Electrical Stimulation:         mins  88185 ( );  Dry Needling          mins  self-pay  Traction          mins 51924        Timed Treatment:   55   mins   Total Treatment:     65   mins    Sha Heart, PT  [place] License #: [id number]    Physical Therapist

## 2023-05-19 ENCOUNTER — OFFICE VISIT (OUTPATIENT)
Dept: SLEEP MEDICINE | Facility: HOSPITAL | Age: 78
End: 2023-05-19
Payer: MEDICARE

## 2023-05-19 VITALS
SYSTOLIC BLOOD PRESSURE: 145 MMHG | HEIGHT: 70 IN | DIASTOLIC BLOOD PRESSURE: 63 MMHG | WEIGHT: 179 LBS | BODY MASS INDEX: 25.62 KG/M2 | OXYGEN SATURATION: 97 % | HEART RATE: 73 BPM

## 2023-05-19 DIAGNOSIS — R06.83 SNORING: ICD-10-CM

## 2023-05-19 DIAGNOSIS — G47.10 HYPERSOMNIA, UNSPECIFIED: ICD-10-CM

## 2023-05-19 DIAGNOSIS — R06.81 WITNESSED EPISODE OF APNEA: Primary | ICD-10-CM

## 2023-05-19 DIAGNOSIS — R53.83 OTHER FATIGUE: ICD-10-CM

## 2023-05-19 PROCEDURE — G0463 HOSPITAL OUTPT CLINIC VISIT: HCPCS

## 2023-05-19 NOTE — PROGRESS NOTES
[facility]  [address]  [place] [address]  Phone   Fax       Td Melendez Jr.  [id number]   1945  77 y.o.  male      PCP:Beau Lee MD    Type of service: Initial New Patient Office Visit  Date of service: 5/19/2023          CHIEF COMPLAINT: Fatigue, snoring      HISTORY OF PRESENT ILLNESS:  Td Melendez Jr. 77 y.o.  presents to the clinic today as a new patient to me and was seen for sleep-related problems of snoring, non-restorative sleep, and witnessed apneas (wife told hime he stops breathing when sleeping). The symptoms are chronic and persistent in nature.   He gets 7 to 8 hours of sleep at night and takes 1-2 naps a day and is still tired during the day.  His sleep is nonrestorative, wakes up tired.  His wife was recently diagnosed with sleep apnea and started on CPAP and is feeling more energized in the morning.  Patient has no history of tonsillectomy, adenoidectomy, nasal surgery, UPPP.          PATIENT HISTORY:  Sleep schedule:  Bedtime: 10 to 11 PM  Wake time: 6 to 8 AM weekdays, 8 to 9 AM weekends  Normally takes about 10 minutes to fall asleep  Average hours of sleep: 7-8  Number of naps per day: 1-2    Symptoms:  In addition to the above, patient reports:   Have you ever awakened gasping for breath, coughing, choking: No   Change in weight:  No   Morning headaches:  No   Awaken with a sore throat or dry mouth:  Yes  Leg jerking at night:  No   Creepy crawly feeling in legs/urge to move legs: Yes   Teeth grinding: Yes, has bite guard  Have you ever awakened at night with a sour taste or burning sensation in your chest:  Yes  Do you have muscle weakness with laughing or anger:  No   Have you ever felt paralyzed while going to sleep or waking up:  No   Sleepwalking: No   Nightmares: No   Nocturia (urination at night): 2 times per night, falls back to sleep easily  Memory Problem: No     Past medical history: (Relevant to  "sleep medicine)  1. Coronary artery disease with history of stent placement  2. Anxiety  3. Arthritis      Social history:  Do you drive a commercial vehicle:  No   Shift work:  No   Tobacco use:  No  Alcohol use:  0 per week  Caffeinated drinks: 1 per day      Family history (parents, siblings, children) (relevant to sleep medicine):  1. Hypertension  2. Heart disease    Medications: reviewed     ALLERGIES: Augmentin [amoxicillin-pot clavulanate] and Clavulanic acid        REVIEW OF SYSTEMS:  Full review of systems available on the intake form which is scanned in the media tab.  The relevant positives are noted below:  1. [place] Sleepiness Scale: Total score: 9   2. Fatigue  3. Snoring        PHYSICAL EXAM:  Vitals:    05/19/23 1303   BP: 145/63   Pulse: 73   SpO2: 97%   Weight: 81.2 kg (179 lb)   Height: 177.8 cm (70\")    Body mass index is 25.68 kg/m². Neck Circumference: 16 inches  HEAD: atraumatic, normocephalic  NOSE: nasal passages are clear  THROAT: tonsils are not enlarged, Mallampati class II-III  NECK: Neck Circumference: 16 inches, trachea is in the midline  RESPIRATORY SYSTEM: Respirations even, unlabored, normal rate  CARDIOVASULAR SYSTEM: Normal rate, no edema  EXTREMITES: No cyanosis or clubbing  NEUROLOGICAL SYSTEM: Alert and oriented x 3, no gross motor defects, gait normal    Office notes from care team reviewed. Office note dated 2/15/23, reviewed.      Labs reviewed.  TSH Results:  TSH        7/21/2022    10:18   TSH   TSH 2.040                 ASSESSMENT AND PLAN:   · Suspected sleep apnea: patient's symptoms and physical examination are consistent with sleep apnea (G47.30).   I discussed the signs, symptoms, and pathophysiology of sleep apnea with this patient.  I also discussed the potential complications of untreated sleep apnea including but not limited to resistant hypertension, insulin resistance, pulmonary hypertension, atrial fibrillation, stroke, nonrestorative sleep with hypersomnia " which can increase risk for motor vehicle accidents, etc.   Different testing methods including home-based and lab based sleep studies were discussed with this patient.   Based on patient history and physical examination, the most appropriate study is home sleep study.  The order for the sleep study is placed in [facility].  The test will be scheduled after prior authorization has been obtained through patient's insurance.  Treatment and management will be discussed in more detail with this patient after the test is completed.  All questions were answered to patient's satisfaction.   · Snoring (R06.83): snoring is the sound created by turbulent airflow vibrating upper airway soft tissue.  I have also discussed factors affecting snoring including sleep deprivation, sleeping on the back and alcohol ingestion. To minimize snoring, patient is advised to have adequate sleep, sleep on their side, and avoid alcohol and sedative medications around bedtime.   · Excessive daytime sleepiness: [place] Sleepiness Scale of Total score: 9.  There are many causes of excessive daytime sleepiness including but not limited to sleep apnea, shiftwork syndrome, depression, and other medical disorders such as heart disease, kidney disease, and liver failure.  The most serious cause of excessive sleepiness is due to neurological conditions such as narcolepsy/cataplexy.  More commonly excessive sleepiness is caused by sleep apnea with frequent awakenings during sleep time.  I have discussed safety of driving and to remain vigilant while driving.  · Anxiety  · CAD    I have also discussed the following:  • Sleep hygiene: try to maintain a regular bed time and wake time, avoid watching TV/ using electronic devices in bed (including cell phones), limit caffeinated and alcoholic beverages before bed, try to maintain a cool and quiet sleep environment, avoid daytime naps  • Adequate amount of sleep: most people need around 7 to 8 hours of sleep each  night        Patient will follow-up after study, 31 to 90 days after PAP therapy initiated if applicable, or sooner for issues or concerns.  Patient's questions were answered.        Thank you for allowing me to participate in the care of this patient.    Patrica Olivarez DNP, APRN  [facility]

## 2023-06-14 ENCOUNTER — HOSPITAL ENCOUNTER (OUTPATIENT)
Dept: SLEEP MEDICINE | Facility: HOSPITAL | Age: 78
End: 2023-06-14
Payer: MEDICARE

## 2023-06-14 DIAGNOSIS — G47.10 HYPERSOMNIA, UNSPECIFIED: ICD-10-CM

## 2023-06-14 DIAGNOSIS — R53.83 OTHER FATIGUE: ICD-10-CM

## 2023-06-14 DIAGNOSIS — R06.81 WITNESSED EPISODE OF APNEA: ICD-10-CM

## 2023-06-14 DIAGNOSIS — R06.83 SNORING: ICD-10-CM

## 2023-06-14 PROCEDURE — 95806 SLEEP STUDY UNATT&RESP EFFT: CPT

## 2023-07-10 PROBLEM — R94.31 ABNORMAL EKG: Status: RESOLVED | Noted: 2021-08-27 | Resolved: 2023-07-10

## 2023-07-10 PROBLEM — R07.9 CHEST PAIN: Status: RESOLVED | Noted: 2022-09-12 | Resolved: 2023-07-10

## 2023-07-14 ENCOUNTER — TREATMENT (OUTPATIENT)
Dept: PHYSICAL THERAPY | Facility: CLINIC | Age: 78
End: 2023-07-14
Payer: MEDICARE

## 2023-07-14 DIAGNOSIS — R42 VERTIGO: Primary | ICD-10-CM

## 2023-07-14 DIAGNOSIS — S76.012A HIP STRAIN, LEFT, INITIAL ENCOUNTER: ICD-10-CM

## 2023-07-14 DIAGNOSIS — H81.12 BPPV (BENIGN PAROXYSMAL POSITIONAL VERTIGO), LEFT: ICD-10-CM

## 2023-07-21 ENCOUNTER — TREATMENT (OUTPATIENT)
Dept: PHYSICAL THERAPY | Facility: CLINIC | Age: 78
End: 2023-07-21
Payer: MEDICARE

## 2023-07-21 DIAGNOSIS — H81.12 BPPV (BENIGN PAROXYSMAL POSITIONAL VERTIGO), LEFT: ICD-10-CM

## 2023-07-21 DIAGNOSIS — S76.012A HIP STRAIN, LEFT, INITIAL ENCOUNTER: ICD-10-CM

## 2023-07-21 DIAGNOSIS — R42 VERTIGO: Primary | ICD-10-CM

## 2023-07-21 DIAGNOSIS — M25.562 ACUTE PAIN OF LEFT KNEE: ICD-10-CM

## 2023-07-21 NOTE — PROGRESS NOTES
Physical Therapy Daily Treatment Note / Discharge Note  [facility] [place]   [address], [address]  [place] [address]  P: [phone]  F: [phone]    Patient: Td Melendez Jr.   : 1945  Referring practitioner: Beau Lee MD  Date of Initial Visit: Type: THERAPY  Noted: 3/1/2023  Today's Date: 2023  Patient seen for 11 sessions       Visit Diagnoses:    ICD-10-CM ICD-9-CM   1. Vertigo  R42 780.4   2. Hip strain, left, initial encounter  S76.012A 843.9   3. BPPV (benign paroxysmal positional vertigo), left  H81.12 386.11   4. Acute pain of left knee  M25.562 719.46         Subjective:  Td Melendez reports: I am doing good with no pain in my (L) hip or my knee.  I am am having no vertigo issues now either.  I am happy with the progression of PT.        Objective          Tenderness   Left Knee   Tenderness in the MCL (proximal) and quadriceps tendon.     Strength/Myotome Testing     Left Hip   Planes of Motion   Flexion: 5  Abduction: 5  External rotation: 5  Internal rotation: 4+    Right Hip   Planes of Motion   Flexion: 5  Abduction: 5  External rotation: 5  Internal rotation: 5    Left Knee   Flexion: 5  Extension: 5    Right Knee   Flexion: 5  Extension: 5    Left Ankle/Foot   Dorsiflexion: 5    Right Ankle/Foot   Dorsiflexion: 5    See Exercise, Manual, and Modality Logs for complete treatment.       Assessment:  The pt has done well with PT with no vertigo and no reported pain on (L) knee & hip.  He does have some point tenderness along the (L) MCL but minimal.  Skilled care is no longer warranted at this time.        Plan:   DC to HEP.  Pt instructed to call with questions or issues related to their injury.              Timed:         Manual Therapy:         mins  51076;     Therapeutic Exercise:         mins  07226;     Neuromuscular Dinorah:        mins  71791;    Therapeutic Activity:     25     mins  79252;     Gait Training:           mins   15164;     Ultrasound:          mins  34565;    Ionto                                   mins  75208  Self Care                            mins  85611  Traction          mins 04086      Un-Timed:  Canalith Repos         mins 02593  Electrical Stimulation:         mins  43311 ( );  Dry Needling          mins self-pay  Traction          mins 14503        Timed Treatment:   25   mins   Total Treatment:     25   mins    Sha Heart, PT  [place] License #: [id number]    Physical Therapist

## 2023-08-06 NOTE — PROGRESS NOTES
Physical Therapy Daily Treatment Note  [facility] [place]   [address], [address]  [place] [address]  P: [phone]  F: [phone]    Patient: Td Melendez Jr.   : 1945  Referring practitioner: Beau Lee MD  Date of Initial Visit: Type: THERAPY  Noted: 3/1/2023  Today's Date: 2023  Patient seen for 10 sessions       Visit Diagnoses:    ICD-10-CM ICD-9-CM   1. Vertigo  R42 780.4   2. BPPV (benign paroxysmal positional vertigo), left  H81.12 386.11   3. Hip strain, left, initial encounter  S76.012A 843.9         Subjective:  Td Melendez reports: I am feeling better with my vertigo symptoms and my (L) knee and hip.  I am very happy with the progress.        Objective   See Exercise, Manual, and Modality Logs for complete treatment.       Assessment:  The pt is progressing well.  No real symptoms continue.        Plan:   Re-assess for DC.          Timed:         Manual Therapy:         mins  50982;     Therapeutic Exercise:    30     mins  56128;     Neuromuscular Dinorah:    15    mins  15879;    Therapeutic Activity:          mins  96908;     Gait Training:           mins  67720;     Ultrasound:          mins  13643;    Ionto                                   mins  65650  Self Care                            mins  96083  Traction          mins 11819      Un-Timed:  Canalith Repos         mins 66794  Electrical Stimulation:         mins  44187 ( );  Dry Needling          mins self-pay  Traction          mins 26723        Timed Treatment:   45   mins   Total Treatment:     45   mins    Sha Heart PT  [place] License #: [id number]    Physical Therapist

## 2023-09-19 ENCOUNTER — OFFICE (OUTPATIENT)
Dept: URBAN - METROPOLITAN AREA CLINIC 64 | Facility: CLINIC | Age: 78
End: 2023-09-19

## 2023-09-19 VITALS
DIASTOLIC BLOOD PRESSURE: 76 MMHG | WEIGHT: 185.8 LBS | HEIGHT: 70 IN | SYSTOLIC BLOOD PRESSURE: 149 MMHG | HEART RATE: 69 BPM

## 2023-09-19 DIAGNOSIS — K21.9 GASTRO-ESOPHAGEAL REFLUX DISEASE WITHOUT ESOPHAGITIS: ICD-10-CM

## 2023-09-19 DIAGNOSIS — Z79.02 LONG TERM (CURRENT) USE OF ANTITHROMBOTICS/ANTIPLATELETS: ICD-10-CM

## 2023-09-19 DIAGNOSIS — Z86.010 PERSONAL HISTORY OF COLONIC POLYPS: ICD-10-CM

## 2023-09-19 PROCEDURE — 99204 OFFICE O/P NEW MOD 45 MIN: CPT | Performed by: INTERNAL MEDICINE

## 2023-09-19 RX ORDER — PANTOPRAZOLE SODIUM 40 MG/1
40 TABLET, DELAYED RELEASE ORAL
Qty: 90 | Refills: 3 | Status: ACTIVE
Start: 2023-09-19

## 2023-10-02 NOTE — PROGRESS NOTES
Instructions reviewed with pt, verbalized understanding.  [facility]      Dina Haywood RN  Triage RN  10/02/23 09:40 EDT

## 2023-10-02 NOTE — PROGRESS NOTES
Received a request for patient to hold clopidogrel 5 days prior to EGD.    Triage, please ask him to follow the following protocol:    10 days before EGD, start low dose aspirin, 81mg daily, and continue clopidogrel    5 days before EGD, stop clopidogrel and stay on aspirin    1 day after EGD, resume clopidogrel and stay on aspirin    6 days after EGD, stop aspirin    Aspirin should be continued cornine-procedurally.

## 2023-10-30 ENCOUNTER — ON CAMPUS - OUTPATIENT (OUTPATIENT)
Dept: URBAN - METROPOLITAN AREA HOSPITAL 85 | Facility: HOSPITAL | Age: 78
End: 2023-10-30

## 2023-10-30 ENCOUNTER — ANESTHESIA EVENT (OUTPATIENT)
Dept: GASTROENTEROLOGY | Facility: HOSPITAL | Age: 78
End: 2023-10-30
Payer: MEDICARE

## 2023-10-30 ENCOUNTER — HOSPITAL ENCOUNTER (OUTPATIENT)
Facility: HOSPITAL | Age: 78
Setting detail: HOSPITAL OUTPATIENT SURGERY
Discharge: HOME | End: 2023-10-30
Attending: INTERNAL MEDICINE | Admitting: INTERNAL MEDICINE
Payer: MEDICARE

## 2023-10-30 ENCOUNTER — ANESTHESIA (OUTPATIENT)
Dept: GASTROENTEROLOGY | Facility: HOSPITAL | Age: 78
End: 2023-10-30
Payer: MEDICARE

## 2023-10-30 VITALS
RESPIRATION RATE: 12 BRPM | TEMPERATURE: 97.8 F | SYSTOLIC BLOOD PRESSURE: 122 MMHG | HEIGHT: 70 IN | OXYGEN SATURATION: 96 % | DIASTOLIC BLOOD PRESSURE: 70 MMHG | WEIGHT: 187 LBS | HEART RATE: 63 BPM | BODY MASS INDEX: 26.77 KG/M2

## 2023-10-30 DIAGNOSIS — Z87.19 HISTORY OF HIATAL HERNIA: ICD-10-CM

## 2023-10-30 DIAGNOSIS — I25.10 CORONARY ARTERY DISEASE INVOLVING NATIVE CORONARY ARTERY OF NATIVE HEART WITHOUT ANGINA PECTORIS: Chronic | ICD-10-CM

## 2023-10-30 DIAGNOSIS — K29.60 OTHER GASTRITIS WITHOUT BLEEDING: ICD-10-CM

## 2023-10-30 DIAGNOSIS — K21.9 GERD (GASTROESOPHAGEAL REFLUX DISEASE): ICD-10-CM

## 2023-10-30 DIAGNOSIS — K21.9 GASTRO-ESOPHAGEAL REFLUX DISEASE WITHOUT ESOPHAGITIS: ICD-10-CM

## 2023-10-30 DIAGNOSIS — K22.5 DIVERTICULUM OF ESOPHAGUS, ACQUIRED: ICD-10-CM

## 2023-10-30 PROCEDURE — 88305 TISSUE EXAM BY PATHOLOGIST: CPT | Performed by: INTERNAL MEDICINE

## 2023-10-30 PROCEDURE — 25810000003 SODIUM CHLORIDE 0.9 % SOLUTION: Performed by: INTERNAL MEDICINE

## 2023-10-30 PROCEDURE — 43239 EGD BIOPSY SINGLE/MULTIPLE: CPT | Performed by: INTERNAL MEDICINE

## 2023-10-30 PROCEDURE — 88342 IMHCHEM/IMCYTCHM 1ST ANTB: CPT | Performed by: INTERNAL MEDICINE

## 2023-10-30 PROCEDURE — 25010000002 PROPOFOL 200 MG/20ML EMULSION: Performed by: ANESTHESIOLOGIST ASSISTANT

## 2023-10-30 RX ORDER — SODIUM CHLORIDE 9 MG/ML
30 INJECTION, SOLUTION INTRAVENOUS CONTINUOUS PRN
Status: DISCONTINUED | OUTPATIENT
Start: 2023-10-30 | End: 2023-10-30 | Stop reason: HOSPADM

## 2023-10-30 RX ORDER — LIDOCAINE HYDROCHLORIDE 10 MG/ML
INJECTION, SOLUTION EPIDURAL; INFILTRATION; INTRACAUDAL; PERINEURAL AS NEEDED
Status: DISCONTINUED | OUTPATIENT
Start: 2023-10-30 | End: 2023-10-30 | Stop reason: SURG

## 2023-10-30 RX ORDER — ONDANSETRON 2 MG/ML
4 INJECTION INTRAMUSCULAR; INTRAVENOUS ONCE AS NEEDED
Status: DISCONTINUED | OUTPATIENT
Start: 2023-10-30 | End: 2023-10-30 | Stop reason: HOSPADM

## 2023-10-30 RX ORDER — PROPOFOL 10 MG/ML
INJECTION, EMULSION INTRAVENOUS AS NEEDED
Status: DISCONTINUED | OUTPATIENT
Start: 2023-10-30 | End: 2023-10-30 | Stop reason: SURG

## 2023-10-30 RX ORDER — SODIUM CHLORIDE 9 MG/ML
50 INJECTION, SOLUTION INTRAVENOUS CONTINUOUS
Status: DISCONTINUED | OUTPATIENT
Start: 2023-10-30 | End: 2023-10-30 | Stop reason: HOSPADM

## 2023-10-30 RX ADMIN — LIDOCAINE HYDROCHLORIDE 50 MG: 10 INJECTION, SOLUTION EPIDURAL; INFILTRATION; INTRACAUDAL; PERINEURAL at 10:29

## 2023-10-30 RX ADMIN — SODIUM CHLORIDE 50 ML/HR: 9 INJECTION, SOLUTION INTRAVENOUS at 09:33

## 2023-10-30 RX ADMIN — PROPOFOL 80 MG: 10 INJECTION, EMULSION INTRAVENOUS at 10:29

## 2023-10-30 NOTE — OP NOTE
ESOPHAGOGASTRODUODENOSCOPY Procedure Report    Patient Name:  Td Melendez Jr.  YOB: 1945    Date of Surgery:  10/30/2023     Pre-Op Diagnosis:  GERD (gastroesophageal reflux disease) [K21.9]  History of hiatal hernia [Z87.19]       Post Op Diagnosis:  Small esophageal diverticulum, status post TIF, nonerosive gastritis      Procedure/CPT® Codes:      Procedure(s):  ESOPHAGOGASTRODUODENOSCOPY with biopsies x 1 area.    Staff:  Surgeon(s):  Shell Suero MD         Anesthesia: Monitored Anesthesia Care    Implants:    Nothing was implanted during the procedure    Specimen:        See Below    No blood loss    Complications:  None     Description of Procedure:  Informed consent was obtained for the procedure, including sedation.  Risks of perforation, hemorrhage, adverse drug reaction and aspiration were discussed.  The patient was brought into the endoscopy suite. Continuous cardiopulmonary monitoring was performed. The patient was placed in the left lateral decubitus position.  The bite block was inserted into the patient's mouth. After adequate sedation was attained, the Olympus gastroscope was inserted into the patient's mouth and advanced to the second portion of the duodenum without difficulty.  Circumferential examination was performed. A retroflex exam was performed in the patient's stomach.  On completion of the exam, the bowel was decompressed, the scope was removed from the patient, the patient tolerated the procedure well, there were no immediate post-operative complications.     Examination of the esophagus showed normal-appearing esophageal mucosa.  There was a small diverticulum 2 to 3 cm proximal to the GE junction.  Examination of the stomach showed nonerosive gastritis.  Biopsies were obtained from antrum and body and sent for pathology.  Retroflex examination of the stomach showed postsurgical changes.  Examination of the duodenum showed normal  mucosa      Impression:  Small esophageal diverticulum, status post TIF, nonerosive gastritis    Recommendations:  Follow-up histopathology  Educated to continue once daily pantoprazole.  Would consider changing to esomeprazole based on patient preference.      Shell Suero MD     Date: 10/30/2023  Time: 10:38 EDT

## 2023-10-30 NOTE — ANESTHESIA PREPROCEDURE EVALUATION
Anesthesia Evaluation     Patient summary reviewed and Nursing notes reviewed   NPO Solid Status: > 8 hours  NPO Liquid Status: > 8 hours           Airway   Mallampati: II  TM distance: >3 FB  Neck ROM: full  No difficulty expected  Dental - normal exam     Pulmonary - normal exam   Cardiovascular - normal exam    (+) CAD, hyperlipidemia      Neuro/Psych  GI/Hepatic/Renal/Endo    (+) GERD    Musculoskeletal     Abdominal  - normal exam    Bowel sounds: normal.   Substance History      OB/GYN          Other        ROS/Med Hx Other:   July 2023-negative stress test  Left ventricular ejection fraction is normal (Calculated EF = 70%).  ·  Myocardial perfusion imaging indicates a normal myocardial perfusion study with no evidence of ischemia.  ·  Diaphragmatic attenuation artifact is present.  ·  Low risk for ischemic heart disease.                   Anesthesia Plan    ASA 3     MAC and general     intravenous induction     Anesthetic plan, risks, benefits, and alternatives have been provided, discussed and informed consent has been obtained with: patient.    Plan discussed with CAA and CRNA.    CODE STATUS:

## 2023-10-30 NOTE — DISCHARGE INSTRUCTIONS
A responsible adult should stay with you and you should rest quietly for the rest of the day.    Do not drink alcohol, drive, operate any heavy machinery or power tools or make any legal/important decisions for the next 24 hours.     Progress your diet as tolerated.  If you begin to experience severe pain, increased shortness of breath, racing heartbeat or a fever above 101 F, seek immediate medical attention.     Follow up with MD as instructed. Call office for results in 3 to 5 days if needed.     Dr Suero [phone]    mpression:  Small esophageal diverticulum, status post TIF, nonerosive gastritis     Recommendations:  Follow-up histopathology  Educated to continue once daily pantoprazole.  Would consider changing to esomeprazole based on patient preference.

## 2023-10-30 NOTE — ANESTHESIA POSTPROCEDURE EVALUATION
Patient: Td Melendez Jr.    Procedure Summary       Date: 10/30/23 Room / Location: [facility] ENDOSCOPY 1 / [facility] ENDOSCOPY    Anesthesia Start: 1021 Anesthesia Stop: 1039    Procedure: ESOPHAGOGASTRODUODENOSCOPY with biopsies x 1 area. Diagnosis:       GERD (gastroesophageal reflux disease)      History of hiatal hernia      (GERD (gastroesophageal reflux disease) [K21.9])      (History of hiatal hernia [Z87.19])    Surgeons: Shell Suero MD Provider: Varinder Abreu MD    Anesthesia Type: MAC, general ASA Status: 3            Anesthesia Type: MAC, general    Vitals  Vitals Value Taken Time   /70 10/30/23 1050   Temp     Pulse 62 10/30/23 1052   Resp 12 10/30/23 1050   SpO2 97 % 10/30/23 1052   Vitals shown include unfiled device data.        Post Anesthesia Care and Evaluation    Patient location during evaluation: PACU  Patient participation: complete - patient participated  Level of consciousness: awake  Pain scale: See nurse's notes for pain score.  Pain management: adequate    Airway patency: patent  Anesthetic complications: No anesthetic complications  PONV Status: none  Cardiovascular status: acceptable  Respiratory status: acceptable and spontaneous ventilation  Hydration status: acceptable    Comments: Patient seen and examined postoperatively; vital signs stable; SpO2 greater than or equal to 90%; cardiopulmonary status stable; nausea/vomiting adequately controlled; pain adequately controlled; no apparent anesthesia complications; patient discharged from anesthesia care when discharge criteria were met

## 2023-10-30 NOTE — H&P
" LOS: 0 days   Patient Care Team:  Beau Lee MD as PCP - General (Family Medicine)  Obi Boucher MD as Consulting Physician (Cardiology)  Venkat Salas MD (Pain Medicine)  Pedrito Neal Jr., MD as Consulting Physician (Urology)  Ramón Franco MD as Consulting Physician (General Surgery)      Subjective     Interval History:     Subjective: Recurrent GERD, abnormal esophagram      ROS:   No chest pain, shortness of breath, or cough.        Medication Review:     Current Facility-Administered Medications:     sodium chloride 0.9 % infusion, 50 mL/hr, Intravenous, Continuous, Shell Suero MD, Last Rate: 50 mL/hr at 10/30/23 1021, Currently Infusing at 10/30/23 1021      Objective     Vital Signs  Vitals:    10/19/23 1340 10/30/23 0926   Pulse:  66   Resp:  9   Temp:  97.8 °F (36.6 °C)   TempSrc:  Oral   SpO2:  96%   Weight: 81.6 kg (180 lb) 84.8 kg (187 lb)   Height: 177.8 cm (70\") 177.8 cm (70\")       Physical Exam:    General Appearance:    Awake and alert, in no acute distress   Head:    Normocephalic, without obvious abnormality   Eyes:          Conjunctivae normal, anicteric sclera   Throat:   No oral lesions, no thrush, oral mucosa moist   Neck:   No adenopathy, supple, no JVD   Lungs:     respirations regular, even and unlabored   Abdomen:     Soft, non-tender, no rebound or guarding, non-distended, no hepatosplenomegaly   Rectal:     Deferred   Extremities:   No edema, no cyanosis   Skin:   No bruising or rash, no jaundice        Results Review:    Lab Results (last 24 hours)       ** No results found for the last 24 hours. **            Imaging Results (Last 24 Hours)       ** No results found for the last 24 hours. **              Assessment & Plan   Proceed with scope and MAC anesthesia        Shell Suero MD  10/30/23  10:23 EDT  "

## 2023-10-31 LAB
LAB AP CASE REPORT: NORMAL
LAB AP CLINICAL INFORMATION: NORMAL
PATH REPORT.FINAL DX SPEC: NORMAL
PATH REPORT.GROSS SPEC: NORMAL

## 2023-12-13 DIAGNOSIS — E78.2 MIXED HYPERLIPIDEMIA: Chronic | ICD-10-CM

## 2023-12-13 DIAGNOSIS — I25.10 CORONARY ARTERY DISEASE INVOLVING NATIVE CORONARY ARTERY OF NATIVE HEART WITHOUT ANGINA PECTORIS: Chronic | ICD-10-CM

## 2023-12-13 DIAGNOSIS — F41.9 ANXIETY: Chronic | ICD-10-CM

## 2023-12-13 RX ORDER — SILDENAFIL CITRATE 20 MG/1
TABLET ORAL
Qty: 30 TABLET | Refills: 11 | Status: SHIPPED | OUTPATIENT
Start: 2023-12-13

## 2023-12-13 RX ORDER — PITAVASTATIN CALCIUM 4.18 MG/1
4 TABLET, FILM COATED ORAL NIGHTLY
Qty: 100 TABLET | Refills: 3 | Status: SHIPPED | OUTPATIENT
Start: 2023-12-13

## 2023-12-13 RX ORDER — ALPRAZOLAM 0.25 MG/1
0.25 TABLET ORAL DAILY PRN
Qty: 30 TABLET | Refills: 2 | OUTPATIENT
Start: 2023-12-13

## 2023-12-18 DIAGNOSIS — I25.10 CORONARY ARTERY DISEASE INVOLVING NATIVE CORONARY ARTERY OF NATIVE HEART WITHOUT ANGINA PECTORIS: Chronic | ICD-10-CM

## 2023-12-18 RX ORDER — CLOPIDOGREL BISULFATE 75 MG/1
75 TABLET ORAL DAILY
Qty: 90 TABLET | Refills: 0 | OUTPATIENT
Start: 2023-12-18

## 2024-01-05 ENCOUNTER — OFFICE (OUTPATIENT)
Dept: URBAN - METROPOLITAN AREA CLINIC 64 | Facility: CLINIC | Age: 79
End: 2024-01-05

## 2024-01-05 VITALS
WEIGHT: 188 LBS | DIASTOLIC BLOOD PRESSURE: 75 MMHG | HEART RATE: 66 BPM | HEIGHT: 70 IN | SYSTOLIC BLOOD PRESSURE: 146 MMHG

## 2024-01-05 DIAGNOSIS — Z79.02 LONG TERM (CURRENT) USE OF ANTITHROMBOTICS/ANTIPLATELETS: ICD-10-CM

## 2024-01-05 DIAGNOSIS — K21.9 GASTRO-ESOPHAGEAL REFLUX DISEASE WITHOUT ESOPHAGITIS: ICD-10-CM

## 2024-01-05 DIAGNOSIS — Z86.010 PERSONAL HISTORY OF COLONIC POLYPS: ICD-10-CM

## 2024-01-05 PROCEDURE — 99213 OFFICE O/P EST LOW 20 MIN: CPT | Performed by: INTERNAL MEDICINE

## 2024-01-31 ENCOUNTER — TELEPHONE (OUTPATIENT)
Dept: CARDIOLOGY | Facility: CLINIC | Age: 79
End: 2024-01-31
Payer: MEDICARE

## 2024-01-31 ENCOUNTER — PREP FOR SURGERY (OUTPATIENT)
Dept: CARDIOLOGY | Facility: CLINIC | Age: 79
End: 2024-01-31
Payer: MEDICARE

## 2024-01-31 NOTE — TELEPHONE ENCOUNTER
Received a request for patient to hold clopidogrel 5 days prior to EGD. EGD itself is a low risk procedure.      Triage, please ask him to follow the following protocol, and please send this note to Dr Shell Garvey MD. Fax [phone].     10 days before EGD, start low dose aspirin, 81mg daily, and continue clopidogrel     5 days before EGD, stop clopidogrel and stay on aspirin     1 day after EGD, resume clopidogrel and stay on aspirin     6 days after EGD, stop aspirin     Aspirin should be continued corinne-procedurally.       Thanks,  Obi Boucher MD

## 2024-01-31 NOTE — TELEPHONE ENCOUNTER
Permission for the hub to transfer this call directly to the nurse triage line at [facility].    Attempted to call Td Melendez Jr., no answer.  Left a voicemail for patient to call back and ask to speak with the triage nurses.  Will continue to try to reach patient.    Tatiana De La Vega RN  [facility] Triage  01/31/24 11:58 EST

## 2024-01-31 NOTE — TELEPHONE ENCOUNTER
Td Melendez Jr. Returned call.  Reviewed medication instructions with patient and he verbalized understanding of medication instructions.  Patient requested copy of instructions be sent via [employer] message as he is currently driving, which has been done.    Faxed copy of note to Dr. Garvey's office as requested.    Thank you,  Maria Luisa GERMAN RN  Triage Nurse [facility]   15:29 EST

## 2024-01-31 NOTE — LETTER
January 31, 2024       Obi Boucher MD  01/31/24 1137  Signed  Received a request for patient to hold clopidogrel 5 days prior to EGD. EGD itself is a low risk procedure.      Triage, please ask him to follow the following protocol, and please send this note to Dr Shell Garvey MD. Fax [phone].     10 days before EGD, start low dose aspirin, 81mg daily, and continue clopidogrel     5 days before EGD, stop clopidogrel and stay on aspirin     1 day after EGD, resume clopidogrel and stay on aspirin     6 days after EGD, stop aspirin     Aspirin should be continued corinne-procedurally.       Thanks,  Obi Boucher MD

## 2024-01-31 NOTE — TELEPHONE ENCOUNTER
Permission for the hub to transfer this call directly to the nurse triage line at [facility].    Attempted to call Td Melendez Jr., no answer.  Left a voicemail for patient to call back and ask to speak with the triage nurses.  Will continue to try to reach patient.    Tatiana De La Vega RN  [facility] Triage  01/31/24 14:42 EST

## 2024-01-31 NOTE — H&P
Received a request for patient to hold clopidogrel 5 days prior to EGD. EGD itself is a low risk procedure.      Triage, please ask him to follow the following protocol, and please send this note to Dr Shell Garvey MD. Fax [phone].     10 days before EGD, start low dose aspirin, 81mg daily, and continue clopidogrel     5 days before EGD, stop clopidogrel and stay on aspirin     1 day after EGD, resume clopidogrel and stay on aspirin     6 days after EGD, stop aspirin     Aspirin should be continued corinne-procedurally.

## 2024-02-01 NOTE — TELEPHONE ENCOUNTER
Fax call report shows successful transmission.    Thank you,  Maria Luisa GERMAN RN  Triage Nurse [facility]   09:06 EST

## 2024-02-29 ENCOUNTER — OFFICE (OUTPATIENT)
Dept: URBAN - METROPOLITAN AREA CLINIC 64 | Facility: CLINIC | Age: 79
End: 2024-02-29
Payer: MEDICARE

## 2024-02-29 DIAGNOSIS — K21.9 GASTRO-ESOPHAGEAL REFLUX DISEASE WITHOUT ESOPHAGITIS: ICD-10-CM

## 2024-02-29 PROCEDURE — 99426 PRIN CARE MGMT STAFF 1ST 30: CPT | Performed by: INTERNAL MEDICINE

## 2024-03-31 ENCOUNTER — OFFICE (OUTPATIENT)
Dept: URBAN - METROPOLITAN AREA CLINIC 64 | Facility: CLINIC | Age: 79
End: 2024-03-31
Payer: MEDICARE

## 2024-03-31 DIAGNOSIS — K21.9 GASTRO-ESOPHAGEAL REFLUX DISEASE WITHOUT ESOPHAGITIS: ICD-10-CM

## 2024-03-31 PROCEDURE — 99426 PRIN CARE MGMT STAFF 1ST 30: CPT | Performed by: INTERNAL MEDICINE

## 2024-04-15 ENCOUNTER — TELEPHONE (OUTPATIENT)
Dept: FAMILY MEDICINE CLINIC | Facility: CLINIC | Age: 79
End: 2024-04-15
Payer: MEDICARE

## 2024-04-15 DIAGNOSIS — E78.2 MIXED HYPERLIPIDEMIA: Primary | Chronic | ICD-10-CM

## 2024-04-15 DIAGNOSIS — I25.10 CORONARY ARTERY DISEASE INVOLVING NATIVE CORONARY ARTERY OF NATIVE HEART WITHOUT ANGINA PECTORIS: Chronic | ICD-10-CM

## 2024-04-15 NOTE — TELEPHONE ENCOUNTER
Caller: Td Melendez Jr.    Relationship: Self    Best call back number:     [phone] (Mobile)       What orders are you requesting (i.e. lab or imaging): BLOOD WORK     In what timeframe would the patient need to come in: 07/15/24    Where will you receive your lab/imaging services: IN OFFICE     Additional notes: PLEASE CALL TO SCHEDULE.

## 2024-04-16 ENCOUNTER — TELEPHONE (OUTPATIENT)
Dept: FAMILY MEDICINE CLINIC | Facility: CLINIC | Age: 79
End: 2024-04-16
Payer: MEDICARE

## 2024-04-16 NOTE — TELEPHONE ENCOUNTER
HUB TO RELAY   Left detail message stating that lab orders have been place and appt for scheduled 7/15/2024 @845 per patient request.

## 2024-05-22 ENCOUNTER — HOSPITAL ENCOUNTER (OUTPATIENT)
Facility: HOSPITAL | Age: 79
Discharge: HOME | End: 2024-05-22
Attending: EMERGENCY MEDICINE | Admitting: EMERGENCY MEDICINE
Payer: MEDICARE

## 2024-05-22 VITALS
HEART RATE: 71 BPM | DIASTOLIC BLOOD PRESSURE: 71 MMHG | SYSTOLIC BLOOD PRESSURE: 143 MMHG | OXYGEN SATURATION: 97 % | WEIGHT: 180 LBS | TEMPERATURE: 98.2 F | RESPIRATION RATE: 16 BRPM | HEIGHT: 70 IN | BODY MASS INDEX: 25.77 KG/M2

## 2024-05-22 DIAGNOSIS — J02.9 PHARYNGITIS, UNSPECIFIED ETIOLOGY: Primary | ICD-10-CM

## 2024-05-22 LAB — STREP A PCR: NOT DETECTED

## 2024-05-22 PROCEDURE — G0463 HOSPITAL OUTPT CLINIC VISIT: HCPCS | Performed by: PHYSICIAN ASSISTANT

## 2024-05-22 PROCEDURE — 87651 STREP A DNA AMP PROBE: CPT | Performed by: EMERGENCY MEDICINE

## 2024-05-22 RX ORDER — PREDNISONE 20 MG/1
TABLET ORAL
Qty: 9 TABLET | Refills: 0 | Status: SHIPPED | OUTPATIENT
Start: 2024-05-22 | End: 2024-05-28

## 2024-05-22 RX ORDER — CEFDINIR 300 MG/1
300 CAPSULE ORAL 2 TIMES DAILY
Qty: 20 CAPSULE | Refills: 0 | Status: SHIPPED | OUTPATIENT
Start: 2024-05-22 | End: 2024-06-01

## 2024-05-22 NOTE — PROGRESS NOTES
Student's Name:   Madi Bowden Birth Date  2016  Sex  male  Race/Ethnicity  Black/ School/Grade Level/ID#     [address]   [address] [facility] [address]    ________________________________          _________________          ___________________  Parent/Guardian                                               Telephone# Home:             Work:   HEALTH HISTORY: MUST BE COMPLETED AND SIGNED BY PARENT/GUARDIAN AND VERIFIED BY HEALTH CARE PROVIDER  ALLERGIES: (Food, drug, insect, other) has No Known Allergies.   MEDICATION: (List all prescribed or taken on a regular basis.) has a current medication list which includes the following prescription(s): fluticasone (Flonase Sensimist) 27.5 MCG/SPRAY nasal spray and cetirizine (ZyrTEC) 5 MG/5ML solution.   Diagnosis of asthma?   [] Yes   [] No  Loss of function of one of paired  organs?(eye/ear/kidney/testicle) [] Yes   [] No    Child wakes during night coughing? [] Yes   [] No  Hospitalizations? [] Yes   [] No    Birth defects? [] Yes   [] No       When? What for?     Developmental delay? [] Yes   [] No  Surgery? (List all.)  [] Yes   [] No    Blood disorders? Hemophilia,  Sickle Cell, Other? Explain. [] Yes   [] No       When? What for?     Diabetes? [] Yes   [] No  Serious injury or illness? [] Yes   [] No    Head injury/Concussion/Passed out? [] Yes   [] No  TB skin test positive (past/present)? * [] Yes   [] No *If yes, refer to local [facility] dept   Seizures? What are they like?  [] Yes   [] No  TB disease (past or present)? * [] Yes   [] No *If yes, refer to local [facility] dept   Heart problem/Shortness of breath?  [] Yes   [] No  Tobacco use (type, frequency)?  [] Yes   [] No    Heart murmur/High blood pressure? [] Yes   [] No  Alcohol/Drug use?  [] Yes   [] No    Dizziness or chest pain with exercise? [] Yes   [] No  Family history of sudden death  before age 50? (Cause?) [] Yes   [] No    Eye/Vision problems? _____           [] Glasses     Physical Therapy Daily Treatment Note  [facility]   [address], [address]  [place] [address]  P: [phone]  F: [phone]    Patient: Td Melendez Jr.   : 1945  Referring practitioner: Beau Lee MD  Date of Initial Visit: Type: THERAPY  Noted: 3/1/2023  Today's Date: 3/22/2023  Patient seen for 2 sessions       Visit Diagnoses:    ICD-10-CM ICD-9-CM   1. Vertigo  R42 780.4   2. BPPV (benign paroxysmal positional vertigo), left  H81.12 386.11         Td Melendez reports: My vertigo is pretty good.  I have been traveling a lot the last several weeks and did not notice it most.  He returned home Friday night and I have started to notice more symptoms with doing house work again, like bending over and looking up.      I also wanted to speak about my (L) hip and (L) knee pain.  The (L) hip has been bothering me in the last 2 months and the (L) knee within the last week or 2.  Can we treat that?     Subjective     Objective          Strength/Myotome Testing     Left Hip   Planes of Motion   Flexion: 4 (pain)    Right Hip   Planes of Motion   Flexion: 5    Left Knee   Flexion: 4+  Extension: 5    Right Knee   Flexion: 5  Extension: 5      See Exercise, Manual, and Modality Logs for complete treatment.     See Vestibular Section of Logs for testing and Rx.    S/P Rx provided pt was escorted to a chair with CGA to a full seated position.  Pt sat for 10 mins with head stationary.      Pt not to lay down or do activities that change head level beyond 45 degrees from upright until laying down for bed for the day.        Assessment & Plan     Assessment    Assessment details: Pt dianne Tx well with the BPPV. Gave the (R) epley for HEP.      Did a brief evaluation of the (L) LE and gave some conservative exercises.  Pt reported some relief of (L) knee pain sp exercises.  Let Dr. Lee, his PCP know his situation and requested a script through in basket message.        [] Contacts  Last exam by eye doctor ______  Other concerns? (crossed eye, drooping lids, squinting, difficulty reading) []  Dental    []  Braces    [] Bridge    []  Plate    []  Other    Additional information:   Ear/Hearing problems? [] Yes  [] No  Information may be shared with appropriate personnel for health and educational purposes.   Bone/Joint problem/injury/scoliosis? [] Yes  [] No  Parent/Guardian  Signatures:                                                                         Date   IMMUNIZATIONS: To be completed by health care provider. The mo/da/yr for every dose administered is required. If a specific vaccine is medically contraindicated, a separate written statement must be attached by the health care responsible for completing the health examination explaining the medical reason for the contraindication.   REQUIRED Vaccine/Dose  VACCINE/DOSE DATE DATE DATE DATE DATE   DTP or DTaP 2018 2018 2018 7/5/2019 5/19/2022   Tdap        Polio (IPV) 2018 2018 2018 7/5/2019 5/19/2022   Hib 2018 2018 2018 7/5/2019    Pneumococcal Conjugate 2018 2018 2018 7/5/2019    Hep B 2018 2018 2018 2018    Hep A/Hep B        MMR 4/18/2019 5/19/2022      Measles        Mumps        Rubella        Varicella 4/18/2019 5/19/2022      Meningococcal conjugate (MCV4)         RECOMMENDED, BUT NOT REQUIRED Vaccine/Dose  VACCINE/DOSE DATE DATE DATE DATE   Hepatitis A 4/18/2019 2/7/2020     HPV       Influenza 10/10/2019 10/9/2020 10/8/2021 11/20/2023       Health care provider (MD, DO, APN, PA, school health professional, health official) verifying above immunization history must sign below. If adding dates to the above immunization history section, put your initials by date(s) and sign here.)  Signature: ___   Stefano Escalera M.D. 5/22/2024__________  Title  ______________________________    Date ________________   Printed by Authority of the State of      Plan  Plan details: Re-assess upon next visit for continued BPPV symptoms and treat appropriately.             Timed:         Manual Therapy:         mins  97859;     Therapeutic Exercise:    10     mins  21714;     Neuromuscular Dinorah:        mins  46972;    Therapeutic Activity:     15     mins  88889;     Gait Training:           mins  63696;     Ultrasound:          mins  98789;    Ionto                                   mins  49150  Self Care                            mins  21780  Traction          mins 12710      Un-Timed:  Canalith Repos    15     mins 44646  Electrical Stimulation:         mins  45712 ( );  Dry Needling          mins self-pay  Traction          mins 56820        Timed Treatment:   25   mins   Total Treatment:     50   mins    [facility]  [place] License #: [id number]    Physical Therapist       [place] (COMPLETE BOTH SIDES)          12/23                [facility]          Approved [facility] 5/2024      Certificates of [place] Exemption to Immunizations or Physician Medical Statements of Medical Contraindication Are Reviewed and Maintained by the School Authority.  ALTERNATIVE PROOF OF IMMUNITY   1. Clinical diagnosis (measles, mumps, hepatitis B) is allowed when verified by physician and supported with lab confirmation.  Attach copy of lab result.    * MEASLES (Rubeola)  [place]   DA  YR          **MUMPS  [place]   DA  YR             HEPATITIS B  MO   DA   YR           VARICELLA  [place]   DA  YR      2. History of varicella (chickenpox) disease is acceptable if verified by health care provider, school health professional or health official. Person signing below verifies that the parent/guardian’s description of varicella disease history is indicative of past infection and is accepting such history as documentation of disease.  Date of Disease                                  Signature                                                           Title   3. Laboratory Evidence of Immunity (check one)      [] Measles*     [] Mumps**     []Rubella     [] Varicella   (Attach copy of lab result)         *All measles cases diagnosed on or after July 1, 2002, must be confirmed by laboratory evidence.      **All mumps cases diagnosed on or after July 1, 2013, must be confirmed by laboratory evidence.   Physician Statements of Immunity MUST be submitted to IDPH for review.  Completion of Alternative 1 or 3 MUST be accompanied by Labs & Physician Signature: ___________________________   PHYSICAL EXAMINATION REQUIREMENTS   Entire section below to be completed by MD/DO/APN/PA   Head Circumference if <2-3 years old - /74   Pulse 95   Temp 98.5 °F (36.9 °C) (Temporal)   Resp 22   Ht 4' 1.61\" (1.26 m)   Wt 27.3 kg (60 lb 1.2 oz)   BMI 17.16 kg/m²   BSA 0.98 m²    86.3 %ile (Z= 1.09) based on CDC (Boys, 2-20 Years)  BMI-for-age based on BMI available as of 5/22/2024.   DIABETES SCREENING (NOT REQUIRED FOR ) BMI>85% age/sex: Yes     And any two of the following: Family History: No  Ethnic Minority: Yes  Signs of Insulin Resistance (hypertension, dyslipidemia, polycystic ovarian syndrome, acanthosis nigricans): No  At Risk: No   LEAD RISK QUESTIONNAIRE Required for children age 6 months through 6 years enrolled in licensed or public school operated day care, , nursery school and/or . (Blood test required if resides in [place] or high risk zip [facility].)  Questionnaire Administered? NA  Blood Test Indicated? NA   Blood Test Date/Result: 3.3 (5/19/2022).   TB SKIN OR BLOOD TEST Recommended only for children in high-risk groups including children immunosuppressed due to HIV infection or other conditions, frequent travel to or born in high prevalence countries or those exposed to adults in high-risk categories. See CDC guidelines. http://www.cdc.gov/tb/publications/factsheets/testing/TB_testing.htm.  Test Needed: No     Test performed: No                          Skin Test:    Date Read              /      /             Result:   Positive__      Negative__        mm________                          Blood Test: Date Reported       /      /               Result:  Positive__      Negative__      Value________  LAB TESTS (recommended) Date/Result SCREENINGS Date/Results   Hemoglobin or Hematocrit 12.2 (5/19/2022) Developmental Screening Tool N/A     Urinalysis NA Social and Emotional Screening    Sickle Cell (when indicated)  Other:         SYSTEM REVIEW Normal  Comments/Follow-up/Needs  Normal Comments/Follow-up/Needs   Skin  Yes  Endocrine Yes    Ears Yes                  Screening Result Gastrointestinal Yes    Eyes Yes                  Screening Result: Genito-Urinary Yes                                      LMP:    Nose Yes  Neurologic Yes    Throat Yes  Musculoskeletal Yes    Mouth/Dental Yes  Spinal Exam  Yes    Cardiovascular/HTN Yes  Nutritional status Yes    Respiratory Yes Diagnosis of Asthma: No   Mental Health Yes    Currently Prescribed Asthma Medication:        No  Quick-relief medication (e.g. Short Acting Beta Antagonist)        No  Controller medication (e.g. inhaled corticosteroid) Other     NEEDS/MODIFICATIONS required in the school setting: No restrictions DIETARY Needs/Restrictions: No restrictions   SPECIAL INSTRUCTIONS/DEVICES e.g. safety glasses, glass eye, chest protector for arrhythmia, pacemaker, prosthetic device, dental bridge, false teeth, athletic support/cup: No restrictions   MENTAL HEALTH/OTHER Is there anything else the school should know about this student?  If you would like to discuss this student’s health with school or school health personnel:  Not needed   EMERGENCY ACTION needed while at school due to child’s health condition (e.g. ,seizures, asthma, insect sting, food, peanut allergy, bleeding problem, diabetes, heart problem)?   No   On the basis of the examination on this day, I approve this child’s participation in                                (If No or Modified please attach explanation.)  PHYSICAL EDUCATION:  Yes                               INTERSCHOLASTIC SPORTS   Yes   Print Name  Stefano Escalera MD         Signature        Stefano Escalera M.D.               Date: 5/22/2024   Address:  [facility] [place] [address]  [facility] [place] [address] [facility]  [address] [address] [phone]   Printed by Authority of the [facility] (COMPLETE BOTH SIDES)          12/23                [facility]          Approved [facility] 5/2024

## 2024-05-22 NOTE — FSED PROVIDER NOTE
Subjective   History of Present Illness  Patient is a 78-year-old gentleman presents emergency room with sore throat for several weeks.  Primarily its on the back right side.  He has not had congestion, earache, fevers, cough.  Ever happened before.  There was no trauma.  He is not having dysphagia.  No change of in his voice.      Review of Systems   Constitutional: Negative.    HENT:  Positive for sore throat. Negative for congestion, ear pain, postnasal drip, rhinorrhea, trouble swallowing and voice change.    Eyes: Negative.    Respiratory: Negative.     Cardiovascular: Negative.    Gastrointestinal: Negative.    Musculoskeletal: Negative.    Skin: Negative.    Psychiatric/Behavioral: Negative.     All other systems reviewed and are negative.      Past Medical History:   Diagnosis Date    Allergic     Anxiety     CAD (coronary artery disease)     unstable angina, 7/2015: 20% LM, long 70% LAD with abnormal FFR (s/p 2.00b41qa Xience), 50% OM1, 99% prox RCA (s/p 3.5x18m Xience).  Normal Cardiolite 11/2015 and 6/2018    Colon polyp 2010    Fibromyalgia     GERD (gastroesophageal reflux disease)     H/O complete eye exam 04/2018    Hyperlipidemia     IBS (irritable bowel syndrome)     Squamous cell skin cancer, face     Urinary retention        Allergies   Allergen Reactions    Augmentin [Amoxicillin-Pot Clavulanate] Hives and Itching    Clavulanic Acid Unknown - High Severity       Past Surgical History:   Procedure Laterality Date    CARDIAC CATHETERIZATION      [facility] with 20% LM long 70% LAD (with positive FFR), 50% OM1, 99% prox RCA, s/p 2.75x33 Xience [place] to LAD and 3.5x18mm Xience [place] to RCA. LVEF 64% Normal Cardiolite 11/2015    CARDIAC SURGERY      cardiac stents, 2    COLONOSCOPY  approx 6/2011    normal per patient  [facility]    COLONOSCOPY N/A 4/26/2021    Procedure: COLONOSCOPY TO CECUM WITH COLD SNARE POLYPECTOMIES AND BIOPSIES ;  Surgeon: Catarino Luna MD;  Location:   [facility];  Service: Gastroenterology;  Laterality: N/A;  PRE- HISTORY COLON POLYPS  POST- POLYPS, COLITIS, HEMORRHOIDS    COLONOSCOPY W/ POLYPECTOMY N/A 7/25/2018    Procedure: COLONOSCOPY TO CECUM WITH COLD SNAREPOLYPECTOMIES, HOT SNARE POLYPECTOMIES;  Surgeon: Catarino Luna MD;  Location: [facility] ENDOSCOPY;  Service: Gastroenterology    CORONARY ANGIOPLASTY WITH STENT PLACEMENT      CYSTOSCOPY TRANSURETHRAL RESECTION OF PROSTATE      ENDOSCOPY N/A 7/25/2018    Procedure: ESOPHAGOGASTRODUODENOSCOPY WITH COLD BIOPSIES;  Surgeon: Catarino Luna MD;  Location: [facility] ENDOSCOPY;  Service: Gastroenterology    ENDOSCOPY N/A 1/7/2021    Procedure: ESOPHAGOGASTRODUODENOSCOPY with cold biopsies;  Surgeon: Catarino Luna MD;  Location: [facility] ENDOSCOPY;  Service: Gastroenterology;  Laterality: N/A;  PRE: EPIGASTRIC PAIN, DYSPEPSIA  POST: GASTRITIS    ENDOSCOPY N/A 5/25/2022    Procedure: ESOPHAGOGASTRODUODENOSCOPY WITH DILATATION #48 to #58 bougie, gastric biopsy;  Surgeon: Td Richey DO;  Location: [facility] ENDOSCOPY;  Service: General;  Laterality: N/A;  gastritis, small hiatal hernia    ENDOSCOPY N/A 10/30/2023    Procedure: ESOPHAGOGASTRODUODENOSCOPY with biopsies x 1 area.;  Surgeon: Shell Suero MD;  Location: [facility] ENDOSCOPY;  Service: Gastroenterology;  Laterality: N/A;  Post-gastritis    HIATAL HERNIA REPAIR N/A 7/6/2022    Procedure: HIATAL HERNIA REPAIR LAPAROSCOPIC WITH TRANSORAL INCISIONLESS FUNDOPLICATION;  Surgeon: Td Richey DO;  Location: [facility] MAIN OR;  Service: General;  Laterality: N/A;    RADIOFREQUENCY ABLATION      NECK    SKIN CANCER EXCISION      WISDOM TOOTH EXTRACTION         Family History   Problem Relation Age of Onset    Cervical cancer Mother     Cancer Mother     Depression Mother     Heart disease Mother     Liver disease Mother     Alcohol abuse Mother     Thyroid cancer Father     Cancer Father     Depression Father     Diabetes Father      Thyroid disease Father     Coronary artery disease Other     Malig Hyperthermia Neg Hx        Social History     Socioeconomic History    Marital status:    Tobacco Use    Smoking status: Former     Current packs/day: 0.00     Average packs/day: 0.3 packs/day for 4.0 years (1.0 ttl pk-yrs)     Types: Cigarettes     Start date: 1968     Quit date:      Years since quittin.4    Smokeless tobacco: Never   Vaping Use    Vaping status: Never Used   Substance and Sexual Activity    Alcohol use: No     Comment: caffeine use    Drug use: No    Sexual activity: Defer           Objective   Physical Exam  Vitals reviewed.   Constitutional:       Appearance: He is well-developed and normal weight.   HENT:      Right Ear: Tympanic membrane normal.      Left Ear: Tympanic membrane normal.      Mouth/Throat:      Mouth: Mucous membranes are moist. No oral lesions.      Pharynx: Posterior oropharyngeal erythema present. No pharyngeal swelling or oropharyngeal exudate.   Eyes:      Conjunctiva/sclera: Conjunctivae normal.   Cardiovascular:      Rate and Rhythm: Normal rate and regular rhythm.   Pulmonary:      Effort: Pulmonary effort is normal.      Breath sounds: Normal breath sounds.   Skin:     General: Skin is warm.   Neurological:      General: No focal deficit present.      Mental Status: He is alert.   Psychiatric:         Behavior: Behavior normal.         Procedures           ED Course                                           Medical Decision Making  Presentation patient does have a red pharynx.  Do not appreciate anything worse on the right side than the left.  Do not see tonsils.  No exudates.  Handle secretions.  No stridor or respiratory distress.  Strep is negative.  Very likely this could be viral or he is having 2 infections back to back.  I am going to send him to ENT since it has been several weeks.  I will go and treat him with prednisone and cefdinir.  He is medically clear at this time with  strict return precautions.  Referral given for Dr. Velasco whom patient says he seen in the past.    Problems Addressed:  Pharyngitis, unspecified etiology: complicated acute illness or injury    Risk  Prescription drug management.        Final diagnoses:   Pharyngitis, unspecified etiology       ED Disposition  ED Disposition       ED Disposition   Discharge    Condition   Stable    Comment   --               Justin Velasco MD  [address]  [place] [id number] [phone]               Medication List        New Prescriptions      cefdinir 300 MG capsule  Commonly known as: OMNICEF  Take 1 capsule by mouth 2 (Two) Times a Day for 10 days.     predniSONE 20 MG tablet  Commonly known as: DELTASONE  Take 2 tablets by mouth Daily for 3 days, THEN 1 tablet Daily for 3 days.  Start taking on: May 22, 2024            Changed      clopidogrel 75 MG tablet  Commonly known as: PLAVIX  Take 1 tablet by mouth Daily.  What changed: additional instructions               Where to Get Your Medications        These medications were sent to [facility] [address] [place] [address] [address] - [phone] [facility] [phone]   [address] [place] [address]      Phone: [phone]   cefdinir 300 MG capsule  predniSONE 20 MG tablet

## 2024-05-28 ENCOUNTER — ON CAMPUS - OUTPATIENT (OUTPATIENT)
Dept: URBAN - METROPOLITAN AREA HOSPITAL 77 | Facility: HOSPITAL | Age: 79
End: 2024-05-28
Payer: MEDICARE

## 2024-05-28 DIAGNOSIS — Z09 ENCOUNTER FOR FOLLOW-UP EXAMINATION AFTER COMPLETED TREATMEN: ICD-10-CM

## 2024-05-28 DIAGNOSIS — D12.3 BENIGN NEOPLASM OF TRANSVERSE COLON: ICD-10-CM

## 2024-05-28 DIAGNOSIS — D12.2 BENIGN NEOPLASM OF ASCENDING COLON: ICD-10-CM

## 2024-05-28 DIAGNOSIS — Z86.010 PERSONAL HISTORY OF COLONIC POLYPS: ICD-10-CM

## 2024-05-28 PROCEDURE — 45385 COLONOSCOPY W/LESION REMOVAL: CPT | Mod: PT | Performed by: INTERNAL MEDICINE

## 2024-07-12 ENCOUNTER — TELEPHONE (OUTPATIENT)
Dept: FAMILY MEDICINE CLINIC | Facility: CLINIC | Age: 79
End: 2024-07-12
Payer: MEDICARE

## 2024-07-12 NOTE — TELEPHONE ENCOUNTER
Hub staff attempted to follow warm transfer process and was unsuccessful     Caller: Td Melendez Jr.    Relationship to patient: Self    Best call back number: [phone]     Patient is needing: PATIENT CALLED AND NEEDS TO RESCHEDULE HIS LABS.  PLEASE CALL HIM BACK.  WANTS IT ON MONDAY MORNING

## 2024-07-23 LAB
ALBUMIN SERPL-MCNC: 4.3 G/DL (ref 3.5–5.2)
ALBUMIN/GLOB SERPL: 2.4 G/DL
ALP SERPL-CCNC: 87 U/L (ref 39–117)
ALT SERPL-CCNC: 15 U/L (ref 1–41)
AST SERPL-CCNC: 19 U/L (ref 1–40)
BASOPHILS # BLD AUTO: 0.03 10*3/MM3 (ref 0–0.2)
BASOPHILS NFR BLD AUTO: 0.5 % (ref 0–1.5)
BILIRUB SERPL-MCNC: 0.6 MG/DL (ref 0–1.2)
BUN SERPL-MCNC: 20 MG/DL (ref 8–23)
BUN/CREAT SERPL: 16.1 (ref 7–25)
CALCIUM SERPL-MCNC: 10 MG/DL (ref 8.6–10.5)
CHLORIDE SERPL-SCNC: 104 MMOL/L (ref 98–107)
CHOLEST SERPL-MCNC: 139 MG/DL (ref 0–200)
CO2 SERPL-SCNC: 23 MMOL/L (ref 22–29)
CREAT SERPL-MCNC: 1.24 MG/DL (ref 0.76–1.27)
EGFRCR SERPLBLD CKD-EPI 2021: 60.3 ML/MIN/1.73
EOSINOPHIL # BLD AUTO: 0.12 10*3/MM3 (ref 0–0.4)
EOSINOPHIL NFR BLD AUTO: 2 % (ref 0.3–6.2)
ERYTHROCYTE [DISTWIDTH] IN BLOOD BY AUTOMATED COUNT: 12.9 % (ref 12.3–15.4)
GLOBULIN SER CALC-MCNC: 1.8 GM/DL
GLUCOSE SERPL-MCNC: 106 MG/DL (ref 65–99)
HCT VFR BLD AUTO: 48.9 % (ref 37.5–51)
HDLC SERPL-MCNC: 41 MG/DL (ref 40–60)
HGB BLD-MCNC: 16.7 G/DL (ref 13–17.7)
IMM GRANULOCYTES # BLD AUTO: 0.01 10*3/MM3 (ref 0–0.05)
IMM GRANULOCYTES NFR BLD AUTO: 0.2 % (ref 0–0.5)
LDLC SERPL CALC-MCNC: 78 MG/DL (ref 0–100)
LYMPHOCYTES # BLD AUTO: 1.79 10*3/MM3 (ref 0.7–3.1)
LYMPHOCYTES NFR BLD AUTO: 29.4 % (ref 19.6–45.3)
MCH RBC QN AUTO: 31.3 PG (ref 26.6–33)
MCHC RBC AUTO-ENTMCNC: 34.2 G/DL (ref 31.5–35.7)
MCV RBC AUTO: 91.6 FL (ref 79–97)
MONOCYTES # BLD AUTO: 0.59 10*3/MM3 (ref 0.1–0.9)
MONOCYTES NFR BLD AUTO: 9.7 % (ref 5–12)
NEUTROPHILS # BLD AUTO: 3.54 10*3/MM3 (ref 1.7–7)
NEUTROPHILS NFR BLD AUTO: 58.2 % (ref 42.7–76)
NRBC BLD AUTO-RTO: 0 /100 WBC (ref 0–0.2)
PLATELET # BLD AUTO: 211 10*3/MM3 (ref 140–450)
POTASSIUM SERPL-SCNC: 5 MMOL/L (ref 3.5–5.2)
PROT SERPL-MCNC: 6.1 G/DL (ref 6–8.5)
RBC # BLD AUTO: 5.34 10*6/MM3 (ref 4.14–5.8)
SODIUM SERPL-SCNC: 139 MMOL/L (ref 136–145)
TRIGL SERPL-MCNC: 108 MG/DL (ref 0–150)
TSH SERPL DL<=0.005 MIU/L-ACNC: 2.73 UIU/ML (ref 0.27–4.2)
VLDLC SERPL CALC-MCNC: 20 MG/DL (ref 5–40)
WBC # BLD AUTO: 6.08 10*3/MM3 (ref 3.4–10.8)

## 2024-07-23 RX ORDER — PITAVASTATIN CALCIUM 4.18 MG/1
4 TABLET, FILM COATED ORAL NIGHTLY
Qty: 100 TABLET | Refills: 3 | Status: CANCELLED | OUTPATIENT
Start: 2024-07-23

## 2024-07-23 NOTE — ASSESSMENT & PLAN NOTE
Lipid abnormalities are stable    Plan:  Continue same medication/s without change.      Counseled patient on lifestyle modifications to help control hyperlipidemia.     Patient Treatment Goals:   LDL goal is less than 70    Followup in 1 year.

## 2024-07-23 NOTE — ASSESSMENT & PLAN NOTE
Coronary Artery Disease (OPTIONAL): Coronary artery disease is stable.  Continue current treatment regimen.  Cardiac status will be reassessed in 1 year.

## 2024-07-23 NOTE — PROGRESS NOTES
Subjective   The ABCs of the Annual Wellness Visit  Medicare Wellness Visit      Td Melendez Jr. is a 78 y.o. patient who presents for a Medicare Wellness Visit.    The following portions of the patient's history were reviewed and   updated as appropriate: allergies, current medications, past family history, past medical history, past social history, past surgical history, and problem list.    Compared to one year ago, the patient's physical   health is the same.  Compared to one year ago, the patient's mental   health is the same.    Recent Hospitalizations:  This patient has had a [facility] admission record on file within the last 365 days.  Current Medical Providers:  Patient Care Team:  Beau Lee MD as PCP - General (Family Medicine)  Obi Boucher MD as Consulting Physician (Cardiology)  Venkat Salas MD (Pain Medicine)  Pedrito Neal Jr., MD as Consulting Physician (Urology)  Ramón Franco MD as Consulting Physician (General Surgery)  Marina Olivarez, DNP, APRN as Nurse Practitioner (Nurse Practitioner)  Jaci Arguelles MD as Consulting Physician (Sleep Medicine)  Lionel Page MD (Family Medicine)    Outpatient Medications Prior to Visit   Medication Sig Dispense Refill    Hypromellose (GENTEAL SEVERE) 0.3 % ophthalmic gel As Needed.      Pitavastatin Calcium (Livalo) 4 MG tablet Take 1 tablet by mouth Every Night. 100 tablet 3    acetaminophen-codeine (TYLENOL #3) 300-30 MG per tablet TAKE 1 TABLET BY MOUTH EVERY 4-6 HOURS AS NEEDED FOR PAIN      coenzyme Q10 100 MG capsule Take 1 capsule by mouth Daily. LD 6/30      diphenhydrAMINE-acetaminophen (TYLENOL PM)  MG tablet per tablet Take 1 tablet by mouth At Night As Needed for Sleep.      Emollient (COLLAGEN EX) Take 1 tablet by mouth Daily. LD 6/30      HYDROcodone-acetaminophen (NORCO) 5-325 MG per tablet Take  by mouth See Admin Instructions. Take 1 tablet by mouth every 4-6 hours as needed for pain       LITHIUM PO Take 5 mg by mouth 2 (two) times a day.      melatonin 5 MG tablet tablet Take 1 tablet by mouth At Night As Needed.      PreviDent 5000 Booster Plus 1.1 % paste Take 1 dose by mouth Daily.      Vitamin D-Vitamin K (VITAMIN K2-VITAMIN D3 PO) Take  by mouth Daily. Hold for surgery      ALPRAZolam (XANAX) 0.25 MG tablet Take 1 tablet by mouth Daily As Needed for Anxiety. 30 tablet 2    clopidogrel (PLAVIX) 75 MG tablet Take 1 tablet by mouth Daily. (Patient taking differently: Take 1 tablet by mouth Daily. Hold 5 days) 90 tablet 3    ezetimibe (ZETIA) 10 MG tablet Take 1 tablet by mouth Daily. 90 tablet 3    pantoprazole (Protonix) 40 MG EC tablet Take 1 tablet by mouth Daily. 90 tablet 0    sildenafil (REVATIO) 20 MG tablet Take 2-3 tabs QD prn 30 tablet 11     No facility-administered medications prior to visit.     Opioid medication/s are on active medication list.  and I have evaluated his active treatment plan and pain score trends (see table).  Vitals:    07/24/24 1340   PainSc: 0-No pain     I have reviewed the chart for potential of high risk medication and harmful drug interactions in the elderly.        Aspirin is not on active medication list.  Aspirin use is not indicated based on review of current medical condition/s. Risk of harm outweighs potential benefits.  .    Patient Active Problem List   Diagnosis    CAD (coronary artery disease)    Hyperlipidemia    Anxiety    Gastroesophageal reflux disease without esophagitis    Polyp of colon    Dyspepsia    Epigastric pain    Poor appetite    Personal history of colonic polyps    Screening for colon cancer    Hypertriglyceridemia    Hematuria    Chronic pain     Advance Care Planning (Click this link to access ACP Navigator)  Advance Directive is on file.  ACP discussion was held with the patient during this visit. Patient has an advance directive in EMR which is still valid.         Objective   Vitals:    07/24/24 1340   BP: 128/62   Pulse: 70  "  Resp: 16   Temp: 97.6 °F (36.4 °C)   TempSrc: Oral   SpO2: 99%   Weight: 84.4 kg (186 lb)   Height: 177.8 cm (70\")   PainSc: 0-No pain       Estimated body mass index is 26.69 kg/m² as calculated from the following:    Height as of this encounter: 177.8 cm (70\").    Weight as of this encounter: 84.4 kg (186 lb).    BMI is >= 25 and <30. (Overweight) The following options were offered after discussion;: exercise counseling/recommendations and nutrition counseling/recommendations        Does the patient have evidence of cognitive impairment? No  Lab Results   Component Value Date    CHLPL 139 2024    TRIG 108 2024    HDL 41 2024    LDL 78 2024    VLDL 20 2024                                                                                                Health  Risk Assessment    Smoking Status:  Social History     Tobacco Use   Smoking Status Former    Current packs/day: 0.00    Average packs/day: 0.3 packs/day for 4.0 years (1.0 ttl pk-yrs)    Types: Cigarettes    Start date: 1968    Quit date:     Years since quittin.5   Smokeless Tobacco Never     Alcohol Consumption:  Social History     Substance and Sexual Activity   Alcohol Use No    Comment: caffeine use     Fall Risk Screen:  MARILU Fall Risk Assessment has not been completed.    Depression Screenin/24/2024     1:45 PM   PHQ-2/PHQ-9 Depression Screening   Little Interest or Pleasure in Doing Things 0-->not at all   Feeling Down, Depressed or Hopeless 0-->not at all   PHQ-9: Brief Depression Severity Measure Score 0     Health Habits and Functional and Cognitive Screenin/24/2024     1:00 PM   Functional & Cognitive Status   Do you have difficulty preparing food and eating? No   Do you have difficulty bathing yourself, getting dressed or grooming yourself? No   Do you have difficulty using the toilet? No   Do you have difficulty moving around from place to place? No   Do you have trouble with steps " or getting out of a bed or a chair? No   Current Diet Well Balanced Diet   Dental Exam Up to date   Eye Exam Up to date   Exercise (times per week) 3 times per week   Current Exercises Include Walking   Do you need help using the phone?  No   Are you deaf or do you have serious difficulty hearing?  No   Do you need help to go to places out of walking distance? No   Do you need help shopping? No   Do you need help preparing meals?  No   Do you need help with housework?  No   Do you need help with laundry? No   Do you need help taking your medications? No   Do you need help managing money? No   Do you ever drive or ride in a car without wearing a seat belt? No   Have you felt unusual stress, anger or loneliness in the last month? No   Who do you live with? Spouse   If you need help, do you have trouble finding someone available to you? No   Have you been bothered in the last four weeks by sexual problems? No   Do you have difficulty concentrating, remembering or making decisions? No             Age-appropriate Screening Schedule:  Refer to the list below for future screening recommendations based on patient's age, sex and/or medical conditions. Orders for these recommended tests are listed in the plan section. The patient has been provided with a written plan.    Health Maintenance List  Health Maintenance   Topic Date Due    RSV Vaccine - Adults (1 - 1-dose 60+ series) Never done    TDAP/TD VACCINES (2 - Td or Tdap) 01/01/2018    Pneumococcal Vaccine 65+ (2 of 2 - PCV) 01/09/2020    COVID-19 Vaccine (7 - 2023-24 season) 09/01/2023    BMI FOLLOWUP  03/22/2024    COLORECTAL CANCER SCREENING  04/26/2024    INFLUENZA VACCINE  08/01/2024    LIPID PANEL  07/22/2025    ANNUAL WELLNESS VISIT  07/24/2025    ZOSTER VACCINE  Completed    HEPATITIS C SCREENING  Discontinued                                                                                                                                                CMS  "Preventative Services Quick Reference  Risk Factors Identified During Encounter  None Identified    The above risks/problems have been discussed with the patient.  Pertinent information has been shared with the patient in the After Visit Summary.  An After Visit Summary and PPPS were made available to the patient.    Follow Up:   Next Medicare Wellness visit to be scheduled in 1 year.         Additional E&M Note during same encounter follows:  Patient has additional, significant, and separately identifiable condition(s)/problem(s) that require work above and beyond the Medicare Wellness Visit     Chief Complaint  Coronary Artery Disease, Hyperlipidemia, Anxiety, Erectile Dysfunction (/Med refill due   / [facility] ), and Medicare Wellness-subsequent    Subjective   HPI  Td is also being seen today for additional medical problem/s.    Review of Systems   Constitutional:  Negative for chills and fever.   HENT:  Negative for congestion, ear pain and sinus pressure.    Eyes:  Negative for pain and visual disturbance.   Respiratory:  Negative for cough and shortness of breath.    Cardiovascular:  Negative for chest pain.   Gastrointestinal:  Negative for abdominal pain.   Genitourinary:  Negative for difficulty urinating and dysuria.   Skin: Negative.    Neurological: Negative.    Psychiatric/Behavioral:  Negative for dysphoric mood.               Objective   Vital Signs:  /62   Pulse 70   Temp 97.6 °F (36.4 °C) (Oral)   Resp 16   Ht 177.8 cm (70\")   Wt 84.4 kg (186 lb)   SpO2 99%   BMI 26.69 kg/m²   Physical Exam  Vitals and nursing note reviewed.   Constitutional:       General: He is not in acute distress.     Appearance: He is well-developed.   Neck:      Thyroid: No thyromegaly.   Cardiovascular:      Rate and Rhythm: Normal rate and regular rhythm.      Heart sounds: No murmur heard.  Pulmonary:      Effort: Pulmonary effort is normal.      Breath sounds: Normal breath sounds.   Abdominal:      " General: Bowel sounds are normal.      Tenderness: There is no abdominal tenderness.   Musculoskeletal:      Cervical back: Neck supple.   Neurological:      Mental Status: He is alert and oriented to person, place, and time.   Psychiatric:         Behavior: Behavior normal.         Thought Content: Thought content normal.     Labs reviewed with pt today during visit. All questions answered.      The following data was reviewed by: Beau Lee MD on 07/24/2024:        Assessment and Plan Additional age appropriate preventative wellness advice topics were discussed during today's preventative wellness exam(some topics already addressed during AWV portion of the note above):    Physical Activity: Advised cardiovascular activity 150 minutes per week as tolerated. (example brisk walk for 30 minutes, 5 days a week).     Nutrition: Discussed nutrition plan with patient. Information shared in after visit summary. Goal is for a well balanced diet to enhance overall health.              Encounter for subsequent annual wellness visit (AWV) in Medicare patient    Coronary artery disease involving native coronary artery of native heart without angina pectoris  Coronary Artery Disease (OPTIONAL): Coronary artery disease is stable.  Continue current treatment regimen.  Cardiac status will be reassessed in 1 year.  Mixed hyperlipidemia   Lipid abnormalities are stable    Plan:  Continue same medication/s without change.      Counseled patient on lifestyle modifications to help control hyperlipidemia.     Patient Treatment Goals:   LDL goal is less than 70    Followup in 1 year.  Erectile dysfunction, unspecified erectile dysfunction type    Anxiety    IFG (impaired fasting glucose)    Medication management    Gastroesophageal reflux disease without esophagitis      Orders Placed This Encounter   Procedures    ToxAssure Flex 15, Ur -     Order Specific Question:   Release to patient     Answer:   Routine Release [[id number]]    Diet/exercise/weight management to treat the glucose discussed.    New Medications Ordered This Visit   Medications    clopidogrel (PLAVIX) 75 MG tablet     Sig: Take 1 tablet by mouth Daily.     Dispense:  90 tablet     Refill:  3    ezetimibe (ZETIA) 10 MG tablet     Sig: Take 1 tablet by mouth Daily.     Dispense:  90 tablet     Refill:  3    sildenafil (REVATIO) 20 MG tablet     Sig: Take 2-3 tabs QD prn     Dispense:  30 tablet     Refill:  11    ALPRAZolam (XANAX) 0.25 MG tablet     Sig: Take 1 tablet by mouth Daily As Needed for Anxiety.     Dispense:  30 tablet     Refill:  2    famotidine (Pepcid) 20 MG tablet     Sig: Take 1 tablet by mouth 2 (Two) Times a Day.     Dispense:  180 tablet     Refill:  3    pitavastatin calcium (LIVALO) 2 MG tablet tablet     Sig: Take 1 tablet by mouth Every Night.     Dispense:  90 tablet     Refill:  3        I spent 15 minutes caring for Td on this date of service. This time includes time spent by me in the following activities:preparing for the visit, reviewing tests, performing a medically appropriate examination and/or evaluation , ordering medications, tests, or procedures, documenting information in the medical record, and independently interpreting results and communicating that information with the patient/family/caregiver  Follow Up   No follow-ups on file.  Patient was given instructions and counseling regarding his condition or for health maintenance advice. Please see specific information pulled into the AVS if appropriate.

## 2024-07-24 ENCOUNTER — OFFICE VISIT (OUTPATIENT)
Dept: FAMILY MEDICINE CLINIC | Facility: CLINIC | Age: 79
End: 2024-07-24
Payer: MEDICARE

## 2024-07-24 VITALS
SYSTOLIC BLOOD PRESSURE: 128 MMHG | DIASTOLIC BLOOD PRESSURE: 62 MMHG | HEART RATE: 70 BPM | RESPIRATION RATE: 16 BRPM | WEIGHT: 186 LBS | TEMPERATURE: 97.6 F | HEIGHT: 70 IN | OXYGEN SATURATION: 99 % | BODY MASS INDEX: 26.63 KG/M2

## 2024-07-24 DIAGNOSIS — I25.10 CORONARY ARTERY DISEASE INVOLVING NATIVE CORONARY ARTERY OF NATIVE HEART WITHOUT ANGINA PECTORIS: Chronic | ICD-10-CM

## 2024-07-24 DIAGNOSIS — R73.01 IFG (IMPAIRED FASTING GLUCOSE): ICD-10-CM

## 2024-07-24 DIAGNOSIS — E78.2 MIXED HYPERLIPIDEMIA: Chronic | ICD-10-CM

## 2024-07-24 DIAGNOSIS — N52.9 ERECTILE DYSFUNCTION, UNSPECIFIED ERECTILE DYSFUNCTION TYPE: ICD-10-CM

## 2024-07-24 DIAGNOSIS — Z79.899 MEDICATION MANAGEMENT: ICD-10-CM

## 2024-07-24 DIAGNOSIS — K21.9 GASTROESOPHAGEAL REFLUX DISEASE WITHOUT ESOPHAGITIS: Chronic | ICD-10-CM

## 2024-07-24 DIAGNOSIS — F41.9 ANXIETY: Chronic | ICD-10-CM

## 2024-07-24 DIAGNOSIS — Z00.00 ENCOUNTER FOR SUBSEQUENT ANNUAL WELLNESS VISIT (AWV) IN MEDICARE PATIENT: Primary | ICD-10-CM

## 2024-07-24 PROCEDURE — 1126F AMNT PAIN NOTED NONE PRSNT: CPT | Performed by: FAMILY MEDICINE

## 2024-07-24 PROCEDURE — G0439 PPPS, SUBSEQ VISIT: HCPCS | Performed by: FAMILY MEDICINE

## 2024-07-24 PROCEDURE — 99214 OFFICE O/P EST MOD 30 MIN: CPT | Performed by: FAMILY MEDICINE

## 2024-07-24 PROCEDURE — 1159F MED LIST DOCD IN RCRD: CPT | Performed by: FAMILY MEDICINE

## 2024-07-24 PROCEDURE — 1160F RVW MEDS BY RX/DR IN RCRD: CPT | Performed by: FAMILY MEDICINE

## 2024-07-24 PROCEDURE — 1170F FXNL STATUS ASSESSED: CPT | Performed by: FAMILY MEDICINE

## 2024-07-24 RX ORDER — EZETIMIBE 10 MG/1
10 TABLET ORAL DAILY
Qty: 90 TABLET | Refills: 3 | Status: SHIPPED | OUTPATIENT
Start: 2024-07-24

## 2024-07-24 RX ORDER — PITAVASTATIN CALCIUM 2.09 MG/1
2 TABLET, FILM COATED ORAL NIGHTLY
Qty: 90 TABLET | Refills: 3 | Status: SHIPPED | OUTPATIENT
Start: 2024-07-24

## 2024-07-24 RX ORDER — ALPRAZOLAM 0.25 MG/1
0.25 TABLET ORAL DAILY PRN
Qty: 30 TABLET | Refills: 2 | Status: SHIPPED | OUTPATIENT
Start: 2024-07-24

## 2024-07-24 RX ORDER — CLOPIDOGREL BISULFATE 75 MG/1
75 TABLET ORAL DAILY
Qty: 90 TABLET | Refills: 3 | Status: SHIPPED | OUTPATIENT
Start: 2024-07-24

## 2024-07-24 RX ORDER — FAMOTIDINE 20 MG/1
20 TABLET, FILM COATED ORAL 2 TIMES DAILY
Qty: 180 TABLET | Refills: 3 | Status: SHIPPED | OUTPATIENT
Start: 2024-07-24

## 2024-07-24 RX ORDER — SILDENAFIL CITRATE 20 MG/1
TABLET ORAL
Qty: 30 TABLET | Refills: 11 | Status: SHIPPED | OUTPATIENT
Start: 2024-07-24

## 2024-07-24 NOTE — PATIENT INSTRUCTIONS
Medicare Wellness  Personal Prevention Plan of Service     Date of Office Visit:    Encounter Provider:  Beau Lee MD  Place of Service:  [facility]  Patient Name: Td Melendez Jr.  :  1945    As part of the Medicare Wellness portion of your visit today, we are providing you with this personalized preventive plan of services (PPPS). This plan is based upon recommendations of the [place] Preventive Services Task Force (USPSTF) and the Advisory Committee on Immunization Practices (ACIP).    This lists the preventive care services that should be considered, and provides dates of when you are due. Items listed as completed are up-to-date and do not require any further intervention.    Health Maintenance   Topic Date Due    RSV Vaccine - Adults (1 - 1-dose 60+ series) Never done    TDAP/TD VACCINES (2 - Td or Tdap) 2018    Pneumococcal Vaccine 65+ (2 of 2 - PCV) 2020    COVID-19 Vaccine (2023- season) 2023    BMI FOLLOWUP  2024    COLORECTAL CANCER SCREENING  2024    INFLUENZA VACCINE  2024    LIPID PANEL  2025    ANNUAL WELLNESS VISIT  2025    ZOSTER VACCINE  Completed    HEPATITIS C SCREENING  Discontinued       Orders Placed This Encounter   Procedures    ToxAssure Flex 15, Ur -     Order Specific Question:   Release to patient     Answer:   Routine Release [[id number]]       Return in about 1 year (around 2025) for Recheck.

## 2024-07-29 ENCOUNTER — PRIOR AUTHORIZATION (OUTPATIENT)
Dept: FAMILY MEDICINE CLINIC | Facility: CLINIC | Age: 79
End: 2024-07-29
Payer: MEDICARE

## 2024-07-29 NOTE — TELEPHONE ENCOUNTER
Td Melendez jr Key: [id number] - PA Case ID: [id number] - Rx #: [id number] help? Call us at [phone]  Status  Sent to [facility]  ALPRAZolam 0.25MG tablets    PRIOR AUTH PENDING FOR GENERIC XANAX

## 2024-07-31 LAB
1OH-MIDAZOLAM UR QL SCN: NOT DETECTED NG/MG CREAT
6MAM UR QL SCN: NEGATIVE NG/ML
7AMINOCLONAZEPAM/CREAT UR: NOT DETECTED NG/MG CREAT
A-OH ALPRAZ/CREAT UR: NOT DETECTED NG/MG CREAT
A-OH-TRIAZOLAM/CREAT UR CFM: NOT DETECTED NG/MG CREAT
ALPRAZ/CREAT UR CFM: NOT DETECTED NG/MG CREAT
AMPHETAMINES UR QL SCN: NEGATIVE NG/ML
BARBITURATES UR QL SCN: NEGATIVE NG/ML
BENZODIAZ SCN METH UR: NEGATIVE
BUPRENORPHINE UR QL SCN: NEGATIVE
BUPRENORPHINE/CREAT UR: NOT DETECTED NG/MG CREAT
CANNABINOIDS UR QL SCN: NEGATIVE NG/ML
CLONAZEPAM/CREAT UR CFM: NOT DETECTED NG/MG CREAT
COCAINE+BZE UR QL SCN: NEGATIVE NG/ML
CREAT UR-MCNC: 135 MG/DL
DESALKYLFLURAZ/CREAT UR: NOT DETECTED NG/MG CREAT
DIAZEPAM/CREAT UR: NOT DETECTED NG/MG CREAT
ETHANOL UR QL SCN: NEGATIVE G/DL
FENTANYL CTO UR SCN-MCNC: NEGATIVE NG/ML
FENTANYL/CREAT UR: NOT DETECTED NG/MG CREAT
FLUNITRAZEPAM UR QL SCN: NOT DETECTED NG/MG CREAT
LORAZEPAM/CREAT UR: NOT DETECTED NG/MG CREAT
METHADONE UR QL SCN: NEGATIVE NG/ML
METHADONE+METAB UR QL SCN: NEGATIVE NG/ML
MIDAZOLAM/CREAT UR CFM: NOT DETECTED NG/MG CREAT
NORBUPRENORPHINE/CREAT UR: NOT DETECTED NG/MG CREAT
NORDIAZEPAM/CREAT UR: NOT DETECTED NG/MG CREAT
NORFENTANYL/CREAT UR: NOT DETECTED NG/MG CREAT
NORFLUNITRAZEPAM UR-MCNC: NOT DETECTED NG/MG CREAT
OPIATES UR SCN-MCNC: NEGATIVE NG/ML
OXAZEPAM/CREAT UR: NOT DETECTED NG/MG CREAT
OXYCODONE CTO UR SCN-MCNC: NEGATIVE NG/ML
PCP UR QL SCN: NEGATIVE NG/ML
PRESCRIBED MEDICATIONS: NORMAL
TAPENTADOL CTO UR SCN-MCNC: NEGATIVE NG/ML
TEMAZEPAM/CREAT UR: NOT DETECTED NG/MG CREAT
TRAMADOL UR QL SCN: NEGATIVE NG/ML

## 2024-08-20 ENCOUNTER — TRANSCRIBE ORDERS (OUTPATIENT)
Dept: ADMINISTRATIVE | Facility: HOSPITAL | Age: 79
End: 2024-08-20
Payer: MEDICARE

## 2024-08-20 DIAGNOSIS — H92.01 OTALGIA OF RIGHT EAR: Primary | ICD-10-CM

## 2024-09-03 ENCOUNTER — HOSPITAL ENCOUNTER (OUTPATIENT)
Dept: CT IMAGING | Facility: HOSPITAL | Age: 79
Discharge: HOME | End: 2024-09-03
Admitting: OTOLARYNGOLOGY
Payer: MEDICARE

## 2024-09-03 DIAGNOSIS — H92.01 OTALGIA OF RIGHT EAR: ICD-10-CM

## 2024-09-03 PROCEDURE — 70491 CT SOFT TISSUE NECK W/DYE: CPT

## 2024-09-03 PROCEDURE — 25510000001 IOPAMIDOL 61 % SOLUTION: Performed by: OTOLARYNGOLOGY

## 2024-09-03 RX ORDER — IOPAMIDOL 612 MG/ML
100 INJECTION, SOLUTION INTRAVASCULAR
Status: COMPLETED | OUTPATIENT
Start: 2024-09-03 | End: 2024-09-03

## 2024-09-03 RX ADMIN — IOPAMIDOL 75 ML: 612 INJECTION, SOLUTION INTRAVENOUS at 10:11

## 2024-10-08 NOTE — PROGRESS NOTES
Chief Complaint:   Chief Complaint   Patient presents with    Anxiety     Med refill due  / CSA UPDATED TODAY     PNEUMONIA     FOLLOW UP URGENT CARE       Td Melendez Jr. 78 y.o. male who presents today for Medical Management of the below listed issues. He  has a problem list of   Patient Active Problem List   Diagnosis    CAD (coronary artery disease)    Hyperlipidemia    Anxiety    Gastroesophageal reflux disease without esophagitis    Polyp of colon    Dyspepsia    Epigastric pain    Poor appetite    Personal history of colonic polyps    Screening for colon cancer    Hypertriglyceridemia    Hematuria    Chronic pain   .  Since the last visit, He was seen in the [facility] urgent care on 10/2/2024 and was treated for left lower lobe pneumonia, seen on chest x-ray, with Levaquin.         he has been compliant with   Current Outpatient Medications:     ALPRAZolam (XANAX) 0.25 MG tablet, Take 1 tablet by mouth Daily As Needed for Anxiety., Disp: 30 tablet, Rfl: 2    acetaminophen-codeine (TYLENOL #3) 300-30 MG per tablet, TAKE 1 TABLET BY MOUTH EVERY 4-6 HOURS AS NEEDED FOR PAIN, Disp: , Rfl:     clopidogrel (PLAVIX) 75 MG tablet, Take 1 tablet by mouth Daily., Disp: 90 tablet, Rfl: 3    coenzyme Q10 100 MG capsule, Take 1 capsule by mouth Daily. LD 6/30, Disp: , Rfl:     diphenhydrAMINE-acetaminophen (TYLENOL PM)  MG tablet per tablet, Take 1 tablet by mouth At Night As Needed for Sleep., Disp: , Rfl:     Emollient (COLLAGEN EX), Take 1 tablet by mouth Daily. LD 6/30, Disp: , Rfl:     ezetimibe (ZETIA) 10 MG tablet, Take 1 tablet by mouth Daily., Disp: 90 tablet, Rfl: 3    famotidine (Pepcid) 20 MG tablet, Take 1 tablet by mouth 2 (Two) Times a Day., Disp: 180 tablet, Rfl: 3    guaiFENesin (MUCINEX) 600 MG 12 hr tablet, Take 2 tablets by mouth 2 (Two) Times a Day., Disp: 30 tablet, Rfl: 0    HYDROcodone-acetaminophen (NORCO) 5-325 MG per tablet, Take  by mouth See Admin Instructions. Take 1 tablet by mouth  "every 4-6 hours as needed for pain, Disp: , Rfl:     Hypromellose (GENTEAL SEVERE) 0.3 % ophthalmic gel, As Needed., Disp: , Rfl:     LITHIUM PO, Take 5 mg by mouth 2 (two) times a day., Disp: , Rfl:     magnesium oxide (MAG-OX) 400 MG tablet, Take 1 tablet by mouth Daily., Disp: , Rfl:     melatonin 5 MG tablet tablet, Take 1 tablet by mouth At Night As Needed., Disp: , Rfl:     pitavastatin calcium (LIVALO) 2 MG tablet tablet, Take 1 tablet by mouth Every Night., Disp: 90 tablet, Rfl: 3    PreviDent 5000 Booster Plus 1.1 % paste, Take 1 dose by mouth Daily., Disp: , Rfl:     promethazine-dextromethorphan (PROMETHAZINE-DM) 6.25-15 MG/5ML syrup, Take 5 mL by mouth 4 (Four) Times a Day As Needed for Cough. (Patient not taking: Reported on 10/9/2024), Disp: 180 mL, Rfl: 0    sildenafil (REVATIO) 20 MG tablet, Take 2-3 tabs QD prn, Disp: 30 tablet, Rfl: 11    Vitamin D-Vitamin K (VITAMIN K2-VITAMIN D3 PO), Take  by mouth Daily. Hold for surgery, Disp: , Rfl: .  He denies medication side effects.    All of the other chronic condition(s) listed above are stable w/o issues.    /68   Pulse 90   Temp 98.1 °F (36.7 °C) (Oral)   Resp 18   Ht 177.8 cm (70\")   Wt 83 kg (183 lb)   SpO2 99%   BMI 26.26 kg/m²     Results for orders placed or performed during the hospital encounter of 10/02/24   Covid-19 + Flu A&B AG, Veritor    Specimen: Swab   Result Value Ref Range    SARS Antigen Not Detected Not Detected, Presumptive Negative    Influenza A Antigen KELSEY Not Detected Not Detected    Influenza B Antigen KELSEY Not Detected Not Detected    Internal Control Passed Passed    Lot Number [id number]     Expiration Date 09/04/2025              The following portions of the patient's history were reviewed and updated as appropriate: allergies, current medications, past family history, past medical history, past social history, past surgical history, and problem list.    Review of Systems   Constitutional:  Negative for activity " change, chills and fever.   Respiratory:  Negative for cough.    Cardiovascular:  Negative for chest pain.   Psychiatric/Behavioral:  Negative for dysphoric mood.        Objective             Physical Exam  Vitals and nursing note reviewed.   Constitutional:       General: He is not in acute distress.     Appearance: He is well-developed.   Cardiovascular:      Rate and Rhythm: Normal rate and regular rhythm.   Pulmonary:      Effort: Pulmonary effort is normal.      Breath sounds: Normal breath sounds.   Neurological:      Mental Status: He is alert and oriented to person, place, and time.   Psychiatric:         Behavior: Behavior normal.         Thought Content: Thought content normal.     Urgent care center notes reviewed by me at today's visit, as well as the chest x-ray report.        Diagnoses and all orders for this visit:    1. Anxiety (Primary)  -     ALPRAZolam (XANAX) 0.25 MG tablet; Take 1 tablet by mouth Daily As Needed for Anxiety.  Dispense: 30 tablet; Refill: 2    2. Pneumonia of left lower lobe due to infectious organism  -     XR Chest 2 View; Future    Will wait 2 more weeks for clearing, and will repeat a chest x-ray for further management.

## 2024-10-09 ENCOUNTER — OFFICE VISIT (OUTPATIENT)
Dept: FAMILY MEDICINE CLINIC | Facility: CLINIC | Age: 79
End: 2024-10-09
Payer: MEDICARE

## 2024-10-09 VITALS
RESPIRATION RATE: 18 BRPM | HEART RATE: 90 BPM | SYSTOLIC BLOOD PRESSURE: 138 MMHG | WEIGHT: 183 LBS | BODY MASS INDEX: 26.2 KG/M2 | OXYGEN SATURATION: 99 % | HEIGHT: 70 IN | TEMPERATURE: 98.1 F | DIASTOLIC BLOOD PRESSURE: 68 MMHG

## 2024-10-09 DIAGNOSIS — F41.9 ANXIETY: Primary | Chronic | ICD-10-CM

## 2024-10-09 DIAGNOSIS — J18.9 PNEUMONIA OF LEFT LOWER LOBE DUE TO INFECTIOUS ORGANISM: ICD-10-CM

## 2024-10-09 PROCEDURE — 1126F AMNT PAIN NOTED NONE PRSNT: CPT | Performed by: FAMILY MEDICINE

## 2024-10-09 PROCEDURE — 1160F RVW MEDS BY RX/DR IN RCRD: CPT | Performed by: FAMILY MEDICINE

## 2024-10-09 PROCEDURE — 99214 OFFICE O/P EST MOD 30 MIN: CPT | Performed by: FAMILY MEDICINE

## 2024-10-09 PROCEDURE — 1159F MED LIST DOCD IN RCRD: CPT | Performed by: FAMILY MEDICINE

## 2024-10-09 RX ORDER — ALPRAZOLAM 0.25 MG
0.25 TABLET ORAL DAILY PRN
Qty: 30 TABLET | Refills: 2 | Status: SHIPPED | OUTPATIENT
Start: 2024-10-09

## 2024-10-30 ENCOUNTER — HOSPITAL ENCOUNTER (OUTPATIENT)
Dept: GENERAL RADIOLOGY | Facility: HOSPITAL | Age: 79
Discharge: HOME | End: 2024-10-30
Admitting: FAMILY MEDICINE
Payer: MEDICARE

## 2024-10-30 DIAGNOSIS — J18.9 PNEUMONIA OF LEFT LOWER LOBE DUE TO INFECTIOUS ORGANISM: ICD-10-CM

## 2024-10-30 PROCEDURE — 71046 X-RAY EXAM CHEST 2 VIEWS: CPT

## 2024-11-06 ENCOUNTER — OFFICE VISIT (OUTPATIENT)
Dept: CARDIOLOGY | Facility: CLINIC | Age: 79
End: 2024-11-06
Payer: MEDICARE

## 2024-11-06 VITALS
HEART RATE: 71 BPM | HEIGHT: 70 IN | DIASTOLIC BLOOD PRESSURE: 64 MMHG | OXYGEN SATURATION: 99 % | SYSTOLIC BLOOD PRESSURE: 126 MMHG | WEIGHT: 186.6 LBS | BODY MASS INDEX: 26.71 KG/M2

## 2024-11-06 DIAGNOSIS — K21.9 GASTROESOPHAGEAL REFLUX DISEASE WITHOUT ESOPHAGITIS: ICD-10-CM

## 2024-11-06 DIAGNOSIS — I25.10 CORONARY ARTERY DISEASE INVOLVING NATIVE CORONARY ARTERY OF NATIVE HEART WITHOUT ANGINA PECTORIS: Primary | Chronic | ICD-10-CM

## 2024-11-06 DIAGNOSIS — E78.2 MIXED HYPERLIPIDEMIA: Chronic | ICD-10-CM

## 2024-11-06 DIAGNOSIS — E78.1 HYPERTRIGLYCERIDEMIA: ICD-10-CM

## 2024-11-06 PROCEDURE — 1159F MED LIST DOCD IN RCRD: CPT | Performed by: NURSE PRACTITIONER

## 2024-11-06 PROCEDURE — 1160F RVW MEDS BY RX/DR IN RCRD: CPT | Performed by: NURSE PRACTITIONER

## 2024-11-06 PROCEDURE — 93000 ELECTROCARDIOGRAM COMPLETE: CPT | Performed by: NURSE PRACTITIONER

## 2024-11-06 PROCEDURE — 99214 OFFICE O/P EST MOD 30 MIN: CPT | Performed by: NURSE PRACTITIONER

## 2024-11-06 NOTE — PROGRESS NOTES
Date of Office Visit: 2024  Encounter Provider: RUSS Crook  Place of Service: [facility] CARDIOLOGY  Patient Name: Td Melendez Jr.  :1945  Primary Cardiologist: Dr. Obi Boucher    Chief Complaint   Patient presents with    Coronary Artery Disease    Annual Exam   :     HPI: Td Melendez Jr. is a 78 y.o. male who presents today for annual cardiac follow-up visit.  I reviewed his medical records.    He has been diagnosed with hyperlipidemia, GERD, and fibromyalgia.    In 2015, he was diagnosed with coronary artery disease and underwent drug-eluting stent placement to the LAD and RCA.  He had a normal Myoview stress test in 2015, 2018, and 2023.    He presents today for cardiac follow-up visit.  He denies chest pain, shortness of air, palpitations, edema, dizziness, or syncope.  He does experience esophageal reflux/burning symptoms.    Past Medical History:   Diagnosis Date    Allergic     Anxiety     CAD (coronary artery disease)     unstable angina, 2015: 20% LM, long 70% LAD with abnormal FFR (s/p 2.24g45vr Xience), 50% OM1, 99% prox RCA (s/p 3.5x18m Xience).  Normal Cardiolite 2015 and 2018    Colon polyp     Fibromyalgia     GERD (gastroesophageal reflux disease)     H/O complete eye exam 2018    Hyperlipidemia     IBS (irritable bowel syndrome)     Squamous cell skin cancer, face     Urinary retention        Past Surgical History:   Procedure Laterality Date    CARDIAC CATHETERIZATION      [facility] 2015: cath with 20% LM long 70% LAD (with positive FFR), 50% OM1, 99% prox RCA, s/p 2.75x33 Xience Alpine to LAD and 3.5x18mm Xience Alpine to RCA. LVEF 64% Normal Cardiolite 2015    CARDIAC SURGERY      cardiac stents, 2    COLONOSCOPY  approx 2011    normal per patient  [facility]    COLONOSCOPY N/A 2021    Procedure: COLONOSCOPY TO CECUM WITH COLD SNARE POLYPECTOMIES AND BIOPSIES ;  Surgeon: Jeremy  Catarino Car MD;  Location: [facility] ENDOSCOPY;  Service: Gastroenterology;  Laterality: N/A;  PRE- HISTORY COLON POLYPS  POST- POLYPS, COLITIS, HEMORRHOIDS    COLONOSCOPY W/ POLYPECTOMY N/A 7/25/2018    Procedure: COLONOSCOPY TO CECUM WITH COLD SNAREPOLYPECTOMIES, HOT SNARE POLYPECTOMIES;  Surgeon: Catarino Luna MD;  Location: [facility] ENDOSCOPY;  Service: Gastroenterology    CORONARY ANGIOPLASTY WITH STENT PLACEMENT      CYSTOSCOPY TRANSURETHRAL RESECTION OF PROSTATE      ENDOSCOPY N/A 7/25/2018    Procedure: ESOPHAGOGASTRODUODENOSCOPY WITH COLD BIOPSIES;  Surgeon: Catarino Luna MD;  Location: [facility] ENDOSCOPY;  Service: Gastroenterology    ENDOSCOPY N/A 1/7/2021    Procedure: ESOPHAGOGASTRODUODENOSCOPY with cold biopsies;  Surgeon: Catarino Luna MD;  Location: [facility] ENDOSCOPY;  Service: Gastroenterology;  Laterality: N/A;  PRE: EPIGASTRIC PAIN, DYSPEPSIA  POST: GASTRITIS    ENDOSCOPY N/A 5/25/2022    Procedure: ESOPHAGOGASTRODUODENOSCOPY WITH DILATATION #48 to #58 bougie, gastric biopsy;  Surgeon: Td Richey DO;  Location: [facility] ENDOSCOPY;  Service: General;  Laterality: N/A;  gastritis, small hiatal hernia    ENDOSCOPY N/A 10/30/2023    Procedure: ESOPHAGOGASTRODUODENOSCOPY with biopsies x 1 area.;  Surgeon: Shell Suero MD;  Location: [facility] ENDOSCOPY;  Service: Gastroenterology;  Laterality: N/A;  Post-gastritis    HIATAL HERNIA REPAIR N/A 7/6/2022    Procedure: HIATAL HERNIA REPAIR LAPAROSCOPIC WITH TRANSORAL INCISIONLESS FUNDOPLICATION;  Surgeon: Td Richey DO;  Location: [facility] MAIN OR;  Service: General;  Laterality: N/A;    RADIOFREQUENCY ABLATION      NECK    SKIN CANCER EXCISION      WISDOM TOOTH EXTRACTION         Social History     Socioeconomic History    Marital status:    Tobacco Use    Smoking status: Former     Current packs/day: 0.00     Average packs/day: 0.3 packs/day for 4.0 years (1.0 ttl pk-yrs)     Types: Cigarettes     Start date:  1968     Quit date:      Years since quittin.8     Passive exposure: Never (IN CHILDHOOD)    Smokeless tobacco: Never   Vaping Use    Vaping status: Never Used   Substance and Sexual Activity    Alcohol use: No     Comment: caffeine use    Drug use: No    Sexual activity: Defer       Family History   Problem Relation Age of Onset    Cervical cancer Mother     Cancer Mother     Depression Mother     Heart disease Mother     Liver disease Mother     Alcohol abuse Mother     Thyroid cancer Father     Cancer Father     Depression Father     Diabetes Father     Thyroid disease Father     Coronary artery disease Other     Malig Hyperthermia Neg Hx        The following portion of the patient's history were reviewed and updated as appropriate: past medical history, past surgical history, past social history, past family history, allergies, current medications, and problem list.    Review of Systems   Constitutional: Negative.   Cardiovascular: Negative.    Respiratory: Negative.     Hematologic/Lymphatic: Negative.    Gastrointestinal:  Positive for heartburn.   Neurological: Negative.        Allergies   Allergen Reactions    Augmentin [Amoxicillin-Pot Clavulanate] Hives and Itching    Clavulanic Acid Unknown - High Severity    Potassium Chloride Unknown - High Severity     PT STATES NOT ALLERGIC TO MEDICATION          Current Outpatient Medications:     acetaminophen-codeine (TYLENOL #3) 300-30 MG per tablet, TAKE 1 TABLET BY MOUTH EVERY 4-6 HOURS AS NEEDED FOR PAIN, Disp: , Rfl:     ALPRAZolam (XANAX) 0.25 MG tablet, Take 1 tablet by mouth Daily As Needed for Anxiety., Disp: 30 tablet, Rfl: 2    clopidogrel (PLAVIX) 75 MG tablet, Take 1 tablet by mouth Daily., Disp: 90 tablet, Rfl: 3    coenzyme Q10 100 MG capsule, Take 1 capsule by mouth Daily. LD , Disp: , Rfl:     diphenhydrAMINE-acetaminophen (TYLENOL PM)  MG tablet per tablet, Take 1 tablet by mouth At Night As Needed for Sleep., Disp: , Rfl:  "    Emollient (COLLAGEN EX), Take 1 tablet by mouth Daily. LD 6/30, Disp: , Rfl:     ezetimibe (ZETIA) 10 MG tablet, Take 1 tablet by mouth Daily., Disp: 90 tablet, Rfl: 3    famotidine (Pepcid) 20 MG tablet, Take 1 tablet by mouth 2 (Two) Times a Day., Disp: 180 tablet, Rfl: 3    HYDROcodone-acetaminophen (NORCO) 5-325 MG per tablet, Take  by mouth See Admin Instructions. Take 1 tablet by mouth every 4-6 hours as needed for pain, Disp: , Rfl:     Hypromellose (GENTEAL SEVERE) 0.3 % ophthalmic gel, As Needed., Disp: , Rfl:     LITHIUM PO, Take 5 mg by mouth 2 (two) times a day., Disp: , Rfl:     pitavastatin calcium (LIVALO) 2 MG tablet tablet, Take 1 tablet by mouth Every Night., Disp: 90 tablet, Rfl: 3    sildenafil (REVATIO) 20 MG tablet, Take 2-3 tabs QD prn, Disp: 30 tablet, Rfl: 11    Vitamin D-Vitamin K (VITAMIN K2-VITAMIN D3 PO), Take  by mouth Daily. Hold for surgery, Disp: , Rfl:          Objective:     Vitals:    11/06/24 1531   BP: 126/64   BP Location: Left arm   Patient Position: Sitting   Cuff Size: Adult   Pulse: 71   SpO2: 99%   Weight: 84.6 kg (186 lb 9.6 oz)   Height: 177.8 cm (70\")     Body mass index is 26.77 kg/m².    PHYSICAL EXAM:    Vitals Reviewed.   General Appearance: No acute distress, well developed and well nourished. Obese.   Eyes: Glasses.   HENT: No hearing loss noted.    Respiratory: No signs of respiratory distress. Respiration rhythm and depth normal.  Clear to auscultation.   Cardiovascular:  Jugular Venous Pressure: Normal  Heart Rate and Rhythm: Normal, Heart Sounds: Normal S1 and S2. No S3 or S4 noted.  Murmurs: No murmurs noted. No rubs, thrills, or gallops.   Lower Extremities: No edema noted.  Musculoskeletal: Normal movement of extremities.  Skin: General appearance normal.    Psychiatric: Patient alert and oriented to person, place, and time. Speech and behavior appropriate. Normal mood and affect.       ECG 12 Lead    Date/Time: 11/6/2024 3:32 PM  Performed by: " Padmini Ballard APRN    Authorized by: Padmini Ballard APRN  Comparison: compared with previous ECG from 7/6/2023  Similar to previous ECG  Rhythm: sinus rhythm  Rate: normal  BPM: 71  Conduction: conduction normal  T Waves: T waves normal  QRS axis: normal  Other findings: non-specific ST-T wave changes    Clinical impression: non-specific ECG            Assessment:       Diagnosis Plan   1. Coronary artery disease involving native coronary artery of native heart without angina pectoris        2. Mixed hyperlipidemia        3. Hypertriglyceridemia        4. Gastroesophageal reflux disease without esophagitis               Plan:       1.  Coronary Artery Disease: Status post drug-eluting stent placement to the LAD and RCA in July 2015.  Normal stress test with the last being in July 2023.  Denies angina.  Continue clopidogrel and pitavastatin.    2/3.  Hyperlipidemia/Hypertriglyceridemia: Remains on pitavastatin and ezetimibe.  Followed by his PCP.    4.  Esophageal burning and heartburn.  Defer to PCP.    5.  Call with any cardiac concerns.  Follow-up with Dr. Boucher in 1 year.    As always, it has been a pleasure to participate in your patient's care. Thank you.         Sincerely,         RUSS Greco  [facility] Cardiology      Dictated utilizing Dragon Dictation

## 2025-02-17 ENCOUNTER — OFFICE (OUTPATIENT)
Dept: URBAN - METROPOLITAN AREA CLINIC 64 | Facility: CLINIC | Age: 80
End: 2025-02-17
Payer: MEDICARE

## 2025-02-17 VITALS
DIASTOLIC BLOOD PRESSURE: 85 MMHG | SYSTOLIC BLOOD PRESSURE: 156 MMHG | HEIGHT: 70 IN | DIASTOLIC BLOOD PRESSURE: 89 MMHG | WEIGHT: 189 LBS | SYSTOLIC BLOOD PRESSURE: 147 MMHG | HEART RATE: 69 BPM

## 2025-02-17 DIAGNOSIS — R19.7 DIARRHEA, UNSPECIFIED: ICD-10-CM

## 2025-02-17 DIAGNOSIS — Z86.0101 PERSONAL HISTORY OF ADENOMATOUS AND SERRATED COLON POLYPS: ICD-10-CM

## 2025-02-17 DIAGNOSIS — K21.9 GASTRO-ESOPHAGEAL REFLUX DISEASE WITHOUT ESOPHAGITIS: ICD-10-CM

## 2025-02-17 PROCEDURE — 99213 OFFICE O/P EST LOW 20 MIN: CPT | Performed by: INTERNAL MEDICINE

## 2025-04-08 ENCOUNTER — TELEPHONE (OUTPATIENT)
Dept: CARDIOLOGY | Age: 80
End: 2025-04-08
Payer: MEDICARE

## 2025-04-08 NOTE — TELEPHONE ENCOUNTER
Caller: Td Melendez Jr.    Relationship: Self    Best call back number:  [phone]    PATIENT HAS BEEN IN [place], LAST WEEK HE HAD 2 HEART CATHS.  HE WOULD LIKE A FOLLOW UP TO DISCUSS THESE MATTERS WITH YOU.

## 2025-04-08 NOTE — PROGRESS NOTES
RM:________     PCP: Beau Lee MD    : 1945  AGE: 79 y.o.  EST PATIENT           Wt Readings from Last 3 Encounters:   24 83.5 kg (184 lb)   24 84.6 kg (186 lb 9.6 oz)   10/09/24 83 kg (183 lb)           CP______  SOA_______ DIZZINESS _____ FATIGUE ______  PALPS ______    WT: ____________ BP: __________L __________R HR______    ALLERGIES:Augmentin [amoxicillin-pot clavulanate], Clavulanic acid, and Potassium chloride      Social History     Tobacco Use    Smoking status: Former     Current packs/day: 0.00     Average packs/day: 0.3 packs/day for 4.0 years (1.0 ttl pk-yrs)     Types: Cigarettes     Start date: 1968     Quit date: 1972     Years since quittin.3     Passive exposure: Never (IN CHILDHOOD)    Smokeless tobacco: Never   Vaping Use    Vaping status: Never Used   Substance Use Topics    Alcohol use: No     Comment: caffeine use    Drug use: No

## 2025-04-08 NOTE — TELEPHONE ENCOUNTER
I called pt and he gave me the information for his hospital d/c  Pt has multiple questions that require an appt and obtaining records.      [facility]   DOS 04/01/25 and 04/03/25.      HIM fax number [phone]  Hospital: [phone]    Pt is scheduled with Dr. Boucher on Thurs at 1000.

## 2025-04-10 ENCOUNTER — HOSPITAL ENCOUNTER (OUTPATIENT)
Dept: CARDIOLOGY | Facility: HOSPITAL | Age: 80
Discharge: HOME | End: 2025-04-10
Admitting: INTERNAL MEDICINE
Payer: MEDICARE

## 2025-04-10 ENCOUNTER — OFFICE VISIT (OUTPATIENT)
Dept: CARDIOLOGY | Age: 80
End: 2025-04-10
Payer: MEDICARE

## 2025-04-10 VITALS
DIASTOLIC BLOOD PRESSURE: 72 MMHG | BODY MASS INDEX: 26.92 KG/M2 | WEIGHT: 188 LBS | HEART RATE: 77 BPM | SYSTOLIC BLOOD PRESSURE: 128 MMHG | HEIGHT: 70 IN

## 2025-04-10 DIAGNOSIS — R09.89: ICD-10-CM

## 2025-04-10 DIAGNOSIS — I25.10 CORONARY ARTERY DISEASE INVOLVING NATIVE CORONARY ARTERY OF NATIVE HEART WITHOUT ANGINA PECTORIS: Chronic | ICD-10-CM

## 2025-04-10 DIAGNOSIS — I25.10 CORONARY ARTERY DISEASE INVOLVING NATIVE CORONARY ARTERY OF NATIVE HEART WITHOUT ANGINA PECTORIS: Primary | Chronic | ICD-10-CM

## 2025-04-10 DIAGNOSIS — E78.2 MIXED HYPERLIPIDEMIA: Chronic | ICD-10-CM

## 2025-04-10 DIAGNOSIS — S30.1XXA HEMATOMA OF GROIN, INITIAL ENCOUNTER: ICD-10-CM

## 2025-04-10 LAB
BH CV RIGHT GROIN PSA PROCEDURE SCRIPTING LRR: 1
BH CV VAS PRELIMINARY FINDINGS SCRIPTING: 1
BH CV XLRA MEAS EXT ILIAC A PSV RIGHT: 108 CM/SEC
PROX PFA PSV RIGHT: 47.8 CM/SEC
PROX SFA PSV RIGHT: 74.6 CM/SEC
RIGHT GROIN CFA SYS: 87 CM/SEC

## 2025-04-10 PROCEDURE — 93926 LOWER EXTREMITY STUDY: CPT

## 2025-04-10 RX ORDER — TAMSULOSIN HYDROCHLORIDE 0.4 MG/1
CAPSULE ORAL DAILY
COMMUNITY

## 2025-04-10 NOTE — LETTER
April 10, 2025     Beau Lee MD  [address] [address]  [address]  [place] [address]    Patient: Td Melendez Jr.   YOB: 1945   Date of Visit: 4/10/2025     Dear Beau Lee MD:       Thank you for referring Td Melendez to me for evaluation. Below are the relevant portions of my assessment and plan of care.    If you have questions, please do not hesitate to call me. I look forward to following Td along with you.         Sincerely,        Obi Boucher MD        CC: No Recipients    Obi Boucher MD  04/10/25 1245  Sign when Signing Visit  Date of Office Visit: 04/10/2025  Encounter Provider: Obi Boucher MD  Place of Service: [facility] CARDIOLOGY  Patient Name: Td Melendez Jr.  :1945    Chief Complaint   Patient presents with   • Coronary Artery Disease   • Post-op   :     HPI: Td Melendez Jr. is a 79 y.o. male who presents today in hospital follow up. I have reviewed prior notes and there are no changes except for any new updates described below. I have also reviewed any information entered into the medical record by the patient or by ancillary staff.     He has a history of unstable angina and underwent drug-eluting stent placement to the left anterior descending artery and the right coronary artery in 2015. He was noted to have a nonobstructive lesion in the circumflex. In 2015, he presented with chest discomfort ; a Cardiolite stress was normal. In 2018, he was admitted with chest pain that sounded GI in etiology; a Cardiolite stress test was normal. He had a stress test in  for atypical chest pain; that was normal.    He had been doing very well. Approximately four weeks ago he started to notice more indegestion/heartburn than usual. He was visiting his son in [place] in late 2025 and developed fairly classic exertional angina. He was admitted to [facility]. He ruled out for ACS; an echo was normal  "(EF 60-64%, normal diastology, normal valves). He underwent coronary angiography, which revealed:  *normal LM/Cx  *80% ISR LAD involving the ostium of the diag  *80% distal RCA    He underwent placement of a 3.5x18mm Xience stent to the RCA, and placement of a 2.75x8mm Synergy stent to the LAD which was post-dilated to 3mm. Prior to LAD stent placement, the ostium of the diag was angioplastied with a balloon. He was switched from clopidogrel, which he was on for monotherapy, to aspirin/ticagrelor.    He feels well for the most part. His groin is very bruised and has a tender \"knot.\" He feels fatigued but denies CP or SOA.     Past Medical History:   Diagnosis Date   • Anxiety    • CAD (coronary artery disease)     unstable angina, 7/2015: 20% LM, long 70% LAD with abnormal FFR (s/p 2.68h07lr Xience), 50% OM1, 99% prox RCA (s/p 3.5x18m Xience).  Normal Cardiolite 11/2015 and 6/2018   • Chronic diarrhea 1980    treat with lotronex   • Colon polyp 2010   • Depression 1983   • Fibromyalgia    • GERD (gastroesophageal reflux disease)    • H/O complete eye exam 04/2018   • Hyperlipidemia    • IBS (irritable bowel syndrome)    • Injury of back 2005   • Sinusitis 1987   • Squamous cell skin cancer, face    • Urinary retention        Past Surgical History:   Procedure Laterality Date   • CARDIAC CATHETERIZATION      [facility] 07/2015: cath with 20% LM long 70% LAD (with positive FFR), 50% OM1, 99% prox RCA, s/p 2.75x33 Xience Alpine to LAD and 3.5x18mm Xience Alpine to RCA. LVEF 64% Normal Cardiolite 11/2015   • CARDIAC SURGERY      cardiac stents, 2   • COLONOSCOPY  approx 6/2011    normal per patient  [facility]   • COLONOSCOPY N/A 04/26/2021    Procedure: COLONOSCOPY TO CECUM WITH COLD SNARE POLYPECTOMIES AND BIOPSIES ;  Surgeon: Catarino Luna MD;  Location: [facility] ENDOSCOPY;  Service: Gastroenterology;  Laterality: N/A;  PRE- HISTORY COLON POLYPS  POST- POLYPS, COLITIS, HEMORRHOIDS   • COLONOSCOPY W/ POLYPECTOMY " N/A 07/25/2018    Procedure: COLONOSCOPY TO CECUM WITH COLD SNAREPOLYPECTOMIES, HOT SNARE POLYPECTOMIES;  Surgeon: Catarino Luna MD;  Location: [facility] ENDOSCOPY;  Service: Gastroenterology   • CORONARY ANGIOPLASTY WITH STENT PLACEMENT     • CORONARY ANGIOPLASTY WITH STENT PLACEMENT  04/01/2025   • CORONARY ANGIOPLASTY WITH STENT PLACEMENT  04/03/2025   • CYSTOSCOPY TRANSURETHRAL RESECTION OF PROSTATE     • ENDOSCOPY N/A 07/25/2018    Procedure: ESOPHAGOGASTRODUODENOSCOPY WITH COLD BIOPSIES;  Surgeon: Catarino Luna MD;  Location: [facility] ENDOSCOPY;  Service: Gastroenterology   • ENDOSCOPY N/A 01/07/2021    Procedure: ESOPHAGOGASTRODUODENOSCOPY with cold biopsies;  Surgeon: Catarino Luna MD;  Location: [facility] ENDOSCOPY;  Service: Gastroenterology;  Laterality: N/A;  PRE: EPIGASTRIC PAIN, DYSPEPSIA  POST: GASTRITIS   • ENDOSCOPY N/A 05/25/2022    Procedure: ESOPHAGOGASTRODUODENOSCOPY WITH DILATATION #48 to #58 bougie, gastric biopsy;  Surgeon: Td Richey DO;  Location: [facility] ENDOSCOPY;  Service: General;  Laterality: N/A;  gastritis, small hiatal hernia   • ENDOSCOPY N/A 10/30/2023    Procedure: ESOPHAGOGASTRODUODENOSCOPY with biopsies x 1 area.;  Surgeon: Shell Suero MD;  Location: [facility] ENDOSCOPY;  Service: Gastroenterology;  Laterality: N/A;  Post-gastritis   • HIATAL HERNIA REPAIR N/A 07/06/2022    Procedure: HIATAL HERNIA REPAIR LAPAROSCOPIC WITH TRANSORAL INCISIONLESS FUNDOPLICATION;  Surgeon: Td Richey DO;  Location: [facility] MAIN OR;  Service: General;  Laterality: N/A;   • RADIOFREQUENCY ABLATION      NECK   • SKIN CANCER EXCISION     • WISDOM TOOTH EXTRACTION         Social History     Socioeconomic History   • Marital status:    Tobacco Use   • Smoking status: Former     Current packs/day: 0.00     Average packs/day: 0.3 packs/day for 4.0 years (1.0 ttl pk-yrs)     Types: Cigarettes     Start date: 1/1/1968     Quit date: 1972     Years since quitting:  53.3     Passive exposure: Never (IN CHILDHOOD)   • Smokeless tobacco: Never   Vaping Use   • Vaping status: Never Used   Substance and Sexual Activity   • Alcohol use: No     Comment: caffeine use   • Drug use: No   • Sexual activity: Defer       Family History   Problem Relation Age of Onset   • Cervical cancer Mother    • Cancer Mother    • Depression Mother    • Heart disease Mother    • Liver disease Mother    • Alcohol abuse Mother    • Thyroid cancer Father    • Cancer Father    • Depression Father    • Diabetes Father    • Thyroid disease Father    • Heart disease Father    • Coronary artery disease Other    • Heart disease Brother    • Malig Hyperthermia Neg Hx        Review of Systems   Constitutional: Positive for malaise/fatigue.   Gastrointestinal:  Positive for heartburn.   All other systems reviewed and are negative.      Allergies   Allergen Reactions   • Augmentin [Amoxicillin-Pot Clavulanate] Hives and Itching   • Clavulanic Acid Unknown - High Severity        Current Outpatient Medications:   •  ALPRAZolam (XANAX) 0.25 MG tablet, Take 1 tablet by mouth Daily As Needed for Anxiety., Disp: 30 tablet, Rfl: 2  •  ASPIRIN 81 PO, Take  by mouth Daily., Disp: , Rfl:   •  coenzyme Q10 100 MG capsule, Take 1 capsule by mouth Daily. LD 6/30, Disp: , Rfl:   •  diphenhydrAMINE-acetaminophen (TYLENOL PM)  MG tablet per tablet, Take 1 tablet by mouth At Night As Needed for Sleep., Disp: , Rfl:   •  Emollient (COLLAGEN EX), Take 1 tablet by mouth Daily. LD 6/30, Disp: , Rfl:   •  ezetimibe (ZETIA) 10 MG tablet, Take 1 tablet by mouth Daily., Disp: 90 tablet, Rfl: 3  •  famotidine (Pepcid) 20 MG tablet, Take 1 tablet by mouth 2 (Two) Times a Day., Disp: 180 tablet, Rfl: 3  •  HYDROcodone-acetaminophen (NORCO) 5-325 MG per tablet, Take  by mouth See Admin Instructions. Take 1 tablet by mouth every 4-6 hours as needed for pain, Disp: , Rfl:   •  Hypromellose (GENTEAL SEVERE) 0.3 % ophthalmic gel, As Needed.,  "Disp: , Rfl:   •  LITHIUM PO, Take 5 mg by mouth 2 (two) times a day., Disp: , Rfl:   •  pitavastatin calcium (LIVALO) 2 MG tablet tablet, Take 1 tablet by mouth Every Night., Disp: 90 tablet, Rfl: 3  •  sildenafil (REVATIO) 20 MG tablet, Take 2-3 tabs QD prn, Disp: 30 tablet, Rfl: 11  •  ticagrelor (BRILINTA) 90 MG tablet tablet, Take 1 tablet by mouth 2 (Two) Times a Day., Disp: 180 tablet, Rfl: 3  •  methylPREDNISolone (MEDROL) 4 MG dose pack, Take as directed on package instructions. (Patient not taking: Reported on 4/10/2025), Disp: 21 each, Rfl: 0  •  tamsulosin (Flomax) 0.4 MG capsule 24 hr capsule, Take  by mouth Daily., Disp: , Rfl:   •  ticagrelor (BRILINTA) 60 MG tablet tablet, Take 1 tablet by mouth 2 (Two) Times a Day., Disp: 30 tablet, Rfl: 0  •  Vitamin D-Vitamin K (VITAMIN K2-VITAMIN D3 PO), Take  by mouth Daily. Hold for surgery, Disp: , Rfl:     Objective:     Vitals:    04/10/25 1021   BP: 128/72   BP Location: Left arm   Pulse: 77   Weight: 85.3 kg (188 lb)   Height: 177.8 cm (70\")       Body mass index is 26.98 kg/m².    Physical Exam  Vitals reviewed.   Constitutional:       Appearance: He is well-developed.   HENT:      Head: Normocephalic.      Nose: Nose normal.      Mouth/Throat:      Pharynx: Oropharynx is clear.   Eyes:      Conjunctiva/sclera: Conjunctivae normal.   Neck:      Vascular: No JVD.   Cardiovascular:      Rate and Rhythm: Normal rate and regular rhythm.      Pulses: Normal pulses.      Heart sounds: Normal heart sounds.      Comments: Large area of bruising right groin and thigh, firm hematoma right groin measuring 2.5cm x 1cm, normal femoral pulse, slight bruit  Pulmonary:      Effort: Pulmonary effort is normal.      Breath sounds: Normal breath sounds.   Abdominal:      Palpations: Abdomen is soft.      Tenderness: There is no abdominal tenderness.   Musculoskeletal:         General: No swelling. Normal range of motion.      Cervical back: Normal range of motion.   Skin:   "   General: Skin is warm and dry.      Findings: No erythema.   Neurological:      General: No focal deficit present.      Mental Status: He is alert.   Psychiatric:         Mood and Affect: Mood normal.             ECG 12 Lead    Date/Time: 4/10/2025 12:31 PM  Performed by: Obi Boucher MD    Authorized by: Obi Boucher MD  Comparison: compared with previous ECG   Similar to previous ECG  Rhythm: sinus rhythm  Conduction: conduction normal  ST Segments: ST segments normal  T Waves: T waves normal  QRS axis: normal  Other: no other findings    Clinical impression: normal ECG        Assessment:       Diagnosis Plan   1. Coronary artery disease involving native coronary artery of native heart without angina pectoris  CBC (No Diff)    Lipid Panel    Lipoprotein A (LPA)    Duplex Pseudoaneurysm CAR    Ambulatory Referral to Cardiac Rehab      2. Mixed hyperlipidemia        3. Hematoma of groin, initial encounter  Duplex Pseudoaneurysm CAR      4. Bruit of large artery  Duplex Pseudoaneurysm CAR            Plan:      Coronary Artery Disease  Assessment  • The patient has no angina  • He's doing well.  He's on ezetimibe and pitavastatin.    Subjective - Objective  • There has been a previous stent procedure using MADELIN  7/2015 and 4/2025  • Current antiplatelet therapy includes aspirin 81mg and ticagrelor 90mg    It sounds like he had in-stent restenosis and not thrombosis. He's on DAPT with aspirin/ticagrelor. I will refer him to cardiac rehab. He has a large groin hematoma and a slight bruit; his pulse is bounding. I sent him for a stat duplex and thankfully it was negative for pseudoaneurysm or fistula.    I am checking a lipid panel and Lp(a). If the latter is elevated, he would likely benefit from a PCSK9i in lieu of ezetimibe. I'm going to check a CBC given his fatigue and large groin hematoma.    Sincerely,     Obi Boucher MD

## 2025-04-10 NOTE — PROGRESS NOTES
Date of Office Visit: 04/10/2025  Encounter Provider: Obi Boucher MD  Place of Service: [facility] CARDIOLOGY  Patient Name: Td Melendez Jr.  :1945    Chief Complaint   Patient presents with    Coronary Artery Disease    Post-op   :     HPI: Td Melendez Jr. is a 79 y.o. male who presents today in hospital follow up. I have reviewed prior notes and there are no changes except for any new updates described below. I have also reviewed any information entered into the medical record by the patient or by ancillary staff.     He has a history of unstable angina and underwent drug-eluting stent placement to the left anterior descending artery and the right coronary artery in 2015. He was noted to have a nonobstructive lesion in the circumflex. In 2015, he presented with chest discomfort ; a Cardiolite stress was normal. In 2018, he was admitted with chest pain that sounded GI in etiology; a Cardiolite stress test was normal. He had a stress test in  for atypical chest pain; that was normal.    He had been doing very well. Approximately four weeks ago he started to notice more indegestion/heartburn than usual. He was visiting his son in [place] in late 2025 and developed fairly classic exertional angina. He was admitted to [facility]. He ruled out for ACS; an echo was normal (EF 60-64%, normal diastology, normal valves). He underwent coronary angiography, which revealed:  *normal LM/Cx  *80% ISR LAD involving the ostium of the diag  *80% distal RCA    He underwent placement of a 3.5x18mm Xience stent to the RCA, and placement of a 2.75x8mm Synergy stent to the LAD which was post-dilated to 3mm. Prior to LAD stent placement, the ostium of the diag was angioplastied with a balloon. He was switched from clopidogrel, which he was on for monotherapy, to aspirin/ticagrelor.    He feels well for the most part. His groin is very bruised and has a tender  "\"knot.\" He feels fatigued but denies CP or SOA.     Past Medical History:   Diagnosis Date    Anxiety     CAD (coronary artery disease)     unstable angina, 7/2015: 20% LM, long 70% LAD with abnormal FFR (s/p 2.03i37un Xience), 50% OM1, 99% prox RCA (s/p 3.5x18m Xience).  Normal Cardiolite 11/2015 and 6/2018    Chronic diarrhea 1980    treat with lotronex    Colon polyp 2010    Depression 1983    Fibromyalgia     GERD (gastroesophageal reflux disease)     H/O complete eye exam 04/2018    Hyperlipidemia     IBS (irritable bowel syndrome)     Injury of back 2005    Sinusitis 1987    Squamous cell skin cancer, face     Urinary retention        Past Surgical History:   Procedure Laterality Date    CARDIAC CATHETERIZATION      [facility] 07/2015: cath with 20% LM long 70% LAD (with positive FFR), 50% OM1, 99% prox RCA, s/p 2.75x33 Xience Alpine to LAD and 3.5x18mm Xience Alpine to RCA. LVEF 64% Normal Cardiolite 11/2015    CARDIAC SURGERY      cardiac stents, 2    COLONOSCOPY  approx 6/2011    normal per patient  [facility]    COLONOSCOPY N/A 04/26/2021    Procedure: COLONOSCOPY TO CECUM WITH COLD SNARE POLYPECTOMIES AND BIOPSIES ;  Surgeon: Catarino Luna MD;  Location: [facility] ENDOSCOPY;  Service: Gastroenterology;  Laterality: N/A;  PRE- HISTORY COLON POLYPS  POST- POLYPS, COLITIS, HEMORRHOIDS    COLONOSCOPY W/ POLYPECTOMY N/A 07/25/2018    Procedure: COLONOSCOPY TO CECUM WITH COLD SNAREPOLYPECTOMIES, HOT SNARE POLYPECTOMIES;  Surgeon: Catarino Luna MD;  Location: [facility] ENDOSCOPY;  Service: Gastroenterology    CORONARY ANGIOPLASTY WITH STENT PLACEMENT      CORONARY ANGIOPLASTY WITH STENT PLACEMENT  04/01/2025    CORONARY ANGIOPLASTY WITH STENT PLACEMENT  04/03/2025    CYSTOSCOPY TRANSURETHRAL RESECTION OF PROSTATE      ENDOSCOPY N/A 07/25/2018    Procedure: ESOPHAGOGASTRODUODENOSCOPY WITH COLD BIOPSIES;  Surgeon: Catarino Luna MD;  Location: [facility] ENDOSCOPY;  Service: Gastroenterology    ENDOSCOPY " N/A 2021    Procedure: ESOPHAGOGASTRODUODENOSCOPY with cold biopsies;  Surgeon: Catarino Luna MD;  Location: [facility] ENDOSCOPY;  Service: Gastroenterology;  Laterality: N/A;  PRE: EPIGASTRIC PAIN, DYSPEPSIA  POST: GASTRITIS    ENDOSCOPY N/A 2022    Procedure: ESOPHAGOGASTRODUODENOSCOPY WITH DILATATION #48 to #58 bougie, gastric biopsy;  Surgeon: Td Richey DO;  Location: [facility] ENDOSCOPY;  Service: General;  Laterality: N/A;  gastritis, small hiatal hernia    ENDOSCOPY N/A 10/30/2023    Procedure: ESOPHAGOGASTRODUODENOSCOPY with biopsies x 1 area.;  Surgeon: Shell Suero MD;  Location: [facility] ENDOSCOPY;  Service: Gastroenterology;  Laterality: N/A;  Post-gastritis    HIATAL HERNIA REPAIR N/A 2022    Procedure: HIATAL HERNIA REPAIR LAPAROSCOPIC WITH TRANSORAL INCISIONLESS FUNDOPLICATION;  Surgeon: Td Richey DO;  Location: [facility] MAIN OR;  Service: General;  Laterality: N/A;    RADIOFREQUENCY ABLATION      NECK    SKIN CANCER EXCISION      WISDOM TOOTH EXTRACTION         Social History     Socioeconomic History    Marital status:    Tobacco Use    Smoking status: Former     Current packs/day: 0.00     Average packs/day: 0.3 packs/day for 4.0 years (1.0 ttl pk-yrs)     Types: Cigarettes     Start date: 1968     Quit date:      Years since quittin.3     Passive exposure: Never (IN CHILDHOOD)    Smokeless tobacco: Never   Vaping Use    Vaping status: Never Used   Substance and Sexual Activity    Alcohol use: No     Comment: caffeine use    Drug use: No    Sexual activity: Defer       Family History   Problem Relation Age of Onset    Cervical cancer Mother     Cancer Mother     Depression Mother     Heart disease Mother     Liver disease Mother     Alcohol abuse Mother     Thyroid cancer Father     Cancer Father     Depression Father     Diabetes Father     Thyroid disease Father     Heart disease Father     Coronary artery disease Other     Heart disease  Brother     Malig Hyperthermia Neg Hx        Review of Systems   Constitutional: Positive for malaise/fatigue.   Gastrointestinal:  Positive for heartburn.   All other systems reviewed and are negative.      Allergies   Allergen Reactions    Augmentin [Amoxicillin-Pot Clavulanate] Hives and Itching    Clavulanic Acid Unknown - High Severity        Current Outpatient Medications:     ALPRAZolam (XANAX) 0.25 MG tablet, Take 1 tablet by mouth Daily As Needed for Anxiety., Disp: 30 tablet, Rfl: 2    ASPIRIN 81 PO, Take  by mouth Daily., Disp: , Rfl:     coenzyme Q10 100 MG capsule, Take 1 capsule by mouth Daily. LD 6/30, Disp: , Rfl:     diphenhydrAMINE-acetaminophen (TYLENOL PM)  MG tablet per tablet, Take 1 tablet by mouth At Night As Needed for Sleep., Disp: , Rfl:     Emollient (COLLAGEN EX), Take 1 tablet by mouth Daily. LD 6/30, Disp: , Rfl:     ezetimibe (ZETIA) 10 MG tablet, Take 1 tablet by mouth Daily., Disp: 90 tablet, Rfl: 3    famotidine (Pepcid) 20 MG tablet, Take 1 tablet by mouth 2 (Two) Times a Day., Disp: 180 tablet, Rfl: 3    HYDROcodone-acetaminophen (NORCO) 5-325 MG per tablet, Take  by mouth See Admin Instructions. Take 1 tablet by mouth every 4-6 hours as needed for pain, Disp: , Rfl:     Hypromellose (GENTEAL SEVERE) 0.3 % ophthalmic gel, As Needed., Disp: , Rfl:     LITHIUM PO, Take 5 mg by mouth 2 (two) times a day., Disp: , Rfl:     pitavastatin calcium (LIVALO) 2 MG tablet tablet, Take 1 tablet by mouth Every Night., Disp: 90 tablet, Rfl: 3    sildenafil (REVATIO) 20 MG tablet, Take 2-3 tabs QD prn, Disp: 30 tablet, Rfl: 11    ticagrelor (BRILINTA) 90 MG tablet tablet, Take 1 tablet by mouth 2 (Two) Times a Day., Disp: 180 tablet, Rfl: 3    methylPREDNISolone (MEDROL) 4 MG dose pack, Take as directed on package instructions. (Patient not taking: Reported on 4/10/2025), Disp: 21 each, Rfl: 0    tamsulosin (Flomax) 0.4 MG capsule 24 hr capsule, Take  by mouth Daily., Disp: , Rfl:      "ticagrelor (BRILINTA) 60 MG tablet tablet, Take 1 tablet by mouth 2 (Two) Times a Day., Disp: 30 tablet, Rfl: 0    Vitamin D-Vitamin K (VITAMIN K2-VITAMIN D3 PO), Take  by mouth Daily. Hold for surgery, Disp: , Rfl:      Objective:     Vitals:    04/10/25 1021   BP: 128/72   BP Location: Left arm   Pulse: 77   Weight: 85.3 kg (188 lb)   Height: 177.8 cm (70\")       Body mass index is 26.98 kg/m².    Physical Exam  Vitals reviewed.   Constitutional:       Appearance: He is well-developed.   HENT:      Head: Normocephalic.      Nose: Nose normal.      Mouth/Throat:      Pharynx: Oropharynx is clear.   Eyes:      Conjunctiva/sclera: Conjunctivae normal.   Neck:      Vascular: No JVD.   Cardiovascular:      Rate and Rhythm: Normal rate and regular rhythm.      Pulses: Normal pulses.      Heart sounds: Normal heart sounds.      Comments: Large area of bruising right groin and thigh, firm hematoma right groin measuring 2.5cm x 1cm, normal femoral pulse, slight bruit  Pulmonary:      Effort: Pulmonary effort is normal.      Breath sounds: Normal breath sounds.   Abdominal:      Palpations: Abdomen is soft.      Tenderness: There is no abdominal tenderness.   Musculoskeletal:         General: No swelling. Normal range of motion.      Cervical back: Normal range of motion.   Skin:     General: Skin is warm and dry.      Findings: No erythema.   Neurological:      General: No focal deficit present.      Mental Status: He is alert.   Psychiatric:         Mood and Affect: Mood normal.             ECG 12 Lead    Date/Time: 4/10/2025 12:31 PM  Performed by: Obi Boucher MD    Authorized by: Obi Boucher MD  Comparison: compared with previous ECG   Similar to previous ECG  Rhythm: sinus rhythm  Conduction: conduction normal  ST Segments: ST segments normal  T Waves: T waves normal  QRS axis: normal  Other: no other findings    Clinical impression: normal ECG        Assessment:       Diagnosis Plan   1. Coronary artery disease " involving native coronary artery of native heart without angina pectoris  CBC (No Diff)    Lipid Panel    Lipoprotein A (LPA)    Duplex Pseudoaneurysm CAR    Ambulatory Referral to Cardiac Rehab      2. Mixed hyperlipidemia        3. Hematoma of groin, initial encounter  Duplex Pseudoaneurysm CAR      4. Bruit of large artery  Duplex Pseudoaneurysm CAR             Plan:       Coronary Artery Disease  Assessment   The patient has no angina   He's doing well.  He's on ezetimibe and pitavastatin.    Subjective - Objective   There has been a previous stent procedure using MADELIN  7/2015 and 4/2025   Current antiplatelet therapy includes aspirin 81mg and ticagrelor 90mg    It sounds like he had in-stent restenosis and not thrombosis. He's on DAPT with aspirin/ticagrelor. I will refer him to cardiac rehab. He has a large groin hematoma and a slight bruit; his pulse is bounding. I sent him for a stat duplex and thankfully it was negative for pseudoaneurysm or fistula.    I am checking a lipid panel and Lp(a). If the latter is elevated, he would likely benefit from a PCSK9i in lieu of ezetimibe. I'm going to check a CBC given his fatigue and large groin hematoma.    Sincerely,     Obi Boucher MD

## 2025-04-14 ENCOUNTER — LAB (OUTPATIENT)
Dept: LAB | Facility: HOSPITAL | Age: 80
End: 2025-04-14
Payer: MEDICARE

## 2025-04-14 DIAGNOSIS — I25.10 CORONARY ARTERY DISEASE INVOLVING NATIVE CORONARY ARTERY OF NATIVE HEART WITHOUT ANGINA PECTORIS: Chronic | ICD-10-CM

## 2025-04-14 LAB
CHOLEST SERPL-MCNC: 137 MG/DL (ref 0–200)
DEPRECATED RDW RBC AUTO: 41 FL (ref 37–54)
ERYTHROCYTE [DISTWIDTH] IN BLOOD BY AUTOMATED COUNT: 12.3 % (ref 12.3–15.4)
HCT VFR BLD AUTO: 42.7 % (ref 37.5–51)
HDLC SERPL-MCNC: 32 MG/DL (ref 40–60)
HGB BLD-MCNC: 14.6 G/DL (ref 13–17.7)
LDLC SERPL CALC-MCNC: 86 MG/DL (ref 0–100)
LDLC/HDLC SERPL: 2.63 {RATIO}
MCH RBC QN AUTO: 31.3 PG (ref 26.6–33)
MCHC RBC AUTO-ENTMCNC: 34.2 G/DL (ref 31.5–35.7)
MCV RBC AUTO: 91.4 FL (ref 79–97)
PLATELET # BLD AUTO: 275 10*3/MM3 (ref 140–450)
PMV BLD AUTO: 9.1 FL (ref 6–12)
RBC # BLD AUTO: 4.67 10*6/MM3 (ref 4.14–5.8)
TRIGL SERPL-MCNC: 104 MG/DL (ref 0–150)
VLDLC SERPL-MCNC: 19 MG/DL (ref 5–40)
WBC NRBC COR # BLD AUTO: 6.19 10*3/MM3 (ref 3.4–10.8)

## 2025-04-14 PROCEDURE — 85027 COMPLETE CBC AUTOMATED: CPT

## 2025-04-14 PROCEDURE — 83695 ASSAY OF LIPOPROTEIN(A): CPT

## 2025-04-14 PROCEDURE — 80061 LIPID PANEL: CPT

## 2025-04-14 PROCEDURE — 36415 COLL VENOUS BLD VENIPUNCTURE: CPT

## 2025-04-15 ENCOUNTER — PATIENT MESSAGE (OUTPATIENT)
Dept: CARDIOLOGY | Age: 80
End: 2025-04-15
Payer: MEDICARE

## 2025-04-15 ENCOUNTER — TRANSCRIBE ORDERS (OUTPATIENT)
Dept: CARDIAC REHAB | Facility: HOSPITAL | Age: 80
End: 2025-04-15
Payer: MEDICARE

## 2025-04-15 DIAGNOSIS — Z95.5 STATUS POST INSERTION OF DRUG ELUTING CORONARY ARTERY STENT: Primary | ICD-10-CM

## 2025-04-15 LAB — LPA SERPL-SCNC: 10 NMOL/L

## 2025-04-15 NOTE — TELEPHONE ENCOUNTER
I called. The 60mg was my error. Thankfully he has received the 90 day supply of the med. I cxl'd the 60mg.

## 2025-04-18 ENCOUNTER — OFFICE VISIT (OUTPATIENT)
Dept: CARDIAC REHAB | Facility: HOSPITAL | Age: 80
End: 2025-04-18
Payer: MEDICARE

## 2025-04-18 DIAGNOSIS — Z95.5 STATUS POST INSERTION OF DRUG ELUTING CORONARY ARTERY STENT: Primary | ICD-10-CM

## 2025-04-18 PROCEDURE — 93798 PHYS/QHP OP CAR RHAB W/ECG: CPT

## 2025-04-18 PROCEDURE — 93797 PHYS/QHP OP CAR RHAB WO ECG: CPT

## 2025-04-21 ENCOUNTER — TREATMENT (OUTPATIENT)
Dept: CARDIAC REHAB | Facility: HOSPITAL | Age: 80
End: 2025-04-21
Payer: MEDICARE

## 2025-04-21 DIAGNOSIS — Z95.5 STATUS POST INSERTION OF DRUG ELUTING CORONARY ARTERY STENT: Primary | ICD-10-CM

## 2025-04-21 PROCEDURE — 93798 PHYS/QHP OP CAR RHAB W/ECG: CPT

## 2025-04-22 ENCOUNTER — TELEPHONE (OUTPATIENT)
Dept: CARDIOLOGY | Age: 80
End: 2025-04-22
Payer: MEDICARE

## 2025-04-22 NOTE — TELEPHONE ENCOUNTER
Dr. Boucher/Padmini,    Pt called the office this am. He said he has been having a tightness in his chest (center) and he has to gasp for breath sometimes. It's not worse with exertion. It's been happening on and off since starting Brilinta on 4-1-25.     B/P is 164/78, HR 84 this am. Any recommendations?    Thank you,    Patrica Braun, RN  Triage [facility] [place]  04/22/25 09:46 EDT

## 2025-04-22 NOTE — TELEPHONE ENCOUNTER
14 Notified pt of recommendations from Dr. Boucher. Scheduled pt to see Tatiana on Friday. Pt verbalized understanding.    Thank you,    Patrica Braun, RN  Triage [facility] [place]  04/22/25 10:33 EDT

## 2025-04-22 NOTE — TELEPHONE ENCOUNTER
Is the tightness short lived or is the tightness related to exertion? Does it feel like he felt before his stent? [place] is well known for making people SOA. Does he drink caffeine? Sometimes drinking more caffeine helps with that. Please get more info and then I'll decide.    Marc carlos

## 2025-04-22 NOTE — TELEPHONE ENCOUNTER
He said the tightness lasts a long time. It is not better or worse with exertion. It does not feel the same as before he got the stent. Today he had a cup of decaf coffee, but he said he does drink caffeinated coffee too.    Patrica

## 2025-04-22 NOTE — TELEPHONE ENCOUNTER
I want him to make it a point to drink two servings of caffeine every morning with his Brilinta and see if he feels any better. But he needs an appt this week too. If at any point he feels like he did before the stent or it becomes exertional, then go to ED.    Marc carlos

## 2025-04-23 ENCOUNTER — TREATMENT (OUTPATIENT)
Dept: CARDIAC REHAB | Facility: HOSPITAL | Age: 80
End: 2025-04-23
Payer: MEDICARE

## 2025-04-23 DIAGNOSIS — Z95.5 STATUS POST INSERTION OF DRUG ELUTING CORONARY ARTERY STENT: Primary | ICD-10-CM

## 2025-04-23 PROCEDURE — 93798 PHYS/QHP OP CAR RHAB W/ECG: CPT

## 2025-04-23 NOTE — PROGRESS NOTES
RM:________     PCP: Beau Lee MD                                     Last EKG :  04/10/2025    : 1945  AGE: 79 y.o.    REASON FOR VISIT: _CHEST TIGHTNESS / SOA_ (EKG NEEDED)                                                  Wt Readings from Last 3 Encounters:   04/10/25 85.3 kg (188 lb)   24 83.5 kg (184 lb)   24 84.6 kg (186 lb 9.6 oz)      BP Readings from Last 3 Encounters:   04/10/25 128/72   24 141/75   24 126/64          WT: ____________ HT: ______ BP: __________ HR ______   02% _______                 Lipid Panel          2024    08:34 2025    08:21   Lipid Panel   Total Cholesterol  137    Total Cholesterol 139     Triglycerides 108  104    HDL Cholesterol 41  32    VLDL Cholesterol 20  19    LDL Cholesterol  78  86    LDL/HDL Ratio  2.63               Lab Results   Component Value Date    GLUCOSE 106 (H) 2024    CALCIUM 10.0 2024     2024    K 5.0 2024    CO2 23.0 2024     2024    BUN 20 2024    CREATININE 1.24 2024    EGFR 60.3 2024    BCR 16.1 2024    ANIONGAP 4.6 (L) 2023

## 2025-04-25 ENCOUNTER — OFFICE VISIT (OUTPATIENT)
Dept: CARDIOLOGY | Age: 80
End: 2025-04-25
Payer: MEDICARE

## 2025-04-25 ENCOUNTER — TREATMENT (OUTPATIENT)
Dept: CARDIAC REHAB | Facility: HOSPITAL | Age: 80
End: 2025-04-25
Payer: MEDICARE

## 2025-04-25 VITALS
WEIGHT: 184 LBS | HEART RATE: 77 BPM | BODY MASS INDEX: 26.34 KG/M2 | SYSTOLIC BLOOD PRESSURE: 122 MMHG | OXYGEN SATURATION: 92 % | DIASTOLIC BLOOD PRESSURE: 62 MMHG | HEIGHT: 70 IN

## 2025-04-25 DIAGNOSIS — E78.2 MIXED HYPERLIPIDEMIA: ICD-10-CM

## 2025-04-25 DIAGNOSIS — I25.10 CORONARY ARTERY DISEASE INVOLVING NATIVE CORONARY ARTERY OF NATIVE HEART WITHOUT ANGINA PECTORIS: Primary | ICD-10-CM

## 2025-04-25 DIAGNOSIS — Z95.5 STATUS POST INSERTION OF DRUG ELUTING CORONARY ARTERY STENT: Primary | ICD-10-CM

## 2025-04-25 PROCEDURE — 93798 PHYS/QHP OP CAR RHAB W/ECG: CPT

## 2025-04-25 RX ORDER — PRASUGREL 10 MG/1
60 TABLET, FILM COATED ORAL ONCE
Qty: 6 TABLET | Refills: 0 | Status: SHIPPED | OUTPATIENT
Start: 2025-04-25 | End: 2025-04-25

## 2025-04-25 RX ORDER — PRASUGREL 5 MG/1
5 TABLET, FILM COATED ORAL DAILY
Qty: 90 TABLET | Refills: 3 | Status: SHIPPED | OUTPATIENT
Start: 2025-04-25 | End: 2025-04-25 | Stop reason: SDUPTHER

## 2025-04-25 RX ORDER — PRASUGREL 5 MG/1
5 TABLET, FILM COATED ORAL DAILY
Qty: 90 TABLET | Refills: 3 | Status: SHIPPED | OUTPATIENT
Start: 2025-07-11

## 2025-04-25 NOTE — ASSESSMENT & PLAN NOTE
Patient is experiencing shortness of breath, it is thought that this could be secondary to taking Brilinta  Patient previously has taken Effient without any adverse effects.  Recommend transitioning from Brilinta to Effient.  Patient will load with 60 mg of Effient 12-24 hours after most recent dose of Brilinta as a loading dose.  He will then continue with 5 mg daily thereafter  Patient will call with continued symptoms after 1 week  Continue GDMT:  Aspirin  Zetia  Livalo

## 2025-04-25 NOTE — ASSESSMENT & PLAN NOTE
Lipid abnormalities are stable    Plan:  Continue same medication/s without change.    Sherin and Mnih    Discussed medication dosage, use, side effects, and goals of treatment in detail.    Counseled patient on lifestyle modifications to help control hyperlipidemia.     Patient Treatment Goals:   LDL goal is less than 70    Followup in 6 months.

## 2025-04-25 NOTE — PROGRESS NOTES
CARDIOLOGY        Patient Name: Td Melendez Jr.  :1945  Age: 79 y.o.  Primary Cardiologist: Obi Boucher MD  Encounter Provider:  RUSS Yin    Date of Service: 2025      CHIEF COMPLAINT / REASON FOR OFFICE VISIT     Chest Tightness and Shortness of Breath      HISTORY OF PRESENT ILLNESS       Shortness of Breath  Pertinent negatives include no fever, leg swelling or wheezing.     Td Melendez Jr. is a 79 y.o. male who presents today for evaluation of symptoms.     Pt has a  history significant for CAD, HLD, chronic pain, poor appetite.    Patient was evaluated in clinic in 2025.  At that time he started to notice more indigestion and heartburn than normal.  He was visiting his son who lives in [place] and developed classic angina and presented to the hospital.  Patient was ruled out for ACS.    Echocardiogram 2025 LVEF 60-64% with normal diastolic function and normal cardiac valves.    Cardiac catheterization 2025 in [place], [place] normal circumflex and left main.  80% ISR LAD involving the ostium of the diagonal.  80% distal RCA stenosis.  Patient had placement of MADELIN to the RCA and LAD.  Ostium of the diagonal was angioplastied with balloon.  Patient was sent home on DAPT with aspirin and ticagrelor.    Patient called the office 2025 with increased shortness of breath.  It was thought that this was secondary to Brilinta and patient was encouraged to start drinking caffeine every morning to see if that helps with symptoms.    Patient reports that he has increased his caffeine intake with taking his Brilinta, he states that the dyspnea improved for a day or 2 and is now back.  He notes that this am, just taking a shower and shaving, he has felt shortness of breath at rest and has some chest tightness.  He reports that this is different than his typical anginal equivalent.  He denies nausea, vomiting, diaphoresis.  He has been referred to cardiac rehab and  "has started the program.       INTERVAL HISTORY     July 2015 patient presented with unstable angina and had cardiac catheterization and had placement of MADELIN to the LAD and RCA.  Also noted to have nonobstructive lesion of the circumflex.    November 2015 patient presented with chest discomfort.  Cardiolite stress test was normal.    June 18 patient was admitted with chest pain that was likely GI in etiology.  Cardiolite stress test was normal.    Stress test 2023 for atypical chest pain was normal.      The following portions of the patient's history were reviewed and updated as appropriate: allergies, current medications, past family history, past medical history, past social history, past surgical history and problem list.      VITAL SIGNS     Visit Vitals  /62 (BP Location: Left arm, Patient Position: Sitting, Cuff Size: Small Adult)   Pulse 77   Ht 177.8 cm (70\")   Wt 83.5 kg (184 lb)   SpO2 92%   BMI 26.40 kg/m²         Wt Readings from Last 3 Encounters:   04/25/25 83.5 kg (184 lb)   04/10/25 85.3 kg (188 lb)   12/27/24 83.5 kg (184 lb)     Body mass index is 26.4 kg/m².      REVIEW OF SYSTEMS     Review of Systems   Constitutional: Negative for chills, fever, weight gain and weight loss.   Cardiovascular:  Negative for leg swelling.   Respiratory:  Positive for shortness of breath. Negative for cough, snoring and wheezing.    Hematologic/Lymphatic: Negative for bleeding problem. Does not bruise/bleed easily.   Skin:  Negative for color change.   Musculoskeletal:  Negative for falls, joint pain and myalgias.   Gastrointestinal:  Negative for melena.   Genitourinary:  Negative for hematuria.   Neurological:  Negative for excessive daytime sleepiness.   Psychiatric/Behavioral:  Negative for depression. The patient is not nervous/anxious.            PHYSICAL EXAMINATION     Constitutional:       Appearance: Normal appearance. Well-developed.   Eyes:      Conjunctiva/sclera: Conjunctivae normal.   Neck:     "  Vascular: No carotid bruit.   Pulmonary:      Effort: Pulmonary effort is normal.      Breath sounds: Normal breath sounds.   Cardiovascular:      Normal rate. Regular rhythm. Normal S1. Normal S2.       Murmurs: There is no murmur.      No gallop.  No click. No rub.   Edema:     Peripheral edema absent.   Musculoskeletal: Normal range of motion. Skin:     General: Skin is warm and dry.   Neurological:      Mental Status: Alert and oriented to person, place, and time.      GCS: GCS eye subscore is 4. GCS verbal subscore is 5. GCS motor subscore is 6.   Psychiatric:         Speech: Speech normal.         Behavior: Behavior normal.         Thought Content: Thought content normal.         Judgment: Judgment normal.           REVIEWED DATA       ECG 12 Lead    Date/Time: 4/25/2025 11:00 AM  Performed by: Ena Sebastian APRN    Authorized by: Ena Sebastian APRN  Comparison: compared with previous ECG from 4/10/2025  Rhythm: sinus rhythm  Rate: normal  BPM: 77  Conduction: conduction normal  ST Segments: ST segments normal  T Waves: T waves normal  QRS axis: normal    Clinical impression: normal ECG              Lipid Panel          7/22/2024    08:34 4/14/2025    08:21   Lipid Panel   Total Cholesterol  137    Total Cholesterol 139     Triglycerides 108  104    HDL Cholesterol 41  32    VLDL Cholesterol 20  19    LDL Cholesterol  78  86    LDL/HDL Ratio  2.63        Lab Results   Component Value Date     07/22/2024     07/06/2023    K 5.0 07/22/2024    K 3.7 07/06/2023     07/22/2024     (H) 07/06/2023    CO2 23.0 07/22/2024    CO2 26.4 07/06/2023    BUN 20 07/22/2024    BUN 16 07/06/2023    CREATININE 1.24 07/22/2024    CREATININE 0.81 07/06/2023    EGFRIFNONA 63 06/16/2021    EGFRIFNONA 80 01/18/2021    EGFRIFAFRI 76 06/16/2021    EGFRIFAFRI 80 05/29/2020    GLUCOSE 106 (H) 07/22/2024    GLUCOSE 97 07/06/2023    CALCIUM 10.0 07/22/2024    CALCIUM 9.1 07/06/2023    ALBUMIN 4.3  "07/22/2024    ALBUMIN 4.0 07/05/2023    BILITOT 0.6 07/22/2024    BILITOT 0.7 07/05/2023    AST 19 07/22/2024    AST 15 07/05/2023    ALT 15 07/22/2024    ALT 13 07/05/2023     Lab Results   Component Value Date    WBC 6.19 04/14/2025    WBC 6.08 07/22/2024    HGB 14.6 04/14/2025    HGB 16.7 07/22/2024    HCT 42.7 04/14/2025    HCT 48.9 07/22/2024    MCV 91.4 04/14/2025    MCV 91.6 07/22/2024     04/14/2025     07/22/2024     No results found for: \"PROBNP\", \"BNP\"  Lab Results   Component Value Date    CKTOTAL 75 07/10/2015    TROPONINT 10 07/06/2023     Lab Results   Component Value Date    TSH 2.730 07/22/2024    TSH 2.040 07/21/2022             ASSESSMENT & PLAN     Diagnoses and all orders for this visit:    1. Coronary artery disease involving native coronary artery of native heart without angina pectoris (Primary)  Overview:  Echocardiogram March 2025 LVEF 60-64% with normal diastolic function and normal cardiac valves.    Cardiac catheterization March 2025 in [place], [place] normal circumflex and left main.  80% ISR LAD involving the ostium of the diagonal.  80% distal RCA stenosis.  Patient had placement of MADELIN to the RCA and LAD.  Ostium of the diagonal was angioplastied with balloon.  Patient was sent home on DAPT with aspirin and ticagrelor.    Assessment & Plan:  Patient is experiencing shortness of breath, it is thought that this could be secondary to taking Brilinta  Patient previously has taken Effient without any adverse effects.  Recommend transitioning from Brilinta to Effient.  Patient will load with 60 mg of Effient 12-24 hours after most recent dose of Brilinta as a loading dose.  He will then continue with 5 mg daily thereafter  Patient will call with continued symptoms after 1 week  Continue GDMT:  Aspirin  Zetia  Livalo      Orders:  -     ECG 12 Lead  -     Discontinue: prasugrel (EFFIENT) 5 MG tablet; Take 1 tablet by mouth Daily.  Dispense: 90 tablet; Refill: 3  -     prasugrel " (EFFIENT) 10 MG tablet; Take 6 tablets by mouth 1 time for 1 dose. Loading dose, then 5mg/day  Dispense: 6 tablet; Refill: 0  -     prasugrel (EFFIENT) 5 MG tablet; Take 1 tablet by mouth Daily.  Dispense: 90 tablet; Refill: 3    2. Mixed hyperlipidemia  Overview:  Lipid Panel          7/22/2024    08:34 4/14/2025    08:21   Lipid Panel   Total Cholesterol  137    Total Cholesterol 139     Triglycerides 108  104    HDL Cholesterol 41  32    VLDL Cholesterol 20  19    LDL Cholesterol  78  86    LDL/HDL Ratio  2.63          Assessment & Plan:   Lipid abnormalities are stable    Plan:  Continue same medication/s without change.    Zetia and Livalo    Discussed medication dosage, use, side effects, and goals of treatment in detail.    Counseled patient on lifestyle modifications to help control hyperlipidemia.     Patient Treatment Goals:   LDL goal is less than 70    Followup in 6 months.              Future Appointments         Provider Department Center    4/25/2025 3:00 PM TELEMETRY MONITOR - [facility] CARD [facility] CARD REHAB [facility]IREN    4/28/2025 3:00 PM TELEMETRY MONITOR - BH [facility] CARD [facility] CARD REHAB [facility]EN    4/29/2025 1:30 PM Beau Lee MD [facility] PRIMARY CARE [facility]    4/30/2025 3:00 PM TELEMETRY MONITOR - BH [facility] CARD [facility] CARD REHAB [facility]IREN    5/2/2025 3:00 PM TELEMETRY MONITOR - BH [facility] CARD [facility] CARD REHAB [facility]IREN    5/5/2025 3:00 PM TELEMETRY MONITOR - BH [facility] CARD [facility] CARD REHAB HIREN    5/7/2025 3:00 PM TELEMETRY MONITOR - BH [facility] CARD [facility] CARD REHAB HIREN    5/9/2025 3:00 PM TELEMETRY MONITOR - BH [facility] CARD [facility] CARD REHAB HIREN    5/12/2025 3:00 PM TELEMETRY MONITOR - BH [facility] CARD [facility] CARD REHAB HIREN    5/14/2025 3:00 PM TELEMETRY MONITOR - BH [facility] CARD [facility] CARD REHAB HIREN    5/16/2025 4:00 PM TELEMETRY MONITOR - BH [facility] CARD  [facility] CARD REHAB HIREN    5/19/2025 4:00 PM TELEMETRY MONITOR -  HIREN CARD [facility] CARD REHAB HIREN    5/21/2025 4:00 PM TELEMETRY MONITOR -  HIREN CARD [facility] CARD REHAB HIREN    5/23/2025 4:00 PM TELEMETRY MONITOR -  HIREN CARD [facility] CARD REHAB HIREN    5/28/2025 4:00 PM TELEMETRY MONITOR -  HIREN CARD [facility] CARD REHAB HIREN    5/30/2025 4:00 PM TELEMETRY MONITOR -  HIREN CARD [facility] CARD REHAB HIREN    6/2/2025 4:00 PM TELEMETRY MONITOR -  HIREN CARD [facility] CARD REHAB HIREN    6/4/2025 3:00 PM TELEMETRY MONITOR -  HIREN CARD [facility] CARD REHAB HIREN    6/6/2025 3:00 PM TELEMETRY MONITOR -  HIREN CARD [facility] CARD REHAB HIREN    6/9/2025 3:00 PM TELEMETRY MONITOR -  HIREN [facility] CARD REHAB HIREN    6/11/2025 3:00 PM TELEMETRY MONITOR -  HIREN [facility] CARD REHAB HIREN    6/13/2025 3:00 PM TELEMETRY MONITOR -  HIREN [facility] CARD REHAB HIREN    6/16/2025 3:00 PM TELEMETRY MONITOR -  HIREN [facility] CARD REHAB HIREN    6/18/2025 3:00 PM TELEMETRY MONITOR -  HIREN [facility] CARD REHAB HIREN    6/20/2025 3:00 PM TELEMETRY MONITOR -  HIREN [facility] CARD REHAB HIREN    6/23/2025 3:00 PM TELEMETRY MONITOR -  HIREN [facility] CARD REHAB HIREN    6/25/2025 3:00 PM TELEMETRY MONITOR -  HIREN [facility] CARD REHAB HIREN    6/27/2025 3:00 PM TELEMETRY MONITOR -  HIREN [facility] CARD REHAB HIREN    6/30/2025 3:00 PM TELEMETRY MONITOR - [facility] CARD REHAB HIREN    7/2/2025 3:00 PM TELEMETRY MONITOR - [facility] REHAB HIREN    7/7/2025 3:00 PM TELEMETRY MONITOR - [facility] REHAB HIREN    7/9/2025 3:00 PM TELEMETRY  MONITOR -  HIREN CARD [facility] CARD REHAB HIREN    7/11/2025 3:00 PM TELEMETRY MONITOR -  HIREN CARD [facility] CARD REHAB HIREN    7/14/2025 3:00 PM TELEMETRY MONITOR -  HIREN CARD [facility] CARD REHAB HIREN    7/16/2025 3:00 PM TELEMETRY MONITOR -  HIREN CARD [facility] CARD REHAB HIREN    7/24/2025 10:30 AM Beau Lee MD [facility] PRIMARY CARE HIREN    10/1/2025 11:00 AM Obi Boucher MD [facility] CARDIOLOGY HIREN              MEDICATIONS         Discharge Medications            Accurate as of April 25, 2025  1:38 PM. If you have any questions, ask your nurse or doctor.                New Medications        Instructions Start Date   prasugrel 10 MG tablet  Commonly known as: EFFIENT  Started by: RUSS Sandoval   60 mg, Oral, Once, Loading dose, then 5mg/day      prasugrel 5 MG tablet  Commonly known as: EFFIENT  Started by: Tatiana Sebastian APRN   5 mg, Oral, Daily   Start Date: July 11, 2025            Continue These Medications        Instructions Start Date   ALPRAZolam 0.25 MG tablet  Commonly known as: XANAX   0.25 mg, Oral, Daily PRN      ASPIRIN 81 PO   Daily      coenzyme Q10 100 MG capsule   100 mg, Daily      COLLAGEN EX   1 tablet, Daily      diphenhydrAMINE-acetaminophen  MG tablet per tablet  Commonly known as: TYLENOL PM   1 tablet, Nightly PRN      ezetimibe 10 MG tablet  Commonly known as: ZETIA   10 mg, Oral, Daily      famotidine 20 MG tablet  Commonly known as: Pepcid   20 mg, Oral, 2 Times Daily      Flomax 0.4 MG capsule 24 hr capsule  Generic drug: tamsulosin   Daily      HYDROcodone-acetaminophen 5-325 MG per tablet  Commonly known as: NORCO   See Admin Instructions      LITHIUM PO   5 mg, 2 times daily      pitavastatin calcium 2 MG tablet tablet  Commonly known as: LIVALO   2 mg, Oral, Nightly      polyethyl glycol-propyl glycol 0.4-0.3 % solution ophthalmic solution (artificial  tears)  Commonly known as: SYSTANE   Every 1 Hour PRN      sildenafil 20 MG tablet  Commonly known as: REVATIO   Take 2-3 tabs QD prn      VITAMIN K2-VITAMIN D3 PO   Daily             Stop These Medications      ticagrelor 90 MG tablet tablet  Commonly known as: BRILINTA  Stopped by: RUSS Sandoval                  **Dragon Disclaimer:   Much of this encounter note is an electronic transcription/translation of spoken language to printed text. The electronic translation of spoken language may permit erroneous, or at times, nonsensical words or phrases to be inadvertently transcribed. Although I have reviewed the note for such errors, some may still exist.

## 2025-04-28 ENCOUNTER — TREATMENT (OUTPATIENT)
Dept: CARDIAC REHAB | Facility: HOSPITAL | Age: 80
End: 2025-04-28
Payer: MEDICARE

## 2025-04-28 DIAGNOSIS — Z95.5 STATUS POST INSERTION OF DRUG ELUTING CORONARY ARTERY STENT: Primary | ICD-10-CM

## 2025-04-28 PROCEDURE — 93798 PHYS/QHP OP CAR RHAB W/ECG: CPT

## 2025-04-28 NOTE — PROGRESS NOTES
Subjective   Td Melendez Jr. is a 79 y.o. male.     CC: Mgmt of CAD    History of Present Illness     Patient returns today after seeing cardiology on 4/25/2025 with this note:    Td Melendez Jr. is a 79 y.o. male who presents today for evaluation of symptoms.      Pt has a  history significant for CAD, HLD, chronic pain, poor appetite.     Patient was evaluated in clinic in April 2025.  At that time he started to notice more indigestion and heartburn than normal.  He was visiting his son who lives in [place] and developed classic angina and presented to the hospital.  Patient was ruled out for ACS.     Echocardiogram March 2025 LVEF 60-64% with normal diastolic function and normal cardiac valves.     Cardiac catheterization March 2025 in [place], [place] normal circumflex and left main.  80% ISR LAD involving the ostium of the diagonal.  80% distal RCA stenosis.  Patient had placement of MADELIN to the RCA and LAD.  Ostium of the diagonal was angioplastied with balloon.  Patient was sent home on DAPT with aspirin and ticagrelor.     Patient called the office 4/22/2025 with increased shortness of breath.  It was thought that this was secondary to Brilinta and patient was encouraged to start drinking caffeine every morning to see if that helps with symptoms.     Patient reports that he has increased his caffeine intake with taking his Brilinta, he states that the dyspnea improved for a day or 2 and is now back.  He notes that this am, just taking a shower and shaving, he has felt shortness of breath at rest and has some chest tightness.  He reports that this is different than his typical anginal equivalent.  He denies nausea, vomiting, diaphoresis.  He has been referred to cardiac rehab and has started the program.       1. Coronary artery disease involving native coronary artery of native heart without angina pectoris (Primary)  Overview:  Echocardiogram March 2025 LVEF 60-64% with normal diastolic function and  normal cardiac valves.     Cardiac catheterization March 2025 in  normal circumflex and left main.  80% ISR LAD involving the ostium of the diagonal.  80% distal RCA stenosis.  Patient had placement of MADELIN to the RCA and LAD.  Ostium of the diagonal was angioplastied with balloon.  Patient was sent home on DAPT with aspirin and ticagrelor.     Assessment & Plan:  Patient is experiencing shortness of breath, it is thought that this could be secondary to taking Brilinta  Patient previously has taken Effient without any adverse effects.  Recommend transitioning from Brilinta to Effient.  Patient will load with 60 mg of Effient 12-24 hours after most recent dose of Brilinta as a loading dose.  He will then continue with 5 mg daily thereafter  Patient will call with continued symptoms after 1 week  Continue GDMT:  Aspirin  Sherin Wilkinson        Orders:  -     ECG 12 Lead  -     Discontinue: prasugrel (EFFIENT) 5 MG tablet; Take 1 tablet by mouth Daily.  Dispense: 90 tablet; Refill: 3  -     prasugrel (EFFIENT) 10 MG tablet; Take 6 tablets by mouth 1 time for 1 dose. Loading dose, then 5mg/day  Dispense: 6 tablet; Refill: 0  -     prasugrel (EFFIENT) 5 MG tablet; Take 1 tablet by mouth Daily.  Dispense: 90 tablet; Refill: 3     2. Mixed hyperlipidemia  Overview:  Lipid Panel            7/22/2024    08:34  4/14/2025    08:21  Lipid Panel  Total Cholesterol     137   Total Cholesterol  139      Triglycerides  108   104   HDL Cholesterol  41   32   VLDL Cholesterol  20   19   LDL Cholesterol   78   86   LDL/HDL Ratio     2.63            Assessment & Plan:   Lipid abnormalities are stable     Plan:  Continue same medication/s without change.    Davie     Discussed medication dosage, use, side effects, and goals of treatment in detail.    Counseled patient on lifestyle modifications to help control hyperlipidemia.      Patient Treatment Goals:   LDL goal is less than 70     Followup in 6  "months.              The following portions of the patient's history were reviewed and updated as appropriate: allergies, current medications, past family history, past medical history, past social history, past surgical history, and problem list.    Review of Systems   Constitutional:  Negative for activity change, chills and fever.   Respiratory:  Negative for cough.    Cardiovascular:  Negative for chest pain.   Psychiatric/Behavioral:  Negative for dysphoric mood.        /66   Pulse 72   Temp 98.2 °F (36.8 °C) (Oral)   Resp 16   Ht 177.8 cm (70\")   Wt 83.9 kg (185 lb)   SpO2 99%   BMI 26.54 kg/m²     Objective   Physical Exam  Vitals and nursing note reviewed.   Constitutional:       General: He is not in acute distress.     Appearance: He is well-developed.   Cardiovascular:      Rate and Rhythm: Normal rate and regular rhythm.   Pulmonary:      Effort: Pulmonary effort is normal.      Breath sounds: Normal breath sounds.   Neurological:      Mental Status: He is alert and oriented to person, place, and time.   Psychiatric:         Behavior: Behavior normal.         Thought Content: Thought content normal.     Notes from cardiology reviewed by me at today's visit.    Assessment & Plan   Diagnoses and all orders for this visit:    1. Coronary artery disease involving native coronary artery of native heart without angina pectoris (Primary)    2. Benign prostatic hyperplasia without lower urinary tract symptoms  -     tamsulosin (Flomax) 0.4 MG capsule 24 hr capsule; Take 1 capsule by mouth Every Night.  Dispense: 30 capsule; Refill: 5    Low-carb diet, no processed foods, and 30 minutes of cardio exercise daily recommended.  Patient to keep all medication as prescribed and is future cardiology appointments. Pt is in cardiac rehab now and is going to finish this.              "

## 2025-04-29 ENCOUNTER — OFFICE VISIT (OUTPATIENT)
Dept: FAMILY MEDICINE CLINIC | Facility: CLINIC | Age: 80
End: 2025-04-29
Payer: MEDICARE

## 2025-04-29 VITALS
SYSTOLIC BLOOD PRESSURE: 126 MMHG | WEIGHT: 185 LBS | BODY MASS INDEX: 26.48 KG/M2 | OXYGEN SATURATION: 99 % | TEMPERATURE: 98.2 F | RESPIRATION RATE: 16 BRPM | HEART RATE: 72 BPM | DIASTOLIC BLOOD PRESSURE: 66 MMHG | HEIGHT: 70 IN

## 2025-04-29 DIAGNOSIS — I25.10 CORONARY ARTERY DISEASE INVOLVING NATIVE CORONARY ARTERY OF NATIVE HEART WITHOUT ANGINA PECTORIS: Primary | Chronic | ICD-10-CM

## 2025-04-29 DIAGNOSIS — N40.0 BENIGN PROSTATIC HYPERPLASIA WITHOUT LOWER URINARY TRACT SYMPTOMS: ICD-10-CM

## 2025-04-29 RX ORDER — TAMSULOSIN HYDROCHLORIDE 0.4 MG/1
1 CAPSULE ORAL NIGHTLY
Qty: 30 CAPSULE | Refills: 5 | Status: SHIPPED | OUTPATIENT
Start: 2025-04-29

## 2025-04-30 ENCOUNTER — TREATMENT (OUTPATIENT)
Dept: CARDIAC REHAB | Facility: HOSPITAL | Age: 80
End: 2025-04-30
Payer: MEDICARE

## 2025-04-30 DIAGNOSIS — Z95.5 STATUS POST INSERTION OF DRUG ELUTING CORONARY ARTERY STENT: Primary | ICD-10-CM

## 2025-04-30 PROCEDURE — 93798 PHYS/QHP OP CAR RHAB W/ECG: CPT

## 2025-05-01 ENCOUNTER — TELEPHONE (OUTPATIENT)
Dept: CARDIOLOGY | Age: 80
End: 2025-05-01
Payer: MEDICARE

## 2025-05-01 ENCOUNTER — HOSPITAL ENCOUNTER (OUTPATIENT)
Facility: HOSPITAL | Age: 80
Setting detail: OBSERVATION
Discharge: HOME | End: 2025-05-02
Attending: EMERGENCY MEDICINE | Admitting: INTERNAL MEDICINE
Payer: MEDICARE

## 2025-05-01 ENCOUNTER — APPOINTMENT (OUTPATIENT)
Dept: GENERAL RADIOLOGY | Facility: HOSPITAL | Age: 80
End: 2025-05-01
Payer: MEDICARE

## 2025-05-01 DIAGNOSIS — R07.9 CHEST PAIN, UNSPECIFIED TYPE: Primary | ICD-10-CM

## 2025-05-01 LAB
ALBUMIN SERPL-MCNC: 4.2 G/DL (ref 3.5–5.2)
ALBUMIN/GLOB SERPL: 1.5 G/DL
ALP SERPL-CCNC: 82 U/L (ref 39–117)
ALT SERPL W P-5'-P-CCNC: 12 U/L (ref 1–41)
ANION GAP SERPL CALCULATED.3IONS-SCNC: 10.2 MMOL/L (ref 5–15)
AST SERPL-CCNC: 18 U/L (ref 1–40)
BASOPHILS # BLD AUTO: 0.03 10*3/MM3 (ref 0–0.2)
BASOPHILS NFR BLD AUTO: 0.4 % (ref 0–1.5)
BILIRUB SERPL-MCNC: 0.7 MG/DL (ref 0–1.2)
BUN SERPL-MCNC: 16 MG/DL (ref 8–23)
BUN/CREAT SERPL: 13.7 (ref 7–25)
CALCIUM SPEC-SCNC: 10 MG/DL (ref 8.6–10.5)
CHLORIDE SERPL-SCNC: 104 MMOL/L (ref 98–107)
CO2 SERPL-SCNC: 22.8 MMOL/L (ref 22–29)
CREAT SERPL-MCNC: 1.17 MG/DL (ref 0.76–1.27)
DEPRECATED RDW RBC AUTO: 39.9 FL (ref 37–54)
EGFRCR SERPLBLD CKD-EPI 2021: 64.2 ML/MIN/1.73
EOSINOPHIL # BLD AUTO: 0.06 10*3/MM3 (ref 0–0.4)
EOSINOPHIL NFR BLD AUTO: 0.8 % (ref 0.3–6.2)
ERYTHROCYTE [DISTWIDTH] IN BLOOD BY AUTOMATED COUNT: 12.2 % (ref 12.3–15.4)
GEN 5 1HR TROPONIN T REFLEX: 10 NG/L
GLOBULIN UR ELPH-MCNC: 2.8 GM/DL
GLUCOSE SERPL-MCNC: 117 MG/DL (ref 65–99)
HCT VFR BLD AUTO: 46.7 % (ref 37.5–51)
HGB BLD-MCNC: 16.1 G/DL (ref 13–17.7)
HOLD SPECIMEN: NORMAL
HOLD SPECIMEN: NORMAL
IMM GRANULOCYTES # BLD AUTO: 0.01 10*3/MM3 (ref 0–0.05)
IMM GRANULOCYTES NFR BLD AUTO: 0.1 % (ref 0–0.5)
LYMPHOCYTES # BLD AUTO: 1.61 10*3/MM3 (ref 0.7–3.1)
LYMPHOCYTES NFR BLD AUTO: 22.7 % (ref 19.6–45.3)
MCH RBC QN AUTO: 31 PG (ref 26.6–33)
MCHC RBC AUTO-ENTMCNC: 34.5 G/DL (ref 31.5–35.7)
MCV RBC AUTO: 90 FL (ref 79–97)
MONOCYTES # BLD AUTO: 0.54 10*3/MM3 (ref 0.1–0.9)
MONOCYTES NFR BLD AUTO: 7.6 % (ref 5–12)
NEUTROPHILS NFR BLD AUTO: 4.83 10*3/MM3 (ref 1.7–7)
NEUTROPHILS NFR BLD AUTO: 68.4 % (ref 42.7–76)
NRBC BLD AUTO-RTO: 0 /100 WBC (ref 0–0.2)
PLATELET # BLD AUTO: 216 10*3/MM3 (ref 140–450)
PMV BLD AUTO: 9.6 FL (ref 6–12)
POTASSIUM SERPL-SCNC: 4.2 MMOL/L (ref 3.5–5.2)
PROT SERPL-MCNC: 7 G/DL (ref 6–8.5)
QT INTERVAL: 358 MS
QTC INTERVAL: 405 MS
RBC # BLD AUTO: 5.19 10*6/MM3 (ref 4.14–5.8)
SODIUM SERPL-SCNC: 137 MMOL/L (ref 136–145)
TROPONIN T NUMERIC DELTA: 0 NG/L
TROPONIN T SERPL HS-MCNC: 10 NG/L
WBC NRBC COR # BLD AUTO: 7.08 10*3/MM3 (ref 3.4–10.8)
WHOLE BLOOD HOLD COAG: NORMAL
WHOLE BLOOD HOLD SPECIMEN: NORMAL

## 2025-05-01 PROCEDURE — 99222 1ST HOSP IP/OBS MODERATE 55: CPT

## 2025-05-01 PROCEDURE — 93005 ELECTROCARDIOGRAM TRACING: CPT

## 2025-05-01 PROCEDURE — 80053 COMPREHEN METABOLIC PANEL: CPT | Performed by: EMERGENCY MEDICINE

## 2025-05-01 PROCEDURE — 99285 EMERGENCY DEPT VISIT HI MDM: CPT

## 2025-05-01 PROCEDURE — 93005 ELECTROCARDIOGRAM TRACING: CPT | Performed by: EMERGENCY MEDICINE

## 2025-05-01 PROCEDURE — 93010 ELECTROCARDIOGRAM REPORT: CPT | Performed by: INTERNAL MEDICINE

## 2025-05-01 PROCEDURE — G0378 HOSPITAL OBSERVATION PER HR: HCPCS

## 2025-05-01 PROCEDURE — 85025 COMPLETE CBC W/AUTO DIFF WBC: CPT | Performed by: EMERGENCY MEDICINE

## 2025-05-01 PROCEDURE — 71045 X-RAY EXAM CHEST 1 VIEW: CPT

## 2025-05-01 PROCEDURE — 36415 COLL VENOUS BLD VENIPUNCTURE: CPT

## 2025-05-01 PROCEDURE — 84484 ASSAY OF TROPONIN QUANT: CPT | Performed by: EMERGENCY MEDICINE

## 2025-05-01 RX ORDER — FAMOTIDINE 20 MG/1
20 TABLET, FILM COATED ORAL
Status: DISCONTINUED | OUTPATIENT
Start: 2025-05-01 | End: 2025-05-02 | Stop reason: HOSPADM

## 2025-05-01 RX ORDER — AMOXICILLIN 250 MG
2 CAPSULE ORAL 2 TIMES DAILY PRN
Status: DISCONTINUED | OUTPATIENT
Start: 2025-05-01 | End: 2025-05-02 | Stop reason: HOSPADM

## 2025-05-01 RX ORDER — CALCIUM CARBONATE 500 MG/1
2 TABLET, CHEWABLE ORAL 2 TIMES DAILY PRN
Status: DISCONTINUED | OUTPATIENT
Start: 2025-05-01 | End: 2025-05-02 | Stop reason: HOSPADM

## 2025-05-01 RX ORDER — BISACODYL 10 MG
10 SUPPOSITORY, RECTAL RECTAL DAILY PRN
Status: DISCONTINUED | OUTPATIENT
Start: 2025-05-01 | End: 2025-05-02 | Stop reason: HOSPADM

## 2025-05-01 RX ORDER — ASPIRIN 81 MG/1
81 TABLET ORAL DAILY
Status: DISCONTINUED | OUTPATIENT
Start: 2025-05-02 | End: 2025-05-02 | Stop reason: HOSPADM

## 2025-05-01 RX ORDER — PRASUGREL 10 MG/1
5 TABLET, FILM COATED ORAL DAILY
Status: DISCONTINUED | OUTPATIENT
Start: 2025-05-01 | End: 2025-05-02 | Stop reason: HOSPADM

## 2025-05-01 RX ORDER — SODIUM CHLORIDE 0.9 % (FLUSH) 0.9 %
10 SYRINGE (ML) INJECTION AS NEEDED
Status: DISCONTINUED | OUTPATIENT
Start: 2025-05-01 | End: 2025-05-02 | Stop reason: HOSPADM

## 2025-05-01 RX ORDER — NITROGLYCERIN 0.4 MG/1
0.4 TABLET SUBLINGUAL
Status: DISCONTINUED | OUTPATIENT
Start: 2025-05-01 | End: 2025-05-02 | Stop reason: HOSPADM

## 2025-05-01 RX ORDER — SODIUM CHLORIDE 9 MG/ML
40 INJECTION, SOLUTION INTRAVENOUS AS NEEDED
Status: DISCONTINUED | OUTPATIENT
Start: 2025-05-01 | End: 2025-05-02 | Stop reason: HOSPADM

## 2025-05-01 RX ORDER — BISACODYL 5 MG/1
5 TABLET, DELAYED RELEASE ORAL DAILY PRN
Status: DISCONTINUED | OUTPATIENT
Start: 2025-05-01 | End: 2025-05-02 | Stop reason: HOSPADM

## 2025-05-01 RX ORDER — ACETAMINOPHEN 650 MG/1
650 SUPPOSITORY RECTAL EVERY 4 HOURS PRN
Status: DISCONTINUED | OUTPATIENT
Start: 2025-05-01 | End: 2025-05-02 | Stop reason: HOSPADM

## 2025-05-01 RX ORDER — ONDANSETRON 4 MG/1
4 TABLET, ORALLY DISINTEGRATING ORAL EVERY 6 HOURS PRN
Status: DISCONTINUED | OUTPATIENT
Start: 2025-05-01 | End: 2025-05-02 | Stop reason: HOSPADM

## 2025-05-01 RX ORDER — ASPIRIN 325 MG
325 TABLET ORAL ONCE
Status: COMPLETED | OUTPATIENT
Start: 2025-05-01 | End: 2025-05-01

## 2025-05-01 RX ORDER — TAMSULOSIN HYDROCHLORIDE 0.4 MG/1
0.4 CAPSULE ORAL NIGHTLY
Status: DISCONTINUED | OUTPATIENT
Start: 2025-05-01 | End: 2025-05-02 | Stop reason: HOSPADM

## 2025-05-01 RX ORDER — ACETAMINOPHEN 160 MG/5ML
650 SOLUTION ORAL EVERY 4 HOURS PRN
Status: DISCONTINUED | OUTPATIENT
Start: 2025-05-01 | End: 2025-05-02 | Stop reason: HOSPADM

## 2025-05-01 RX ORDER — POLYETHYLENE GLYCOL 3350 17 G/17G
17 POWDER, FOR SOLUTION ORAL DAILY PRN
Status: DISCONTINUED | OUTPATIENT
Start: 2025-05-01 | End: 2025-05-02 | Stop reason: HOSPADM

## 2025-05-01 RX ORDER — SODIUM CHLORIDE 0.9 % (FLUSH) 0.9 %
10 SYRINGE (ML) INJECTION EVERY 12 HOURS SCHEDULED
Status: DISCONTINUED | OUTPATIENT
Start: 2025-05-01 | End: 2025-05-02 | Stop reason: HOSPADM

## 2025-05-01 RX ORDER — ACETAMINOPHEN 325 MG/1
650 TABLET ORAL EVERY 4 HOURS PRN
Status: DISCONTINUED | OUTPATIENT
Start: 2025-05-01 | End: 2025-05-02 | Stop reason: HOSPADM

## 2025-05-01 RX ORDER — ONDANSETRON 2 MG/ML
4 INJECTION INTRAMUSCULAR; INTRAVENOUS EVERY 6 HOURS PRN
Status: DISCONTINUED | OUTPATIENT
Start: 2025-05-01 | End: 2025-05-02 | Stop reason: HOSPADM

## 2025-05-01 RX ADMIN — PRASUGREL 5 MG: 10 TABLET, FILM COATED ORAL at 20:23

## 2025-05-01 RX ADMIN — NITROGLYCERIN 1 INCH: 20 OINTMENT TOPICAL at 18:14

## 2025-05-01 RX ADMIN — ASPIRIN 325 MG ORAL TABLET 325 MG: 325 PILL ORAL at 16:59

## 2025-05-01 RX ADMIN — TAMSULOSIN HYDROCHLORIDE 0.4 MG: 0.4 CAPSULE ORAL at 20:23

## 2025-05-01 RX ADMIN — FAMOTIDINE 20 MG: 20 TABLET, FILM COATED ORAL at 18:14

## 2025-05-01 RX ADMIN — Medication 10 ML: at 20:37

## 2025-05-01 RX ADMIN — Medication 10 ML: at 20:28

## 2025-05-01 NOTE — TELEPHONE ENCOUNTER
I notified pt.  He's agreeable to coming to ED.    Roxi MACIAS RN  Triage [facility]  05/01/25 12:59 EDT

## 2025-05-01 NOTE — TELEPHONE ENCOUNTER
I called pt back.  He says he's having discomfort even at rest.    ED?    Roxi MACIAS RN  Triage [facility]  05/01/25 12:52 EDT

## 2025-05-01 NOTE — ED PROVIDER NOTES
EMERGENCY DEPARTMENT ENCOUNTER  Room Number:  11/11  PCP: Beau Lee MD  Independent Historians: Patient      HPI:  Chief Complaint: had concerns including Chest Pain.     A complete HPI/ROS/PMH/PSH/SH/FH are unobtainable due to: None    Chronic or social conditions impacting patient care (Social Determinants of Health): None      Context: Td Melendez Jr. is a 79 y.o. male with a medical history of heart disease, cardiac stent placement, hyperlipidemia, GERD who presents to the ED c/o acute chest pain.  The patient reports that he had a cardiac cath with 2 stents placed about 1 month ago in [place].  He states that he has had chest pain since then.  He has seen his cardiologist and was switched from Brilinta to Effient 1 week ago.  He had previously had good results with Effient.  He states he has not had significant improvement of his pain.  He states that yesterday he was at cardiac rehab and while he was doing his normal cardiac rehab he developed chest discomfort and diaphoresis.  He states he felt lightheaded after the cardiac rehab.  He called his cardiologist and they directed him here.  He reports he is having some discomfort currently but states that is the same discomfort that he has had over the last month.      Review of prior external notes (non-ED) -and- Review of prior external test results outside of this encounter:  Laboratory evaluation 7/22/2024 shows normal CMP, normal CBC    Prescription drug monitoring program review:         PAST MEDICAL HISTORY  Active Ambulatory Problems     Diagnosis Date Noted    CAD (coronary artery disease)     Hyperlipidemia 01/04/2017    Anxiety 01/11/2018    Gastroesophageal reflux disease without esophagitis 06/28/2018    Polyp of colon 07/13/2018    Dyspepsia 12/11/2020    Epigastric pain 12/11/2020    Poor appetite 12/11/2020    Personal history of colonic polyps 03/31/2021    Screening for colon cancer 03/31/2021    Hypertriglyceridemia 08/27/2021     Hematuria 07/06/2022    Chronic pain 07/06/2022     Resolved Ambulatory Problems     Diagnosis Date Noted    Chest pain 06/24/2018    Gastroesophageal reflux disease 07/13/2018    Abnormal EKG 08/27/2021    Chest pain 09/12/2022     Past Medical History:   Diagnosis Date    Chronic diarrhea 1980    Colon polyp 2010    Depression 1983    Fibromyalgia     GERD (gastroesophageal reflux disease)     H/O complete eye exam 04/2018    IBS (irritable bowel syndrome)     Injury of back 2005    Sinusitis 1987    Squamous cell skin cancer, face     Urinary retention          PAST SURGICAL HISTORY  Past Surgical History:   Procedure Laterality Date    CARDIAC CATHETERIZATION      [facility] 07/2015: cath with 20% LM long 70% LAD (with positive FFR), 50% OM1, 99% prox RCA, s/p 2.75x33 Xience Alpine to LAD and 3.5x18mm Xience Alpine to RCA. LVEF 64% Normal Cardiolite 11/2015    CARDIAC SURGERY      cardiac stents, 2    COLONOSCOPY  approx 6/2011    normal per patient  [facility]    COLONOSCOPY N/A 04/26/2021    Procedure: COLONOSCOPY TO CECUM WITH COLD SNARE POLYPECTOMIES AND BIOPSIES ;  Surgeon: Catarino Luna MD;  Location: [facility] ENDOSCOPY;  Service: Gastroenterology;  Laterality: N/A;  PRE- HISTORY COLON POLYPS  POST- POLYPS, COLITIS, HEMORRHOIDS    COLONOSCOPY W/ POLYPECTOMY N/A 07/25/2018    Procedure: COLONOSCOPY TO CECUM WITH COLD SNAREPOLYPECTOMIES, HOT SNARE POLYPECTOMIES;  Surgeon: Catarino Luna MD;  Location: [facility] ENDOSCOPY;  Service: Gastroenterology    CORONARY ANGIOPLASTY WITH STENT PLACEMENT      CORONARY ANGIOPLASTY WITH STENT PLACEMENT  04/01/2025    CORONARY ANGIOPLASTY WITH STENT PLACEMENT  04/03/2025    CYSTOSCOPY TRANSURETHRAL RESECTION OF PROSTATE      ENDOSCOPY N/A 07/25/2018    Procedure: ESOPHAGOGASTRODUODENOSCOPY WITH COLD BIOPSIES;  Surgeon: Catarino Luna MD;  Location: [facility] ENDOSCOPY;  Service: Gastroenterology    ENDOSCOPY N/A 01/07/2021    Procedure:  ESOPHAGOGASTRODUODENOSCOPY with cold biopsies;  Surgeon: Catarino Luna MD;  Location:  [facility] ENDOSCOPY;  Service: Gastroenterology;  Laterality: N/A;  PRE: EPIGASTRIC PAIN, DYSPEPSIA  POST: GASTRITIS    ENDOSCOPY N/A 2022    Procedure: ESOPHAGOGASTRODUODENOSCOPY WITH DILATATION #48 to #58 bougie, gastric biopsy;  Surgeon: Td Richey DO;  Location: [facility] ENDOSCOPY;  Service: General;  Laterality: N/A;  gastritis, small hiatal hernia    ENDOSCOPY N/A 10/30/2023    Procedure: ESOPHAGOGASTRODUODENOSCOPY with biopsies x 1 area.;  Surgeon: Shell Suero MD;  Location: [facility] ENDOSCOPY;  Service: Gastroenterology;  Laterality: N/A;  Post-gastritis    HIATAL HERNIA REPAIR N/A 2022    Procedure: HIATAL HERNIA REPAIR LAPAROSCOPIC WITH TRANSORAL INCISIONLESS FUNDOPLICATION;  Surgeon: Td Richey DO;  Location: [facility] MAIN OR;  Service: General;  Laterality: N/A;    RADIOFREQUENCY ABLATION      NECK    SKIN CANCER EXCISION      WISDOM TOOTH EXTRACTION           FAMILY HISTORY  Family History   Problem Relation Age of Onset    Cervical cancer Mother     Cancer Mother     Depression Mother     Heart disease Mother     Liver disease Mother     Alcohol abuse Mother     Thyroid cancer Father     Cancer Father     Depression Father     Diabetes Father     Thyroid disease Father     Heart disease Father     Coronary artery disease Other     Heart disease Brother     Malig Hyperthermia Neg Hx          SOCIAL HISTORY  Social History     Socioeconomic History    Marital status:    Tobacco Use    Smoking status: Former     Current packs/day: 0.00     Average packs/day: 0.3 packs/day for 4.0 years (1.0 ttl pk-yrs)     Types: Cigarettes     Start date: 1968     Quit date:      Years since quittin.3     Passive exposure: Never (IN CHILDHOOD)    Smokeless tobacco: Never   Vaping Use    Vaping status: Never Used   Substance and Sexual Activity    Alcohol use: No     Comment: caffeine  use    Drug use: No    Sexual activity: Defer         ALLERGIES  Augmentin [amoxicillin-pot clavulanate] and Clavulanic acid      REVIEW OF SYSTEMS  Review of Systems  Included in HPI  All systems reviewed and negative except for those discussed in HPI.      PHYSICAL EXAM    I have reviewed the triage vital signs and nursing notes.    ED Triage Vitals   Temp Heart Rate Resp BP SpO2   05/01/25 1415 05/01/25 1415 05/01/25 1415 05/01/25 1431 05/01/25 1415   98.1 °F (36.7 °C) 108 19 135/69 98 %      Temp src Heart Rate Source Patient Position BP Location FiO2 (%)   -- -- -- -- --              Physical Exam  GENERAL: Awake, alert, no acute distress  SKIN: Warm, dry  HENT: Normocephalic, atraumatic  EYES: no scleral icterus  CV: regular rhythm, regular rate  RESPIRATORY: normal effort, lungs clear  ABDOMEN: soft, nontender, nondistended  MUSCULOSKELETAL: no deformity, no calf tenderness or swelling  NEURO: alert, moves all extremities, follows commands            LAB RESULTS  Recent Results (from the past 24 hours)   ECG 12 Lead ED Triage Standing Order; Chest Pain    Collection Time: 05/01/25  2:19 PM   Result Value Ref Range    QT Interval 358 ms    QTC Interval 405 ms   Comprehensive Metabolic Panel    Collection Time: 05/01/25  2:19 PM    Specimen: Arm, Right; Blood   Result Value Ref Range    Glucose 117 (H) 65 - 99 mg/dL    BUN 16 8 - 23 mg/dL    Creatinine 1.17 0.76 - 1.27 mg/dL    Sodium 137 136 - 145 mmol/L    Potassium 4.2 3.5 - 5.2 mmol/L    Chloride 104 98 - 107 mmol/L    CO2 22.8 22.0 - 29.0 mmol/L    Calcium 10.0 8.6 - 10.5 mg/dL    Total Protein 7.0 6.0 - 8.5 g/dL    Albumin 4.2 3.5 - 5.2 g/dL    ALT (SGPT) 12 1 - 41 U/L    AST (SGOT) 18 1 - 40 U/L    Alkaline Phosphatase 82 39 - 117 U/L    Total Bilirubin 0.7 0.0 - 1.2 mg/dL    Globulin 2.8 gm/dL    A/G Ratio 1.5 g/dL    BUN/Creatinine Ratio 13.7 7.0 - 25.0    Anion Gap 10.2 5.0 - 15.0 mmol/L    eGFR 64.2 >60.0 mL/min/1.73   High Sensitivity Troponin T     Collection Time: 05/01/25  2:19 PM    Specimen: Arm, Right; Blood   Result Value Ref Range    HS Troponin T 10 <22 ng/L   Green Top (Gel)    Collection Time: 05/01/25  2:19 PM   Result Value Ref Range    Extra Tube Hold for add-ons.    Lavender Top    Collection Time: 05/01/25  2:19 PM   Result Value Ref Range    Extra Tube hold for add-on    Gold Top - SST    Collection Time: 05/01/25  2:19 PM   Result Value Ref Range    Extra Tube Hold for add-ons.    Light Blue Top    Collection Time: 05/01/25  2:19 PM   Result Value Ref Range    Extra Tube Hold for add-ons.    CBC Auto Differential    Collection Time: 05/01/25  2:19 PM    Specimen: Arm, Right; Blood   Result Value Ref Range    WBC 7.08 3.40 - 10.80 10*3/mm3    RBC 5.19 4.14 - 5.80 10*6/mm3    Hemoglobin 16.1 13.0 - 17.7 g/dL    Hematocrit 46.7 37.5 - 51.0 %    MCV 90.0 79.0 - 97.0 fL    MCH 31.0 26.6 - 33.0 pg    MCHC 34.5 31.5 - 35.7 g/dL    RDW 12.2 (L) 12.3 - 15.4 %    RDW-SD 39.9 37.0 - 54.0 fl    MPV 9.6 6.0 - 12.0 fL    Platelets 216 140 - 450 10*3/mm3    Neutrophil % 68.4 42.7 - 76.0 %    Lymphocyte % 22.7 19.6 - 45.3 %    Monocyte % 7.6 5.0 - 12.0 %    Eosinophil % 0.8 0.3 - 6.2 %    Basophil % 0.4 0.0 - 1.5 %    Immature Grans % 0.1 0.0 - 0.5 %    Neutrophils, Absolute 4.83 1.70 - 7.00 10*3/mm3    Lymphocytes, Absolute 1.61 0.70 - 3.10 10*3/mm3    Monocytes, Absolute 0.54 0.10 - 0.90 10*3/mm3    Eosinophils, Absolute 0.06 0.00 - 0.40 10*3/mm3    Basophils, Absolute 0.03 0.00 - 0.20 10*3/mm3    Immature Grans, Absolute 0.01 0.00 - 0.05 10*3/mm3    nRBC 0.0 0.0 - 0.2 /100 WBC         RADIOLOGY  XR Chest 1 View  Result Date: 5/1/2025  XR CHEST 1 VW-  HISTORY: 79-year-old male with chest pain.  FINDINGS: There is no evidence for pneumonia, effusions, or CHF. No acute abnormality is seen.  This report was finalized on 5/1/2025 2:50 PM by Dr. Muriel Santos M.D on Workstation: [id number]          MEDICATIONS GIVEN IN ER  Medications   sodium chloride 0.9 %  flush 10 mL (has no administration in time range)   aspirin tablet 325 mg (has no administration in time range)         ORDERS PLACED DURING THIS VISIT:  Orders Placed This Encounter   Procedures    XR Chest 1 View    [place] Draw    Comprehensive Metabolic Panel    High Sensitivity Troponin T    CBC Auto Differential    High Sensitivity Troponin T 1Hr    NPO Diet NPO Type: Strict NPO    Undress & Gown    Continuous Pulse Oximetry    LCG (on-call MD unless specified)    Oxygen Therapy- Nasal Cannula; Titrate 1-6 LPM Per SpO2; 90 - 95%    ECG 12 Lead ED Triage Standing Order; Chest Pain    ECG 12 Lead ED Triage Standing Order; Chest Pain    Insert Peripheral IV    Initiate Observation Status    CBC & Differential    Green Top (Gel)    Lavender Top    Gold Top - SST    Light Blue Top         OUTPATIENT MEDICATION MANAGEMENT:  Current Facility-Administered Medications Ordered in Epic   Medication Dose Route Frequency Provider Last Rate Last Admin    aspirin tablet 325 mg  325 mg Oral Once Rolando Nicolas MD        sodium chloride 0.9 % flush 10 mL  10 mL Intravenous PRN Rolando Nicolas MD         Current Outpatient Medications Ordered in Epic   Medication Sig Dispense Refill    ALPRAZolam (XANAX) 0.25 MG tablet Take 1 tablet by mouth Daily As Needed for Anxiety. 30 tablet 2    ASPIRIN 81 PO Take  by mouth Daily.      coenzyme Q10 100 MG capsule Take 1 capsule by mouth Daily. LD 6/30      diphenhydrAMINE-acetaminophen (TYLENOL PM)  MG tablet per tablet Take 1 tablet by mouth At Night As Needed for Sleep.      Emollient (COLLAGEN EX) Take 1 tablet by mouth Daily. LD 6/30      ezetimibe (ZETIA) 10 MG tablet Take 1 tablet by mouth Daily. 90 tablet 3    famotidine (Pepcid) 20 MG tablet Take 1 tablet by mouth 2 (Two) Times a Day. 180 tablet 3    HYDROcodone-acetaminophen (NORCO) 5-325 MG per tablet Take  by mouth See Admin Instructions. Take 1 tablet by mouth every 4-6 hours as needed for pain      LITHIUM PO Take 5  mg by mouth 2 (two) times a day.      pitavastatin calcium (LIVALO) 2 MG tablet tablet Take 1 tablet by mouth Every Night. 90 tablet 3    polyethyl glycol-propyl glycol (SYSTANE) 0.4-0.3 % solution ophthalmic solution (artificial tears) Every 1 (One) Hour As Needed.      [START ON 7/11/2025] prasugrel (EFFIENT) 5 MG tablet Take 1 tablet by mouth Daily. 90 tablet 3    sildenafil (REVATIO) 20 MG tablet Take 2-3 tabs QD prn 30 tablet 11    tamsulosin (Flomax) 0.4 MG capsule 24 hr capsule Take 1 capsule by mouth Every Night. 30 capsule 5    Vitamin D-Vitamin K (VITAMIN K2-VITAMIN D3 PO) Take  by mouth Daily. Hold for surgery           PROCEDURES  Procedures            PROGRESS, DATA ANALYSIS, CONSULTS, AND MEDICAL DECISION MAKING  All labs have been independently interpreted by me.  All radiology studies have been reviewed by me. All EKG's have been independently viewed and interpreted by me.  Discussion below represents my analysis of pertinent findings related to patient's condition, differential diagnosis, treatment plan and final disposition.    Differential diagnosis includes but is not limited to acute coronary syndrome, acute aortic syndrome, PE, pneumothorax, unstable angina.    Clinical Scores:         HEART Score: 5   Shared Decision Making  I discussed the findings with the patient/patient representative who is in agreement with the treatment plan and the final disposition.  Risks and benefits of discharge and/or observation/admission were discussed: Yes                               ED Course as of 05/01/25 1547   Thu May 01, 2025   1431 First look: Patient presents for evaluation of persistent chest pain for several weeks.  He does have a CAD history with prior stenting.  He has a normal heart rate and normal work of breathing with normal oxygenation on room air right now.  Will proceed with typical cardiac workup to initiate this ED visit.  Further care will be assumed by the oncoming physician at shift  change. [MELANIE]   1522 XR Chest 1 View  My independent interpretation of the imaging study is no pneumothorax [TR]   1522 EKG PROCEDURE    EKG time: 1419  Rhythm/Rate: Normal sinus, rate 77  P waves and NH: Normal P, normal NH  QRS, axis: Narrow QRS, normal axis  ST and T waves: No acute    Independently Interpreted by me  Not significantly changed compared to prior 7/6/2023   [TR]   1526 Workup and findings with the patient at the bedside.  Answered all questions.  I have a call out to his cardiologist to discuss.  His first troponin is normal and his EKG is unremarkable.  I am concerned because he has had a change in his typical symptoms. [TR]   1544 Discussing with Dr. Boucher with cardiology.  She request to admit to her service. [TR]   1545 I reviewed the workup and findings with the patient and family at the bedside.  Answered all questions.  They are agreeable to admission. [TR]      ED Course User Index  [MELANIE] Rolando Nicolas MD  [TR] Chuckie Carpenter MD             AS OF 15:47 EDT VITALS:    BP - 135/69  HR - 79  TEMP - 98.1 °F (36.7 °C)  O2 SATS - 97%    COMPLEXITY OF CARE  The patient requires admission.      DIAGNOSIS  Final diagnoses:   Chest pain, unspecified type         DISPOSITION  ED Disposition       ED Disposition   Decision to Admit    Condition   --    Comment   Level of Care: Telemetry [5]   Diagnosis: Chest pain [273385]   Admitting Physician: SUDHAKAR BOUCHER [[id number]]   Attending Physician: SUDHAKAR BOUCHER [[id number]]   Is patient appropriate for Inpatient Observation Unit?: No [0]                  Please note that portions of this document were completed with a voice recognition program.    Note Disclaimer: At [facility], we believe that sharing information builds trust and better relationships. You are receiving this note because you recently visited [facility]. It is possible you will see health information before a provider has talked with you about it. This kind of information can be easy to  misunderstand. To help you fully understand what it means for your health, we urge you to discuss this note with your provider.         Chuckie Carpenter MD  05/01/25 1543

## 2025-05-01 NOTE — ED NOTES
Nursing report ED to floor  Td Melendez Jr.  79 y.o.  male    HPI :  HPI  Stated Reason for Visit: CP    Chief Complaint  Chief Complaint   Patient presents with    Chest Pain       Admitting doctor:   Obi Boucher MD    Admitting diagnosis:   The encounter diagnosis was Chest pain, unspecified type.    Code status:   Current Code Status       Date Active Code Status Order ID Comments User Context       Prior            Allergies:   Augmentin [amoxicillin-pot clavulanate] and Clavulanic acid    Isolation:   No active isolations    Intake and Output  No intake or output data in the 24 hours ending 05/01/25 1601    Weight:   There were no vitals filed for this visit.    Most recent vitals:   Vitals:    05/01/25 1415 05/01/25 1431   BP:  135/69   Pulse: 108 79   Resp: 19    Temp: 98.1 °F (36.7 °C)    SpO2: 98% 97%       Active LDAs/IV Access:   Lines, Drains & Airways       Active LDAs       Name Placement date Placement time Site Days    Peripheral IV 05/01/25 1526 20 G Left Antecubital 05/01/25  1526  Antecubital  less than 1                    Labs (abnormal labs have a star):   Labs Reviewed   COMPREHENSIVE METABOLIC PANEL - Abnormal; Notable for the following components:       Result Value    Glucose 117 (*)     All other components within normal limits    Narrative:     GFR Categories in Chronic Kidney Disease (CKD)              GFR Category          GFR (mL/min/1.73)    Interpretation  G1                    90 or greater        Normal or high (1)  G2                    60-89                Mild decrease (1)  G3a                   45-59                Mild to moderate decrease  G3b                   30-44                Moderate to severe decrease  G4                    15-29                Severe decrease  G5                    14 or less           Kidney failure    (1)In the absence of evidence of kidney disease, neither GFR category G1 or G2 fulfill the criteria for CKD.    eGFR calculation 2021 CKD-EPI  creatinine equation, which does not include race as a factor   CBC WITH AUTO DIFFERENTIAL - Abnormal; Notable for the following components:    RDW 12.2 (*)     All other components within normal limits   TROPONIN - Normal    Narrative:     High Sensitive Troponin T Reference Range:  <14.0 ng/L- Negative Female for AMI  <22.0 ng/L- Negative Male for AMI  >=14 - Abnormal Female indicating possible myocardial injury.  >=22 - Abnormal Male indicating possible myocardial injury.   Clinicians would have to utilize clinical acumen, EKG, Troponin, and serial changes to determine if it is an Acute Myocardial Infarction or myocardial injury due to an underlying chronic condition.        HIGH SENSITIVITIY TROPONIN T 1HR - Normal    Narrative:     High Sensitive Troponin T Reference Range:  <14.0 ng/L- Negative Female for AMI  <22.0 ng/L- Negative Male for AMI  >=14 - Abnormal Female indicating possible myocardial injury.  >=22 - Abnormal Male indicating possible myocardial injury.   Clinicians would have to utilize clinical acumen, EKG, Troponin, and serial changes to determine if it is an Acute Myocardial Infarction or myocardial injury due to an underlying chronic condition.        RAINBOW DRAW    Narrative:     The following orders were created for panel order [place] Draw.  Procedure                               Abnormality         Status                     ---------                               -----------         ------                     Green Top (Gel)[[id number]]                                  Final result               Lavender Top[[id number]]                                     Final result               Gold Top - SST[[id number]]                                   Final result               Light Blue Top[[id number]]                                   Final result                 Please view results for these tests on the individual orders.   CBC AND DIFFERENTIAL    Narrative:     The following orders were created  for panel order CBC & Differential.  Procedure                               Abnormality         Status                     ---------                               -----------         ------                     CBC Auto Differential[[id number]]        Abnormal            Final result                 Please view results for these tests on the individual orders.   GREEN TOP   LAVENDER TOP   GOLD TOP - SST   LIGHT BLUE TOP       EKG:   ECG 12 Lead ED Triage Standing Order; Chest Pain   Preliminary Result   HEART RATE=77  bpm   RR Vwptjzne=421  ms   IN Wqaurbkf=665  ms   P Horizontal Axis=-3  deg   P Front Axis=68  deg   QRSD Tkkthtkc=976  ms   QT Sumhkrvs=077  ms   SQvZ=825  ms   QRS Axis=79  deg   T Wave Axis=40  deg   - OTHERWISE NORMAL ECG -   Sinus rhythm   Consider left atrial enlargement   Date and Time of Study:2025 14:19:00          Meds given in ED:   Medications   sodium chloride 0.9 % flush 10 mL (has no administration in time range)   aspirin tablet 325 mg (has no administration in time range)       Imaging results:  No radiology results for the last day    Ambulatory status:       Social issues:   Social History     Socioeconomic History    Marital status:    Tobacco Use    Smoking status: Former     Current packs/day: 0.00     Average packs/day: 0.3 packs/day for 4.0 years (1.0 ttl pk-yrs)     Types: Cigarettes     Start date: 1968     Quit date: 1972     Years since quittin.3     Passive exposure: Never (IN CHILDHOOD)    Smokeless tobacco: Never   Vaping Use    Vaping status: Never Used   Substance and Sexual Activity    Alcohol use: No     Comment: caffeine use    Drug use: No    Sexual activity: Defer       Peripheral Neurovascular  Peripheral Neurovascular (Adult)  Peripheral Neurovascular WDL: WDL    Neuro Cognitive  Neuro Cognitive (Adult)  Cognitive/Neuro/Behavioral WDL: WDL    Learning       Respiratory  Respiratory WDL  Respiratory WDL: WDL    Abdominal Pain       Pain  Assessments  Pain (Adult)  (0-10) Pain Rating: Rest: 6  Pain Location: chest    NIH Stroke Scale       Cassia France RN  05/01/25 16:01 EDT

## 2025-05-01 NOTE — PLAN OF CARE
Patient alert and orientated. Family at the bedside. NPO after midnight for cath lab.  Problem: Adult Inpatient Plan of Care  Goal: Plan of Care Review  Outcome: Progressing  Goal: Patient-Specific Goal (Individualized)  Outcome: Progressing  Goal: Absence of Hospital-Acquired Illness or Injury  Outcome: Progressing  Goal: Optimal Comfort and Wellbeing  Outcome: Progressing  Goal: Readiness for Transition of Care  Outcome: Progressing  Intervention: Mutually Develop Transition Plan  Recent Flowsheet Documentation  Taken 5/1/2025 1754 by Loretta Ayala, RN  Transportation Anticipated: family or friend will provide  Transportation Concerns: none  Patient/Family Anticipated Services at Transition: none  Patient/Family Anticipates Transition to: home  Taken 5/1/2025 1741 by Loretta Ayala, RN  Equipment Currently Used at Home: none   Goal Outcome Evaluation:

## 2025-05-01 NOTE — H&P
Date of Consultation: 25    Encounter Provider: RUSS Johnson    Group of Service: [facility]     Patient Name: Td Melendez Jr.    :1945    Chief complaint: Chest pain    History of Present Illness: Td Melendez is a 79-year-old who is followed by Dr. Boucher.  He has a history of unstable angina and underwent drug-eluting stent placement to the left anterior descending artery and right coronary artery in 2015.  At that time he was noted to have a nonobstructive lesion in the circumflex.    4 weeks ago he noticed an increase in indigestion/heartburn.  He was visiting his son in [place] in late 2025 and developed classic exertional angina.  He was admitted to [facility] where he was ruled out for ACS.  His echocardiogram was normal (EF 60 to 64%, normal diastology, normal valves).  He underwent coronary angiography which revealed a normal left main and circumflex, 80% in-stent restenosis of the LAD involving the ostium of the diagonal, and 80% distal RCA.  He subsequently underwent placement of a 3.5 x 18 mm Xience stent to the RCA, placement of a 2.75 x 8 mm Synergy stent to the LAD which postdilated to 3 mm.  Prior to the LAD stent placement the ostium of the diagonal was angioplastied with a balloon.  He was discharged on aspirin and ticagrelor.    Since his discharge he has had persistent mid sternal chest pressure.  This occurred constantly and he reports it was mild.  He denies any accompanying symptoms.  He did note fatigue after his stent placement.  He also developed a large groin hematoma with a slight bruit which was evaluated with a stat duplex, thankfully this was negative for pseudoaneurysm or fistula.    On 2025 he called the office with complaints of increased shortness of breath.  He felt it may be secondary to his Brilinta and he was encouraged to increase his caffeine intake to see if this help with symptoms.  He did this but did not feel it  helped and so he was transition to Effient.    Over the last 2 days he has noted increased chest discomfort with activity.  The first episode occurred while he was on the treadmill at cardiac rehab.  The pain occurred midsternum, with a pressure sensation.  He denies associated symptoms.  He denies associated shortness of breath.  The pain improved quickly with rest.  Yesterday he went for a walk and experienced a similar sensation though not as severe per his report.    He was resting on a stretcher in the ED on my exam today.  He has very mild chest discomfort currently, rates this a 1 out of 10.  High-sensitivity troponin 10 then 10 on repeat.  CBC and CMP unremarkable.  Chest x-ray with no acute findings.  EKG shows sinus rhythm with a rate of 77 bpm, no evidence of ischemia.    Echocardiogram March 2025   LVEF 60-64% with normal diastolic function and normal cardiac valves.     Cardiac catheterization March 2025 in [place], [place]   normal circumflex and left main.  80% ISR LAD involving the ostium of the diagonal.  80% distal RCA stenosis.  Patient had placement of MADELIN to the RCA and LAD.  Ostium of the diagonal was angioplastied with balloon.  Patient was sent home on DAPT with aspirin and ticagrelor.    Past Medical History:   Diagnosis Date    Anxiety     CAD (coronary artery disease)     unstable angina, 7/2015: 20% LM, long 70% LAD with abnormal FFR (s/p 2.09u83za Xience), 50% OM1, 99% prox RCA (s/p 3.5x18m Xience).  Normal Cardiolite 11/2015 and 6/2018    Chronic diarrhea 1980    treat with lotronex    Colon polyp 2010    Depression 1983    Fibromyalgia     GERD (gastroesophageal reflux disease)     H/O complete eye exam 04/2018    Hyperlipidemia     IBS (irritable bowel syndrome)     Injury of back 2005    Sinusitis 1987    Squamous cell skin cancer, face     Urinary retention          Past Surgical History:   Procedure Laterality Date    CARDIAC CATHETERIZATION      [facility] 07/2015: cath with 20% LM long  70% LAD (with positive FFR), 50% OM1, 99% prox RCA, s/p 2.75x33 Xience Alpine to LAD and 3.5x18mm Xience Alpine to RCA. LVEF 64% Normal Cardiolite 11/2015    CARDIAC SURGERY      cardiac stents, 2    COLONOSCOPY  approx 6/2011    normal per patient  [facility]    COLONOSCOPY N/A 04/26/2021    Procedure: COLONOSCOPY TO CECUM WITH COLD SNARE POLYPECTOMIES AND BIOPSIES ;  Surgeon: Catarino Luna MD;  Location: [facility] ENDOSCOPY;  Service: Gastroenterology;  Laterality: N/A;  PRE- HISTORY COLON POLYPS  POST- POLYPS, COLITIS, HEMORRHOIDS    COLONOSCOPY W/ POLYPECTOMY N/A 07/25/2018    Procedure: COLONOSCOPY TO CECUM WITH COLD SNAREPOLYPECTOMIES, HOT SNARE POLYPECTOMIES;  Surgeon: Catarino Luna MD;  Location: [facility] ENDOSCOPY;  Service: Gastroenterology    CORONARY ANGIOPLASTY WITH STENT PLACEMENT      CORONARY ANGIOPLASTY WITH STENT PLACEMENT  04/01/2025    CORONARY ANGIOPLASTY WITH STENT PLACEMENT  04/03/2025    CYSTOSCOPY TRANSURETHRAL RESECTION OF PROSTATE      ENDOSCOPY N/A 07/25/2018    Procedure: ESOPHAGOGASTRODUODENOSCOPY WITH COLD BIOPSIES;  Surgeon: Catarino Luna MD;  Location: [facility] ENDOSCOPY;  Service: Gastroenterology    ENDOSCOPY N/A 01/07/2021    Procedure: ESOPHAGOGASTRODUODENOSCOPY with cold biopsies;  Surgeon: Catarino Luna MD;  Location: [facility] ENDOSCOPY;  Service: Gastroenterology;  Laterality: N/A;  PRE: EPIGASTRIC PAIN, DYSPEPSIA  POST: GASTRITIS    ENDOSCOPY N/A 05/25/2022    Procedure: ESOPHAGOGASTRODUODENOSCOPY WITH DILATATION #48 to #58 bougie, gastric biopsy;  Surgeon: Td Richey DO;  Location: [facility] ENDOSCOPY;  Service: General;  Laterality: N/A;  gastritis, small hiatal hernia    ENDOSCOPY N/A 10/30/2023    Procedure: ESOPHAGOGASTRODUODENOSCOPY with biopsies x 1 area.;  Surgeon: Shell Suero MD;  Location: [facility] ENDOSCOPY;  Service: Gastroenterology;  Laterality: N/A;  Post-gastritis    HIATAL HERNIA REPAIR N/A 07/06/2022    Procedure:  HIATAL HERNIA REPAIR LAPAROSCOPIC WITH TRANSORAL INCISIONLESS FUNDOPLICATION;  Surgeon: Td Richey DO;  Location: [facility] MAIN OR;  Service: General;  Laterality: N/A;    RADIOFREQUENCY ABLATION      NECK    SKIN CANCER EXCISION      WISDOM TOOTH EXTRACTION           Allergies   Allergen Reactions    Augmentin [Amoxicillin-Pot Clavulanate] Hives and Itching    Clavulanic Acid Unknown - High Severity         No current facility-administered medications on file prior to encounter.     Current Outpatient Medications on File Prior to Encounter   Medication Sig Dispense Refill    ALPRAZolam (XANAX) 0.25 MG tablet Take 1 tablet by mouth Daily As Needed for Anxiety. 30 tablet 2    ASPIRIN 81 PO Take  by mouth Daily.      coenzyme Q10 100 MG capsule Take 1 capsule by mouth Daily. LD 6/30      diphenhydrAMINE-acetaminophen (TYLENOL PM)  MG tablet per tablet Take 1 tablet by mouth At Night As Needed for Sleep.      Emollient (COLLAGEN EX) Take 1 tablet by mouth Daily. LD 6/30      ezetimibe (ZETIA) 10 MG tablet Take 1 tablet by mouth Daily. 90 tablet 3    famotidine (Pepcid) 20 MG tablet Take 1 tablet by mouth 2 (Two) Times a Day. 180 tablet 3    HYDROcodone-acetaminophen (NORCO) 5-325 MG per tablet Take  by mouth See Admin Instructions. Take 1 tablet by mouth every 4-6 hours as needed for pain      LITHIUM PO Take 5 mg by mouth 2 (two) times a day.      pitavastatin calcium (LIVALO) 2 MG tablet tablet Take 1 tablet by mouth Every Night. 90 tablet 3    polyethyl glycol-propyl glycol (SYSTANE) 0.4-0.3 % solution ophthalmic solution (artificial tears) Every 1 (One) Hour As Needed.      [START ON 7/11/2025] prasugrel (EFFIENT) 5 MG tablet Take 1 tablet by mouth Daily. 90 tablet 3    sildenafil (REVATIO) 20 MG tablet Take 2-3 tabs QD prn 30 tablet 11    tamsulosin (Flomax) 0.4 MG capsule 24 hr capsule Take 1 capsule by mouth Every Night. 30 capsule 5    Vitamin D-Vitamin K (VITAMIN K2-VITAMIN D3 PO) Take  by mouth  Daily. Hold for surgery      [DISCONTINUED] OXcarbazepine (TRILEPTAL) 300 MG tablet Take 300 mg by mouth 2 (Two) Times a Day.           Social History     Socioeconomic History    Marital status:    Tobacco Use    Smoking status: Former     Current packs/day: 0.00     Average packs/day: 0.3 packs/day for 4.0 years (1.0 ttl pk-yrs)     Types: Cigarettes     Start date: 1968     Quit date:      Years since quittin.3     Passive exposure: Never (IN CHILDHOOD)    Smokeless tobacco: Never   Vaping Use    Vaping status: Never Used   Substance and Sexual Activity    Alcohol use: No     Comment: caffeine use    Drug use: No    Sexual activity: Defer         Family History   Problem Relation Age of Onset    Cervical cancer Mother     Cancer Mother     Depression Mother     Heart disease Mother     Liver disease Mother     Alcohol abuse Mother     Thyroid cancer Father     Cancer Father     Depression Father     Diabetes Father     Thyroid disease Father     Heart disease Father     Coronary artery disease Other     Heart disease Brother     Malig Hyperthermia Neg Hx        REVIEW OF SYSTEMS:   12 point ROS was performed and is negative except as outlined in HPI       Objective:     Vitals:    25 1601 25 1631 25 1701 25 1712   BP: 113/75 128/69 139/72    Pulse: 66 62 67 65   Resp:       Temp:       SpO2: 97% 96% 97% 98%     There is no height or weight on file to calculate BMI.        Physical Exam  Vitals reviewed.   Constitutional:       General: He is not in acute distress.  HENT:      Head: Normocephalic.      Nose: Nose normal.   Eyes:      Extraocular Movements: Extraocular movements intact.      Pupils: Pupils are equal, round, and reactive to light.   Cardiovascular:      Rate and Rhythm: Normal rate and regular rhythm.      Pulses: Normal pulses.      Heart sounds: Normal heart sounds. Heart sounds not distant. No murmur heard.     No friction rub. No gallop. No S3 or S4  sounds.   Pulmonary:      Effort: Pulmonary effort is normal.      Breath sounds: Normal breath sounds.   Abdominal:      General: Abdomen is flat. Bowel sounds are normal.      Palpations: Abdomen is soft.      Tenderness: There is no abdominal tenderness.   Skin:     General: Skin is warm and dry.   Neurological:      General: No focal deficit present.      Mental Status: He is alert and oriented to person, place, and time. Mental status is at baseline.   Psychiatric:         Mood and Affect: Mood normal.         Behavior: Behavior normal.         Lab Review:                Results from last 7 days   Lab Units 05/01/25  1419   SODIUM mmol/L 137   POTASSIUM mmol/L 4.2   CHLORIDE mmol/L 104   CO2 mmol/L 22.8   BUN mg/dL 16   CREATININE mg/dL 1.17   GLUCOSE mg/dL 117*   CALCIUM mg/dL 10.0     Results from last 7 days   Lab Units 05/01/25  1526 05/01/25  1419   HSTROP T ng/L 10 10     Results from last 7 days   Lab Units 05/01/25  1419   WBC 10*3/mm3 7.08   HEMOGLOBIN g/dL 16.1   HEMATOCRIT % 46.7   PLATELETS 10*3/mm3 216                       EKG:            Assessment/Plan:   Chest pain  Persistent midsternal chest heaviness since his cardiac cath in March 2025 with subsequent stent placement.  Denies associated symptoms.  The pain does not radiate.  Discussed case with Dr. Boucher his primary cardiologist.  Suspect his pain may be secondary to the involvement at the ostium of the diagonal.  This was angioplastied with a balloon prior to his LAD stent placement.  Will proceed with a cardiac cath tomorrow to further evaluate this.  I discussed this with the patient and his wife who is at bedside who are agreeable to proceed.  Will place Nitropaste now.  N.p.o. after midnight.  Coronary artery disease  Status post placement of MADELIN to the RCA and LAD, ostium of the diagonal was angioplasty with a balloon in March 2025  DAPT with Effient and aspirin  GDMT: Aspirin, Zetia, Livalo  Mixed hyperlipidemia  On Zetia and  Minh    Plan for cardiac cath tomorrow, discussed with Dr. Boucher and patient and his spouse (Lindsey).    Astrid Bello, APRN   05/01/25

## 2025-05-01 NOTE — TELEPHONE ENCOUNTER
Pt is currently in the ED.    Thank you,    Patrica Braun, RN  [place] [facility]  05/01/25 15:04 EDT

## 2025-05-01 NOTE — TELEPHONE ENCOUNTER
Pt called in because on 4/23/25 he made the switch from Brilinta to Effient.  He's contusing to have CP when he exerts himself and then he'll feel lightheaded and dizzy afterwards.  He says the CP is like a heaviness/ache in his chest.  He's still taking ASA and hasn't missed any doses of these medications.    He says it doesn't really feel similar to his symptoms prior to PCI.    Do you have any recommendations for this patient?    Roxi MACIAS RN  [facility] Triage  05/01/25  12:42 EDT

## 2025-05-02 ENCOUNTER — TELEPHONE (OUTPATIENT)
Dept: CARDIOLOGY | Age: 80
End: 2025-05-02

## 2025-05-02 ENCOUNTER — APPOINTMENT (OUTPATIENT)
Dept: CARDIAC REHAB | Facility: HOSPITAL | Age: 80
End: 2025-05-02
Payer: MEDICARE

## 2025-05-02 ENCOUNTER — READMISSION MANAGEMENT (OUTPATIENT)
Dept: CALL CENTER | Facility: HOSPITAL | Age: 80
End: 2025-05-02
Payer: MEDICARE

## 2025-05-02 VITALS
SYSTOLIC BLOOD PRESSURE: 133 MMHG | TEMPERATURE: 97.9 F | DIASTOLIC BLOOD PRESSURE: 62 MMHG | OXYGEN SATURATION: 97 % | RESPIRATION RATE: 18 BRPM | HEART RATE: 72 BPM | WEIGHT: 186.29 LBS | BODY MASS INDEX: 26.73 KG/M2

## 2025-05-02 LAB
ALBUMIN SERPL-MCNC: 3.8 G/DL (ref 3.5–5.2)
ALBUMIN/GLOB SERPL: 1.7 G/DL
ALP SERPL-CCNC: 78 U/L (ref 39–117)
ALT SERPL W P-5'-P-CCNC: 12 U/L (ref 1–41)
ANION GAP SERPL CALCULATED.3IONS-SCNC: 11 MMOL/L (ref 5–15)
AST SERPL-CCNC: 14 U/L (ref 1–40)
BASOPHILS # BLD AUTO: 0.04 10*3/MM3 (ref 0–0.2)
BASOPHILS NFR BLD AUTO: 0.7 % (ref 0–1.5)
BILIRUB SERPL-MCNC: 0.4 MG/DL (ref 0–1.2)
BUN SERPL-MCNC: 19 MG/DL (ref 8–23)
BUN/CREAT SERPL: 20 (ref 7–25)
CALCIUM SPEC-SCNC: 9.1 MG/DL (ref 8.6–10.5)
CHLORIDE SERPL-SCNC: 107 MMOL/L (ref 98–107)
CO2 SERPL-SCNC: 21 MMOL/L (ref 22–29)
CREAT SERPL-MCNC: 0.95 MG/DL (ref 0.76–1.27)
DEPRECATED RDW RBC AUTO: 42.8 FL (ref 37–54)
EGFRCR SERPLBLD CKD-EPI 2021: 82.4 ML/MIN/1.73
EOSINOPHIL # BLD AUTO: 0.14 10*3/MM3 (ref 0–0.4)
EOSINOPHIL NFR BLD AUTO: 2.4 % (ref 0.3–6.2)
ERYTHROCYTE [DISTWIDTH] IN BLOOD BY AUTOMATED COUNT: 12.8 % (ref 12.3–15.4)
GLOBULIN UR ELPH-MCNC: 2.2 GM/DL
GLUCOSE SERPL-MCNC: 98 MG/DL (ref 65–99)
HCT VFR BLD AUTO: 44.3 % (ref 37.5–51)
HGB BLD-MCNC: 15.5 G/DL (ref 13–17.7)
IMM GRANULOCYTES # BLD AUTO: 0.01 10*3/MM3 (ref 0–0.05)
IMM GRANULOCYTES NFR BLD AUTO: 0.2 % (ref 0–0.5)
INR PPP: 1.19 (ref 0.9–1.1)
LYMPHOCYTES # BLD AUTO: 1.72 10*3/MM3 (ref 0.7–3.1)
LYMPHOCYTES NFR BLD AUTO: 29.4 % (ref 19.6–45.3)
MAGNESIUM SERPL-MCNC: 2.1 MG/DL (ref 1.6–2.4)
MCH RBC QN AUTO: 32 PG (ref 26.6–33)
MCHC RBC AUTO-ENTMCNC: 35 G/DL (ref 31.5–35.7)
MCV RBC AUTO: 91.3 FL (ref 79–97)
MONOCYTES # BLD AUTO: 0.62 10*3/MM3 (ref 0.1–0.9)
MONOCYTES NFR BLD AUTO: 10.6 % (ref 5–12)
NEUTROPHILS NFR BLD AUTO: 3.33 10*3/MM3 (ref 1.7–7)
NEUTROPHILS NFR BLD AUTO: 56.7 % (ref 42.7–76)
NRBC BLD AUTO-RTO: 0 /100 WBC (ref 0–0.2)
PLATELET # BLD AUTO: 181 10*3/MM3 (ref 140–450)
PMV BLD AUTO: 9.6 FL (ref 6–12)
POTASSIUM SERPL-SCNC: 4.3 MMOL/L (ref 3.5–5.2)
PROT SERPL-MCNC: 6 G/DL (ref 6–8.5)
PROTHROMBIN TIME: 15.1 SECONDS (ref 11.7–14.2)
RBC # BLD AUTO: 4.85 10*6/MM3 (ref 4.14–5.8)
SODIUM SERPL-SCNC: 139 MMOL/L (ref 136–145)
WBC NRBC COR # BLD AUTO: 5.86 10*3/MM3 (ref 3.4–10.8)

## 2025-05-02 PROCEDURE — 25010000002 HEPARIN (PORCINE) PER 1000 UNITS: Performed by: INTERNAL MEDICINE

## 2025-05-02 PROCEDURE — 93458 L HRT ARTERY/VENTRICLE ANGIO: CPT | Performed by: INTERNAL MEDICINE

## 2025-05-02 PROCEDURE — C1894 INTRO/SHEATH, NON-LASER: HCPCS | Performed by: INTERNAL MEDICINE

## 2025-05-02 PROCEDURE — 25010000002 FENTANYL CITRATE (PF) 50 MCG/ML SOLUTION: Performed by: INTERNAL MEDICINE

## 2025-05-02 PROCEDURE — G0378 HOSPITAL OBSERVATION PER HR: HCPCS

## 2025-05-02 PROCEDURE — 25510000001 IOPAMIDOL PER 1 ML: Performed by: INTERNAL MEDICINE

## 2025-05-02 PROCEDURE — 25010000002 MIDAZOLAM PER 1 MG: Performed by: INTERNAL MEDICINE

## 2025-05-02 PROCEDURE — 83735 ASSAY OF MAGNESIUM: CPT

## 2025-05-02 PROCEDURE — 99239 HOSP IP/OBS DSCHRG MGMT >30: CPT | Performed by: INTERNAL MEDICINE

## 2025-05-02 PROCEDURE — 85610 PROTHROMBIN TIME: CPT

## 2025-05-02 PROCEDURE — 80053 COMPREHEN METABOLIC PANEL: CPT

## 2025-05-02 PROCEDURE — C1769 GUIDE WIRE: HCPCS | Performed by: INTERNAL MEDICINE

## 2025-05-02 PROCEDURE — 85025 COMPLETE CBC W/AUTO DIFF WBC: CPT

## 2025-05-02 PROCEDURE — 25010000002 LIDOCAINE 2% SOLUTION: Performed by: INTERNAL MEDICINE

## 2025-05-02 RX ORDER — SODIUM CHLORIDE 9 MG/ML
40 INJECTION, SOLUTION INTRAVENOUS AS NEEDED
Status: DISCONTINUED | OUTPATIENT
Start: 2025-05-02 | End: 2025-05-02 | Stop reason: HOSPADM

## 2025-05-02 RX ORDER — IOPAMIDOL 755 MG/ML
INJECTION, SOLUTION INTRAVASCULAR
Status: DISCONTINUED | OUTPATIENT
Start: 2025-05-02 | End: 2025-05-02 | Stop reason: HOSPADM

## 2025-05-02 RX ORDER — LIDOCAINE HYDROCHLORIDE 20 MG/ML
INJECTION, SOLUTION INFILTRATION; PERINEURAL
Status: DISCONTINUED | OUTPATIENT
Start: 2025-05-02 | End: 2025-05-02 | Stop reason: HOSPADM

## 2025-05-02 RX ORDER — MIDAZOLAM HYDROCHLORIDE 1 MG/ML
INJECTION, SOLUTION INTRAMUSCULAR; INTRAVENOUS
Status: DISCONTINUED | OUTPATIENT
Start: 2025-05-02 | End: 2025-05-02 | Stop reason: HOSPADM

## 2025-05-02 RX ORDER — AMLODIPINE BESYLATE 5 MG/1
5 TABLET ORAL DAILY
Qty: 90 TABLET | Refills: 3 | Status: SHIPPED | OUTPATIENT
Start: 2025-05-02

## 2025-05-02 RX ORDER — SODIUM CHLORIDE 0.9 % (FLUSH) 0.9 %
3 SYRINGE (ML) INJECTION EVERY 12 HOURS SCHEDULED
Status: DISCONTINUED | OUTPATIENT
Start: 2025-05-02 | End: 2025-05-02 | Stop reason: HOSPADM

## 2025-05-02 RX ORDER — NITROGLYCERIN 0.4 MG/1
0.4 TABLET SUBLINGUAL
Qty: 25 TABLET | Refills: 3 | Status: SHIPPED | OUTPATIENT
Start: 2025-05-02

## 2025-05-02 RX ORDER — HEPARIN SODIUM 1000 [USP'U]/ML
INJECTION, SOLUTION INTRAVENOUS; SUBCUTANEOUS
Status: DISCONTINUED | OUTPATIENT
Start: 2025-05-02 | End: 2025-05-02 | Stop reason: HOSPADM

## 2025-05-02 RX ORDER — VERAPAMIL HYDROCHLORIDE 2.5 MG/ML
INJECTION INTRAVENOUS
Status: DISCONTINUED | OUTPATIENT
Start: 2025-05-02 | End: 2025-05-02 | Stop reason: HOSPADM

## 2025-05-02 RX ORDER — FENTANYL CITRATE 50 UG/ML
INJECTION, SOLUTION INTRAMUSCULAR; INTRAVENOUS
Status: DISCONTINUED | OUTPATIENT
Start: 2025-05-02 | End: 2025-05-02 | Stop reason: HOSPADM

## 2025-05-02 RX ORDER — ACETAMINOPHEN 325 MG/1
650 TABLET ORAL EVERY 4 HOURS PRN
Status: DISCONTINUED | OUTPATIENT
Start: 2025-05-02 | End: 2025-05-02 | Stop reason: HOSPADM

## 2025-05-02 RX ORDER — SODIUM CHLORIDE 0.9 % (FLUSH) 0.9 %
10 SYRINGE (ML) INJECTION AS NEEDED
Status: DISCONTINUED | OUTPATIENT
Start: 2025-05-02 | End: 2025-05-02 | Stop reason: HOSPADM

## 2025-05-02 RX ADMIN — ASPIRIN 81 MG: 81 TABLET, COATED ORAL at 09:12

## 2025-05-02 RX ADMIN — NITROGLYCERIN 1 INCH: 20 OINTMENT TOPICAL at 06:18

## 2025-05-02 RX ADMIN — FAMOTIDINE 20 MG: 20 TABLET, FILM COATED ORAL at 09:12

## 2025-05-02 RX ADMIN — Medication 10 ML: at 09:14

## 2025-05-02 NOTE — PLAN OF CARE
Problem: Adult Inpatient Plan of Care  Goal: Plan of Care Review  5/2/2025 0512 by Nuria Chi RN  Outcome: Progressing  5/2/2025 0512 by Nuria Chi RN  Outcome: Progressing  Goal: Patient-Specific Goal (Individualized)  5/2/2025 0512 by Nuria Chi RN  Outcome: Progressing  5/2/2025 0512 by Nuria Chi RN  Outcome: Progressing  Goal: Absence of Hospital-Acquired Illness or Injury  5/2/2025 0512 by Nuria Chi RN  Outcome: Progressing  5/2/2025 0512 by Nuria Chi RN  Outcome: Progressing  Intervention: Identify and Manage Fall Risk  Recent Flowsheet Documentation  Taken 5/1/2025 2023 by Nuria Chi RN  Safety Promotion/Fall Prevention: safety round/check completed  Intervention: Prevent and Manage VTE (Venous Thromboembolism) Risk  Recent Flowsheet Documentation  Taken 5/1/2025 2023 by Nuria Chi RN  VTE Prevention/Management: SCDs (sequential compression devices) off  Goal: Optimal Comfort and Wellbeing  5/2/2025 0512 by Nuria Chi RN  Outcome: Progressing  5/2/2025 0512 by Nuria Chi RN  Outcome: Progressing  Intervention: Provide Person-Centered Care  Recent Flowsheet Documentation  Taken 5/1/2025 2023 by Nuria Chi RN  Trust Relationship/Rapport:   questions answered   care explained  Goal: Readiness for Transition of Care  5/2/2025 0512 by Nuria Chi RN  Outcome: Progressing  5/2/2025 0512 by Nuria Chi RN  Outcome: Progressing     Problem: Chest Pain  Goal: Resolution of Chest Pain Symptoms  5/2/2025 0512 by Nuria Chi RN  Outcome: Progressing  5/2/2025 0512 by Nuria Chi RN  Outcome: Progressing     Goal Outcome Evaluation:  Pt is alert and oriented. VSS. Pt is currently NPO for left heart cath. Pt reported no chest pain on my shift. Will continue plan of care.

## 2025-05-02 NOTE — OUTREACH NOTE
Prep Survey      Flowsheet Row Responses   [facility] patient discharged from? [place]   Is LACE score < 7 ? Yes   Eligibility [facility]   Date of Admission 05/01/25   Date of Discharge 05/02/25   Discharge Disposition Home or Self Care   Discharge diagnosis Chest pain   Does the patient have one of the following disease processes/diagnoses(primary or secondary)? Other   Does the patient have Home health ordered? No   Is there a DME ordered? No   Prep survey completed? Yes            PARKER URBANO - Registered Nurse

## 2025-05-02 NOTE — DISCHARGE SUMMARY
[facility] Discharge Summary      Patient Name: Td Melendez Jr.  Age/Sex: 79 y.o. male  : 1945  MRN: [id number]    Encounter Provider: Catarino Chavira MD  Referring Provider: Obi Boucher MD  Place of Service: [facility] CARDIOLOGY  Patient Care Team:  Beau Lee MD as PCP - General (Family Medicine)  Obi Boucher MD as Cardiologist (Cardiology)  Venkat Salas MD (Pain Medicine)  Pedrito Neal Jr., MD as Consulting Physician (Urology)  Marina Olivarez, OZ, APRN as Nurse Practitioner (Nurse Practitioner)  Jaci Arguelles MD as Consulting Physician (Sleep Medicine)  Lionel Page MD (Family Medicine)         Date of Discharge:  2025     Date of Admit: 2025      Discharge Diagnosis:   Chest pain        Hospital Course:   Mr. Melendez is a 79-year-old gentleman past medical history notable coronary disease [facility]  Is experiencing continued chest discomfort there is concerns there is having unstable angina he was directed to the emergency room workup thus far has been unrevealing with normal troponins and EKG was put on Nitropaste was feeling better this morning heart catheterization was requested to rule out any problems with the stents heart catheterization demonstrated patent stents there are some moderate disease involving the ostium of the diagonal branch due to pinching from the recently placed stents.  SANDER-3 flow is noted however.  Patient currently is on no antianginal medications we will add on amlodipine and monitor response.  Pain has been somewhat mild but now that they know what they are dealing with they are comfortable with knowing how to treat it we will restart cardiac rehab next week we will follow-up with primary cardiologist in the next week or 2    Physical Examination:   Constitutional: Well appearing, well developed, no acute distress   HENT:  Oropharynx clear and membrane moist  Eyes: Normal conjunctiva, no sclera icterus.  Neck: Supple, no carotid bruit bilaterally.  Cardiovascular: Regular rate and rhythm, No Murmur, No bilateral lower extremity edema.  Pulmonary: Normal respiratory effort, normal lung sounds, no wheezing.  Abdominal: Soft, nontender, no hepatosplenomegaly, liver is non-pulsatile.  Neurological: Alert and orient x 3.   Skin: Warm, dry, no ecchymosis, no rash.  Psych: Appropriate mood and affect. Normal judgment and insight.    Procedures Performed:  Procedure(s):  Left Heart Cath         Consults:  Consults       Date and Time Order Name Status Description    5/1/2025  3:18 PM LCG (on-call MD unless specified)              Pertinent Test Results:    Results from last 7 days   Lab Units 05/02/25  0617 05/01/25  1419   SODIUM mmol/L 139 137   POTASSIUM mmol/L 4.3 4.2   CHLORIDE mmol/L 107 104   CO2 mmol/L 21.0* 22.8   BUN mg/dL 19 16   CREATININE mg/dL 0.95 1.17   GLUCOSE mg/dL 98 117*   CALCIUM mg/dL 9.1 10.0       Results from last 7 days   Lab Units 05/01/25  1526 05/01/25  1419   HSTROP T ng/L 10 10     Results from last 7 days   Lab Units 05/02/25  0617 05/01/25  1419   WBC 10*3/mm3 5.86 7.08   HEMOGLOBIN g/dL 15.5 16.1   HEMATOCRIT % 44.3 46.7   PLATELETS 10*3/mm3 181 216     Results from last 7 days   Lab Units 05/02/25  0617   INR  1.19*     Results from last 7 days   Lab Units 05/02/25  0617   MAGNESIUM mg/dL 2.1                       Discharge Medications     Your medication list        START taking these medications        Instructions Last Dose Given Next Dose Due   amLODIPine 5 MG tablet  Commonly known as: Norvasc      Take 1 tablet by mouth Daily.       nitroglycerin 0.4 MG SL tablet  Commonly known as: NITROSTAT      Place 1 tablet under the tongue Every 5 (Five) Minutes As Needed for Chest Pain. Place one tablet under tongue as needed for chest pain.              CHANGE how you take these medications        Instructions  Last Dose Given Next Dose Due   tamsulosin 0.4 MG capsule 24 hr capsule  Commonly known as: Flomax  What changed: when to take this      Take 1 capsule by mouth Every Night.              CONTINUE taking these medications        Instructions Last Dose Given Next Dose Due   ALPRAZolam 0.25 MG tablet  Commonly known as: XANAX      Take 1 tablet by mouth Daily As Needed for Anxiety.       ASPIRIN 81 PO      Take  by mouth Daily.       coenzyme Q10 100 MG capsule      Take 1 capsule by mouth Daily. LD 6/30       COLLAGEN EX      Take 1 tablet by mouth Daily. LD 6/30       diphenhydrAMINE-acetaminophen  MG tablet per tablet  Commonly known as: TYLENOL PM      Take 1 tablet by mouth At Night As Needed for Sleep.       ezetimibe 10 MG tablet  Commonly known as: ZETIA      Take 1 tablet by mouth Daily.       famotidine 20 MG tablet  Commonly known as: Pepcid      Take 1 tablet by mouth 2 (Two) Times a Day.       HYDROcodone-acetaminophen 5-325 MG per tablet  Commonly known as: NORCO      Take  by mouth See Admin Instructions. Take 1 tablet by mouth every 4-6 hours as needed for pain       LITHIUM PO      Take 5 mg by mouth 2 (two) times a day.       pitavastatin calcium 2 MG tablet tablet  Commonly known as: LIVALO      Take 1 tablet by mouth Every Night.       polyethyl glycol-propyl glycol 0.4-0.3 % solution ophthalmic solution (artificial tears)  Commonly known as: SYSTANE      Every 1 (One) Hour As Needed.       prasugrel 5 MG tablet  Commonly known as: EFFIENT  Start taking on: July 11, 2025      Take 1 tablet by mouth Daily.       sildenafil 20 MG tablet  Commonly known as: REVATIO      Take 2-3 tabs QD prn       VITAMIN K2-VITAMIN D3 PO      Take  by mouth Daily. Hold for surgery                 Where to Get Your Medications        These medications were sent to [id number] - [place], [place] - [address] - [phone]  - [phone] fx  [address] [place] [address]      Phone:  [phone]   amLODIPine 5 MG tablet  nitroglycerin 0.4 MG SL tablet           Discharge Diet:   Dietary Orders (From admission, onward)       Start     Ordered    05/02/25 0001  NPO Diet NPO Type: Strict NPO  Diet Effective Midnight        Question:  NPO Type  Answer:  Strict NPO    05/01/25 1643                        Discharge disposition: Home    Discharge condition: Stable and improved      Follow-up Appointments  Future Appointments   Date Time Provider Department Center   5/5/2025  3:00 PM TELEMETRY MONITOR -  [facility] CARD  [facility]   5/7/2025  3:00 PM TELEMETRY MONITOR -  [facility] CARD  [facility] CAR [facility]   5/9/2025  3:00 PM TELEMETRY MONITOR -  [facility] CARD  [facility] CAR [facility]   5/12/2025  3:00 PM TELEMETRY MONITOR -  HIREN CARD  [facility] CAR [facility]   5/14/2025  3:00 PM TELEMETRY MONITOR -  HIREN CARD  [facility] CAR [facility]   5/16/2025  4:00 PM TELEMETRY MONITOR -  HIREN CARD  [facility] CAR [facility]   5/19/2025  4:00 PM TELEMETRY MONITOR -  HIREN CARD  [facility]EN CAR [facility]   5/21/2025  4:00 PM TELEMETRY MONITOR -  HIREN CARD  HIREN CAR [facility]EN   5/23/2025  4:00 PM TELEMETRY MONITOR -  HIREN CARD  HIREN CAR [facility]   5/28/2025  4:00 PM TELEMETRY MONITOR -  HIREN CARD  HIREN CAR HIREN   5/30/2025  4:00 PM TELEMETRY MONITOR -  HIREN CARD  HIREN CAR HIREN   6/2/2025  4:00 PM TELEMETRY MONITOR -  HIREN CARD  HIREN CAR HIREN   6/4/2025  3:00 PM TELEMETRY MONITOR -  HIREN CARD  HIREN CAR HIREN   6/6/2025  3:00 PM TELEMETRY MONITOR -  HIREN CARD  HIREN CAR HIREN   6/9/2025  3:00 PM TELEMETRY MONITOR -  HIREN CARD  HIREN CAR HIREN   6/11/2025  3:00 PM TELEMETRY MONITOR -  HIREN CARD  HIREN CAR HIREN   6/13/2025  3:00 PM TELEMETRY MONITOR -  HIREN CARD  HIREN CAR HIREN   6/16/2025  3:00 PM TELEMETRY MONITOR -  HIREN CARD  HIREN CAR HIREN   6/18/2025  3:00 PM TELEMETRY MONITOR -  HIREN CARD  HIREN CAR HIREN   6/20/2025  3:00 PM TELEMETRY MONITOR -  HIREN CARD  HIREN CAR HIREN   6/23/2025  3:00 PM TELEMETRY MONITOR -  HIREN CARD  HIREN CAR HIREN   6/25/2025  3:00 PM TELEMETRY MONITOR -   HIREN CARD BH HIREN CAR HIREN   6/27/2025  3:00 PM TELEMETRY MONITOR - BH HIREN CARD BH HIREN CAR HIREN   6/30/2025  3:00 PM TELEMETRY MONITOR - BH HIREN CARD BH HIREN CAR HIREN   7/2/2025  3:00 PM TELEMETRY MONITOR - BH HIREN CARD BH HIREN CAR HIREN   7/7/2025  3:00 PM TELEMETRY MONITOR - BH HIREN CARD BH HIREN CAR HIREN   7/9/2025  3:00 PM TELEMETRY MONITOR - BH HIREN CARD BH HIREN CAR HIREN   7/11/2025  3:00 PM TELEMETRY MONITOR - BH HIREN CARD BH HIREN CAR HIREN   7/14/2025  3:00 PM TELEMETRY MONITOR - BH HIREN CARD BH HIREN CAR HIREN   7/16/2025  3:00 PM TELEMETRY MONITOR - BH HIREN CARD BH HIREN CAR HIREN   7/24/2025 10:30 AM Beau Lee MD [facility] PC [id number] HIREN   10/1/2025 11:00 AM Obi Boucher MD [facility] CD [id number] HIREN      Follow-up Information       Beau Lee MD .    Specialty: Family Medicine  Contact information:  [id number] [id number]  [phone]                               Test Results Pending at Discharge         Time:   I spent  35  minutes on this discharge activity which included: face-to-face encounter with the patient, reviewing the data in the system, coordination of the care with the nursing staff as well as consultants, documentation, and entering orders.          Catarino Chavira MD  [facility]  05/02/25  13:07 EDT

## 2025-05-02 NOTE — TELEPHONE ENCOUNTER
Caller: Td Melendez Jr.    Relationship to patient: Self    Best call back number: [phone]    Chief complaint: N/A    Type of visit: HOSPITAL F/U    Requested date: ASAP     If rescheduling, when is the original appointment: N/A     Additional notes:PT WAS DISCHARGED FROM ER 5/2. DISCHARGE SUMMARY TOLD TO F/U W/ DR MCKEON BUT DID NOT PROVIDE A TIMEFRAME FOR THE F/U APPT. PLEASE CALL PT TO ADVISE.

## 2025-05-02 NOTE — DISCHARGE INSTRUCTIONS
[facility]  [address] [place] [address]    Coronary Angiogram (Radial/Ulnar Approach) After Care    Refer to this sheet in the next few weeks. These instructions provide you with information on caring for yourself after your procedure. Your caregiver may also give you more specific instructions. Your treatment has been planned according to current medical practices, but problems sometimes occur. Call your caregiver if you have any problems or questions after your procedure.    Home Care Instructions:  You may shower the day after the procedure. Remove the bandage (dressing) and gently wash the site with plain soap and water. Gently pat the site dry. You may apply a band aid daily for 2 days if desired.    Do not apply powder or lotion to the site.  Do not submerge the affected site in water for 3 to 5 days or until the site is completely healed.   Do not lift, push or pull anything over 5 pounds for 5 days after your procedure or as directed by your physician.  As a reference, a gallon of milk weighs 8 pounds.   Inspect the site at least twice daily. You may notice some bruising at the site and it may be tender for 1 to 2 weeks.     Increase your fluid intake for the next 2 days.    Keep arm elevated for 24 hours. For the remainder of the day, keep your arm in “Pledge of Allegiance” position when up and about.     You may drive 24 hours after the procedure unless otherwise instructed by your caregiver.  Do not operate machinery or power tools for 24 hours.  A responsible adult should be with you for the first 24 hours after you arrive home. Do not make any important legal decisions or sign legal papers for 24 hours.  Do not drink alcohol for 24 hours.    Metformin or any medications containing Metformin should not be taken for 48 hours after your procedure.      Call Your Doctor if:   You have unusual pain at the radial/ulnar (wrist) site.  You have redness, warmth, swelling, or pain at the  radial/ulnar (wrist) site.  You have drainage (other than a small amount of blood on the dressing).  `You have chills or a fever > 101.  Your arm becomes pale or dark, cool, tingly, or numb.  You develop chest pain, shortness of breath, feel faint or pass out.    You have heavy bleeding from the site, hold pressure on the site for 20 minutes.  If the bleeding stops, apply a fresh bandage and call your cardiologist.  However, if you        continue to have bleeding, call 911 and continue to apply pressure to the site.   You have any symptoms of a stroke.  Remember BE FAST  B-balance. Sudden trouble walking or loss of balance.  E-eyes.  Sudden changes in how you see or a sudden onset of a very bad headache.   F-face. Sudden weakness or loss of feeling of the face or facial droop on one side.   A-arms Sudden weakness or numbness in one arm.  One arm drifts down if they are both held out in front of you. This happens suddenly and usually on one side of the body.   S-speech.  Sudden trouble speaking, slurred speech or trouble understanding what are saying.   T-time  Time to call emergency services.  Write down the symptoms and the time they started.

## 2025-05-05 ENCOUNTER — TREATMENT (OUTPATIENT)
Dept: CARDIAC REHAB | Facility: HOSPITAL | Age: 80
End: 2025-05-05
Payer: MEDICARE

## 2025-05-05 ENCOUNTER — TRANSITIONAL CARE MANAGEMENT TELEPHONE ENCOUNTER (OUTPATIENT)
Dept: CALL CENTER | Facility: HOSPITAL | Age: 80
End: 2025-05-05
Payer: MEDICARE

## 2025-05-05 DIAGNOSIS — Z95.5 STATUS POST INSERTION OF DRUG ELUTING CORONARY ARTERY STENT: Primary | ICD-10-CM

## 2025-05-05 PROCEDURE — 93798 PHYS/QHP OP CAR RHAB W/ECG: CPT

## 2025-05-05 NOTE — OUTREACH NOTE
"Call Center TCM Note      Flowsheet Row Responses   [facility] patient discharged from? [facility]   Does the patient have one of the following disease processes/diagnoses(primary or secondary)? Other   TCM attempt successful? Yes   Call start time 1340   Call end time 1344   Discharge diagnosis Chest pain   Meds reviewed with patient/caregiver? Yes   Is the patient having any side effects they believe may be caused by any medication additions or changes? No   Does the patient have all medications ordered at discharge? Yes   Is the patient taking all medications as directed (includes completed medication regime)? Yes   Comments Appt made foor 5/13 @ 1:30 with Dr. Lee.  Pt has f/u with cardiology on 5/9 with RUSS Greco   Does the patient have an appointment with their PCP within 7-14 days of discharge? No   Nursing Interventions Assisted patient with making appointment per protocol   Psychosocial issues? No   Did the patient receive a copy of their discharge instructions? Yes   Nursing interventions Reviewed instructions with patient   What is the patient's perception of their health status since discharge? Improving   Is the patient/caregiver able to teach back signs and symptoms related to disease process for when to call PCP? Yes   Is the patient/caregiver able to teach back signs and symptoms related to disease process for when to call 911? Yes   Is the patient/caregiver able to teach back the hierarchy of who to call/visit for symptoms/problems? PCP, Specialist, Home health nurse, Urgent Care, ED, 911 Yes   If the patient is a current smoker, are they able to teach back resources for cessation? Not a smoker   Additional teach back comments States he is doing \"pretty good\".   TCM call completed? Yes   Wrap up additional comments Appt made with PCP with no other needs at this time.   Call end time 1344            Coleen REEVES - Licensed Nurse    5/5/2025, 13:46 EDT        "

## 2025-05-06 ENCOUNTER — OFFICE VISIT (OUTPATIENT)
Dept: CARDIOLOGY | Age: 80
End: 2025-05-06
Payer: MEDICARE

## 2025-05-06 ENCOUNTER — TELEPHONE (OUTPATIENT)
Dept: CARDIOLOGY | Age: 80
End: 2025-05-06
Payer: MEDICARE

## 2025-05-06 ENCOUNTER — HOSPITAL ENCOUNTER (OUTPATIENT)
Dept: CARDIOLOGY | Facility: HOSPITAL | Age: 80
Discharge: HOME | End: 2025-05-06
Admitting: NURSE PRACTITIONER
Payer: MEDICARE

## 2025-05-06 VITALS
BODY MASS INDEX: 25.77 KG/M2 | HEART RATE: 68 BPM | HEIGHT: 70 IN | WEIGHT: 180 LBS | SYSTOLIC BLOOD PRESSURE: 138 MMHG | DIASTOLIC BLOOD PRESSURE: 74 MMHG

## 2025-05-06 DIAGNOSIS — I25.118 CORONARY ARTERY DISEASE OF NATIVE ARTERY OF NATIVE HEART WITH STABLE ANGINA PECTORIS: ICD-10-CM

## 2025-05-06 DIAGNOSIS — R07.9 CHEST PAIN, UNSPECIFIED TYPE: Primary | ICD-10-CM

## 2025-05-06 DIAGNOSIS — R07.9 CHEST PAIN, UNSPECIFIED TYPE: ICD-10-CM

## 2025-05-06 DIAGNOSIS — N17.9 AKI (ACUTE KIDNEY INJURY): Primary | ICD-10-CM

## 2025-05-06 DIAGNOSIS — E78.5 HYPERLIPIDEMIA LDL GOAL <70: ICD-10-CM

## 2025-05-06 DIAGNOSIS — I25.10 CORONARY ARTERY DISEASE INVOLVING NATIVE CORONARY ARTERY OF NATIVE HEART WITHOUT ANGINA PECTORIS: ICD-10-CM

## 2025-05-06 DIAGNOSIS — R07.89 CHEST PAIN, ATYPICAL: ICD-10-CM

## 2025-05-06 LAB
ALBUMIN SERPL-MCNC: 4.4 G/DL (ref 3.5–5.2)
ALBUMIN/GLOB SERPL: 1.5 G/DL
ALP SERPL-CCNC: 85 U/L (ref 39–117)
ALT SERPL W P-5'-P-CCNC: 13 U/L (ref 1–41)
ANION GAP SERPL CALCULATED.3IONS-SCNC: 7.1 MMOL/L (ref 5–15)
AST SERPL-CCNC: 18 U/L (ref 1–40)
BILIRUB SERPL-MCNC: 0.7 MG/DL (ref 0–1.2)
BUN SERPL-MCNC: 20 MG/DL (ref 8–23)
BUN/CREAT SERPL: 13.7 (ref 7–25)
CALCIUM SPEC-SCNC: 10 MG/DL (ref 8.6–10.5)
CHLORIDE SERPL-SCNC: 105 MMOL/L (ref 98–107)
CO2 SERPL-SCNC: 23.9 MMOL/L (ref 22–29)
CREAT SERPL-MCNC: 1.46 MG/DL (ref 0.76–1.27)
D DIMER PPP FEU-MCNC: <0.27 MCGFEU/ML (ref 0–0.77)
DEPRECATED RDW RBC AUTO: 45 FL (ref 37–54)
EGFRCR SERPLBLD CKD-EPI 2021: 49.2 ML/MIN/1.73
ERYTHROCYTE [DISTWIDTH] IN BLOOD BY AUTOMATED COUNT: 13 % (ref 12.3–15.4)
GLOBULIN UR ELPH-MCNC: 2.9 GM/DL
GLUCOSE SERPL-MCNC: 111 MG/DL (ref 65–99)
HCT VFR BLD AUTO: 50 % (ref 37.5–51)
HGB BLD-MCNC: 16.4 G/DL (ref 13–17.7)
MCH RBC QN AUTO: 31 PG (ref 26.6–33)
MCHC RBC AUTO-ENTMCNC: 32.8 G/DL (ref 31.5–35.7)
MCV RBC AUTO: 94.5 FL (ref 79–97)
PLATELET # BLD AUTO: 195 10*3/MM3 (ref 140–450)
PMV BLD AUTO: 9.8 FL (ref 6–12)
POTASSIUM SERPL-SCNC: 4.1 MMOL/L (ref 3.5–5.2)
PROT SERPL-MCNC: 7.3 G/DL (ref 6–8.5)
RBC # BLD AUTO: 5.29 10*6/MM3 (ref 4.14–5.8)
SODIUM SERPL-SCNC: 136 MMOL/L (ref 136–145)
TROPONIN T SERPL HS-MCNC: 9 NG/L
WBC NRBC COR # BLD AUTO: 6.39 10*3/MM3 (ref 3.4–10.8)

## 2025-05-06 PROCEDURE — 99214 OFFICE O/P EST MOD 30 MIN: CPT | Performed by: NURSE PRACTITIONER

## 2025-05-06 PROCEDURE — 93000 ELECTROCARDIOGRAM COMPLETE: CPT | Performed by: NURSE PRACTITIONER

## 2025-05-06 PROCEDURE — 85027 COMPLETE CBC AUTOMATED: CPT | Performed by: NURSE PRACTITIONER

## 2025-05-06 PROCEDURE — 1159F MED LIST DOCD IN RCRD: CPT | Performed by: NURSE PRACTITIONER

## 2025-05-06 PROCEDURE — 36415 COLL VENOUS BLD VENIPUNCTURE: CPT

## 2025-05-06 PROCEDURE — 85379 FIBRIN DEGRADATION QUANT: CPT | Performed by: NURSE PRACTITIONER

## 2025-05-06 PROCEDURE — 80053 COMPREHEN METABOLIC PANEL: CPT | Performed by: NURSE PRACTITIONER

## 2025-05-06 PROCEDURE — 1160F RVW MEDS BY RX/DR IN RCRD: CPT | Performed by: NURSE PRACTITIONER

## 2025-05-06 PROCEDURE — 84484 ASSAY OF TROPONIN QUANT: CPT | Performed by: NURSE PRACTITIONER

## 2025-05-06 NOTE — TELEPHONE ENCOUNTER
The pain is constant. It's not exertional, positional or pleuritic. The nitro helped the pain to not be so sharp. So, it's currently constant and dull. It hasn't gotten sharp again since he took the nitro earlier this morning.    Thank you,    Patrica Braun RN  Triage [facility]  05/06/25 11:41 EDT

## 2025-05-06 NOTE — TELEPHONE ENCOUNTER
Dr. Boucher,    Pt called the office this morning. He said he woke up around 4:00 am and had sharp, centralized chest pain. He took 3 nitros over 15 minutes. The pain went from a sharp pain to a dull pain and he is still currently having pain.    He denies any other symptoms including SOA, nausea, sweating and fatigue. B/P was 117/64, HR 73 this morning. Any recommendations for him?    Thank you,    Patrica Braun, RN  Triage [facility]  05/06/25 11:16 EDT

## 2025-05-06 NOTE — TELEPHONE ENCOUNTER
Is it constant or comes and goes? Is it exertional, positional, or pleuritic? Does the NTG help at all?    jdk

## 2025-05-06 NOTE — PROGRESS NOTES
Date of Office Visit: 25  Encounter Provider: RUSS Shah  Place of Service: [facility] CARDIOLOGY  Patient Name: Td Melendez Jr.  :1945    Chief Complaint   Patient presents with    Follow-up    Coronary artery disease involving native coronary artery of   :     HPI: Td Melendez Jr. is a 79 y.o. male  with coronary artery disease, hyperlipidemia, BPH      He is followed by Dr. Obi Boucher.  I will visit him for the first time and have reviewed his medical record.    He has history of unstable angina and had drug-eluting stent placement in the LAD and right coronary artery 2015.  He had a nonobstructive lesion in the circumflex at that time.  He had a stress test in  for atypical chest pain that was normal.  He began having more indigestion/heartburn.  He was in [place] in late 2025 and developed fairly classic exertional angina.  He was admitted to [facility] and ruled out for acute coronary syndrome.  He had an echo which showed normal left ventricular systolic function, normal diastolic function and normal valves.  He underwent coronary angiography which showed normal left main, normal circumflex, 80% in-stent restenosis of the LAD involving the ostium of the diagonal and 80% distal RCA.  He underwent placement of a 3.5 x 18 mm drug-eluting stent to the RCA and placement of a 2.75 x 8 mm Synergy stent to the LAD that was postdilated to 3 mm.  Prior to LAD stent placement the ostium of the diagonal was angioplastied with a balloon.  He was switched from clopidogrel which she was on for monotherapy, to aspirin/ticagrelor.  He had a large groin hematoma that was negative for pseudoaneurysm or fistula.  On 2025 he had repeat cardiac catheterization which showed moderate coronary artery disease with 60 to 70% ostial mid diagonal branch segment stenosis otherwise patent stent within the proximal LAD, proximal right coronary artery and distal  "right coronary artery with luminal irregularities throughout the rest.  He had normal LVEDP of 14 mmHg.  Medical management was recommended of patient's ostial disease due to the location which would require a bifurcation technique and could pose a challenge.  He was prescribed amlodipine and cleared to resume cardiac rehab.    He presents today due to mild substernal chest discomfort.  He took 3 sublingual nitroglycerin last night and the pain improved.  He is in cardiac rehab and walking 20 to 30 minutes.  No exertional chest pain.  He had palpitations along with the chest discomfort last night.  No shortness of breath or edema or dizziness.  He reportedly could \"do without\" sildenafil as needed if we need to change his medication.  He had labs prior to our visit today.      Allergies   Allergen Reactions    Augmentin [Amoxicillin-Pot Clavulanate] Hives and Itching    Clavulanic Acid Unknown - High Severity           Family and social history reviewed.     ROS  All other systems were reviewed and are negative          Objective:     Vitals:    05/06/25 1517   BP: 138/74   BP Location: Left arm   Patient Position: Sitting   Cuff Size: Adult   Pulse: 68   Weight: 81.6 kg (180 lb)   Height: 177.8 cm (70\")     Body mass index is 25.83 kg/m².    PHYSICAL EXAM:  Pulmonary:      Effort: Pulmonary effort is normal.      Breath sounds: Normal breath sounds.   Cardiovascular:      Normal rate. Regular rhythm.      Comments: Right radial site intact          ECG 12 Lead    Date/Time: 5/6/2025 4:14 PM  Performed by: Phoebe Oliveira APRN    Authorized by: Phoebe Oliveira APRN  Comparison: compared with previous ECG   Similar to previous ECG  Rhythm: sinus rhythm  Rate: normal  Comments: No significant change            Current Outpatient Medications   Medication Sig Dispense Refill    ALPRAZolam (XANAX) 0.25 MG tablet Take 1 tablet by mouth Daily As Needed for Anxiety. 30 tablet 2    amLODIPine (Norvasc) 5 MG tablet Take 1 " tablet by mouth Daily. 90 tablet 3    ASPIRIN 81 PO Take  by mouth Daily.      coenzyme Q10 100 MG capsule Take 1 capsule by mouth Daily. LD 6/30      diphenhydrAMINE-acetaminophen (TYLENOL PM)  MG tablet per tablet Take 1 tablet by mouth At Night As Needed for Sleep.      Emollient (COLLAGEN EX) Take 1 tablet by mouth Daily. LD 6/30      ezetimibe (ZETIA) 10 MG tablet Take 1 tablet by mouth Daily. 90 tablet 3    famotidine (Pepcid) 20 MG tablet Take 1 tablet by mouth 2 (Two) Times a Day. 180 tablet 3    HYDROcodone-acetaminophen (NORCO) 5-325 MG per tablet Take  by mouth See Admin Instructions. Take 1 tablet by mouth every 4-6 hours as needed for pain      LITHIUM PO Take 5 mg by mouth 2 (two) times a day.      nitroglycerin (NITROSTAT) 0.4 MG SL tablet Place 1 tablet under the tongue Every 5 (Five) Minutes As Needed for Chest Pain. Place one tablet under tongue as needed for chest pain. 25 tablet 3    pitavastatin calcium (LIVALO) 2 MG tablet tablet Take 1 tablet by mouth Every Night. 90 tablet 3    polyethyl glycol-propyl glycol (SYSTANE) 0.4-0.3 % solution ophthalmic solution (artificial tears) Every 1 (One) Hour As Needed.      [START ON 7/11/2025] prasugrel (EFFIENT) 5 MG tablet Take 1 tablet by mouth Daily. 90 tablet 3    sildenafil (REVATIO) 20 MG tablet Take 2-3 tabs QD prn 30 tablet 11    tamsulosin (Flomax) 0.4 MG capsule 24 hr capsule Take 1 capsule by mouth Every Night. (Patient taking differently: Take 1 capsule by mouth Daily.) 30 capsule 5    Vitamin D-Vitamin K (VITAMIN K2-VITAMIN D3 PO) Take  by mouth Daily. Hold for surgery       No current facility-administered medications for this visit.     Assessment:       Diagnosis Plan   1. MORENITA (acute kidney injury)  Basic Metabolic Panel           Orders Placed This Encounter   Procedures    Basic Metabolic Panel     Standing Status:   Future     Expected Date:   5/11/2025     Expiration Date:   8/6/2026     Release to patient:   Routine Release  [[id number]]    ECG 12 Lead     This order was created via procedure documentation     Release to patient:   Routine Release [[id number]]         Plan:   1.  79-year-old gentleman with coronary artery disease, prior drug-eluting stent to the LAD and right coronary artery July 2015 and then in-stent restenosis of the LAD involving the ostium of the diagonal and 80% distal RCA.  He underwent drug-eluting stent to the right coronary artery and LAD while in [place] March 2025.  The stent placed at the ostium of the diagonal was angioplastied with the balloon.  Repeat cath 5/2/2025 showed 60-70% of the diagonal at the ostium of the LAD which was to be treated medically.  The stents in the proximal LAD, proximal RCA were patent in the distal RCA with luminal irregularities.  Normal LVEDP.  Amlodipine was added for antianginal.  Sublingual nitro was administered.  Imdur was avoided due to occasional sildenafil use.  Further consideration with beta-blocker or Ranexa was to be considered  -He has no exertional chest pain and continues in cardiac rehab.  - He took 3 sublingual nitroglycerin last night.  His troponin today is normal and EKG shows no significant change.  D-dimer is also normal.  Slight bump of creatinine felt to be due to recent coronary angiography x 2.  He will increase his water intake.  -We discussed alternative antianginal such as strong beta-blocker or long-acting nitrate/Imdur.  Patient states that he could do without sildenafil as needed, if necessary but at this time he would like to try amlodipine a little longer with just sublingual nitroglycerin use as needed.  He knows not to take sildenafil within  48 hours of sublingual nitro  2.  Hyperlipidemia.  He is on pitavastatin 2 mg and zetia .  He had normal lipoprotein a 3 weeks ago  3.bph on therapy  4. MORENITA- I spoke with patient regarding concern for contrast induced MORENITA- he will increase water intake over the next 3 to 5 days and have repeat BMP in 1  week.    4-6-week follow-up appointment with Dr. Boucher recommended.  He will call our office with any questions or concerns or if he would like to try alternative antianginal prior to his neck scheduled appointment.        It has been a pleasure to participate in this patient's care.      Thank you,  RUSS Shah      **I used Dragon to dictate this note:**

## 2025-05-06 NOTE — TELEPHONE ENCOUNTER
Phoebe,    You had the only spot patient could come to today at 3:00. I put him on your schedule. Let me know if there is an issue and I'll see what I can do to change it.    Patrica

## 2025-05-06 NOTE — TELEPHONE ENCOUNTER
I called him back and there was no answer. I left a detailed VM to see if he can come in earlier. Asked him to call me back to let me know. I will continue to try to reach him. Lab orders placed just in case he arrives before I get a hold of him. I also notified Carine in [facility].    Thank you,    Patrica Braun, RN  Triage [facility] [place]  05/06/25 13:45 EDT     Sheath #1: Presents as clean, dry, & intact, no bleeding and no hematoma.

## 2025-05-06 NOTE — TELEPHONE ENCOUNTER
Pt's wife called me back. She said they will leave now and come to [facility] for stat labs before pt's appt at 3:00.    Thank you,    Patrica Braun RN  Triage [facility] [place]  05/06/25 13:55 EDT

## 2025-05-06 NOTE — TELEPHONE ENCOUNTER
Can he come now for stat labs in [facility] and I see him closer to 3p? Let him know that  [facility] is only open til 4 so it would be nice to get labs checked while we have time

## 2025-05-07 ENCOUNTER — TREATMENT (OUTPATIENT)
Dept: CARDIAC REHAB | Facility: HOSPITAL | Age: 80
End: 2025-05-07
Payer: MEDICARE

## 2025-05-07 DIAGNOSIS — Z95.5 STATUS POST INSERTION OF DRUG ELUTING CORONARY ARTERY STENT: Primary | ICD-10-CM

## 2025-05-07 PROCEDURE — 93798 PHYS/QHP OP CAR RHAB W/ECG: CPT

## 2025-05-09 ENCOUNTER — OFFICE VISIT (OUTPATIENT)
Dept: CARDIOLOGY | Age: 80
End: 2025-05-09
Payer: MEDICARE

## 2025-05-09 ENCOUNTER — TREATMENT (OUTPATIENT)
Dept: CARDIAC REHAB | Facility: HOSPITAL | Age: 80
End: 2025-05-09
Payer: MEDICARE

## 2025-05-09 VITALS
DIASTOLIC BLOOD PRESSURE: 68 MMHG | HEART RATE: 66 BPM | OXYGEN SATURATION: 98 % | WEIGHT: 184 LBS | SYSTOLIC BLOOD PRESSURE: 122 MMHG | HEIGHT: 70 IN | BODY MASS INDEX: 26.34 KG/M2

## 2025-05-09 DIAGNOSIS — E78.1 HYPERTRIGLYCERIDEMIA: ICD-10-CM

## 2025-05-09 DIAGNOSIS — Z95.5 STATUS POST INSERTION OF DRUG ELUTING CORONARY ARTERY STENT: Primary | ICD-10-CM

## 2025-05-09 DIAGNOSIS — I25.118 CORONARY ARTERY DISEASE OF NATIVE ARTERY OF NATIVE HEART WITH STABLE ANGINA PECTORIS: Primary | Chronic | ICD-10-CM

## 2025-05-09 DIAGNOSIS — R07.2 PRECORDIAL PAIN: ICD-10-CM

## 2025-05-09 DIAGNOSIS — E78.2 MIXED HYPERLIPIDEMIA: Chronic | ICD-10-CM

## 2025-05-09 PROBLEM — Z12.11 SCREENING FOR COLON CANCER: Status: RESOLVED | Noted: 2021-03-31 | Resolved: 2025-05-09

## 2025-05-09 PROCEDURE — 93798 PHYS/QHP OP CAR RHAB W/ECG: CPT

## 2025-05-09 RX ORDER — ISOSORBIDE MONONITRATE 30 MG/1
30 TABLET, EXTENDED RELEASE ORAL DAILY
Qty: 30 TABLET | Refills: 1 | Status: SHIPPED | OUTPATIENT
Start: 2025-05-09

## 2025-05-09 NOTE — PROGRESS NOTES
Date of Office Visit: 2025  Encounter Provider: RUSS Crook  Place of Service: [facility]  Patient Name: Td Melendez Jr.  :1945  Primary Cardiologist: Dr. Obi Boucher     Chief Complaint   Patient presents with    Coronary Artery Disease    Chest Pain   :     HPI: Td Melendez Jr. is a 79 y.o. male who presents today for  annual cardiac follow-up visit. I reviewed his medical records.     He has been diagnosed with hyperlipidemia, hypertriglyceridemia, GERD, and fibromyalgia.     In 2015, he was diagnosed with coronary artery disease and underwent drug-eluting stent placement to the LAD and RCA.  He had a normal Myoview stress test in 2015, 2018, and 2023.    In 2025, he reported indigestion and heartburn.  He was visiting his son in [place] in 2025 and developed angina.  He ruled out for ACS and echocardiogram was normal.  Echocardiogram revealed normal left main/circumflex, 80% in-stent restenosis of the LAD involving the ostium of the diagonal and 80% distal RCA.  He underwent drug-eluting stent placement to the RCA (3.5 x 18 mm Xience) and LAD (2.75 x 8 mm Synergy).  He then followed up with Dr. Boucher and denied chest pain.  He had a large groin hematoma with slight bruit and bounding pulse.  Duplex was negative for pseudoaneurysm or fistula.    At the end of April, he saw a nurse cathie in our office and reported chest tightness and shortness of breath.  He had been encouraged to start drinking caffeine because he was on Brilinta, but it did not change his shortness of breath.  He was changed to Effient.    On , he presented to [facility] ED with chest pain.  He received nitro placed which resolved his chest pain.  Cardiac catheterization showed patent stents with stenosis at the LAD ostium 60-70% with SANDER-3 flow.  Pendulous wanted to avoid placing a stent there and recommended medical treatment.  He  "was recommended to try amlodipine.    On 5/6, he saw another nurse practitioner office and reported chest pain that resolved with nitroglycerin sublingual tablets x 3.  He wanted to give the amlodipine a little bit more time for treatment of the chest pain.    He presents today with the same chest pain.  He reports a midsternal chest pain that is mild \"all the time\" and it can worsen to moderate.  He is not noticed that exercise makes it worse.  However, yesterday he exercised on the treadmill and about 10 minutes after he got off his chest pain worsened.  He denies shortness of breath, palpitations, edema, or syncope.  He reports some mild dizziness.  He is requesting to try isosorbide.      Past Medical History:   Diagnosis Date    Anxiety     CAD (coronary artery disease)     unstable angina, 7/2015: 20% LM, long 70% LAD with abnormal FFR (s/p 2.82n08ck Xience), 50% OM1, 99% prox RCA (s/p 3.5x18m Xience).  Normal Cardiolite 11/2015 and 6/2018    Chronic diarrhea 1980    treat with lotronex    Colon polyp 2010    Depression 1983    Fibromyalgia     GERD (gastroesophageal reflux disease)     H/O complete eye exam 04/2018    Hyperlipidemia     IBS (irritable bowel syndrome)     Injury of back 2005    Sinusitis 1987    Squamous cell skin cancer, face     Urinary retention        Past Surgical History:   Procedure Laterality Date    CARDIAC CATHETERIZATION      RUST 07/2015: cath with 20% LM long 70% LAD (with positive FFR), 50% OM1, 99% prox RCA, s/p 2.75x33 Xience Alpine to LAD and 3.5x18mm Xience Alpine to RCA. LVEF 64% Normal Cardiolite 11/2015    CARDIAC CATHETERIZATION N/A 5/2/2025    Procedure: Left Heart Cath;  Surgeon: Catarino Chavira MD;  Location: [facility] CATH INVASIVE LOCATION;  Service: Cardiovascular;  Laterality: N/A;    CARDIAC SURGERY      cardiac stents, 2    CATARACT EXTRACTION Left     COLONOSCOPY  approx 6/2011    normal per patient  [facility]    COLONOSCOPY N/A 04/26/2021    Procedure: " COLONOSCOPY TO CECUM WITH COLD SNARE POLYPECTOMIES AND BIOPSIES ;  Surgeon: Catarino Luna MD;  Location: [facility] ENDOSCOPY;  Service: Gastroenterology;  Laterality: N/A;  PRE- HISTORY COLON POLYPS  POST- POLYPS, COLITIS, HEMORRHOIDS    COLONOSCOPY W/ POLYPECTOMY N/A 07/25/2018    Procedure: COLONOSCOPY TO CECUM WITH COLD SNAREPOLYPECTOMIES, HOT SNARE POLYPECTOMIES;  Surgeon: Catarino Luna MD;  Location: [facility] ENDOSCOPY;  Service: Gastroenterology    CORONARY ANGIOPLASTY WITH STENT PLACEMENT      CORONARY ANGIOPLASTY WITH STENT PLACEMENT  04/01/2025    CORONARY ANGIOPLASTY WITH STENT PLACEMENT  04/03/2025    CYSTOSCOPY TRANSURETHRAL RESECTION OF PROSTATE      ENDOSCOPY N/A 07/25/2018    Procedure: ESOPHAGOGASTRODUODENOSCOPY WITH COLD BIOPSIES;  Surgeon: Catarino Luna MD;  Location: [facility] ENDOSCOPY;  Service: Gastroenterology    ENDOSCOPY N/A 01/07/2021    Procedure: ESOPHAGOGASTRODUODENOSCOPY with cold biopsies;  Surgeon: Catarino Luna MD;  Location: [facility] ENDOSCOPY;  Service: Gastroenterology;  Laterality: N/A;  PRE: EPIGASTRIC PAIN, DYSPEPSIA  POST: GASTRITIS    ENDOSCOPY N/A 05/25/2022    Procedure: ESOPHAGOGASTRODUODENOSCOPY WITH DILATATION #48 to #58 bougie, gastric biopsy;  Surgeon: dT Richey DO;  Location: [facility] ENDOSCOPY;  Service: General;  Laterality: N/A;  gastritis, small hiatal hernia    ENDOSCOPY N/A 10/30/2023    Procedure: ESOPHAGOGASTRODUODENOSCOPY with biopsies x 1 area.;  Surgeon: Shell Suero MD;  Location: [facility] ENDOSCOPY;  Service: Gastroenterology;  Laterality: N/A;  Post-gastritis    HIATAL HERNIA REPAIR N/A 07/06/2022    Procedure: HIATAL HERNIA REPAIR LAPAROSCOPIC WITH TRANSORAL INCISIONLESS FUNDOPLICATION;  Surgeon: Td Richey DO;  Location: [facility] MAIN OR;  Service: General;  Laterality: N/A;    RADIOFREQUENCY ABLATION      NECK    SKIN CANCER EXCISION      WISDOM TOOTH EXTRACTION         Social History     Socioeconomic History     Marital status:    Tobacco Use    Smoking status: Former     Current packs/day: 0.00     Average packs/day: 0.3 packs/day for 4.0 years (1.0 ttl pk-yrs)     Types: Cigarettes     Start date: 1968     Quit date:      Years since quittin.3     Passive exposure: Never (IN CHILDHOOD)    Smokeless tobacco: Never   Vaping Use    Vaping status: Never Used   Substance and Sexual Activity    Alcohol use: No     Comment: caffeine use - coffee and tea    Drug use: No    Sexual activity: Defer       Family History   Problem Relation Age of Onset    Cervical cancer Mother     Cancer Mother     Depression Mother     Heart disease Mother     Liver disease Mother     Alcohol abuse Mother     Thyroid cancer Father     Cancer Father     Depression Father     Diabetes Father     Thyroid disease Father     Heart disease Father     Heart disease Brother     Throat cancer Brother     Coronary artery disease Other     Malig Hyperthermia Neg Hx        The following portion of the patient's history were reviewed and updated as appropriate: past medical history, past surgical history, past social history, past family history, allergies, current medications, and problem list.    Review of Systems   Constitutional: Negative.   Cardiovascular:  Positive for chest pain.   Respiratory: Negative.     Neurological:  Positive for dizziness.       Allergies   Allergen Reactions    Augmentin [Amoxicillin-Pot Clavulanate] Hives and Itching    Clavulanic Acid Unknown - High Severity         Current Outpatient Medications:     ALPRAZolam (XANAX) 0.25 MG tablet, Take 1 tablet by mouth Daily As Needed for Anxiety., Disp: 30 tablet, Rfl: 2    amLODIPine (Norvasc) 5 MG tablet, Take 1 tablet by mouth Daily., Disp: 90 tablet, Rfl: 3    ASPIRIN 81 PO, Take  by mouth Daily., Disp: , Rfl:     coenzyme Q10 100 MG capsule, Take 1 capsule by mouth Daily. LD , Disp: , Rfl:     diphenhydrAMINE-acetaminophen (TYLENOL PM)  MG tablet per  "tablet, Take 1 tablet by mouth At Night As Needed for Sleep., Disp: , Rfl:     Emollient (COLLAGEN EX), Take 1 tablet by mouth Daily. LD 6/30, Disp: , Rfl:     ezetimibe (ZETIA) 10 MG tablet, Take 1 tablet by mouth Daily., Disp: 90 tablet, Rfl: 3    famotidine (Pepcid) 20 MG tablet, Take 1 tablet by mouth 2 (Two) Times a Day., Disp: 180 tablet, Rfl: 3    HYDROcodone-acetaminophen (NORCO) 5-325 MG per tablet, Take  by mouth See Admin Instructions. Take 1 tablet by mouth every 4-6 hours as needed for pain, Disp: , Rfl:     LITHIUM PO, Take 5 mg by mouth 2 (two) times a day., Disp: , Rfl:     Magnesium Oxide (MAGOX 400 PO), Take 400 mg by mouth Daily., Disp: , Rfl:     nitroglycerin (NITROSTAT) 0.4 MG SL tablet, Place 1 tablet under the tongue Every 5 (Five) Minutes As Needed for Chest Pain. Place one tablet under tongue as needed for chest pain., Disp: 25 tablet, Rfl: 3    pitavastatin calcium (LIVALO) 2 MG tablet tablet, Take 1 tablet by mouth Every Night., Disp: 90 tablet, Rfl: 3    polyethyl glycol-propyl glycol (SYSTANE) 0.4-0.3 % solution ophthalmic solution (artificial tears), Every 1 (One) Hour As Needed., Disp: , Rfl:     [START ON 7/11/2025] prasugrel (EFFIENT) 5 MG tablet, Take 1 tablet by mouth Daily., Disp: 90 tablet, Rfl: 3    tamsulosin (Flomax) 0.4 MG capsule 24 hr capsule, Take 1 capsule by mouth Every Night. (Patient taking differently: Take 1 capsule by mouth Daily.), Disp: 30 capsule, Rfl: 5    Vitamin D-Vitamin K (VITAMIN K2-VITAMIN D3 PO), Take  by mouth Daily. Hold for surgery, Disp: , Rfl:    Tadalafil 20 mg 2 to 3 tablets daily as needed      Objective:     Vitals:    05/09/25 1036   BP: 122/68   BP Location: Left arm   Patient Position: Sitting   Cuff Size: Adult   Pulse: 66   SpO2: 98%   Weight: 83.5 kg (184 lb)   Height: 177.8 cm (70\")     Body mass index is 26.4 kg/m².    PHYSICAL EXAM:    Vitals Reviewed.   General Appearance: No acute distress, well developed and well nourished.  HENT: No " hearing loss noted.    Respiratory: No signs of respiratory distress. Respiration rhythm and depth normal.  Clear to auscultation.   Cardiovascular:  Jugular Venous Pressure: Normal  Heart Rate and Rhythm: Normal, Heart Sounds: Normal S1 and S2. No S3 or S4 noted.  Murmurs: No murmurs noted. No rubs, thrills, or gallops.   Lower Extremities: No edema noted.  Right wrist: CDI  Musculoskeletal: Normal movement of extremities.  Skin: General appearance normal.    Psychiatric: Patient alert and oriented to person, place, and time. Speech and behavior appropriate. Normal mood and affect.       ECG 12 Lead    Date/Time: 5/9/2025 10:41 AM  Performed by: Padmini Ballard APRN    Authorized by: Padmini Ballard APRN  Comparison: compared with previous ECG from 5/6/2025  Similar to previous ECG  Rhythm: sinus rhythm  Rate: normal  BPM: 66  Conduction: conduction normal  ST Segments: ST segments normal  T Waves: T waves normal  QRS axis: normal    Clinical impression: normal ECG            Assessment:       Diagnosis Plan   1. Coronary artery disease of native artery of native heart with stable angina pectoris        2. Precordial pain        3. Mixed hyperlipidemia        4. Hypertriglyceridemia               Plan:       1/2.  Coronary Artery Disease: Status post drug-eluting stent placement to the LAD and RCA in July 2015.  He recently underwent stent placement to the RCA and LAD in March 2025 in [place].  Continue aspirin, prasugrel, and pitavastatin.    He has continued to have chronic chest pain, even at rest.  Repeat heart catheterization showed ostial LAD disease with medical treatment recommended.  He was tried on amlodipine, and that has not made a difference.  He would like to try isosorbide and I told him he cannot take that in combination with the sildenafil.  He is going to stop the sildenafil and really wants to try isosorbide 30 mg 1 tablet daily.  I will call him next week for reassessment.  Could his chest  pain be GI?     3/4.  Hyperlipidemia/Hypertriglyceridemia: Remains on pitavastatin and ezetimibe.  Followed by his PCP.      5.  I will call and check on him next week.  He is scheduled to see Dr. Boucher in June.    As always, it has been a pleasure to participate in your patient's care. Thank you.         Sincerely,         RUSS Greco  [facility]      Dictated utilizing Dragon Dictation  I spent 33 minutes reviewing her medical records/testing/previous office notes/labs, face-to-face interaction with patient, physical examination, formulating the plan of care, and discussion of plan of care with patient.

## 2025-05-12 ENCOUNTER — TREATMENT (OUTPATIENT)
Dept: CARDIAC REHAB | Facility: HOSPITAL | Age: 80
End: 2025-05-12
Payer: MEDICARE

## 2025-05-12 DIAGNOSIS — Z95.5 STATUS POST INSERTION OF DRUG ELUTING CORONARY ARTERY STENT: Primary | ICD-10-CM

## 2025-05-12 PROCEDURE — 93798 PHYS/QHP OP CAR RHAB W/ECG: CPT

## 2025-05-12 NOTE — PROGRESS NOTES
Transitional Care Follow Up Visit  Subjective     Td Melendez Jr. is a 79 y.o. male who presents for a transitional care management visit.    Within 48 business hours after discharge our office contacted him via telephone to coordinate his care and needs.      I reviewed and discussed the details of that call along with the discharge summary, hospital problems, inpatient lab results, inpatient diagnostic studies, and consultation reports with Td.     Current outpatient and discharge medications have been reconciled for the patient.  Reviewed by: Beau Lee MD          5/2/2025     3:32 PM   Date of TCM Phone Call   [facility]   Date of Admission 5/1/2025   Date of Discharge 5/2/2025   Discharge Disposition Home or Self Care     Risk for Readmission (LACE) Score: 1 (5/2/2025  6:00 AM)      History of Present Illness   Course During Hospital Stay:      Date of Discharge:  5/2/2025      Date of Admit: 5/1/2025        Discharge Diagnosis:   Chest pain           Hospital Course:   Mr. Melendez is a 79-year-old gentleman past medical history notable coronary disease with recent percutaneous intervention in outlying hospital.  Is experiencing continued chest discomfort there is concerns there is having unstable angina he was directed to the emergency room workup thus far has been unrevealing with normal troponins and EKG was put on Nitropaste was feeling better this morning heart catheterization was requested to rule out any problems with the stents heart catheterization demonstrated patent stents there are some moderate disease involving the ostium of the diagonal branch due to pinching from the recently placed stents.  SANDER-3 flow is noted however.  Patient currently is on no antianginal medications we will add on amlodipine and monitor response.  Pain has been somewhat mild but now that they know what they are dealing with they are comfortable with knowing how to treat it we will  restart cardiac rehab next week we will follow-up with primary cardiologist in the next week or 2    Current outpatient and discharge medications have been reconciled for the patient.  Reviewed by: Beau Lee MD    Patient saw cardiology in follow-up twice since then, once on 5/6 and most recently on 5/9/2025 with this note:    Plan:      1/2.  Coronary Artery Disease: Status post drug-eluting stent placement to the LAD and RCA in July 2015.  He recently underwent stent placement to the RCA and LAD in March 2025 in [place].  Continue aspirin, prasugrel, and pitavastatin.     He has continued to have chronic chest pain, even at rest.  Repeat heart catheterization showed ostial LAD disease with medical treatment recommended.  He was tried on amlodipine, and that has not made a difference.  He would like to try isosorbide and I told him he cannot take that in combination with the sildenafil.  He is going to stop the sildenafil and really wants to try isosorbide 30 mg 1 tablet daily.  I will call him next week for reassessment.  Could his chest pain be GI?     3/4.  Hyperlipidemia/Hypertriglyceridemia: Remains on pitavastatin and ezetimibe.  Followed by his PCP.      5.  I will call and check on him next week.  He is scheduled to see Dr. Boucher in June.    Current outpatient and discharge medications have been reconciled for the patient.  Reviewed by: Beau Lee MD             The following portions of the patient's history were reviewed and updated as appropriate: allergies, current medications, past family history, past medical history, past social history, past surgical history, and problem list.    Review of Systems   Constitutional:  Negative for activity change, chills and fever.   Respiratory:  Negative for cough.    Cardiovascular:  Negative for chest pain.   Psychiatric/Behavioral:  Negative for dysphoric mood.        Objective   /62   Pulse 60   Temp 97.9 °F (36.6 °C) (Oral)   Resp 14   Ht  "177.8 cm (70\")   Wt 83.9 kg (185 lb)   SpO2 98%   BMI 26.54 kg/m²   Physical Exam  Vitals and nursing note reviewed.   Constitutional:       General: He is not in acute distress.     Appearance: He is well-developed.   Cardiovascular:      Rate and Rhythm: Normal rate and regular rhythm.   Pulmonary:      Effort: Pulmonary effort is normal.      Breath sounds: Normal breath sounds.   Neurological:      Mental Status: He is alert and oriented to person, place, and time.   Psychiatric:         Behavior: Behavior normal.         Thought Content: Thought content normal.     Hospital records reviewed with pt confirming HPI.      Assessment & Plan   Diagnoses and all orders for this visit:    1. Coronary artery disease of native artery of native heart with stable angina pectoris (Primary)    2. Hospital discharge follow-up    3. Elevated serum creatinine  Comments:  New for patient; will hydrate well and recheck; avoidance of NSAIDs stressed  Orders:  -     Basic Metabolic Panel    4. Gastroesophageal reflux disease without esophagitis  -     pantoprazole (PROTONIX) 40 MG EC tablet; Take 1 tablet by mouth Daily.  Dispense: 30 tablet; Refill: 2    Pt to use PPI QHS and H2 QAM to see if this makes his CP resolve.  Elevation of headboard and not eating within 2 hours of bedtime discussed.               "

## 2025-05-13 ENCOUNTER — OFFICE VISIT (OUTPATIENT)
Dept: FAMILY MEDICINE CLINIC | Facility: CLINIC | Age: 80
End: 2025-05-13
Payer: MEDICARE

## 2025-05-13 VITALS
HEIGHT: 70 IN | WEIGHT: 185 LBS | TEMPERATURE: 97.9 F | BODY MASS INDEX: 26.48 KG/M2 | RESPIRATION RATE: 14 BRPM | DIASTOLIC BLOOD PRESSURE: 62 MMHG | HEART RATE: 60 BPM | OXYGEN SATURATION: 98 % | SYSTOLIC BLOOD PRESSURE: 114 MMHG

## 2025-05-13 DIAGNOSIS — Z12.5 SPECIAL SCREENING FOR MALIGNANT NEOPLASM OF PROSTATE: ICD-10-CM

## 2025-05-13 DIAGNOSIS — K21.9 GASTROESOPHAGEAL REFLUX DISEASE WITHOUT ESOPHAGITIS: ICD-10-CM

## 2025-05-13 DIAGNOSIS — Z09 HOSPITAL DISCHARGE FOLLOW-UP: ICD-10-CM

## 2025-05-13 DIAGNOSIS — R79.89 ELEVATED SERUM CREATININE: ICD-10-CM

## 2025-05-13 DIAGNOSIS — I25.118 CORONARY ARTERY DISEASE OF NATIVE ARTERY OF NATIVE HEART WITH STABLE ANGINA PECTORIS: Primary | Chronic | ICD-10-CM

## 2025-05-13 DIAGNOSIS — E78.2 MIXED HYPERLIPIDEMIA: Chronic | ICD-10-CM

## 2025-05-13 RX ORDER — PANTOPRAZOLE SODIUM 40 MG/1
40 TABLET, DELAYED RELEASE ORAL DAILY
Qty: 30 TABLET | Refills: 2 | Status: SHIPPED | OUTPATIENT
Start: 2025-05-13

## 2025-05-14 ENCOUNTER — LAB (OUTPATIENT)
Dept: LAB | Facility: HOSPITAL | Age: 80
End: 2025-05-14
Payer: MEDICARE

## 2025-05-14 ENCOUNTER — TREATMENT (OUTPATIENT)
Dept: CARDIAC REHAB | Facility: HOSPITAL | Age: 80
End: 2025-05-14
Payer: MEDICARE

## 2025-05-14 DIAGNOSIS — N17.9 AKI (ACUTE KIDNEY INJURY): ICD-10-CM

## 2025-05-14 DIAGNOSIS — Z95.5 STATUS POST INSERTION OF DRUG ELUTING CORONARY ARTERY STENT: Primary | ICD-10-CM

## 2025-05-14 LAB
ANION GAP SERPL CALCULATED.3IONS-SCNC: 11.4 MMOL/L (ref 5–15)
BUN SERPL-MCNC: 13 MG/DL (ref 8–23)
BUN/CREAT SERPL: 11.9 (ref 7–25)
CALCIUM SPEC-SCNC: 9.7 MG/DL (ref 8.6–10.5)
CHLORIDE SERPL-SCNC: 106 MMOL/L (ref 98–107)
CO2 SERPL-SCNC: 20.6 MMOL/L (ref 22–29)
CREAT SERPL-MCNC: 1.09 MG/DL (ref 0.76–1.27)
EGFRCR SERPLBLD CKD-EPI 2021: 69.9 ML/MIN/1.73
GLUCOSE SERPL-MCNC: 77 MG/DL (ref 65–99)
POTASSIUM SERPL-SCNC: 4.3 MMOL/L (ref 3.5–5.2)
SODIUM SERPL-SCNC: 138 MMOL/L (ref 136–145)

## 2025-05-14 PROCEDURE — 93798 PHYS/QHP OP CAR RHAB W/ECG: CPT

## 2025-05-14 PROCEDURE — 80048 BASIC METABOLIC PNL TOTAL CA: CPT

## 2025-05-14 PROCEDURE — 36415 COLL VENOUS BLD VENIPUNCTURE: CPT

## 2025-05-15 ENCOUNTER — TELEPHONE (OUTPATIENT)
Dept: CARDIOLOGY | Age: 80
End: 2025-05-15
Payer: MEDICARE

## 2025-05-15 NOTE — TELEPHONE ENCOUNTER
Last week, started him on isosorbide 30 mg daily for chest pain.  Has his chest pain resolved?    How are his home blood pressure readings?  If stable, continue medication.    Please let him know that BMP was normal except for CO2 level mildly low.  This is stable and I am not concerned.  Kidney function electrolytes were normal.  Thank you

## 2025-05-16 ENCOUNTER — TREATMENT (OUTPATIENT)
Dept: CARDIAC REHAB | Facility: HOSPITAL | Age: 80
End: 2025-05-16
Payer: MEDICARE

## 2025-05-16 DIAGNOSIS — Z95.5 STATUS POST INSERTION OF DRUG ELUTING CORONARY ARTERY STENT: Primary | ICD-10-CM

## 2025-05-16 PROCEDURE — 93798 PHYS/QHP OP CAR RHAB W/ECG: CPT

## 2025-05-16 RX ORDER — ISOSORBIDE MONONITRATE 30 MG/1
30 TABLET, EXTENDED RELEASE ORAL 2 TIMES DAILY
Start: 2025-05-16

## 2025-05-16 NOTE — TELEPHONE ENCOUNTER
"I spoke with Td Melendez Jr. and updated pt on results from provider.  Pt verbalized understanding and has no further questions at this time.    When I asked him if his CP has resolved, he said, \"It's pretty good, not completely gone. At its worst it is mild\".  He notices it more with exertion but cannot pinpoint how often it's occurring.    BP had been \"really really good\".  Typically 115-120/60s, HR 60s.    Roxi MACIAS RN  [place] [facility]  05/16/25 08:17 EDT  "

## 2025-05-16 NOTE — TELEPHONE ENCOUNTER
It's a low dose, he is welcome to take it BID. Or if he truly feels okay, he doesn't have to change it. Either is fine with me, just let me know if I need to send in a RF.  terrance

## 2025-05-16 NOTE — TELEPHONE ENCOUNTER
I spoke with Td Melendez Jr. and gave them message from the provider.  They verbalized understanding & have no further questions at this time.    He's going to give it a try taking it 30 mg BID.  I asked him to give us a call back in about 1 week to provide an update on how he's doing on it with his symptoms, BP, and HR so that we could send in a RF if needed.  He was agreeable. I modified the medication in his list.    Roxi MACIAS RN  Triage [facility] [place]  05/16/25 13:31 EDT

## 2025-05-19 ENCOUNTER — TREATMENT (OUTPATIENT)
Dept: CARDIAC REHAB | Facility: HOSPITAL | Age: 80
End: 2025-05-19
Payer: MEDICARE

## 2025-05-19 DIAGNOSIS — Z95.5 STATUS POST INSERTION OF DRUG ELUTING CORONARY ARTERY STENT: Primary | ICD-10-CM

## 2025-05-19 PROCEDURE — 93798 PHYS/QHP OP CAR RHAB W/ECG: CPT

## 2025-05-21 ENCOUNTER — APPOINTMENT (OUTPATIENT)
Dept: CARDIAC REHAB | Facility: HOSPITAL | Age: 80
End: 2025-05-21
Payer: MEDICARE

## 2025-05-23 ENCOUNTER — APPOINTMENT (OUTPATIENT)
Dept: CARDIAC REHAB | Facility: HOSPITAL | Age: 80
End: 2025-05-23
Payer: MEDICARE

## 2025-05-28 ENCOUNTER — TREATMENT (OUTPATIENT)
Dept: CARDIAC REHAB | Facility: HOSPITAL | Age: 80
End: 2025-05-28
Payer: MEDICARE

## 2025-05-28 DIAGNOSIS — Z95.5 STATUS POST INSERTION OF DRUG ELUTING CORONARY ARTERY STENT: Primary | ICD-10-CM

## 2025-05-28 PROCEDURE — 93798 PHYS/QHP OP CAR RHAB W/ECG: CPT

## 2025-05-30 ENCOUNTER — TREATMENT (OUTPATIENT)
Dept: CARDIAC REHAB | Facility: HOSPITAL | Age: 80
End: 2025-05-30
Payer: MEDICARE

## 2025-05-30 DIAGNOSIS — Z95.5 STATUS POST INSERTION OF DRUG ELUTING CORONARY ARTERY STENT: Primary | ICD-10-CM

## 2025-05-30 PROCEDURE — 93798 PHYS/QHP OP CAR RHAB W/ECG: CPT

## 2025-06-02 ENCOUNTER — TREATMENT (OUTPATIENT)
Dept: CARDIAC REHAB | Facility: HOSPITAL | Age: 80
End: 2025-06-02
Payer: MEDICARE

## 2025-06-02 DIAGNOSIS — Z95.5 STATUS POST INSERTION OF DRUG ELUTING CORONARY ARTERY STENT: Primary | ICD-10-CM

## 2025-06-02 PROCEDURE — 93798 PHYS/QHP OP CAR RHAB W/ECG: CPT

## 2025-06-04 ENCOUNTER — TREATMENT (OUTPATIENT)
Dept: CARDIAC REHAB | Facility: HOSPITAL | Age: 80
End: 2025-06-04
Payer: MEDICARE

## 2025-06-04 DIAGNOSIS — Z95.5 STATUS POST INSERTION OF DRUG ELUTING CORONARY ARTERY STENT: Primary | ICD-10-CM

## 2025-06-04 PROCEDURE — 93798 PHYS/QHP OP CAR RHAB W/ECG: CPT

## 2025-06-06 ENCOUNTER — TREATMENT (OUTPATIENT)
Dept: CARDIAC REHAB | Facility: HOSPITAL | Age: 80
End: 2025-06-06
Payer: MEDICARE

## 2025-06-06 DIAGNOSIS — Z95.5 STATUS POST INSERTION OF DRUG ELUTING CORONARY ARTERY STENT: Primary | ICD-10-CM

## 2025-06-06 PROCEDURE — 93798 PHYS/QHP OP CAR RHAB W/ECG: CPT

## 2025-06-09 ENCOUNTER — TREATMENT (OUTPATIENT)
Dept: CARDIAC REHAB | Facility: HOSPITAL | Age: 80
End: 2025-06-09
Payer: MEDICARE

## 2025-06-09 DIAGNOSIS — Z95.5 STATUS POST INSERTION OF DRUG ELUTING CORONARY ARTERY STENT: Primary | ICD-10-CM

## 2025-06-09 PROCEDURE — 93798 PHYS/QHP OP CAR RHAB W/ECG: CPT

## 2025-06-10 DIAGNOSIS — N40.0 BENIGN PROSTATIC HYPERPLASIA WITHOUT LOWER URINARY TRACT SYMPTOMS: ICD-10-CM

## 2025-06-10 DIAGNOSIS — R73.01 IFG (IMPAIRED FASTING GLUCOSE): ICD-10-CM

## 2025-06-10 DIAGNOSIS — E78.2 MIXED HYPERLIPIDEMIA: ICD-10-CM

## 2025-06-10 DIAGNOSIS — I25.118 CORONARY ARTERY DISEASE OF NATIVE ARTERY OF NATIVE HEART WITH STABLE ANGINA PECTORIS: Primary | ICD-10-CM

## 2025-06-11 ENCOUNTER — TREATMENT (OUTPATIENT)
Dept: CARDIAC REHAB | Facility: HOSPITAL | Age: 80
End: 2025-06-11
Payer: MEDICARE

## 2025-06-11 DIAGNOSIS — Z95.5 STATUS POST INSERTION OF DRUG ELUTING CORONARY ARTERY STENT: Primary | ICD-10-CM

## 2025-06-11 PROCEDURE — 93798 PHYS/QHP OP CAR RHAB W/ECG: CPT

## 2025-06-13 ENCOUNTER — TREATMENT (OUTPATIENT)
Dept: CARDIAC REHAB | Facility: HOSPITAL | Age: 80
End: 2025-06-13
Payer: MEDICARE

## 2025-06-13 DIAGNOSIS — Z95.5 STATUS POST INSERTION OF DRUG ELUTING CORONARY ARTERY STENT: Primary | ICD-10-CM

## 2025-06-13 PROCEDURE — 93798 PHYS/QHP OP CAR RHAB W/ECG: CPT

## 2025-06-16 ENCOUNTER — TREATMENT (OUTPATIENT)
Dept: CARDIAC REHAB | Facility: HOSPITAL | Age: 80
End: 2025-06-16
Payer: MEDICARE

## 2025-06-16 DIAGNOSIS — Z95.5 STATUS POST INSERTION OF DRUG ELUTING CORONARY ARTERY STENT: Primary | ICD-10-CM

## 2025-06-16 PROCEDURE — 93798 PHYS/QHP OP CAR RHAB W/ECG: CPT

## 2025-06-16 NOTE — PROGRESS NOTES
RM:________     PCP: Beau Lee MD    : 1945  AGE: 79 y.o.  EST PATIENT           Wt Readings from Last 3 Encounters:   25 83.9 kg (185 lb)   25 83.5 kg (184 lb)   25 81.6 kg (180 lb)           CP______  SOA_______ DIZZINESS _____ FATIGUE ______  PALPS ______    WT: ____________ BP: __________L __________R HR______    ALLERGIES:Augmentin [amoxicillin-pot clavulanate] and Clavulanic acid      Social History     Tobacco Use    Smoking status: Former     Current packs/day: 0.00     Average packs/day: 0.3 packs/day for 4.0 years (1.0 ttl pk-yrs)     Types: Cigarettes     Start date: 1968     Quit date: 1972     Years since quittin.4     Passive exposure: Never (IN CHILDHOOD)    Smokeless tobacco: Never   Vaping Use    Vaping status: Never Used   Substance Use Topics    Alcohol use: No     Comment: caffeine use - coffee and tea    Drug use: No

## 2025-06-18 ENCOUNTER — TREATMENT (OUTPATIENT)
Dept: CARDIAC REHAB | Facility: HOSPITAL | Age: 80
End: 2025-06-18
Payer: MEDICARE

## 2025-06-18 DIAGNOSIS — Z95.5 STATUS POST INSERTION OF DRUG ELUTING CORONARY ARTERY STENT: Primary | ICD-10-CM

## 2025-06-18 PROCEDURE — 93798 PHYS/QHP OP CAR RHAB W/ECG: CPT

## 2025-06-20 ENCOUNTER — TREATMENT (OUTPATIENT)
Dept: CARDIAC REHAB | Facility: HOSPITAL | Age: 80
End: 2025-06-20
Payer: MEDICARE

## 2025-06-20 DIAGNOSIS — Z95.5 STATUS POST INSERTION OF DRUG ELUTING CORONARY ARTERY STENT: Primary | ICD-10-CM

## 2025-06-20 PROCEDURE — 93798 PHYS/QHP OP CAR RHAB W/ECG: CPT

## 2025-06-23 ENCOUNTER — TREATMENT (OUTPATIENT)
Dept: CARDIAC REHAB | Facility: HOSPITAL | Age: 80
End: 2025-06-23
Payer: MEDICARE

## 2025-06-23 DIAGNOSIS — Z95.5 STATUS POST INSERTION OF DRUG ELUTING CORONARY ARTERY STENT: Primary | ICD-10-CM

## 2025-06-23 PROCEDURE — 93798 PHYS/QHP OP CAR RHAB W/ECG: CPT

## 2025-06-25 ENCOUNTER — TREATMENT (OUTPATIENT)
Dept: CARDIAC REHAB | Facility: HOSPITAL | Age: 80
End: 2025-06-25
Payer: MEDICARE

## 2025-06-25 DIAGNOSIS — Z95.5 STATUS POST INSERTION OF DRUG ELUTING CORONARY ARTERY STENT: Primary | ICD-10-CM

## 2025-06-25 PROCEDURE — 93798 PHYS/QHP OP CAR RHAB W/ECG: CPT

## 2025-06-27 ENCOUNTER — OFFICE VISIT (OUTPATIENT)
Dept: CARDIOLOGY | Age: 80
End: 2025-06-27
Payer: MEDICARE

## 2025-06-27 ENCOUNTER — TREATMENT (OUTPATIENT)
Dept: CARDIAC REHAB | Facility: HOSPITAL | Age: 80
End: 2025-06-27
Payer: MEDICARE

## 2025-06-27 VITALS
OXYGEN SATURATION: 97 % | WEIGHT: 180.6 LBS | HEIGHT: 70 IN | DIASTOLIC BLOOD PRESSURE: 82 MMHG | SYSTOLIC BLOOD PRESSURE: 120 MMHG | HEART RATE: 66 BPM | BODY MASS INDEX: 25.86 KG/M2

## 2025-06-27 DIAGNOSIS — Z95.5 STATUS POST INSERTION OF DRUG ELUTING CORONARY ARTERY STENT: Primary | ICD-10-CM

## 2025-06-27 DIAGNOSIS — I25.118 CORONARY ARTERY DISEASE OF NATIVE ARTERY OF NATIVE HEART WITH STABLE ANGINA PECTORIS: Primary | Chronic | ICD-10-CM

## 2025-06-27 DIAGNOSIS — E78.2 MIXED HYPERLIPIDEMIA: Chronic | ICD-10-CM

## 2025-06-27 DIAGNOSIS — I25.10 CORONARY ARTERY DISEASE INVOLVING NATIVE CORONARY ARTERY OF NATIVE HEART WITHOUT ANGINA PECTORIS: ICD-10-CM

## 2025-06-27 PROBLEM — R07.9 CHEST PAIN: Status: RESOLVED | Noted: 2025-05-01 | Resolved: 2025-06-27

## 2025-06-27 PROCEDURE — 93798 PHYS/QHP OP CAR RHAB W/ECG: CPT

## 2025-06-27 RX ORDER — ISOSORBIDE MONONITRATE 30 MG/1
30 TABLET, EXTENDED RELEASE ORAL DAILY
Qty: 30 TABLET | Refills: 0 | Status: SHIPPED | OUTPATIENT
Start: 2025-06-27

## 2025-06-27 RX ORDER — PRASUGREL 10 MG/1
10 TABLET, FILM COATED ORAL DAILY
Qty: 90 TABLET | Refills: 2 | Status: SHIPPED | OUTPATIENT
Start: 2025-07-11

## 2025-06-27 RX ORDER — ISOSORBIDE MONONITRATE 30 MG/1
30 TABLET, EXTENDED RELEASE ORAL DAILY
Qty: 90 TABLET | Refills: 3 | Status: SHIPPED | OUTPATIENT
Start: 2025-06-27 | End: 2025-06-27 | Stop reason: SDUPTHER

## 2025-06-27 NOTE — PROGRESS NOTES
Date of Office Visit: 2025  Encounter Provider: Obi Boucher MD  Place of Service: [facility] CARDIOLOGY  Patient Name: Td Melendez Jr.  :1945    Chief Complaint   Patient presents with    Coronary Artery Disease   :     HPI: Td Melendez Jr. is a 79 y.o. male who presents today in hospital follow up. I have reviewed prior notes and there are no changes except for any new updates described below. I have also reviewed any information entered into the medical record by the patient or by ancillary staff.     He has a history of unstable angina and underwent drug-eluting stent placement to the left anterior descending artery and the right coronary artery in 2015. He was noted to have a nonobstructive lesion in the circumflex. In 2015, he presented with chest discomfort ; a Cardiolite stress was normal. In 2018, he was admitted with chest pain that sounded GI in etiology; a Cardiolite stress test was normal. He had a stress test in  for atypical chest pain; that was normal.    He had been doing very well. Approximately four weeks ago he started to notice more indegestion/heartburn than usual. He was visiting his son in [place] in late 2025 and developed fairly classic exertional angina. He was admitted to [facility]. He ruled out for ACS; an echo was normal (EF 60-64%, normal diastology, normal valves). He underwent coronary angiography, which revealed:  *normal LM/Cx  *80% ISR LAD involving the ostium of the diag  *80% distal RCA    He underwent placement of a 3.5x18mm Xience stent to the RCA, and placement of a 2.75x8mm Synergy stent to the LAD which was post-dilated to 3mm. Prior to LAD stent placement, the ostium of the diag was angioplastied with a balloon. He was switched from clopidogrel, which he was on for monotherapy, to aspirin/ticagrelor. We switched him from ticagrelor to prasugrel due to dyspnea.    He had recurrent chest  pain after his cath; was readmitted in May 2025 and coronary angiography revealed patent stents. There was a jailed diagonal, which was felt to be the cause of his angina. He was placed on amlodipine without improvement; he is now on ISMN and that's helped significantly. He has stable mild angina but has not required SLNTG.      Past Medical History:   Diagnosis Date    Anxiety     CAD (coronary artery disease)     unstable angina, 7/2015: 20% LM, long 70% LAD with abnormal FFR (s/p [id number] Xience), 50% OM1, 99% prox RCA (s/p 3.5x18m Xience).  Normal Cardiolite 11/2015 and 6/2018    Chronic diarrhea 1980    treat with lotronex    Colon polyp 2010    Depression 1983    Fibromyalgia     GERD (gastroesophageal reflux disease)     H/O complete eye exam 04/2018    Hyperlipidemia     IBS (irritable bowel syndrome)     Injury of back 2005    Sinusitis 1987    Squamous cell skin cancer, face     Urinary retention        Past Surgical History:   Procedure Laterality Date    CARDIAC CATHETERIZATION      [facility] 07/2015: cath with 20% LM long 70% LAD (with positive FFR), 50% OM1, 99% prox RCA, s/p 2.75x33 Xience Alpine to LAD and 3.5x18mm Xience Alpine to RCA. LVEF 64% Normal Cardiolite 11/2015    CARDIAC CATHETERIZATION N/A 5/2/2025    Procedure: Left Heart Cath;  Surgeon: Catarino Chavira MD;  Location: [facility] CATH INVASIVE LOCATION;  Service: Cardiovascular;  Laterality: N/A;    CARDIAC SURGERY      cardiac stents, 2    CATARACT EXTRACTION Left     COLONOSCOPY  approx 6/2011    normal per patient  [facility]    COLONOSCOPY N/A 04/26/2021    Procedure: COLONOSCOPY TO CECUM WITH COLD SNARE POLYPECTOMIES AND BIOPSIES ;  Surgeon: Catarino Luna MD;  Location: [facility] ENDOSCOPY;  Service: Gastroenterology;  Laterality: N/A;  PRE- HISTORY COLON POLYPS  POST- POLYPS, COLITIS, HEMORRHOIDS    COLONOSCOPY W/ POLYPECTOMY N/A 07/25/2018    Procedure: COLONOSCOPY TO CECUM WITH COLD SNAREPOLYPECTOMIES, HOT SNARE POLYPECTOMIES;   Surgeon: Catarino Luna MD;  Location: [facility] ENDOSCOPY;  Service: Gastroenterology    CORONARY ANGIOPLASTY WITH STENT PLACEMENT      CORONARY ANGIOPLASTY WITH STENT PLACEMENT  2025    CORONARY ANGIOPLASTY WITH STENT PLACEMENT  2025    CYSTOSCOPY TRANSURETHRAL RESECTION OF PROSTATE      ENDOSCOPY N/A 2018    Procedure: ESOPHAGOGASTRODUODENOSCOPY WITH COLD BIOPSIES;  Surgeon: Catarino Luna MD;  Location[facility] ENDOSCOPY;  Service: Gastroenterology    ENDOSCOPY N/A 2021    Procedure: ESOPHAGOGASTRODUODENOSCOPY with cold biopsies;  Surgeon: Catarino Luna MD;  [facility] ENDOSCOPY;  Service: Gastroenterology;  Laterality: N/A;  PRE: EPIGASTRIC PAIN, DYSPEPSIA  POST: GASTRITIS    ENDOSCOPY N/A 2022    Procedure: ESOPHAGOGASTRODUODENOSCOPY WITH DILATATION #48 to #58 bougie, gastric biopsy;  Surgeon: Td Richey DO;  Location: [facility] ENDOSCOPY;  Service: General;  Laterality: N/A;  gastritis, small hiatal hernia    ENDOSCOPY N/A 10/30/2023    Procedure: ESOPHAGOGASTRODUODENOSCOPY with biopsies x 1 area.;  Surgeon: Shell Suero MD;  Location: [facility] ENDOSCOPY;  Service: Gastroenterology;  Laterality: N/A;  Post-gastritis    HIATAL HERNIA REPAIR N/A 2022    Procedure: HIATAL HERNIA REPAIR LAPAROSCOPIC WITH TRANSORAL INCISIONLESS FUNDOPLICATION;  Surgeon: Td Ricehy DO;  Location: [facility] MAIN OR;  Service: General;  Laterality: N/A;    RADIOFREQUENCY ABLATION      NECK    SKIN CANCER EXCISION      WISDOM TOOTH EXTRACTION         Social History     Socioeconomic History    Marital status:    Tobacco Use    Smoking status: Former     Current packs/day: 0.00     Average packs/day: 0.3 packs/day for 4.0 years (1.0 ttl pk-yrs)     Types: Cigarettes     Start date: 1968     Quit date: 1972     Years since quittin.5     Passive exposure: Never (IN CHILDHOOD)    Smokeless tobacco: Never   Vaping Use    Vaping status: Never Used    Substance and Sexual Activity    Alcohol use: No     Comment: caffeine use - coffee and tea    Drug use: No    Sexual activity: Defer       Family History   Problem Relation Age of Onset    Cervical cancer Mother     Cancer Mother     Depression Mother     Heart disease Mother     Liver disease Mother     Alcohol abuse Mother     Thyroid cancer Father     Cancer Father     Depression Father     Diabetes Father     Thyroid disease Father     Heart disease Father     Heart disease Brother     Throat cancer Brother     Coronary artery disease Other     Malig Hyperthermia Neg Hx        Review of Systems   Cardiovascular:  Positive for chest pain.   Gastrointestinal:  Positive for heartburn.   All other systems reviewed and are negative.      Allergies   Allergen Reactions    Augmentin [Amoxicillin-Pot Clavulanate] Hives and Itching    Clavulanic Acid Unknown - High Severity        Current Outpatient Medications:     ALPRAZolam (XANAX) 0.25 MG tablet, Take 1 tablet by mouth Daily As Needed for Anxiety., Disp: 30 tablet, Rfl: 2    ASPIRIN 81 PO, Take  by mouth Daily., Disp: , Rfl:     coenzyme Q10 100 MG capsule, Take 1 capsule by mouth Daily. LD 6/30, Disp: , Rfl:     diphenhydrAMINE-acetaminophen (TYLENOL PM)  MG tablet per tablet, Take 1 tablet by mouth At Night As Needed for Sleep., Disp: , Rfl:     Emollient (COLLAGEN EX), Take 1 tablet by mouth Daily. LD 6/30, Disp: , Rfl:     ezetimibe (ZETIA) 10 MG tablet, Take 1 tablet by mouth Daily., Disp: 90 tablet, Rfl: 3    famotidine (Pepcid) 20 MG tablet, Take 1 tablet by mouth 2 (Two) Times a Day., Disp: 180 tablet, Rfl: 3    HYDROcodone-acetaminophen (NORCO) 5-325 MG per tablet, Take  by mouth See Admin Instructions. Take 1 tablet by mouth every 4-6 hours as needed for pain, Disp: , Rfl:     isosorbide mononitrate (IMDUR) 30 MG 24 hr tablet, Take 1 tablet by mouth 2 (Two) Times a Day. (Patient taking differently: Take 1 tablet by mouth Daily.), Disp: , Rfl:      "LITHIUM PO, Take 5 mg by mouth 2 (two) times a day., Disp: , Rfl:     nitroglycerin (NITROSTAT) 0.4 MG SL tablet, Place 1 tablet under the tongue Every 5 (Five) Minutes As Needed for Chest Pain. Place one tablet under tongue as needed for chest pain., Disp: 25 tablet, Rfl: 3    pitavastatin calcium (LIVALO) 2 MG tablet tablet, Take 1 tablet by mouth Every Night., Disp: 90 tablet, Rfl: 3    polyethyl glycol-propyl glycol (SYSTANE) 0.4-0.3 % solution ophthalmic solution (artificial tears), Every 1 (One) Hour As Needed., Disp: , Rfl:     [START ON 7/11/2025] prasugrel (EFFIENT) 5 MG tablet, Take 1 tablet by mouth Daily., Disp: 90 tablet, Rfl: 3    Vitamin D-Vitamin K (VITAMIN K2-VITAMIN D3 PO), Take  by mouth Daily. Hold for surgery, Disp: , Rfl:     Magnesium Oxide (MAGOX 400 PO), Take 400 mg by mouth Daily. (Patient not taking: Reported on 6/27/2025), Disp: , Rfl:      Objective:     Vitals:    06/27/25 1032   BP: 120/82   BP Location: Left arm   Pulse: 66   SpO2: 97%   Weight: 81.9 kg (180 lb 9.6 oz)   Height: 177.8 cm (70\")         Body mass index is 25.91 kg/m².    Physical Exam  Vitals reviewed.   Constitutional:       Appearance: He is well-developed.   HENT:      Head: Normocephalic.      Nose: Nose normal.      Mouth/Throat:      Pharynx: Oropharynx is clear.   Eyes:      Conjunctiva/sclera: Conjunctivae normal.   Neck:      Vascular: No JVD.   Cardiovascular:      Rate and Rhythm: Normal rate and regular rhythm.      Pulses: Normal pulses.      Heart sounds: Normal heart sounds.   Pulmonary:      Effort: Pulmonary effort is normal.      Breath sounds: Normal breath sounds.   Abdominal:      Palpations: Abdomen is soft.      Tenderness: There is no abdominal tenderness.   Musculoskeletal:         General: No swelling. Normal range of motion.      Cervical back: Normal range of motion.   Skin:     General: Skin is warm and dry.      Findings: No erythema.   Neurological:      General: No focal deficit present. "      Mental Status: He is alert.   Psychiatric:         Mood and Affect: Mood normal.         Procedures    Assessment:       Diagnosis Plan   1. Coronary artery disease of native artery of native heart with stable angina pectoris        2. Mixed hyperlipidemia                 Plan:       Coronary Artery Disease  Assessment   The patient has no angina   He's doing well.  He's on ezetimibe and pitavastatin.    Subjective - Objective   There has been a previous stent procedure using MADELIN  7/2015 and 4/2025   Current antiplatelet therapy includes aspirin 81mg and prasugrel 5mg    It sounds like he had in-stent restenosis and not thrombosis. He's on DAPT with aspirin/prasugrel as he didn't tolerate ticagrelor. I know he is >74yo, but barely. He is of sturdy physical status and has normal renal function. I feel that he should remain on 10mg of prasugrel.    He has stable angina due to jailing of the diag. He'll remain on ISMN. Amlodipine did not help.    His Lp(a) is normal.     Sincerely,     Obi Boucher MD

## 2025-06-30 ENCOUNTER — TREATMENT (OUTPATIENT)
Dept: CARDIAC REHAB | Facility: HOSPITAL | Age: 80
End: 2025-06-30
Payer: MEDICARE

## 2025-06-30 DIAGNOSIS — Z95.5 STATUS POST INSERTION OF DRUG ELUTING CORONARY ARTERY STENT: Primary | ICD-10-CM

## 2025-06-30 PROCEDURE — 93798 PHYS/QHP OP CAR RHAB W/ECG: CPT

## 2025-07-02 ENCOUNTER — APPOINTMENT (OUTPATIENT)
Dept: CARDIAC REHAB | Facility: HOSPITAL | Age: 80
End: 2025-07-02
Payer: MEDICARE

## 2025-07-07 ENCOUNTER — APPOINTMENT (OUTPATIENT)
Dept: CARDIAC REHAB | Facility: HOSPITAL | Age: 80
End: 2025-07-07
Payer: MEDICARE

## 2025-07-09 ENCOUNTER — TREATMENT (OUTPATIENT)
Dept: CARDIAC REHAB | Facility: HOSPITAL | Age: 80
End: 2025-07-09
Payer: MEDICARE

## 2025-07-09 DIAGNOSIS — Z95.5 STATUS POST INSERTION OF DRUG ELUTING CORONARY ARTERY STENT: Primary | ICD-10-CM

## 2025-07-09 PROCEDURE — 93798 PHYS/QHP OP CAR RHAB W/ECG: CPT

## 2025-07-11 ENCOUNTER — TREATMENT (OUTPATIENT)
Dept: CARDIAC REHAB | Facility: HOSPITAL | Age: 80
End: 2025-07-11
Payer: MEDICARE

## 2025-07-11 DIAGNOSIS — Z95.5 STATUS POST INSERTION OF DRUG ELUTING CORONARY ARTERY STENT: Primary | ICD-10-CM

## 2025-07-11 PROCEDURE — 93798 PHYS/QHP OP CAR RHAB W/ECG: CPT

## 2025-07-13 DIAGNOSIS — I25.10 CORONARY ARTERY DISEASE INVOLVING NATIVE CORONARY ARTERY OF NATIVE HEART WITHOUT ANGINA PECTORIS: Chronic | ICD-10-CM

## 2025-07-13 DIAGNOSIS — E78.2 MIXED HYPERLIPIDEMIA: Chronic | ICD-10-CM

## 2025-07-14 ENCOUNTER — APPOINTMENT (OUTPATIENT)
Dept: CARDIAC REHAB | Facility: HOSPITAL | Age: 80
End: 2025-07-14
Payer: MEDICARE

## 2025-07-14 RX ORDER — PITAVASTATIN CALCIUM 2.09 MG/1
2 TABLET, FILM COATED ORAL NIGHTLY
Qty: 90 TABLET | Refills: 3 | OUTPATIENT
Start: 2025-07-14

## 2025-07-16 ENCOUNTER — APPOINTMENT (OUTPATIENT)
Dept: CARDIAC REHAB | Facility: HOSPITAL | Age: 80
End: 2025-07-16
Payer: MEDICARE

## 2025-07-19 LAB
ALBUMIN SERPL-MCNC: 4.3 G/DL (ref 3.5–5.2)
ALBUMIN/GLOB SERPL: 1.8 G/DL
ALP SERPL-CCNC: 83 U/L (ref 39–117)
ALT SERPL-CCNC: 13 U/L (ref 1–41)
AST SERPL-CCNC: 18 U/L (ref 1–40)
BASOPHILS # BLD AUTO: 0.04 10*3/MM3 (ref 0–0.2)
BASOPHILS NFR BLD AUTO: 0.6 % (ref 0–1.5)
BILIRUB SERPL-MCNC: 0.7 MG/DL (ref 0–1.2)
BUN SERPL-MCNC: 16 MG/DL (ref 8–23)
BUN/CREAT SERPL: 15.4 (ref 7–25)
CALCIUM SERPL-MCNC: 9.9 MG/DL (ref 8.6–10.5)
CHLORIDE SERPL-SCNC: 105 MMOL/L (ref 98–107)
CHOLEST SERPL-MCNC: 143 MG/DL (ref 0–200)
CO2 SERPL-SCNC: 24.1 MMOL/L (ref 22–29)
CREAT SERPL-MCNC: 1.04 MG/DL (ref 0.76–1.27)
EGFRCR SERPLBLD CKD-EPI 2021: 74 ML/MIN/1.73
EOSINOPHIL # BLD AUTO: 0.18 10*3/MM3 (ref 0–0.4)
EOSINOPHIL NFR BLD AUTO: 2.8 % (ref 0.3–6.2)
ERYTHROCYTE [DISTWIDTH] IN BLOOD BY AUTOMATED COUNT: 12.8 % (ref 12.3–15.4)
GLOBULIN SER CALC-MCNC: 2.4 GM/DL
GLUCOSE SERPL-MCNC: 95 MG/DL (ref 65–99)
HBA1C MFR BLD: 5.4 % (ref 4.8–5.6)
HCT VFR BLD AUTO: 53.1 % (ref 37.5–51)
HDLC SERPL-MCNC: 39 MG/DL (ref 40–60)
HGB BLD-MCNC: 17 G/DL (ref 13–17.7)
IMM GRANULOCYTES # BLD AUTO: 0.01 10*3/MM3 (ref 0–0.05)
IMM GRANULOCYTES NFR BLD AUTO: 0.2 % (ref 0–0.5)
LDLC SERPL CALC-MCNC: 81 MG/DL (ref 0–100)
LYMPHOCYTES # BLD AUTO: 1.84 10*3/MM3 (ref 0.7–3.1)
LYMPHOCYTES NFR BLD AUTO: 29 % (ref 19.6–45.3)
MCH RBC QN AUTO: 29.9 PG (ref 26.6–33)
MCHC RBC AUTO-ENTMCNC: 32 G/DL (ref 31.5–35.7)
MCV RBC AUTO: 93.5 FL (ref 79–97)
MONOCYTES # BLD AUTO: 0.6 10*3/MM3 (ref 0.1–0.9)
MONOCYTES NFR BLD AUTO: 9.4 % (ref 5–12)
NEUTROPHILS # BLD AUTO: 3.68 10*3/MM3 (ref 1.7–7)
NEUTROPHILS NFR BLD AUTO: 58 % (ref 42.7–76)
NRBC BLD AUTO-RTO: 0 /100 WBC (ref 0–0.2)
PLATELET # BLD AUTO: 204 10*3/MM3 (ref 140–450)
POTASSIUM SERPL-SCNC: 4.1 MMOL/L (ref 3.5–5.2)
PROT SERPL-MCNC: 6.7 G/DL (ref 6–8.5)
PSA SERPL-MCNC: 3.16 NG/ML (ref 0–4)
RBC # BLD AUTO: 5.68 10*6/MM3 (ref 4.14–5.8)
SODIUM SERPL-SCNC: 140 MMOL/L (ref 136–145)
TRIGL SERPL-MCNC: 131 MG/DL (ref 0–150)
TSH SERPL DL<=0.005 MIU/L-ACNC: 1.98 UIU/ML (ref 0.27–4.2)
VLDLC SERPL CALC-MCNC: 23 MG/DL (ref 5–40)
WBC # BLD AUTO: 6.35 10*3/MM3 (ref 3.4–10.8)

## 2025-07-23 NOTE — PROGRESS NOTES
Subjective   The ABCs of the Annual Wellness Visit  Medicare Wellness Visit      Td Melendez Jr. is a 79 y.o. patient who presents for a Medicare Wellness Visit.    The following portions of the patient's history were reviewed and   updated as appropriate: allergies, current medications, past family history, past medical history, past social history, past surgical history, and problem list.    Compared to one year ago, the patient's physical   health is the same.  Compared to one year ago, the patient's mental   health is the same.    Recent Hospitalizations:  He was not admitted to the hospital during the last year.     Current Medical Providers:  Patient Care Team:  Beau Lee MD as PCP - General (Family Medicine)  Obi Boucher MD as Cardiologist (Cardiology)  Venkat Salas MD (Pain Medicine)  Pedrito Neal Jr., MD as Consulting Physician (Urology)  Marina Olivarez DNP, APRN as Nurse Practitioner (Nurse Practitioner)  Jaci Arguelles MD as Consulting Physician (Sleep Medicine)  Lionel Paeg MD (Family Medicine)  Padmini Turcios, RUSS as Nurse Practitioner (Cardiology)  Ena Sebastian APRN as Nurse Practitioner (Cardiology)  Phoebe Oliveira APRN as Nurse Practitioner (Cardiology)    Outpatient Medications Prior to Visit   Medication Sig Dispense Refill    ASPIRIN 81 PO Take  by mouth Daily.      coenzyme Q10 100 MG capsule Take 1 capsule by mouth Daily. LD 6/30      diphenhydrAMINE-acetaminophen (TYLENOL PM)  MG tablet per tablet Take 1 tablet by mouth At Night As Needed for Sleep.      Emollient (COLLAGEN EX) Take 1 tablet by mouth Daily. LD 6/30      HYDROcodone-acetaminophen (NORCO) 5-325 MG per tablet Take  by mouth See Admin Instructions. Take 1 tablet by mouth every 4-6 hours as needed for pain      isosorbide mononitrate (IMDUR) 30 MG 24 hr tablet Take 1 tablet by mouth Daily. 30 tablet 0    LITHIUM PO Take 5 mg by mouth 2 (two) times a day.       Magnesium Oxide (MAGOX 400 PO) Take 400 mg by mouth Daily. (Patient not taking: Reported on 7/24/2025)      nitroglycerin (NITROSTAT) 0.4 MG SL tablet Place 1 tablet under the tongue Every 5 (Five) Minutes As Needed for Chest Pain. Place one tablet under tongue as needed for chest pain. 25 tablet 3    polyethyl glycol-propyl glycol (SYSTANE) 0.4-0.3 % solution ophthalmic solution (artificial tears) Every 1 (One) Hour As Needed.      prasugrel (EFFIENT) 10 MG tablet Take 1 tablet by mouth Daily. 90 tablet 2    Vitamin D-Vitamin K (VITAMIN K2-VITAMIN D3 PO) Take  by mouth Daily. Hold for surgery      ALPRAZolam (XANAX) 0.25 MG tablet Take 1 tablet by mouth Daily As Needed for Anxiety. 30 tablet 2    ezetimibe (ZETIA) 10 MG tablet Take 1 tablet by mouth Daily. 90 tablet 3    famotidine (Pepcid) 20 MG tablet Take 1 tablet by mouth 2 (Two) Times a Day. 180 tablet 3    pitavastatin calcium (LIVALO) 2 MG tablet tablet Take 1 tablet by mouth Every Night. 90 tablet 3     No facility-administered medications prior to visit.     Opioid medication/s are on active medication list.  and I have evaluated his active treatment plan and pain score trends (see table).  Vitals:    07/24/25 1034   PainSc: 0-No pain     I have reviewed the chart for potential of high risk medication and harmful drug interactions in the elderly.        Aspirin is on active medication list. Aspirin use is indicated based on review of current medical condition/s. Pros and cons of this therapy have been discussed today. Benefits of this medication outweigh potential harm.  Patient has been encouraged to continue taking this medication.  .      Patient Active Problem List   Diagnosis    CAD (coronary artery disease)    Hyperlipidemia    Anxiety    Gastroesophageal reflux disease without esophagitis    Polyp of colon    Dyspepsia    Epigastric pain    Poor appetite    Personal history of colonic polyps    Hypertriglyceridemia    Hematuria    Chronic pain  "    Advance Care Planning Advance Directive is on file.  ACP discussion was held with the patient during this visit. Patient has an advance directive in EMR which is still valid.             Objective   Vitals:    25 1034   BP: 130/68   Pulse: 70   Resp: 16   Temp: 97.5 °F (36.4 °C)   TempSrc: Temporal   SpO2: 99%   Weight: 81.4 kg (179 lb 6.4 oz)   Height: 177.8 cm (70\")   PainSc: 0-No pain       Estimated body mass index is 25.74 kg/m² as calculated from the following:    Height as of this encounter: 177.8 cm (70\").    Weight as of this encounter: 81.4 kg (179 lb 6.4 oz).                Does the patient have evidence of cognitive impairment? No  Lab Results   Component Value Date    CHLPL 143 2025    TRIG 131 2025    HDL 39 (L) 2025    LDL 81 2025    VLDL 23 2025    HGBA1C 5.40 2025                                                                                                Health  Risk Assessment    Smoking Status:  Social History     Tobacco Use   Smoking Status Former    Current packs/day: 0.00    Average packs/day: 0.3 packs/day for 4.0 years (1.0 ttl pk-yrs)    Types: Cigarettes    Start date: 1968    Quit date:     Years since quittin.5    Passive exposure: Never (IN CHILDHOOD)   Smokeless Tobacco Never     Alcohol Consumption:  Social History     Substance and Sexual Activity   Alcohol Use No    Comment: caffeine use - coffee and tea       Fall Risk Screen  STEADI Fall Risk Assessment has not been completed.    Depression Screening   Little interest or pleasure in doing things? Not at all   Feeling down, depressed, or hopeless? Not at all   PHQ-2 Total Score 0      Health Habits and Functional and Cognitive Screenin/24/2025    10:00 AM   Functional & Cognitive Status   Do you have difficulty preparing food and eating? No   Do you have difficulty bathing yourself, getting dressed or grooming yourself? No   Do you have difficulty using the " toilet? No   Do you have difficulty moving around from place to place? No   Do you have trouble with steps or getting out of a bed or a chair? No   Current Diet Well Balanced Diet   Dental Exam Up to date   Eye Exam Up to date   Exercise (times per week) 4 times per week   Current Exercises Include Walking   Do you need help using the phone?  No   Are you deaf or do you have serious difficulty hearing?  No   Do you need help to go to places out of walking distance? No   Do you need help shopping? No   Do you need help preparing meals?  No   Do you need help with housework?  No   Do you need help with laundry? No   Do you need help taking your medications? No   Do you need help managing money? No   Do you ever drive or ride in a car without wearing a seat belt? No   Have you felt unusual fatigue (could be tiredness), stress, anger or loneliness in the last month? No   Who do you live with? Spouse   If you need help, do you have trouble finding someone available to you? No   Have you been bothered in the last four weeks by sexual problems? No   Do you have difficulty concentrating, remembering or making decisions? No           Age-appropriate Screening Schedule:  Refer to the list below for future screening recommendations based on patient's age, sex and/or medical conditions. Orders for these recommended tests are listed in the plan section. The patient has been provided with a written plan.    Health Maintenance List  Health Maintenance   Topic Date Due    COVID-19 Vaccine (7 - 2024-25 season) 08/07/2025 (Originally 9/1/2024)    TDAP/TD VACCINES (2 - Td or Tdap) 12/30/2025 (Originally 1/1/2018)    Pneumococcal Vaccine 50+ (2 of 2 - PCV) 01/06/2026 (Originally 1/9/2020)    RSV Vaccine - Adults (1 - 1-dose 75+ series) 07/24/2026 (Originally 11/12/2022)    INFLUENZA VACCINE  10/01/2025    LIPID PANEL  07/18/2026    ANNUAL WELLNESS VISIT  07/24/2026    COLORECTAL CANCER SCREENING  05/22/2027    ZOSTER VACCINE  Completed  "   HEPATITIS C SCREENING  Discontinued                                                                                                                                                CMS Preventative Services Quick Reference  Risk Factors Identified During Encounter  None Identified    The above risks/problems have been discussed with the patient.  Pertinent information has been shared with the patient in the After Visit Summary.  An After Visit Summary and PPPS were made available to the patient.    Follow Up:   Next Medicare Wellness visit to be scheduled in 1 year.         Additional E&M Note during same encounter follows:  Patient has additional, significant, and separately identifiable condition(s)/problem(s) that require work above and beyond the Medicare Wellness Visit     Chief Complaint  MEDICARE WELLNESS, Hyperlipidemia, Anxiety, and Depression    Subjective   HPI  Td is also being seen today for additional medical problem/s.    Review of Systems   Constitutional:  Negative for chills and fever.   HENT:  Negative for congestion, ear pain and sinus pressure.    Eyes:  Negative for pain and visual disturbance.   Respiratory:  Negative for cough and shortness of breath.    Cardiovascular:  Negative for chest pain.   Gastrointestinal:  Negative for abdominal pain.   Genitourinary:  Negative for difficulty urinating and dysuria.   Skin: Negative.    Neurological: Negative.    Psychiatric/Behavioral:  Negative for dysphoric mood.               Objective   Vital Signs:  /68   Pulse 70   Temp 97.5 °F (36.4 °C) (Temporal)   Resp 16   Ht 177.8 cm (70\")   Wt 81.4 kg (179 lb 6.4 oz)   SpO2 99%   BMI 25.74 kg/m²   Physical Exam  Vitals and nursing note reviewed.   Constitutional:       General: He is not in acute distress.     Appearance: He is well-developed.   Cardiovascular:      Rate and Rhythm: Normal rate and regular rhythm.   Pulmonary:      Effort: Pulmonary effort is normal.      Breath sounds: " Normal breath sounds.   Neurological:      Mental Status: He is alert and oriented to person, place, and time.   Psychiatric:         Behavior: Behavior normal.         Thought Content: Thought content normal.     Labs reviewed with pt today during visit. All questions answered.                 Assessment and Plan Additional age appropriate preventative wellness advice topics were discussed during today's preventative wellness exam(some topics already addressed during AWV portion of the note above):    Physical Activity: Advised cardiovascular activity 150 minutes per week as tolerated. (example brisk walk for 30 minutes, 5 days a week).     Nutrition: Discussed nutrition plan with patient. Information shared in after visit summary. Goal is for a well balanced diet to enhance overall health.     Encounter for subsequent annual wellness visit (AWV) in Medicare patient         Anxiety    Orders:    ALPRAZolam (XANAX) 0.25 MG tablet; Take 1 tablet by mouth Daily As Needed for Anxiety.    Coronary artery disease involving native coronary artery of native heart without angina pectoris  Coronary Artery Disease (OPTIONAL): Coronary artery disease is stable.  Continue current treatment regimen.  Cardiac status will be reassessed in 6 months.    Orders:    ezetimibe (ZETIA) 10 MG tablet; Take 1 tablet by mouth Daily.    pitavastatin calcium (LIVALO) 2 MG tablet tablet; Take 1 tablet by mouth Every Night.    Mixed hyperlipidemia   Lipid abnormalities are stable    Plan:  Continue same medication/s without change.      Counseled patient on lifestyle modifications to help control hyperlipidemia.     Patient Treatment Goals:   LDL goal is less than 70    Followup in 6 months.    Orders:    ezetimibe (ZETIA) 10 MG tablet; Take 1 tablet by mouth Daily.    pitavastatin calcium (LIVALO) 2 MG tablet tablet; Take 1 tablet by mouth Every Night.    Gastroesophageal reflux disease without esophagitis    Orders:    famotidine (Pepcid) 20  MG tablet; Take 1 tablet by mouth 2 (Two) Times a Day.    Medication management    Orders:    ToxAssure Flex 15, Ur -          I spent 15 minutes caring for Td on this date of service. This time includes time spent by me in the following activities:preparing for the visit, reviewing tests, performing a medically appropriate examination and/or evaluation , ordering medications, tests, or procedures, documenting information in the medical record, and independently interpreting results and communicating that information with the patient/family/caregiver  Follow Up   Return in about 6 months (around 1/24/2026) for Recheck.  Patient was given instructions and counseling regarding his condition or for health maintenance advice. Please see specific information pulled into the AVS if appropriate.

## 2025-07-23 NOTE — ASSESSMENT & PLAN NOTE
Lipid abnormalities are stable    Plan:  Continue same medication/s without change.      Counseled patient on lifestyle modifications to help control hyperlipidemia.     Patient Treatment Goals:   LDL goal is less than 70    Followup in 6 months.    Orders:    ezetimibe (ZETIA) 10 MG tablet; Take 1 tablet by mouth Daily.    pitavastatin calcium (LIVALO) 2 MG tablet tablet; Take 1 tablet by mouth Every Night.

## 2025-07-23 NOTE — ASSESSMENT & PLAN NOTE
Orders:    ALPRAZolam (XANAX) 0.25 MG tablet; Take 1 tablet by mouth Daily As Needed for Anxiety.

## 2025-07-23 NOTE — ASSESSMENT & PLAN NOTE
Coronary Artery Disease (OPTIONAL): Coronary artery disease is stable.  Continue current treatment regimen.  Cardiac status will be reassessed in 6 months.    Orders:    ezetimibe (ZETIA) 10 MG tablet; Take 1 tablet by mouth Daily.    pitavastatin calcium (LIVALO) 2 MG tablet tablet; Take 1 tablet by mouth Every Night.

## 2025-07-24 ENCOUNTER — OFFICE VISIT (OUTPATIENT)
Dept: FAMILY MEDICINE CLINIC | Facility: CLINIC | Age: 80
End: 2025-07-24
Payer: MEDICARE

## 2025-07-24 VITALS
TEMPERATURE: 97.5 F | OXYGEN SATURATION: 99 % | RESPIRATION RATE: 16 BRPM | HEIGHT: 70 IN | BODY MASS INDEX: 25.68 KG/M2 | WEIGHT: 179.4 LBS | SYSTOLIC BLOOD PRESSURE: 130 MMHG | DIASTOLIC BLOOD PRESSURE: 68 MMHG | HEART RATE: 70 BPM

## 2025-07-24 DIAGNOSIS — K21.9 GASTROESOPHAGEAL REFLUX DISEASE WITHOUT ESOPHAGITIS: Chronic | ICD-10-CM

## 2025-07-24 DIAGNOSIS — Z79.899 MEDICATION MANAGEMENT: ICD-10-CM

## 2025-07-24 DIAGNOSIS — I25.10 CORONARY ARTERY DISEASE INVOLVING NATIVE CORONARY ARTERY OF NATIVE HEART WITHOUT ANGINA PECTORIS: Chronic | ICD-10-CM

## 2025-07-24 DIAGNOSIS — E78.2 MIXED HYPERLIPIDEMIA: Chronic | ICD-10-CM

## 2025-07-24 DIAGNOSIS — Z00.00 ENCOUNTER FOR SUBSEQUENT ANNUAL WELLNESS VISIT (AWV) IN MEDICARE PATIENT: Primary | ICD-10-CM

## 2025-07-24 DIAGNOSIS — F41.9 ANXIETY: Chronic | ICD-10-CM

## 2025-07-24 PROCEDURE — 1126F AMNT PAIN NOTED NONE PRSNT: CPT | Performed by: FAMILY MEDICINE

## 2025-07-24 PROCEDURE — 1170F FXNL STATUS ASSESSED: CPT | Performed by: FAMILY MEDICINE

## 2025-07-24 PROCEDURE — 1160F RVW MEDS BY RX/DR IN RCRD: CPT | Performed by: FAMILY MEDICINE

## 2025-07-24 PROCEDURE — 1159F MED LIST DOCD IN RCRD: CPT | Performed by: FAMILY MEDICINE

## 2025-07-24 PROCEDURE — 99214 OFFICE O/P EST MOD 30 MIN: CPT | Performed by: FAMILY MEDICINE

## 2025-07-24 PROCEDURE — G0439 PPPS, SUBSEQ VISIT: HCPCS | Performed by: FAMILY MEDICINE

## 2025-07-24 RX ORDER — ALPRAZOLAM 0.25 MG
0.25 TABLET ORAL DAILY PRN
Qty: 30 TABLET | Refills: 2 | Status: SHIPPED | OUTPATIENT
Start: 2025-07-24

## 2025-07-24 RX ORDER — PITAVASTATIN CALCIUM 2.09 MG/1
2 TABLET, FILM COATED ORAL NIGHTLY
Qty: 90 TABLET | Refills: 3 | Status: SHIPPED | OUTPATIENT
Start: 2025-07-24

## 2025-07-24 RX ORDER — FAMOTIDINE 20 MG/1
20 TABLET, FILM COATED ORAL 2 TIMES DAILY
Qty: 180 TABLET | Refills: 3 | Status: SHIPPED | OUTPATIENT
Start: 2025-07-24

## 2025-07-24 RX ORDER — EZETIMIBE 10 MG/1
10 TABLET ORAL DAILY
Qty: 90 TABLET | Refills: 3 | Status: SHIPPED | OUTPATIENT
Start: 2025-07-24

## 2025-07-24 NOTE — LETTER
July 24, 2025     Patient: Td Melendez Jr.   YOB: 1945   Date of Visit: 7/24/2025       To Whom It May Concern:    It is my medical opinion that Td Melendez is medically stable and competent to drive an automobile.         Sincerely,        Beau Lee MD

## 2025-07-24 NOTE — PATIENT INSTRUCTIONS
Medicare Wellness  Personal Prevention Plan of Service     Date of Office Visit:    Encounter Provider:  Beau Lee MD  Place of Service:  [facility]  Patient Name: Td Melendez Jr.  :  1945    As part of the Medicare Wellness portion of your visit today, we are providing you with this personalized preventive plan of services (PPPS). This plan is based upon recommendations of the [place] Preventive Services Task Force (USPSTF) and the Advisory Committee on Immunization Practices (ACIP).    This lists the preventive care services that should be considered, and provides dates of when you are due. Items listed as completed are up-to-date and do not require any further intervention.    Health Maintenance   Topic Date Due    COVID-19 Vaccine (2024- season) 2025 (Originally 2024)    TDAP/TD VACCINES (2 - Td or Tdap) 2025 (Originally 2018)    Pneumococcal Vaccine 50+ (2 of 2 - PCV) 2026 (Originally 2020)    RSV Vaccine - Adults (1 - 1-dose 75+ series) 2026 (Originally 2022)    INFLUENZA VACCINE  10/01/2025    LIPID PANEL  2026    ANNUAL WELLNESS VISIT  2026    COLORECTAL CANCER SCREENING  2027    ZOSTER VACCINE  Completed    HEPATITIS C SCREENING  Discontinued       No orders of the defined types were placed in this encounter.      Return in about 6 months (around 2026) for Recheck.

## 2025-07-31 LAB
1OH-MIDAZOLAM UR QL SCN: NOT DETECTED NG/MG CREAT
6MAM UR QL SCN: NEGATIVE NG/ML
7AMINOCLONAZEPAM/CREAT UR: NOT DETECTED NG/MG CREAT
A-OH ALPRAZ/CREAT UR: NOT DETECTED NG/MG CREAT
A-OH-TRIAZOLAM/CREAT UR CFM: NOT DETECTED NG/MG CREAT
ALPRAZ/CREAT UR CFM: NOT DETECTED NG/MG CREAT
AMPHETAMINES UR QL SCN: NEGATIVE NG/ML
BARBITURATES UR QL SCN: NEGATIVE NG/ML
BENZODIAZ SCN METH UR: NEGATIVE
BUPRENORPHINE UR QL SCN: NEGATIVE
BUPRENORPHINE/CREAT UR: NOT DETECTED NG/MG CREAT
CANNABINOIDS UR QL SCN: NEGATIVE NG/ML
CLONAZEPAM/CREAT UR CFM: NOT DETECTED NG/MG CREAT
COCAINE+BZE UR QL SCN: NEGATIVE NG/ML
CREAT UR-MCNC: 154 MG/DL
DESALKYLFLURAZ/CREAT UR: NOT DETECTED NG/MG CREAT
DIAZEPAM/CREAT UR: NOT DETECTED NG/MG CREAT
ETHANOL UR QL SCN: NEGATIVE G/DL
FENTANYL CTO UR SCN-MCNC: NEGATIVE NG/ML
FENTANYL/CREAT UR: NOT DETECTED NG/MG CREAT
FLUNITRAZEPAM UR QL SCN: NOT DETECTED NG/MG CREAT
LORAZEPAM/CREAT UR: NOT DETECTED NG/MG CREAT
METHADONE UR QL SCN: NEGATIVE NG/ML
METHADONE+METAB UR QL SCN: NEGATIVE NG/ML
MIDAZOLAM/CREAT UR CFM: NOT DETECTED NG/MG CREAT
NORBUPRENORPHINE/CREAT UR: NOT DETECTED NG/MG CREAT
NORDIAZEPAM/CREAT UR: NOT DETECTED NG/MG CREAT
NORFENTANYL/CREAT UR: NOT DETECTED NG/MG CREAT
NORFLUNITRAZEPAM UR-MCNC: NOT DETECTED NG/MG CREAT
OPIATES UR SCN-MCNC: NEGATIVE NG/ML
OXAZEPAM/CREAT UR: NOT DETECTED NG/MG CREAT
OXYCODONE CTO UR SCN-MCNC: NEGATIVE NG/ML
PCP UR QL SCN: NEGATIVE NG/ML
PRESCRIBED MEDICATIONS: NORMAL
TAPENTADOL CTO UR SCN-MCNC: NEGATIVE NG/ML
TEMAZEPAM/CREAT UR: NOT DETECTED NG/MG CREAT
TRAMADOL UR QL SCN: NEGATIVE NG/ML

## (undated) DEVICE — THE TORRENT IRRIGATION SCOPE CONNECTOR IS USED WITH THE TORRENT IRRIGATION TUBING TO PROVIDE IRRIGATION FLUIDS SUCH AS STERILE WATER DURING GASTROINTESTINAL ENDOSCOPIC PROCEDURES WHEN USED IN CONJUNCTION WITH AN IRRIGATION PUMP (OR ELECTROSURGICAL UNIT).: Brand: TORRENT

## (undated) DEVICE — POSTN ARM CRDL LAMIN PK/2

## (undated) DEVICE — SLV SCD CALF HEMOFORCE DVT THERP REPROC MD

## (undated) DEVICE — BITEBLOCK OMNI BLOC

## (undated) DEVICE — PETROLATUM GAUZE CISION DRESSING: Brand: VASELINE

## (undated) DEVICE — SNAR POLYP CAPTIVATOR RND STFF 2.4 240CM 10MM 1P/U

## (undated) DEVICE — CANN NASL CO2 TRULINK W/O2 A/

## (undated) DEVICE — TUBING, SUCTION, 1/4" X 10', STRAIGHT: Brand: MEDLINE

## (undated) DEVICE — SENSR O2 OXIMAX FNGR A/ 18IN NONSTR

## (undated) DEVICE — KT MANIFLD CARDIAC

## (undated) DEVICE — SNAR POLYP SENSATION STDOVL 27 240 BX40

## (undated) DEVICE — LAPAROSCOPIC GAS CONDITIONING DEVICE.: Brand: INSUFLOW

## (undated) DEVICE — ENDOPATH XCEL DILATING TIP TROCARS WITH STABILITY SLEEVES: Brand: ENDOPATH XCEL

## (undated) DEVICE — SYR LL TP 10ML STRL

## (undated) DEVICE — UNDYED BRAIDED (POLYGLACTIN 910), SYNTHETIC ABSORBABLE SUTURE: Brand: COATED VICRYL

## (undated) DEVICE — POLYESTER SINGLE STITCH RELOAD: Brand: SURGIDAC

## (undated) DEVICE — BITEBLOCK ENDO W/STRAP 60F A/ LF DISP

## (undated) DEVICE — KT SURG TURNOVER 050

## (undated) DEVICE — SUT VIC 0 SUTUPAK TIES 18IN J906G

## (undated) DEVICE — TR BAND RADIAL ARTERY COMPRESSION DEVICE: Brand: TR BAND

## (undated) DEVICE — FRCP BX RADJAW4 NDL 2.8 240CM LG OG BX40

## (undated) DEVICE — KT ORCA ORCAPOD DISP STRL

## (undated) DEVICE — ADAPT CLN BIOGUARD AIR/H2O DISP

## (undated) DEVICE — 3M™ STERI-STRIP™ REINFORCED ADHESIVE SKIN CLOSURES, R1547, 1/2 IN X 4 IN (12 MM X 100 MM), 6 STRIPS/ENVELOPE: Brand: 3M™ STERI-STRIP™

## (undated) DEVICE — MARYLAND JAW LAPAROSCOPIC SEALER/DIVIDER COATED: Brand: LIGASURE

## (undated) DEVICE — GENERAL LAPAROSCOPY CDS: Brand: MEDLINE INDUSTRIES, INC.

## (undated) DEVICE — CANN O2 ETCO2 FITS ALL CONN CO2 SMPL A/ 7IN DISP LF

## (undated) DEVICE — ENDOPATH PNEUMONEEDLE INSUFFLATION NEEDLES WITH LUER LOCK CONNECTORS 120MM: Brand: ENDOPATH

## (undated) DEVICE — Device: Brand: DEFENDO AIR/WATER/SUCTION AND BIOPSY VALVE

## (undated) DEVICE — PK CATH CARD 40

## (undated) DEVICE — PK ENDO GI 50

## (undated) DEVICE — SINGLE-USE BIOPSY FORCEPS: Brand: RADIAL JAW 4

## (undated) DEVICE — SOLUTION,WATER,IRRIGATION,1000ML,STERILE: Brand: MEDLINE

## (undated) DEVICE — DRSNG SURESITE WNDW 2.38X2.75

## (undated) DEVICE — TOTAL TRAY, DB, 100% SILI FOLEY, 16FR 10: Brand: MEDLINE

## (undated) DEVICE — THE SINGLE USE ETRAP – POLYP TRAP IS USED FOR SUCTION RETRIEVAL OF ENDOSCOPICALLY REMOVED POLYPS.: Brand: ETRAP

## (undated) DEVICE — DGW .035 FC J3MM 260CM TEF: Brand: EMERALD

## (undated) DEVICE — SUTURING DEVICE: Brand: ENDO STITCH

## (undated) DEVICE — RADIFOCUS OPTITORQUE ANGIOGRAPHIC CATHETER: Brand: OPTITORQUE

## (undated) DEVICE — LN SMPL CO2 SHTRM SD STREAM W/M LUER

## (undated) DEVICE — ENDOPATH 5MM CURVED SCISSORS WITH MONOPOLAR CAUTERY: Brand: ENDOPATH

## (undated) DEVICE — PAD GRND REM POLYHESIVE A/ DISP

## (undated) DEVICE — GLIDESHEATH SLENDER STAINLESS STEEL KIT: Brand: GLIDESHEATH SLENDER

## (undated) DEVICE — TBG 02 CRUSH RESIST LF CLR 7FT

## (undated) DEVICE — ENDOPATH XCEL WITH OPTIVIEW TECHNOLOGY UNIVERSAL TROCAR STABILITY SLEEVES: Brand: ENDOPATH XCEL OPTIVIEW

## (undated) DEVICE — 40580 - THE PINK PAD - ADVANCED TRENDELENBURG POSITIONING KIT: Brand: 40580 - THE PINK PAD - ADVANCED TRENDELENBURG POSITIONING KIT

## (undated) DEVICE — GLV SURG BIOGEL SENSR LTX PF SZ7.5

## (undated) DEVICE — DRAPE, LAVH, STERILE: Brand: MEDLINE

## (undated) DEVICE — SPNG GZ AVANT 6PLY 4X4IN STRL PK/2